# Patient Record
Sex: FEMALE | Race: WHITE | NOT HISPANIC OR LATINO | Employment: OTHER | URBAN - METROPOLITAN AREA
[De-identification: names, ages, dates, MRNs, and addresses within clinical notes are randomized per-mention and may not be internally consistent; named-entity substitution may affect disease eponyms.]

---

## 2017-04-06 ENCOUNTER — HOSPITAL ENCOUNTER (OUTPATIENT)
Dept: OTHER | Facility: HOSPITAL | Age: 67
Discharge: HOME | End: 2017-04-06
Attending: FAMILY MEDICINE | Admitting: FAMILY MEDICINE

## 2017-08-01 ENCOUNTER — HOSPITAL ENCOUNTER (OUTPATIENT)
Dept: OTHER | Facility: HOSPITAL | Age: 67
Discharge: HOME | End: 2017-08-01
Attending: FAMILY MEDICINE | Admitting: FAMILY MEDICINE

## 2017-11-22 ENCOUNTER — HOSPITAL ENCOUNTER (OUTPATIENT)
Dept: MAMMOGRAPHY | Facility: HOSPITAL | Age: 67
Discharge: HOME | End: 2017-11-22
Attending: OBSTETRICS & GYNECOLOGY | Admitting: OBSTETRICS & GYNECOLOGY

## 2018-01-03 ENCOUNTER — CONVERSION ENCOUNTER (OUTPATIENT)
Dept: FAMILY MEDICINE CLINIC | Facility: CLINIC | Age: 68
End: 2018-01-03

## 2018-01-03 LAB
BUN SERPL-MCNC: 15 MG/DL (ref 7–25)
BUN/CREAT SERPL: 16.7 (CALC) (ref 6–22)
CHLORIDE SERPL-SCNC: 102 MMOL/L (ref 98–110)
CONV CO2: 27 MMOL/L (ref 21–33)
CREAT UR-MCNC: 0.9 MG/DL (ref 0.6–1.18)
GLUCOSE UR QL: 94 MG/DL (ref 65–99)
POTASSIUM SERPL-SCNC: 4.4 MMOL/L (ref 3.5–5.3)
SODIUM SERPL-SCNC: 137 MMOL/L (ref 135–146)

## 2018-05-10 ENCOUNTER — HOSPITAL ENCOUNTER (OUTPATIENT)
Dept: GENERAL RADIOLOGY | Facility: HOSPITAL | Age: 68
Discharge: HOME | End: 2018-05-10
Attending: FAMILY MEDICINE | Admitting: FAMILY MEDICINE

## 2018-07-24 ENCOUNTER — CONVERSION ENCOUNTER (OUTPATIENT)
Dept: FAMILY MEDICINE CLINIC | Facility: CLINIC | Age: 68
End: 2018-07-24

## 2018-07-25 LAB
ALBUMIN SERPL-MCNC: 4.1 G/DL (ref 3.6–5.1)
ALP SERPL-CCNC: 34 U/L (ref 33–130)
AST SERPL-CCNC: 24 U/L (ref 10–35)
BILIRUB SERPL-MCNC: 0.5 MG/DL (ref 0.2–1.2)
BUN SERPL-MCNC: 15 MG/DL (ref 7–25)
BUN/CREAT SERPL: 13.6 (CALC) (ref 6–22)
CALCIUM SERPL-MCNC: 10.2 MG/DL (ref 8.6–10.2)
CHLORIDE SERPL-SCNC: 105 MMOL/L (ref 98–110)
CHOLEST SERPL-MCNC: 181 MG/DL (ref 125–200)
CONV CO2: 27 MMOL/L (ref 21–33)
CONV TOTAL PROTEIN: 6.6 G/DL (ref 6.2–8.3)
CREAT UR-MCNC: 1.1 MG/DL (ref 0.6–1.18)
EOSINOPHIL # BLD AUTO: 100 CELLS/UL (ref 15–500)
EOSINOPHIL # BLD AUTO: 2 %
ERYTHROCYTE [DISTWIDTH] IN BLOOD BY AUTOMATED COUNT: 13.9 % (ref 11–15)
GLOBULIN UR ELPH-MCNC: 2.5 MG/DL (ref 2.2–3.9)
GLUCOSE UR QL: 97 MG/DL (ref 65–99)
HCT VFR BLD AUTO: 38.2 % (ref 35–45)
HDLC SERPL-MCNC: 60 MG/DL
HGB BLD-MCNC: 12.8 G/DL (ref 11.7–15.5)
INSULIN SERPL-ACNC: 1.6 (CALC) (ref 1–2.1)
LDLC SERPL CALC-MCNC: 106 MG/DL
LYMPHOCYTES # BLD AUTO: 2200 CELLS/UL (ref 850–3900)
LYMPHOCYTES NFR BLD AUTO: 37 %
MCH RBC QN AUTO: 31.4 PG (ref 27–33)
MCHC RBC AUTO-ENTMCNC: 33.4 G/DL (ref 32–36)
MCV RBC AUTO: 93.8 FL (ref 80–100)
MONOCYTES # BLD AUTO: 700 CELLS/UL (ref 200–950)
MONOCYTES NFR BLD AUTO: 12 %
NEUTROPHILS # BLD AUTO: 2900 CELLS/UL (ref 1500–7800)
NEUTROPHILS NFR BLD AUTO: 49 %
PLATELET # BLD AUTO: 229 10*3/UL (ref 140–400)
PMV BLD AUTO: 10 FL (ref 7.5–11.5)
POTASSIUM SERPL-SCNC: 4.3 MMOL/L (ref 3.5–5.3)
RBC # BLD AUTO: 4.08 MILLION/UL (ref 3.8–5.1)
SODIUM SERPL-SCNC: 139 MMOL/L (ref 135–146)
TRIGL SERPL-MCNC: 77 MG/DL
TSH SERPL-ACNC: 3.37 MIU/L (ref 0.4–4.5)
WBC # BLD AUTO: 5.9 10*3/UL (ref 3.8–10.8)

## 2018-10-30 ENCOUNTER — HOSPITAL ENCOUNTER (OUTPATIENT)
Dept: OTHER | Facility: HOSPITAL | Age: 68
Discharge: HOME | End: 2018-10-30
Attending: FAMILY MEDICINE | Admitting: FAMILY MEDICINE

## 2018-12-04 ENCOUNTER — HOSPITAL ENCOUNTER (OUTPATIENT)
Dept: MAMMOGRAPHY | Facility: HOSPITAL | Age: 68
Discharge: HOME | End: 2018-12-04
Attending: OBSTETRICS & GYNECOLOGY | Admitting: OBSTETRICS & GYNECOLOGY

## 2019-02-12 ENCOUNTER — CONVERSION ENCOUNTER (OUTPATIENT)
Dept: FAMILY MEDICINE CLINIC | Facility: CLINIC | Age: 69
End: 2019-02-12

## 2019-02-13 LAB
ALBUMIN SERPL-MCNC: 4.4 G/DL (ref 3.6–5.1)
ALP SERPL-CCNC: 43 U/L (ref 33–130)
ALT SERPL-CCNC: 11 U/L (ref 6–29)
AST SERPL-CCNC: 18 U/L (ref 10–35)
BASOPHILS # BLD AUTO: 53 CELLS/UL (ref 0–200)
BASOPHILS NFR BLD AUTO: 0.6 %
BILIRUB SERPL-MCNC: 0.3 MG/DL (ref 0.2–1.2)
BUN SERPL-MCNC: 27 MG/DL (ref 7–25)
BUN/CREAT SERPL: 28 (CALC) (ref 6–22)
CALCIUM SERPL-MCNC: 9.8 MG/DL (ref 8.6–10.4)
CHLORIDE SERPL-SCNC: 104 MMOL/L (ref 98–110)
CONV CO2: 28 MMOL/L (ref 20–32)
CONV TOTAL PROTEIN: 7.2 G/DL (ref 6.1–8.1)
CREAT UR-MCNC: 0.96 MG/DL (ref 0.6–0.93)
EOSINOPHIL # BLD AUTO: 1.4 %
EOSINOPHIL # BLD AUTO: 123 CELLS/UL (ref 15–500)
ERYTHROCYTE [DISTWIDTH] IN BLOOD BY AUTOMATED COUNT: 13.1 % (ref 11–15)
GLOBULIN UR ELPH-MCNC: 2.8 MG/DL (ref 1.9–3.7)
GLUCOSE UR QL: 104 MG/DL (ref 65–99)
HCT VFR BLD AUTO: 37.9 % (ref 35–45)
HGB BLD-MCNC: 12.7 G/DL (ref 11.7–15.5)
INSULIN SERPL-ACNC: 1.6 (CALC) (ref 1–2.5)
LYMPHOCYTES # BLD AUTO: 2552 CELLS/UL (ref 850–3900)
LYMPHOCYTES NFR BLD AUTO: 29 %
MCH RBC QN AUTO: 30.6 PG (ref 27–33)
MCHC RBC AUTO-ENTMCNC: 33.5 G/DL (ref 32–36)
MCV RBC AUTO: 91.3 FL (ref 80–100)
MONOCYTES # BLD AUTO: 783 CELLS/UL (ref 200–950)
MONOCYTES NFR BLD AUTO: 8.9 %
NEUTROPHILS # BLD AUTO: 5289 CELLS/UL (ref 1500–7800)
NEUTROPHILS NFR BLD AUTO: 60.1 %
PLATELET # BLD AUTO: 269 10*3/UL (ref 140–400)
PMV BLD AUTO: 11.1 FL (ref 7.5–12.5)
POTASSIUM SERPL-SCNC: 4.6 MMOL/L (ref 3.5–5.3)
RBC # BLD AUTO: 4.15 MILLION/UL (ref 3.8–5.1)
SODIUM SERPL-SCNC: 138 MMOL/L (ref 135–146)
WBC # BLD AUTO: 8.8 10*3/UL (ref 3.8–10.8)

## 2019-07-12 RX ORDER — AMLODIPINE BESYLATE 2.5 MG/1
TABLET ORAL
Qty: 30 TABLET | Refills: 0 | Status: SHIPPED | OUTPATIENT
Start: 2019-07-12 | End: 2019-08-06 | Stop reason: SDUPTHER

## 2019-07-26 DIAGNOSIS — I10 HYPERTENSION, UNSPECIFIED TYPE: ICD-10-CM

## 2019-07-26 DIAGNOSIS — E78.5 HYPERLIPIDEMIA, UNSPECIFIED HYPERLIPIDEMIA TYPE: Primary | ICD-10-CM

## 2019-07-27 ENCOUNTER — RESULTS ENCOUNTER (OUTPATIENT)
Dept: FAMILY MEDICINE CLINIC | Facility: CLINIC | Age: 69
End: 2019-07-27

## 2019-07-27 DIAGNOSIS — E78.5 HYPERLIPIDEMIA, UNSPECIFIED HYPERLIPIDEMIA TYPE: ICD-10-CM

## 2019-07-27 DIAGNOSIS — I10 HYPERTENSION, UNSPECIFIED TYPE: ICD-10-CM

## 2019-08-06 ENCOUNTER — OFFICE VISIT (OUTPATIENT)
Dept: FAMILY MEDICINE CLINIC | Facility: CLINIC | Age: 69
End: 2019-08-06

## 2019-08-06 VITALS
HEIGHT: 62 IN | WEIGHT: 144.4 LBS | SYSTOLIC BLOOD PRESSURE: 128 MMHG | DIASTOLIC BLOOD PRESSURE: 80 MMHG | OXYGEN SATURATION: 96 % | RESPIRATION RATE: 17 BRPM | HEART RATE: 62 BPM | TEMPERATURE: 98.9 F | BODY MASS INDEX: 26.57 KG/M2

## 2019-08-06 DIAGNOSIS — R31.9 HEMATURIA, UNSPECIFIED TYPE: ICD-10-CM

## 2019-08-06 DIAGNOSIS — I10 HYPERTENSION, BENIGN: ICD-10-CM

## 2019-08-06 DIAGNOSIS — M81.0 AGE-RELATED OSTEOPOROSIS WITHOUT CURRENT PATHOLOGICAL FRACTURE: ICD-10-CM

## 2019-08-06 DIAGNOSIS — Z00.00 MEDICARE ANNUAL WELLNESS VISIT, SUBSEQUENT: Primary | ICD-10-CM

## 2019-08-06 DIAGNOSIS — E78.2 MIXED HYPERLIPIDEMIA: ICD-10-CM

## 2019-08-06 DIAGNOSIS — IMO0002 SOLITARY KIDNEY: ICD-10-CM

## 2019-08-06 DIAGNOSIS — N26.1 ATROPHIC KIDNEY: ICD-10-CM

## 2019-08-06 DIAGNOSIS — N30.10 IC (INTERSTITIAL CYSTITIS): ICD-10-CM

## 2019-08-06 DIAGNOSIS — N28.9 RENAL INSUFFICIENCY: ICD-10-CM

## 2019-08-06 PROBLEM — R15.0 INCOMPLETE DEFECATION: Status: ACTIVE | Noted: 2019-08-06

## 2019-08-06 PROBLEM — Z71.89 ENCOUNTER FOR COUNSELING FOR CARE MANAGEMENT OF PATIENT WITH CHRONIC CONDITIONS AND COMPLEX HEALTH NEEDS USING NURSE-BASED MODEL: Status: ACTIVE | Noted: 2019-08-06

## 2019-08-06 PROBLEM — B35.1 ONYCHOMYCOSIS: Status: ACTIVE | Noted: 2019-08-06

## 2019-08-06 PROBLEM — M54.6 THORACIC BACK PAIN: Status: ACTIVE | Noted: 2019-08-06

## 2019-08-06 PROBLEM — R00.2 PALPITATIONS: Status: ACTIVE | Noted: 2019-08-06

## 2019-08-06 PROBLEM — M94.9 DISORDER OF BONE AND CARTILAGE: Status: ACTIVE | Noted: 2019-08-06

## 2019-08-06 PROBLEM — L82.1 SEBORRHEIC KERATOSIS: Status: ACTIVE | Noted: 2019-08-06

## 2019-08-06 PROBLEM — M10.9 GOUT: Status: ACTIVE | Noted: 2019-08-06

## 2019-08-06 PROBLEM — I51.7 LVH (LEFT VENTRICULAR HYPERTROPHY): Status: ACTIVE | Noted: 2019-08-06

## 2019-08-06 PROBLEM — I34.0 MITRAL INSUFFICIENCY, ACUTE: Status: ACTIVE | Noted: 2019-08-06

## 2019-08-06 PROBLEM — E78.5 HYPERLIPIDEMIA: Status: ACTIVE | Noted: 2019-08-06

## 2019-08-06 PROBLEM — L91.8 SKIN TAG: Status: ACTIVE | Noted: 2019-08-06

## 2019-08-06 PROBLEM — I27.0 PRIMARY PULMONARY HYPERTENSION: Status: ACTIVE | Noted: 2019-08-06

## 2019-08-06 PROBLEM — F32.A DEPRESSIVE DISORDER: Status: ACTIVE | Noted: 2019-08-06

## 2019-08-06 PROBLEM — Z12.31 VISIT FOR SCREENING MAMMOGRAM: Status: ACTIVE | Noted: 2019-08-06

## 2019-08-06 PROBLEM — K21.9 ACID REFLUX: Status: ACTIVE | Noted: 2019-08-06

## 2019-08-06 PROBLEM — N95.1 MENOPAUSAL SYNDROME: Status: ACTIVE | Noted: 2019-08-06

## 2019-08-06 PROBLEM — M54.2 NECK PAIN: Status: ACTIVE | Noted: 2019-08-06

## 2019-08-06 PROBLEM — G56.00 CARPAL TUNNEL SYNDROME: Status: ACTIVE | Noted: 2019-08-06

## 2019-08-06 PROBLEM — M19.90 OSTEOARTHRITIS: Status: ACTIVE | Noted: 2019-08-06

## 2019-08-06 PROBLEM — K58.1 IRRITABLE BOWEL SYNDROME WITH CONSTIPATION: Status: ACTIVE | Noted: 2019-08-06

## 2019-08-06 PROBLEM — K64.9 HEMORRHOIDS: Status: ACTIVE | Noted: 2019-08-06

## 2019-08-06 PROBLEM — M72.2 PLANTAR FASCIITIS: Status: ACTIVE | Noted: 2019-08-06

## 2019-08-06 PROBLEM — J30.9 ALLERGIC RHINITIS: Status: ACTIVE | Noted: 2019-08-06

## 2019-08-06 PROBLEM — M89.9 DISORDER OF BONE AND CARTILAGE: Status: ACTIVE | Noted: 2019-08-06

## 2019-08-06 PROBLEM — Z12.11 SPECIAL SCREENING FOR MALIGNANT NEOPLASMS, COLON: Status: ACTIVE | Noted: 2019-08-06

## 2019-08-06 PROBLEM — R09.89 CAROTID BRUIT PRESENT: Status: ACTIVE | Noted: 2019-08-06

## 2019-08-06 PROBLEM — F41.9 ANXIETY: Status: ACTIVE | Noted: 2019-08-06

## 2019-08-06 LAB
BILIRUB BLD-MCNC: NEGATIVE MG/DL
CLARITY, POC: CLEAR
COLOR UR: YELLOW
GLUCOSE UR STRIP-MCNC: NEGATIVE MG/DL
KETONES UR QL: ABNORMAL
LEUKOCYTE EST, POC: ABNORMAL
NITRITE UR-MCNC: NEGATIVE MG/ML
PH UR: 6 [PH] (ref 5–8)
PROT UR STRIP-MCNC: ABNORMAL MG/DL
RBC # UR STRIP: ABNORMAL /UL
SP GR UR: 1.03 (ref 1–1.03)
UROBILINOGEN UR QL: NORMAL

## 2019-08-06 PROCEDURE — G0444 DEPRESSION SCREEN ANNUAL: HCPCS | Performed by: FAMILY MEDICINE

## 2019-08-06 PROCEDURE — G0439 PPPS, SUBSEQ VISIT: HCPCS | Performed by: FAMILY MEDICINE

## 2019-08-06 PROCEDURE — 99497 ADVNCD CARE PLAN 30 MIN: CPT | Performed by: FAMILY MEDICINE

## 2019-08-06 PROCEDURE — 96160 PT-FOCUSED HLTH RISK ASSMT: CPT | Performed by: FAMILY MEDICINE

## 2019-08-06 PROCEDURE — 99213 OFFICE O/P EST LOW 20 MIN: CPT | Performed by: FAMILY MEDICINE

## 2019-08-06 PROCEDURE — 81002 URINALYSIS NONAUTO W/O SCOPE: CPT | Performed by: FAMILY MEDICINE

## 2019-08-06 RX ORDER — ASPIRIN 81 MG/1
TABLET ORAL
COMMUNITY
Start: 2014-08-14 | End: 2021-04-14

## 2019-08-06 RX ORDER — ANTIARTHRITIC COMBINATION NO.2 900 MG
TABLET ORAL DAILY
COMMUNITY
End: 2022-11-23

## 2019-08-06 RX ORDER — TAMSULOSIN HYDROCHLORIDE 0.4 MG/1
1 CAPSULE ORAL DAILY
COMMUNITY

## 2019-08-06 RX ORDER — ALLOPURINOL 300 MG/1
TABLET ORAL
COMMUNITY
Start: 2019-06-24 | End: 2020-02-18

## 2019-08-06 RX ORDER — AMLODIPINE BESYLATE 2.5 MG/1
2.5 TABLET ORAL DAILY
Qty: 90 TABLET | Refills: 1 | Status: CANCELLED | OUTPATIENT
Start: 2019-08-06

## 2019-08-06 RX ORDER — METOPROLOL SUCCINATE 100 MG/1
1 TABLET, EXTENDED RELEASE ORAL DAILY
COMMUNITY
Start: 2019-05-20 | End: 2020-05-13

## 2019-08-06 RX ORDER — AMLODIPINE BESYLATE 2.5 MG/1
2.5 TABLET ORAL DAILY
Qty: 90 TABLET | Refills: 1 | Status: SHIPPED | OUTPATIENT
Start: 2019-08-06 | End: 2019-11-16 | Stop reason: SDUPTHER

## 2019-08-06 RX ORDER — CYCLOSPORINE 0.5 MG/ML
2 EMULSION OPHTHALMIC
COMMUNITY
Start: 2014-08-14 | End: 2020-12-28

## 2019-08-06 NOTE — PROGRESS NOTES
Subjective   Dunia Fabian is a 68 y.o. female.     Chief Complaint   Patient presents with   • Medicare Wellness-subsequent     last echo 5/10/2018 last stress test 11/27/2007   • Hyperlipidemia     last lipid 7/29/2019 and was elevated   • Follow-up     seen dr.carol berg on 4/18/2019 and had pap and mammogram       The patient is here: for coordination of medical care.  Last comprehensive physical was on 7/31/2018.  Patient's previous physician was Dr.John Driver.  Overall has: moderate activity with work/home activities, exercises 2 - 3 times per week, good appetite, feels well with minor complaints, good energy level and is sleeping well. Nutrition: appropriate balanced diet. Last tetanus shot was 10/24/2012. occasionally. Patient's last PAP Smear was: 4/18/2019. 8/1/2017. The Patient's last Mammogram was: 4/18/2019. Patient's last colonoscopy was: 6/21/2016.    Hyperlipidemia   This is a chronic problem. The current episode started more than 1 year ago. The problem is uncontrolled. Recent lipid tests were reviewed and are high. Pertinent negatives include no chest pain or shortness of breath.        Recent Hospitalizations:  No hospitalization(s) within the last year..  ccc    I personally reviewed and updated the patient's allergies, medications, problem list, and past medical, surgical, social, and family history.     Family History   Problem Relation Age of Onset   • Parkinsonism Mother    • Heart disease Mother         cardiovascular disease   • Diabetes Mother         diabetes mellitus    • Heart disease Father         cardiovascular disease   • Heart disease Sister         cardiovascular disease   • Diabetes Sister         diabetes mellitus    • Heart disease Brother         cardiovascular disease   • Diabetes Son         diabetes mellitus    • Heart disease Paternal Uncle         ischemic   • Stomach cancer Maternal Grandmother        Social History     Tobacco Use   • Smoking status: Never  Smoker   Substance Use Topics   • Alcohol use: No     Frequency: Never   • Drug use: No       Past Surgical History:   Procedure Laterality Date   • CATARACT EXTRACTION Left 08/2005    with Insert of Lens Prostheti   • CYSTOCELE REPAIR  06/1998    SIMENTAL Cystourethropexy & Cystocele Repair    • KIDNEY SURGERY Right 02/24/2014    Removal   • TONSILLECTOMY  1953   • URETHROLYSIS  10/2000    Transvaginal Urethrolysis & Bone [place] Sling   • VAGINAL HYSTERECTOMY  1970   • VITRECTOMY Left 04/2005    & Membrane Peeling       Patient Active Problem List   Diagnosis   • Acid reflux   • Allergic rhinitis   • Anxiety   • Atrophic kidney   • Carotid bruit present   • Carpal tunnel syndrome   • Low back pain   • Neck pain   • Thoracic back pain   • Depressive disorder   • Medicare annual wellness visit, subsequent   • Gout   • Hemorrhoids   • Hyperlipidemia   • Hypertension, benign   • IC (interstitial cystitis)   • Irritable bowel syndrome with constipation   • LVH (left ventricular hypertrophy)   • Menopausal syndrome   • Mitral insufficiency, acute   • Onychomycosis   • Disorder of bone and cartilage   • Osteoporosis   • Palpitations   • Osteoarthritis   • Primary pulmonary hypertension (CMS/HCC)   • Renal insufficiency   • Plantar fasciitis   • Senile osteoporosis   • Solitary kidney   • Visit for screening mammogram   • Special screening for malignant neoplasms, colon   • Skin tag   • Incomplete defecation   • Seborrheic keratosis   • Hematuria         Current Outpatient Medications:   •  allopurinol (ZYLOPRIM) 300 MG tablet, , Disp: , Rfl:   •  amLODIPine (NORVASC) 2.5 MG tablet, Take 1 tablet by mouth Daily., Disp: 90 tablet, Rfl: 1  •  aspirin (ADULT ASPIRIN EC LOW STRENGTH) 81 MG EC tablet, ADULT ASPIRIN EC LOW STRENGTH 81 MG ORAL TABLET DELAYED RELEASE, Disp: , Rfl:   •  cycloSPORINE (RESTASIS) 0.05 % ophthalmic emulsion, 2 drops., Disp: , Rfl:   •  denosumab (PROLIA) 60 MG/ML solution prefilled syringe syringe, Inject  " under the skin into the appropriate area as directed., Disp: , Rfl:   •  metoprolol succinate XL (TOPROL-XL) 100 MG 24 hr tablet, Take 1 tablet by mouth Daily., Disp: , Rfl:   •  Multiple Vitamins-Minerals (WOMENS MULTI) capsule, Take  by mouth Daily., Disp: , Rfl:   •  SUPER B COMPLEX/C capsule, SUPER B COMPLEX/C ORAL CAPSULE, Disp: , Rfl:   •  tamsulosin (FLOMAX) 0.4 MG capsule 24 hr capsule, Take 1 capsule by mouth Daily., Disp: , Rfl:          Review of Systems   Constitutional: Negative for chills and diaphoresis.   HENT: Negative for trouble swallowing and voice change.    Eyes: Negative for visual disturbance.   Respiratory: Negative for shortness of breath.    Cardiovascular: Negative for chest pain and palpitations.   Gastrointestinal: Negative for abdominal pain and nausea.   Endocrine: Negative for polydipsia and polyphagia.   Genitourinary: Negative for hematuria.   Musculoskeletal: Negative for neck stiffness.   Skin: Negative for color change and pallor.   Allergic/Immunologic: Negative for immunocompromised state.   Neurological: Negative for seizures and syncope.   Hematological: Negative for adenopathy.   Psychiatric/Behavioral: Negative for sleep disturbance and suicidal ideas.       Objective   /80   Pulse 62   Temp 98.9 °F (37.2 °C)   Resp 17   Ht 156.2 cm (61.5\")   Wt 65.5 kg (144 lb 6.4 oz)   SpO2 96%   BMI 26.84 kg/m²   BP Readings from Last 3 Encounters:   08/06/19 128/80   02/19/19 142/62   10/30/18 122/64     Wt Readings from Last 3 Encounters:   08/06/19 65.5 kg (144 lb 6.4 oz)   02/19/19 65.8 kg (144 lb 16 oz)   10/30/18 65 kg (143 lb 6.4 oz)     Physical Exam   Constitutional: She is oriented to person, place, and time. She appears well-developed and well-nourished.   HENT:   Head: Normocephalic.   Right Ear: Tympanic membrane, external ear and ear canal normal.   Left Ear: Tympanic membrane, external ear and ear canal normal.   Nose: Nose normal.   Eyes: Conjunctivae, EOM " and lids are normal. Pupils are equal, round, and reactive to light.   Neck: No JVD present. Carotid bruit is not present. No tracheal deviation present. No thyromegaly present.   Cardiovascular: Normal rate, regular rhythm, normal heart sounds and intact distal pulses. Exam reveals no gallop and no friction rub.   No murmur heard.  Pulmonary/Chest: Effort normal and breath sounds normal. No stridor. She has no decreased breath sounds. She has no wheezes. She has no rales.   Abdominal: Soft. Bowel sounds are normal. She exhibits no distension and no mass. There is no tenderness. There is no rebound and no guarding. No hernia.   Lymphadenopathy:        Head (right side): No submental, no submandibular, no tonsillar, no preauricular, no posterior auricular and no occipital adenopathy present.        Head (left side): No submental, no submandibular, no tonsillar, no preauricular, no posterior auricular and no occipital adenopathy present.     She has no cervical adenopathy.   Neurological: She is alert and oriented to person, place, and time. She has normal strength and normal reflexes. No cranial nerve deficit or sensory deficit. Coordination and gait normal.   Skin: Skin is warm and dry. Turgor is normal. She is not diaphoretic. No pallor.   Seborrheic keratosis neck, benign appearance       Recent Lab Results:          Lab Results   Component Value Date    CHOL 181 07/24/2018    CHOL 215 (H) 07/21/2017    CHOL 198 07/20/2016     Lab Results   Component Value Date    TRIG 77 07/24/2018    TRIG 96 07/21/2017    TRIG 105 07/20/2016     Lab Results   Component Value Date    HDL 60 07/24/2018    HDL 62 07/21/2017    HDL 60 07/20/2016     Lab Results   Component Value Date     07/24/2018     (H) 07/21/2017     07/20/2016     No results found for: PSA  Lab Results   Component Value Date    WBC 8.8 02/12/2019    HGB 12.7 02/12/2019    HCT 37.9 02/12/2019    MCV 91.3 02/12/2019     02/12/2019      Lab Results   Component Value Date    TSH 3.37 07/24/2018     Lab Results   Component Value Date    BUN 27 (H) 02/12/2019    CREATININE 0.96 (H) 02/12/2019    EGFRIFNONA 60 02/12/2019    EGFRIFAFRI 69 02/12/2019    BCR 28 (H) 02/12/2019    K 4.6 02/12/2019    CO2 28 02/12/2019    CALCIUM 9.8 02/12/2019    ALBUMIN 4.4 02/12/2019    AST 18 02/12/2019    ALT 11 02/12/2019         Age-appropriate Screening Schedule:  Refer to the list below for future screening recommendations based on patient's age, sex and/or medical conditions. Orders for these recommended tests are listed in the plan section. The patient has been provided with a written plan.    Health Maintenance   Topic Date Due   • ZOSTER VACCINE (1 of 2) 02/07/2019   • LIPID PANEL  07/26/2019   • INFLUENZA VACCINE  08/01/2019   • DXA SCAN  08/27/2019   • PNEUMOCOCCAL VACCINES (65+ LOW/MEDIUM RISK) (2 of 2 - PPSV23) 08/31/2019   • TDAP/TD VACCINES (3 - Td) 10/24/2022   • COLONOSCOPY  06/21/2026       Depression Screen:   PHQ-2/PHQ-9 Depression Screening 8/6/2019   Little interest or pleasure in doing things 0   Feeling down, depressed, or hopeless 0   Trouble falling or staying asleep, or sleeping too much 0   Feeling tired or having little energy 0   Poor appetite or overeating 0   Feeling bad about yourself - or that you are a failure or have let yourself or your family down 0   Trouble concentrating on things, such as reading the newspaper or watching television 0   Moving or speaking so slowly that other people could have noticed. Or the opposite - being so fidgety or restless that you have been moving around a lot more than usual 0   Thoughts that you would be better off dead, or of hurting yourself in some way 0   Total Score 0   If you checked off any problems, how difficult have these problems made it for you to do your work, take care of things at home, or get along with other people? Not difficult at all     I spent more than 16 minutes asking  patient questions, counseling and documenting in the chart.    Health Habits and Functional and Cognitive Screening:  Functional & Cognitive Status 8/6/2019   Do you have difficulty preparing food and eating? No   Do you have difficulty bathing yourself, getting dressed or grooming yourself? No   Do you have difficulty using the toilet? No   Do you have difficulty moving around from place to place? No   Do you have trouble with steps or getting out of a bed or a chair? No   Current Diet Well Balanced Diet   Dental Exam Up to date   Eye Exam Up to date   Exercise (times per week) 6 times per week   Current Exercise Activities Include Running/Jogging   Do you need help using the phone?  No   Are you deaf or do you have serious difficulty hearing?  No   Do you need help with transportation? No   Do you need help shopping? No   Do you need help preparing meals?  No   Do you need help with housework?  No   Do you need help with laundry? No   Do you need help taking your medications? No   Do you need help managing money? No   Do you ever drive or ride in a car without wearing a seat belt? No   Have you felt unusual stress, anger or loneliness in the last month? No   Who do you live with? Alone   If you need help, do you have trouble finding someone available to you? No   Have you been bothered in the last four weeks by sexual problems? No   Do you have difficulty concentrating, remembering or making decisions? No       Does the patient have evidence of cognitive impairment? No    Advance Care Planning:  Patient does have a living will and will provide us with that.     A face-to-face visit was completed today with patient.  Counseling explanation, and discussion of advanced directives was performed.   The last advanced care visit was performed in 2018.  In a near life ending situation, from which she is not expected to recover functionally, and she is not able to speak for her, she does not want life sustaining measures.  We discussed feeding tubes, ventilators and cardiac support as well as dialysis.    I spent more than 16 minutes discussing Advanced Care Planning information and documenting in the chart.    Identification of Risk Factors:  Risk factors include: Advance Directive Discussion  Cardiovascular risk  Dementia/Memory   Depression/Dysphoria  Fall Risk  Osteoprorosis Risk.    Compared to one year ago, the patient feels her physical health is better.  Compared to one year ago, the patient feels her mental health is better.    Patient Self-Management and Personalized Health Advice  The patient has been provided with information about: diet, exercise, weight management, prevention of cardiac or vascular disease, the relationship between weight and GERD, fall prevention, designing advance directives, supplements and mental health concerns and preventive services including:   · Alcohol Misuse Screening and Counseling  (15 minutes counseling time, Code )  · Annual Wellness Visit (AWV)  · Bone Density Measurements  · Counseling to Prevent Tobacco Use (use of smartset and @cessation@ smartphrase for documentation)  · Depression Screening (15 minutes face to face, Code )  · Influenza Vaccine and Administration  · Pneumococcal Vaccine and Administration.      Assessment/Plan   Problem List Items Addressed This Visit        Cardiovascular and Mediastinum    Hyperlipidemia    Overview     Worse, has not been working on diet, restart  Overall good control  Discussed diet, exercise, lifestyle modification.          Hypertension, benign    Overview     good control  Discussed low sodium diet, lifestyle modification.  good control  holding lisinopril secondary to renal insufficiency  Echo 5/18 with normal ef, increased r sided pressure  Carotid dopplers 5.18 with midl blockage only         Relevant Medications    metoprolol succinate XL (TOPROL-XL) 100 MG 24 hr tablet    amLODIPine (NORVASC) 2.5 MG tablet       Musculoskeletal  and Integument    Osteoporosis    Overview     Tolerating prolia  Discussed diet, exercise, lifestyle modification.  holding calcium, vit d per nephrology  recheck dexa            Genitourinary    Atrophic kidney    Overview     improve status post nephrectomy  followed by urology         IC (interstitial cystitis)    Overview     improved on flomax, followed by [facility], change rx to qhs  h/o interstim placement / subsequent removal  h/o improved on rx per morena, pt  Discussed diet, lifestyle modification.   followed by Zulema / Ernie, s/p recentt removal interstim 2nd inneffective         Relevant Medications    tamsulosin (FLOMAX) 0.4 MG capsule 24 hr capsule    Renal insufficiency    Overview     mild / stable / improved  s/p left nephrectomy bell  avoid nsaids, improved off lisinopril / hctz / pyridium  no blood draws in right arm         Solitary kidney    Overview     doing well, renal f negative normal  avoid nsaids / continue to monitor            Other    Medicare annual wellness visit, subsequent - Primary    Overview     Doing well, vaccines current  Followed by obgyn  Age specific anticipatory guidance and warning signs discussed.  Diet, exercise, and lifestyle modification discussed.  Safety, seatbelts, and routine screenbng examinations discussed.  Discussed self-examinations.          Relevant Orders    POCT urinalysis dipstick, manual (Completed)    Hematuria    Relevant Orders    Urine Culture - Urine, Urine, Clean Catch              Expected course, medications, and adverse effects discussed.  Call or return if worsening or persistent symptoms.

## 2019-08-09 LAB
BACTERIA UR CULT: ABNORMAL
BACTERIA UR CULT: ABNORMAL
OTHER ANTIBIOTIC SUSC ISLT: ABNORMAL

## 2019-08-13 ENCOUNTER — TELEPHONE (OUTPATIENT)
Dept: FAMILY MEDICINE CLINIC | Facility: CLINIC | Age: 69
End: 2019-08-13

## 2019-08-13 DIAGNOSIS — R15.9 INCONTINENCE OF FECES, UNSPECIFIED FECAL INCONTINENCE TYPE: Primary | ICD-10-CM

## 2019-08-13 RX ORDER — NITROFURANTOIN 25; 75 MG/1; MG/1
100 CAPSULE ORAL 2 TIMES DAILY
Qty: 20 CAPSULE | Refills: 0 | Status: SHIPPED | OUTPATIENT
Start: 2019-08-13 | End: 2020-07-16

## 2019-08-13 NOTE — TELEPHONE ENCOUNTER
Let her know her urine culture is growing out bacteria indicating she does have a urinary tract infection, I sent an antibiotic for her, thanks

## 2019-08-14 ENCOUNTER — OFFICE (AMBULATORY)
Dept: URBAN - METROPOLITAN AREA CLINIC 64 | Facility: CLINIC | Age: 69
End: 2019-08-14

## 2019-08-14 VITALS
HEART RATE: 55 BPM | DIASTOLIC BLOOD PRESSURE: 67 MMHG | HEIGHT: 61 IN | SYSTOLIC BLOOD PRESSURE: 146 MMHG | WEIGHT: 146 LBS

## 2019-08-14 DIAGNOSIS — R15.9 FULL INCONTINENCE OF FECES: ICD-10-CM

## 2019-08-14 DIAGNOSIS — R19.7 DIARRHEA, UNSPECIFIED: ICD-10-CM

## 2019-08-14 PROCEDURE — 99203 OFFICE O/P NEW LOW 30 MIN: CPT | Performed by: INTERNAL MEDICINE

## 2019-08-14 RX ORDER — LOPERAMIDE HYDROCHLORIDE 2 MG/1
2 TABLET, FILM COATED ORAL
Qty: 30 | Refills: 10 | Status: COMPLETED
Start: 2019-08-14 | End: 2024-02-27

## 2019-11-06 ENCOUNTER — OFFICE VISIT (OUTPATIENT)
Dept: FAMILY MEDICINE CLINIC | Facility: CLINIC | Age: 69
End: 2019-11-06

## 2019-11-06 VITALS
HEART RATE: 65 BPM | TEMPERATURE: 98 F | BODY MASS INDEX: 27.86 KG/M2 | HEIGHT: 62 IN | SYSTOLIC BLOOD PRESSURE: 152 MMHG | RESPIRATION RATE: 18 BRPM | DIASTOLIC BLOOD PRESSURE: 80 MMHG | OXYGEN SATURATION: 95 % | WEIGHT: 151.4 LBS

## 2019-11-06 DIAGNOSIS — I10 HYPERTENSION, UNSPECIFIED TYPE: Primary | ICD-10-CM

## 2019-11-06 DIAGNOSIS — M81.0 AGE-RELATED OSTEOPOROSIS WITHOUT CURRENT PATHOLOGICAL FRACTURE: ICD-10-CM

## 2019-11-06 DIAGNOSIS — N26.1 ATROPHIC KIDNEY: ICD-10-CM

## 2019-11-06 DIAGNOSIS — N30.10 IC (INTERSTITIAL CYSTITIS): ICD-10-CM

## 2019-11-06 PROCEDURE — 99214 OFFICE O/P EST MOD 30 MIN: CPT | Performed by: FAMILY MEDICINE

## 2019-11-06 NOTE — PROGRESS NOTES
Subjective   Dunia Fabian is a 69 y.o. female.     Chief Complaint   Patient presents with   • Hypertension       Hypertension   This is a chronic problem. The current episode started more than 1 year ago. The problem is controlled. Associated symptoms include neck pain. Pertinent negatives include no chest pain, palpitations or shortness of breath. Risk factors for coronary artery disease include dyslipidemia. Current antihypertension treatment includes beta blockers and angiotensin blockers. The current treatment provides moderate improvement. There are no compliance problems.             I personally reviewed and updated the patient's allergies, medications, problem list, and past medical, surgical, social, and family history.     Family History   Problem Relation Age of Onset   • Parkinsonism Mother    • Heart disease Mother         cardiovascular disease   • Diabetes Mother         diabetes mellitus    • Heart disease Father         cardiovascular disease   • Heart disease Sister         cardiovascular disease   • Diabetes Sister         diabetes mellitus    • Heart disease Brother         cardiovascular disease   • Diabetes Son         diabetes mellitus    • Heart disease Paternal Uncle         ischemic   • Stomach cancer Maternal Grandmother        Social History     Tobacco Use   • Smoking status: Never Smoker   • Smokeless tobacco: Never Used   Substance Use Topics   • Alcohol use: No     Frequency: Never   • Drug use: No       Past Surgical History:   Procedure Laterality Date   • CATARACT EXTRACTION Left 08/2005    with Insert of Lens Prostheti   • CYSTOCELE REPAIR  06/1998    SIMENTAL Cystourethropexy & Cystocele Repair    • KIDNEY SURGERY Right 02/24/2014    Removal   • TONSILLECTOMY  1953   • URETHROLYSIS  10/2000    Transvaginal Urethrolysis & Bone [place] Sling   • VAGINAL HYSTERECTOMY  1970   • VITRECTOMY Left 04/2005    & Membrane Peeling       Patient Active Problem List   Diagnosis   • Acid  reflux   • Allergic rhinitis   • Anxiety   • Atrophic kidney   • Carotid bruit present   • Carpal tunnel syndrome   • Low back pain   • Neck pain   • Thoracic back pain   • Depressive disorder   • Medicare annual wellness visit, subsequent   • Gout   • Hemorrhoids   • Hyperlipidemia   • Hypertension, benign   • IC (interstitial cystitis)   • Irritable bowel syndrome with constipation   • LVH (left ventricular hypertrophy)   • Menopausal syndrome   • Mitral insufficiency, acute   • Onychomycosis   • Disorder of bone and cartilage   • Osteoporosis   • Palpitations   • Osteoarthritis   • Primary pulmonary hypertension (CMS/HCC)   • Renal insufficiency   • Plantar fasciitis   • Senile osteoporosis   • Solitary kidney   • Visit for screening mammogram   • Special screening for malignant neoplasms, colon   • Skin tag   • Incomplete defecation   • Seborrheic keratosis   • Hematuria         Current Outpatient Medications:   •  allopurinol (ZYLOPRIM) 300 MG tablet, , Disp: , Rfl:   •  amLODIPine (NORVASC) 2.5 MG tablet, Take 1 tablet by mouth Daily., Disp: 90 tablet, Rfl: 1  •  aspirin (ADULT ASPIRIN EC LOW STRENGTH) 81 MG EC tablet, ADULT ASPIRIN EC LOW STRENGTH 81 MG ORAL TABLET DELAYED RELEASE, Disp: , Rfl:   •  cycloSPORINE (RESTASIS) 0.05 % ophthalmic emulsion, 2 drops., Disp: , Rfl:   •  denosumab (PROLIA) 60 MG/ML solution prefilled syringe syringe, Inject  under the skin into the appropriate area as directed., Disp: , Rfl:   •  metoprolol succinate XL (TOPROL-XL) 100 MG 24 hr tablet, Take 1 tablet by mouth Daily., Disp: , Rfl:   •  Multiple Vitamins-Minerals (WOMENS MULTI) capsule, Take  by mouth Daily., Disp: , Rfl:   •  nitrofurantoin, macrocrystal-monohydrate, (MACROBID) 100 MG capsule, Take 1 capsule by mouth 2 (Two) Times a Day., Disp: 20 capsule, Rfl: 0  •  SUPER B COMPLEX/C capsule, SUPER B COMPLEX/C ORAL CAPSULE, Disp: , Rfl:   •  tamsulosin (FLOMAX) 0.4 MG capsule 24 hr capsule, Take 1 capsule by mouth  "Daily., Disp: , Rfl:          Review of Systems   Constitutional: Negative for chills and diaphoresis.   Eyes: Negative for visual disturbance.   Respiratory: Negative for shortness of breath.    Cardiovascular: Negative for chest pain and palpitations.   Gastrointestinal: Negative for abdominal pain and nausea.   Endocrine: Negative for polydipsia and polyphagia.   Musculoskeletal: Positive for neck pain. Negative for neck stiffness.   Skin: Negative for color change and pallor.   Neurological: Negative for seizures and syncope.   Hematological: Negative for adenopathy.       Objective   /80   Pulse 65   Temp 98 °F (36.7 °C)   Resp 18   Ht 156.2 cm (61.5\")   Wt 68.7 kg (151 lb 6.4 oz)   SpO2 95%   Breastfeeding? No   BMI 28.14 kg/m²   Wt Readings from Last 3 Encounters:   11/06/19 68.7 kg (151 lb 6.4 oz)   08/06/19 65.5 kg (144 lb 6.4 oz)   02/19/19 65.8 kg (144 lb 16 oz)     Physical Exam   Constitutional: She is oriented to person, place, and time. She appears well-developed and well-nourished.   Cardiovascular: Normal rate, regular rhythm, S1 normal, S2 normal, normal heart sounds, intact distal pulses and normal pulses. Exam reveals no gallop and no friction rub.   No murmur heard.  Pulmonary/Chest: Effort normal and breath sounds normal. No accessory muscle usage or stridor. She has no decreased breath sounds. She has no wheezes. She has no rhonchi. She has no rales.   Abdominal: Soft. Normal appearance, normal aorta and bowel sounds are normal. She exhibits no distension, no pulsatile midline mass and no mass. There is no hepatosplenomegaly. There is no tenderness. There is no rigidity, no rebound, no guarding, no CVA tenderness and negative Diego's sign. No hernia.   Neurological: She is alert and oriented to person, place, and time. Coordination and gait normal.   Skin: Skin is warm and dry. Turgor is normal. She is not diaphoretic. No pallor.         Assessment/Plan     Hypertension.  Good " control.  Discussed low-sodium diet.  Stress echo benign/elevated right-sided pressure only 2018.  Carotid Dopplers with mild blockage only 2018.  Osteoporosis.  Tolerating Prolia.  Holding calcium/vitamin D per nephrology.  Follow-up recheck DEXA  Atrophic kidney.  Improved status post nephrectomy, followed by urology.  Renal function normal.  Interstitial cystitis.  Improved on Flomax, will be followed by [facility], history of InterStim placement/subsequent removal.  Problem List Items Addressed This Visit        Musculoskeletal and Integument    Osteoporosis       Genitourinary    Atrophic kidney    IC (interstitial cystitis)      Other Visit Diagnoses     Hypertension, unspecified type    -  Primary              Expected course, medications, and adverse effects discussed.  Call or return if worsening or persistent symptoms.

## 2019-11-16 DIAGNOSIS — I10 HYPERTENSION, BENIGN: ICD-10-CM

## 2019-11-18 DIAGNOSIS — I10 HYPERTENSION, BENIGN: ICD-10-CM

## 2019-11-18 DIAGNOSIS — I10 HYPERTENSION, BENIGN: Primary | ICD-10-CM

## 2019-11-18 RX ORDER — AMLODIPINE BESYLATE 2.5 MG/1
2.5 TABLET ORAL DAILY
Qty: 90 TABLET | Refills: 0 | Status: SHIPPED | OUTPATIENT
Start: 2019-11-18 | End: 2020-01-15 | Stop reason: SDUPTHER

## 2019-11-18 RX ORDER — CLONIDINE HYDROCHLORIDE 0.1 MG/1
TABLET ORAL
Qty: 60 TABLET | Refills: 5 | Status: SHIPPED | OUTPATIENT
Start: 2019-11-18 | End: 2019-11-18 | Stop reason: SDUPTHER

## 2019-11-19 RX ORDER — CLONIDINE HYDROCHLORIDE 0.1 MG/1
TABLET ORAL
Qty: 771 TABLET | Refills: 5 | Status: SHIPPED | OUTPATIENT
Start: 2019-11-19 | End: 2020-01-15

## 2019-11-25 ENCOUNTER — TRANSCRIBE ORDERS (OUTPATIENT)
Dept: ADMINISTRATIVE | Facility: HOSPITAL | Age: 69
End: 2019-11-25

## 2019-11-25 DIAGNOSIS — Z12.31 ENCOUNTER FOR SCREENING MAMMOGRAM FOR BREAST CANCER: Primary | ICD-10-CM

## 2019-12-03 ENCOUNTER — TELEPHONE (OUTPATIENT)
Dept: FAMILY MEDICINE CLINIC | Facility: CLINIC | Age: 69
End: 2019-12-03

## 2019-12-03 NOTE — TELEPHONE ENCOUNTER
Called pt and let her know we do have the Prolia and that she can come in and get it when she is ready.

## 2019-12-04 ENCOUNTER — CLINICAL SUPPORT (OUTPATIENT)
Dept: FAMILY MEDICINE CLINIC | Facility: CLINIC | Age: 69
End: 2019-12-04

## 2019-12-04 DIAGNOSIS — M81.0 AGE-RELATED OSTEOPOROSIS WITHOUT CURRENT PATHOLOGICAL FRACTURE: Primary | ICD-10-CM

## 2019-12-04 PROCEDURE — 96372 THER/PROPH/DIAG INJ SC/IM: CPT | Performed by: FAMILY MEDICINE

## 2019-12-10 ENCOUNTER — HOSPITAL ENCOUNTER (OUTPATIENT)
Dept: MAMMOGRAPHY | Facility: HOSPITAL | Age: 69
Discharge: HOME | End: 2019-12-10
Admitting: OBSTETRICS & GYNECOLOGY

## 2019-12-10 DIAGNOSIS — Z12.31 ENCOUNTER FOR SCREENING MAMMOGRAM FOR BREAST CANCER: ICD-10-CM

## 2019-12-10 PROCEDURE — 77067 SCR MAMMO BI INCL CAD: CPT

## 2019-12-10 PROCEDURE — 77063 BREAST TOMOSYNTHESIS BI: CPT

## 2020-01-15 ENCOUNTER — OFFICE VISIT (OUTPATIENT)
Dept: FAMILY MEDICINE CLINIC | Facility: CLINIC | Age: 70
End: 2020-01-15

## 2020-01-15 VITALS
WEIGHT: 152.4 LBS | OXYGEN SATURATION: 97 % | HEART RATE: 65 BPM | TEMPERATURE: 98.2 F | HEIGHT: 62 IN | DIASTOLIC BLOOD PRESSURE: 76 MMHG | SYSTOLIC BLOOD PRESSURE: 158 MMHG | RESPIRATION RATE: 16 BRPM | BODY MASS INDEX: 28.05 KG/M2

## 2020-01-15 DIAGNOSIS — N30.01 ACUTE CYSTITIS WITH HEMATURIA: ICD-10-CM

## 2020-01-15 DIAGNOSIS — J06.9 VIRAL UPPER RESPIRATORY TRACT INFECTION: Primary | ICD-10-CM

## 2020-01-15 DIAGNOSIS — I10 HYPERTENSION, BENIGN: ICD-10-CM

## 2020-01-15 DIAGNOSIS — R30.0 DYSURIA: ICD-10-CM

## 2020-01-15 DIAGNOSIS — N30.10 IC (INTERSTITIAL CYSTITIS): ICD-10-CM

## 2020-01-15 LAB
BILIRUB BLD-MCNC: NEGATIVE MG/DL
CLARITY, POC: ABNORMAL
COLOR UR: YELLOW
EXPIRATION DATE: NORMAL
FLUAV AG NPH QL: NEGATIVE
FLUBV AG NPH QL: NEGATIVE
GLUCOSE UR STRIP-MCNC: NEGATIVE MG/DL
INTERNAL CONTROL: NORMAL
KETONES UR QL: ABNORMAL
LEUKOCYTE EST, POC: ABNORMAL
Lab: NORMAL
NITRITE UR-MCNC: NEGATIVE MG/ML
PH UR: 6.5 [PH] (ref 5–8)
PROT UR STRIP-MCNC: NEGATIVE MG/DL
RBC # UR STRIP: ABNORMAL /UL
SP GR UR: 1 (ref 1–1.03)
UROBILINOGEN UR QL: NORMAL

## 2020-01-15 PROCEDURE — 87804 INFLUENZA ASSAY W/OPTIC: CPT | Performed by: FAMILY MEDICINE

## 2020-01-15 PROCEDURE — 81002 URINALYSIS NONAUTO W/O SCOPE: CPT | Performed by: FAMILY MEDICINE

## 2020-01-15 PROCEDURE — 99214 OFFICE O/P EST MOD 30 MIN: CPT | Performed by: FAMILY MEDICINE

## 2020-01-15 RX ORDER — FLUCONAZOLE 150 MG/1
150 TABLET ORAL ONCE
Qty: 1 TABLET | Refills: 0 | Status: SHIPPED | OUTPATIENT
Start: 2020-01-15 | End: 2020-01-15

## 2020-01-15 RX ORDER — CEFPROZIL 500 MG/1
500 TABLET, FILM COATED ORAL 2 TIMES DAILY
Qty: 20 TABLET | Refills: 0 | Status: SHIPPED | OUTPATIENT
Start: 2020-01-15 | End: 2020-07-16

## 2020-01-15 RX ORDER — CLONIDINE HYDROCHLORIDE 0.1 MG/1
TABLET ORAL
Qty: 120 TABLET | Refills: 0
Start: 2020-01-15 | End: 2020-07-16

## 2020-01-15 RX ORDER — AMLODIPINE BESYLATE 5 MG/1
5 TABLET ORAL DAILY
Qty: 30 TABLET | Refills: 11 | Status: SHIPPED | OUTPATIENT
Start: 2020-01-15 | End: 2020-01-15

## 2020-01-15 RX ORDER — AMLODIPINE BESYLATE 5 MG/1
5 TABLET ORAL DAILY
Qty: 90 TABLET | Refills: 2 | Status: SHIPPED | OUTPATIENT
Start: 2020-01-15 | End: 2020-06-03 | Stop reason: SDUPTHER

## 2020-01-15 NOTE — PROGRESS NOTES
Subjective   Dunia Fabian is a 69 y.o. female.     Chief Complaint   Patient presents with   • URI       URI    This is a new problem. The current episode started in the past 7 days. The problem has been unchanged. There has been no fever. Associated symptoms include congestion, coughing, dysuria, ear pain, headaches, a plugged ear sensation, sneezing and a sore throat.   Flank Pain   This is a new problem. The current episode started in the past 7 days. The problem occurs constantly. The problem has been gradually worsening since onset. The quality of the pain is described as aching and burning. The pain does not radiate. Associated symptoms include dysuria and headaches.            I personally reviewed and updated the patient's allergies, medications, problem list, and past medical, surgical, social, and family history.     Family History   Problem Relation Age of Onset   • Parkinsonism Mother    • Heart disease Mother         cardiovascular disease   • Diabetes Mother         diabetes mellitus    • Heart disease Father         cardiovascular disease   • Heart disease Sister         cardiovascular disease   • Diabetes Sister         diabetes mellitus    • Heart disease Brother         cardiovascular disease   • Diabetes Son         diabetes mellitus    • Heart disease Paternal Uncle         ischemic   • Stomach cancer Maternal Grandmother    • Breast cancer Niece 39       Social History     Tobacco Use   • Smoking status: Never Smoker   • Smokeless tobacco: Never Used   Substance Use Topics   • Alcohol use: No     Frequency: Never   • Drug use: No       Past Surgical History:   Procedure Laterality Date   • CATARACT EXTRACTION Left 08/2005    with Insert of Lens Prostheti   • CYSTOCELE REPAIR  06/1998    SIMENTAL Cystourethropexy & Cystocele Repair    • KIDNEY SURGERY Right 02/24/2014    Removal   • TONSILLECTOMY  1953   • URETHROLYSIS  10/2000    Transvaginal Urethrolysis & Bone [place] Sling   • VAGINAL  HYSTERECTOMY  1970   • VITRECTOMY Left 04/2005    & Membrane Peeling       Patient Active Problem List   Diagnosis   • Acid reflux   • Allergic rhinitis   • Anxiety   • Atrophic kidney   • Carotid bruit present   • Carpal tunnel syndrome   • Low back pain   • Neck pain   • Thoracic back pain   • Depressive disorder   • Medicare annual wellness visit, subsequent   • Gout   • Hemorrhoids   • Hyperlipidemia   • Hypertension, benign   • IC (interstitial cystitis)   • Irritable bowel syndrome with constipation   • LVH (left ventricular hypertrophy)   • Menopausal syndrome   • Mitral insufficiency, acute   • Onychomycosis   • Disorder of bone and cartilage   • Osteoporosis   • Palpitations   • Osteoarthritis   • Primary pulmonary hypertension (CMS/HCC)   • Renal insufficiency   • Plantar fasciitis   • Senile osteoporosis   • Solitary kidney   • Visit for screening mammogram   • Special screening for malignant neoplasms, colon   • Skin tag   • Incomplete defecation   • Seborrheic keratosis   • Hematuria   • Chronic kidney disease, stage 3 (moderate) (CMS/HCC)   • Renal hypertrophy   • Hypercalcemia   • Viral upper respiratory tract infection   • Dysuria   • Acute cystitis with hematuria         Current Outpatient Medications:   •  allopurinol (ZYLOPRIM) 300 MG tablet, , Disp: , Rfl:   •  aspirin (ADULT ASPIRIN EC LOW STRENGTH) 81 MG EC tablet, ADULT ASPIRIN EC LOW STRENGTH 81 MG ORAL TABLET DELAYED RELEASE, Disp: , Rfl:   •  cloNIDine (CATAPRES) 0.1 MG tablet, TAKE 1 TABLET BY MOUTH EVERY 3 TO 4 HOURS AS NEEDED FOR SYSTOLIC BLOOD PRESSURE GREATHER THAN 160, Disp: 120 tablet, Rfl: 0  •  cycloSPORINE (RESTASIS) 0.05 % ophthalmic emulsion, 2 drops., Disp: , Rfl:   •  denosumab (PROLIA) 60 MG/ML solution prefilled syringe syringe, Inject  under the skin into the appropriate area as directed., Disp: , Rfl:   •  metoprolol succinate XL (TOPROL-XL) 100 MG 24 hr tablet, Take 1 tablet by mouth Daily., Disp: , Rfl:   •  Multiple  "Vitamins-Minerals (WOMENS MULTI) capsule, Take  by mouth Daily., Disp: , Rfl:   •  nitrofurantoin, macrocrystal-monohydrate, (MACROBID) 100 MG capsule, Take 1 capsule by mouth 2 (Two) Times a Day., Disp: 20 capsule, Rfl: 0  •  SUPER B COMPLEX/C capsule, SUPER B COMPLEX/C ORAL CAPSULE, Disp: , Rfl:   •  tamsulosin (FLOMAX) 0.4 MG capsule 24 hr capsule, Take 1 capsule by mouth Daily., Disp: , Rfl:   •  amLODIPine (NORVASC) 5 MG tablet, TAKE 1 TABLET BY MOUTH DAILY, Disp: 90 tablet, Rfl: 2  •  cefprozil (CEFZIL) 500 MG tablet, Take 1 tablet by mouth 2 (Two) Times a Day., Disp: 20 tablet, Rfl: 0  •  fluconazole (DIFLUCAN) 150 MG tablet, Take 1 tablet by mouth 1 (One) Time for 1 dose., Disp: 1 tablet, Rfl: 0         Review of Systems   HENT: Positive for congestion, ear pain, sneezing and sore throat.    Respiratory: Positive for cough.    Genitourinary: Positive for dysuria and flank pain.       Objective   /76   Pulse 65   Temp 98.2 °F (36.8 °C)   Resp 16   Ht 156.2 cm (61.5\")   Wt 69.1 kg (152 lb 6.4 oz)   SpO2 97%   Breastfeeding No   BMI 28.33 kg/m²   Wt Readings from Last 3 Encounters:   01/15/20 69.1 kg (152 lb 6.4 oz)   11/06/19 68.7 kg (151 lb 6.4 oz)   08/06/19 65.5 kg (144 lb 6.4 oz)     Physical Exam   Constitutional: She is oriented to person, place, and time. She appears well-developed and well-nourished.   HENT:   Head: Normocephalic.   Right Ear: Hearing, external ear and ear canal normal. Tympanic membrane is erythematous. A middle ear effusion is present.   Left Ear: Hearing, external ear and ear canal normal. Tympanic membrane is erythematous. A middle ear effusion is present.   Nose: Congestion present. Right sinus exhibits maxillary sinus tenderness and frontal sinus tenderness. Left sinus exhibits maxillary sinus tenderness and frontal sinus tenderness.   Mouth/Throat: Posterior oropharyngeal erythema present. No tonsillar abscesses. Tonsils are 1+ on the right. Tonsils are 1+ on the " left.   Eyes: Pupils are equal, round, and reactive to light. Conjunctivae, EOM and lids are normal.   Neck: No Brudzinski's sign and no Kernig's sign noted.   Cardiovascular: Normal rate, regular rhythm, S1 normal, S2 normal, normal heart sounds and normal pulses. Exam reveals no gallop and no friction rub.   No murmur heard.  Pulmonary/Chest: Effort normal. No accessory muscle usage or stridor. No respiratory distress. She has no decreased breath sounds. She has wheezes in the right upper field, the right middle field, the right lower field, the left upper field, the left middle field and the left lower field. She has rhonchi in the right upper field, the right middle field, the right lower field, the left upper field, the left middle field and the left lower field. She has no rales.   Abdominal: Soft. Normal appearance and bowel sounds are normal. She exhibits no distension and no mass. There is no tenderness. No hernia.   Neurological: She is alert and oriented to person, place, and time. No cranial nerve deficit. Coordination and gait normal.   Skin: Skin is warm and dry. Turgor is normal. She is not diaphoretic. No pallor.         Assessment/Plan     Acute bacterial sinusitis.  Start cefprozil.  Increase fluid intake.  Allergic rhinitis.  OTC Claritin or Zyrtec.  UTI.  Culture sent.  Cover with cefprozil.  Hypertension.  Worse today, increase amlodipine.  Follow-up recheck. Discussed low-sodium diet.  Stress echo benign/elevated right-sided pressure only 2018.  Carotid Dopplers with mild blockage only 2018.  Osteoporosis.  Tolerating Prolia.  Holding calcium/vitamin D per nephrology.  Follow-up recheck DEXA  Atrophic kidney.  Improved status post nephrectomy, followed by urology.  Renal function normal.  Interstitial cystitis.  Improved on Flomax, will be followed by [facility], history of InterStim placement/subsequent removal.  I and O catheter dependent.  Problem List Items Addressed This Visit         Cardiovascular and Mediastinum    Hypertension, benign    Relevant Medications    cloNIDine (CATAPRES) 0.1 MG tablet       Respiratory    Viral upper respiratory tract infection - Primary    Relevant Medications    cefprozil (CEFZIL) 500 MG tablet    Other Relevant Orders    POC Influenza A / B (Completed)       Nervous and Auditory    Dysuria    Relevant Medications    fluconazole (DIFLUCAN) 150 MG tablet    Other Relevant Orders    POCT urinalysis dipstick, manual (Completed)    Urine Culture - Urine, Urine, Clean Catch       Genitourinary    IC (interstitial cystitis)    Acute cystitis with hematuria              Expected course, medications, and adverse effects discussed.  Call or return if worsening or persistent symptoms.

## 2020-01-18 LAB
BACTERIA UR CULT: ABNORMAL
BACTERIA UR CULT: ABNORMAL
OTHER ANTIBIOTIC SUSC ISLT: ABNORMAL

## 2020-01-28 DIAGNOSIS — I10 HYPERTENSION, BENIGN: ICD-10-CM

## 2020-01-28 RX ORDER — AMLODIPINE BESYLATE 2.5 MG/1
2.5 TABLET ORAL DAILY
Qty: 90 TABLET | Refills: 0 | Status: SHIPPED | OUTPATIENT
Start: 2020-01-28 | End: 2020-05-06

## 2020-02-18 RX ORDER — ALLOPURINOL 300 MG/1
TABLET ORAL
Qty: 90 TABLET | Refills: 1 | Status: SHIPPED | OUTPATIENT
Start: 2020-02-18 | End: 2020-08-11

## 2020-05-05 NOTE — PROGRESS NOTES
Subjective   Dunia Fabian is a 69 y.o. female.     Chief Complaint   Patient presents with   • Hypertension       Hypertension   This is a chronic problem. The current episode started more than 1 year ago. The problem is controlled. Associated symptoms include neck pain. Pertinent negatives include no chest pain, palpitations or shortness of breath. Risk factors for coronary artery disease include dyslipidemia. Current antihypertension treatment includes beta blockers and angiotensin blockers. The current treatment provides moderate improvement. There are no compliance problems.             I personally reviewed and updated the patient's allergies, medications, problem list, and past medical, surgical, social, and family history.     Family History   Problem Relation Age of Onset   • Parkinsonism Mother    • Heart disease Mother         cardiovascular disease   • Diabetes Mother         diabetes mellitus    • Heart disease Father         cardiovascular disease   • Heart disease Sister         cardiovascular disease   • Diabetes Sister         diabetes mellitus    • Heart disease Brother         cardiovascular disease   • Diabetes Son         diabetes mellitus    • Heart disease Paternal Uncle         ischemic   • Stomach cancer Maternal Grandmother    • Breast cancer Niece 39       Social History     Tobacco Use   • Smoking status: Never Smoker   • Smokeless tobacco: Never Used   Substance Use Topics   • Alcohol use: No     Frequency: Never   • Drug use: No       Past Surgical History:   Procedure Laterality Date   • CATARACT EXTRACTION Left 08/2005    with Insert of Lens Prostheti   • CYSTOCELE REPAIR  06/1998    SIMENTAL Cystourethropexy & Cystocele Repair    • KIDNEY SURGERY Right 02/24/2014    Removal   • TONSILLECTOMY  1953   • URETHROLYSIS  10/2000    Transvaginal Urethrolysis & Bone [place] Sling   • VAGINAL HYSTERECTOMY  1970   • VITRECTOMY Left 04/2005    & Membrane Peeling       Patient Active Problem  List   Diagnosis   • Acid reflux   • Allergic rhinitis   • Anxiety   • Atrophic kidney   • Carotid bruit present   • Carpal tunnel syndrome   • Low back pain   • Neck pain   • Thoracic back pain   • Depressive disorder   • Medicare annual wellness visit, subsequent   • Gout   • Hemorrhoids   • Hyperlipidemia   • Hypertension, benign   • IC (interstitial cystitis)   • Irritable bowel syndrome with constipation   • LVH (left ventricular hypertrophy)   • Menopausal syndrome   • Mitral insufficiency, acute   • Onychomycosis   • Disorder of bone and cartilage   • Osteoporosis   • Palpitations   • Osteoarthritis   • Primary pulmonary hypertension (CMS/HCC)   • Renal insufficiency   • Plantar fasciitis   • Senile osteoporosis   • Solitary kidney   • Visit for screening mammogram   • Special screening for malignant neoplasms, colon   • Skin tag   • Incomplete defecation   • Seborrheic keratosis   • Hematuria   • Chronic kidney disease, stage 3 (moderate) (CMS/HCC)   • Renal hypertrophy   • Hypercalcemia   • Viral upper respiratory tract infection   • Dysuria   • Acute cystitis with hematuria         Current Outpatient Medications:   •  allopurinol (ZYLOPRIM) 300 MG tablet, TAKE 1 TABLET BY MOUTH DAILY, Disp: 90 tablet, Rfl: 1  •  aspirin (ADULT ASPIRIN EC LOW STRENGTH) 81 MG EC tablet, ADULT ASPIRIN EC LOW STRENGTH 81 MG ORAL TABLET DELAYED RELEASE, Disp: , Rfl:   •  cloNIDine (CATAPRES) 0.1 MG tablet, TAKE 1 TABLET BY MOUTH EVERY 3 TO 4 HOURS AS NEEDED FOR SYSTOLIC BLOOD PRESSURE GREATHER THAN 160, Disp: 120 tablet, Rfl: 0  •  cycloSPORINE (RESTASIS) 0.05 % ophthalmic emulsion, 2 drops., Disp: , Rfl:   •  denosumab (PROLIA) 60 MG/ML solution prefilled syringe syringe, Inject  under the skin into the appropriate area as directed., Disp: , Rfl:   •  metoprolol succinate XL (TOPROL-XL) 100 MG 24 hr tablet, Take 1 tablet by mouth Daily., Disp: , Rfl:   •  SUPER B COMPLEX/C capsule, SUPER B COMPLEX/C ORAL CAPSULE, Disp: , Rfl:  "  •  tamsulosin (FLOMAX) 0.4 MG capsule 24 hr capsule, Take 1 capsule by mouth Daily., Disp: , Rfl:   •  amLODIPine (NORVASC) 5 MG tablet, TAKE 1 TABLET BY MOUTH DAILY, Disp: 90 tablet, Rfl: 2  •  cefprozil (CEFZIL) 500 MG tablet, Take 1 tablet by mouth 2 (Two) Times a Day., Disp: 20 tablet, Rfl: 0  •  Multiple Vitamins-Minerals (WOMENS MULTI) capsule, Take  by mouth Daily., Disp: , Rfl:   •  nitrofurantoin, macrocrystal-monohydrate, (MACROBID) 100 MG capsule, Take 1 capsule by mouth 2 (Two) Times a Day., Disp: 20 capsule, Rfl: 0         Review of Systems   Constitutional: Negative for chills and diaphoresis.   Eyes: Negative for visual disturbance.   Respiratory: Negative for shortness of breath.    Cardiovascular: Negative for chest pain and palpitations.   Gastrointestinal: Negative for abdominal pain and nausea.   Endocrine: Negative for polydipsia and polyphagia.   Musculoskeletal: Positive for neck pain. Negative for neck stiffness.   Skin: Negative for color change and pallor.   Neurological: Negative for seizures and syncope.   Hematological: Negative for adenopathy.   Psychiatric/Behavioral: Negative for hallucinations and suicidal ideas.       I have reviewed and confirmed the accuracy of the ROS as documented by the MA/LPN/RN Tyron Driver MD      Objective   /74   Pulse 64   Temp 98 °F (36.7 °C)   Resp 16   Ht 156.2 cm (61.5\")   Wt 71.3 kg (157 lb 3.2 oz)   SpO2 97%   BMI 29.22 kg/m²   Wt Readings from Last 3 Encounters:   05/06/20 71.3 kg (157 lb 3.2 oz)   01/15/20 69.1 kg (152 lb 6.4 oz)   11/06/19 68.7 kg (151 lb 6.4 oz)     Physical Exam   Constitutional: She is oriented to person, place, and time. She appears well-developed and well-nourished.   Cardiovascular: Normal rate, regular rhythm, S1 normal, S2 normal, normal heart sounds, intact distal pulses and normal pulses. Exam reveals no gallop and no friction rub.   No murmur heard.  Pulmonary/Chest: Effort normal and breath sounds " normal. No accessory muscle usage or stridor. She has no decreased breath sounds. She has no wheezes. She has no rhonchi. She has no rales.   Abdominal: Soft. Normal appearance, normal aorta and bowel sounds are normal. She exhibits no distension, no pulsatile midline mass and no mass. There is no hepatosplenomegaly. There is no tenderness. There is no rigidity, no rebound, no guarding, no CVA tenderness and negative Diego's sign. No hernia.   Neurological: She is alert and oriented to person, place, and time. Coordination and gait normal.   Skin: Skin is warm and dry. Turgor is normal. She is not diaphoretic. No pallor.         Assessment/Plan     Allergic rhinitis.  OTC Claritin or Zyrtec.  Hypertension.  Worse today, increase amlodipine to 5 mg daily.  Follow-up recheck. Discussed low-sodium diet.  Stress echo benign/elevated right-sided pressure only 2018.  Carotid Dopplers with mild blockage only 2018.  Osteoporosis.  Tolerating Prolia.  Holding calcium/vitamin D per nephrology.  Follow-up recheck DEXA  Atrophic kidney.  Improved status post nephrectomy, followed by urology,nephrology.  Renal function normal.  Interstitial cystitis.  Improved on Flomax, will be followed by [facility], history of InterStim placement/subsequent removal.  I and O catheter dependent.  Hypertensive retinopathy.  Followed by ophthalmology ember galeano, status post treatment    Problem List Items Addressed This Visit        Unprioritized    Atrophic kidney    Hyperlipidemia    Hypertension, benign - Primary    IC (interstitial cystitis)              Expected course, medications, and adverse effects discussed.  Call or return if worsening or persistent symptoms.

## 2020-05-06 ENCOUNTER — OFFICE VISIT (OUTPATIENT)
Dept: FAMILY MEDICINE CLINIC | Facility: CLINIC | Age: 70
End: 2020-05-06

## 2020-05-06 VITALS
WEIGHT: 157.2 LBS | TEMPERATURE: 98 F | HEIGHT: 62 IN | RESPIRATION RATE: 16 BRPM | SYSTOLIC BLOOD PRESSURE: 142 MMHG | HEART RATE: 64 BPM | DIASTOLIC BLOOD PRESSURE: 74 MMHG | BODY MASS INDEX: 28.93 KG/M2 | OXYGEN SATURATION: 97 %

## 2020-05-06 DIAGNOSIS — E78.2 MIXED HYPERLIPIDEMIA: ICD-10-CM

## 2020-05-06 DIAGNOSIS — N26.1 ATROPHIC KIDNEY: ICD-10-CM

## 2020-05-06 DIAGNOSIS — I10 HYPERTENSION, BENIGN: Primary | ICD-10-CM

## 2020-05-06 DIAGNOSIS — N30.10 IC (INTERSTITIAL CYSTITIS): ICD-10-CM

## 2020-05-06 PROCEDURE — 99214 OFFICE O/P EST MOD 30 MIN: CPT | Performed by: FAMILY MEDICINE

## 2020-05-13 DIAGNOSIS — I10 HYPERTENSION, BENIGN: Primary | ICD-10-CM

## 2020-05-13 RX ORDER — METOPROLOL SUCCINATE 100 MG/1
TABLET, EXTENDED RELEASE ORAL DAILY
Qty: 90 TABLET | Refills: 2 | Status: SHIPPED | OUTPATIENT
Start: 2020-05-13 | End: 2021-02-08

## 2020-06-02 PROBLEM — I10 HYPERTENSION, BENIGN: Chronic | Status: ACTIVE | Noted: 2019-08-06

## 2020-06-02 NOTE — PROGRESS NOTES
Subjective   Dunia Fabian is a 69 y.o. female.     Chief Complaint   Patient presents with   • Hypertension       Hypertension   This is a chronic problem. The current episode started more than 1 year ago. The problem is controlled. Associated symptoms include neck pain. Pertinent negatives include no chest pain, palpitations or shortness of breath. Risk factors for coronary artery disease include dyslipidemia. Current antihypertension treatment includes beta blockers and angiotensin blockers. The current treatment provides moderate improvement. There are no compliance problems.             I personally reviewed and updated the patient's allergies, medications, problem list, and past medical, surgical, social, and family history.     Family History   Problem Relation Age of Onset   • Parkinsonism Mother    • Heart disease Mother         cardiovascular disease   • Diabetes Mother         diabetes mellitus    • Heart disease Father         cardiovascular disease   • Heart disease Sister         cardiovascular disease   • Diabetes Sister         diabetes mellitus    • Heart disease Brother         cardiovascular disease   • Diabetes Son         diabetes mellitus    • Heart disease Paternal Uncle         ischemic   • Stomach cancer Maternal Grandmother    • Breast cancer Niece 39       Social History     Tobacco Use   • Smoking status: Never Smoker   • Smokeless tobacco: Never Used   Substance Use Topics   • Alcohol use: No     Frequency: Never   • Drug use: No       Past Surgical History:   Procedure Laterality Date   • CATARACT EXTRACTION Left 08/2005    with Insert of Lens Prostheti   • CYSTOCELE REPAIR  06/1998    SIMENTAL Cystourethropexy & Cystocele Repair    • KIDNEY SURGERY Right 02/24/2014    Removal   • TONSILLECTOMY  1953   • URETHROLYSIS  10/2000    Transvaginal Urethrolysis & Bone [place] Sling   • VAGINAL HYSTERECTOMY  1970   • VITRECTOMY Left 04/2005    & Membrane Peeling       Patient Active Problem  List   Diagnosis   • Acid reflux   • Allergic rhinitis   • Anxiety   • Atrophic kidney   • Carotid bruit present   • Carpal tunnel syndrome   • Low back pain   • Neck pain   • Thoracic back pain   • Depressive disorder   • Medicare annual wellness visit, subsequent   • Gout   • Hemorrhoids   • Hyperlipidemia   • Hypertension, benign   • IC (interstitial cystitis)   • Irritable bowel syndrome with constipation   • LVH (left ventricular hypertrophy)   • Menopausal syndrome   • Mitral insufficiency, acute   • Onychomycosis   • Disorder of bone and cartilage   • Osteoporosis   • Palpitations   • Osteoarthritis   • Primary pulmonary hypertension (CMS/HCC)   • Renal insufficiency   • Plantar fasciitis   • Senile osteoporosis   • Solitary kidney   • Visit for screening mammogram   • Special screening for malignant neoplasms, colon   • Skin tag   • Incomplete defecation   • Seborrheic keratosis   • Hematuria   • Chronic kidney disease, stage 3 (moderate) (CMS/HCC)   • Renal hypertrophy   • Hypercalcemia   • Viral upper respiratory tract infection   • Dysuria   • Acute cystitis with hematuria         Current Outpatient Medications:   •  allopurinol (ZYLOPRIM) 300 MG tablet, TAKE 1 TABLET BY MOUTH DAILY, Disp: 90 tablet, Rfl: 1  •  amLODIPine (NORVASC) 10 MG tablet, Take 1 tablet by mouth Daily., Disp: 90 tablet, Rfl: 3  •  aspirin (ADULT ASPIRIN EC LOW STRENGTH) 81 MG EC tablet, ADULT ASPIRIN EC LOW STRENGTH 81 MG ORAL TABLET DELAYED RELEASE, Disp: , Rfl:   •  cefprozil (CEFZIL) 500 MG tablet, Take 1 tablet by mouth 2 (Two) Times a Day., Disp: 20 tablet, Rfl: 0  •  cloNIDine (CATAPRES) 0.1 MG tablet, TAKE 1 TABLET BY MOUTH EVERY 3 TO 4 HOURS AS NEEDED FOR SYSTOLIC BLOOD PRESSURE GREATHER THAN 160, Disp: 120 tablet, Rfl: 0  •  cycloSPORINE (RESTASIS) 0.05 % ophthalmic emulsion, 2 drops., Disp: , Rfl:   •  denosumab (PROLIA) 60 MG/ML solution prefilled syringe syringe, Inject  under the skin into the appropriate area as  "directed., Disp: , Rfl:   •  metoprolol succinate XL (TOPROL-XL) 100 MG 24 hr tablet, TAKE 1 TABLET BY MOUTH DAILY, Disp: 90 tablet, Rfl: 2  •  Multiple Vitamins-Minerals (WOMENS MULTI) capsule, Take  by mouth Daily., Disp: , Rfl:   •  nitrofurantoin, macrocrystal-monohydrate, (MACROBID) 100 MG capsule, Take 1 capsule by mouth 2 (Two) Times a Day., Disp: 20 capsule, Rfl: 0  •  SUPER B COMPLEX/C capsule, SUPER B COMPLEX/C ORAL CAPSULE, Disp: , Rfl:   •  tamsulosin (FLOMAX) 0.4 MG capsule 24 hr capsule, Take 1 capsule by mouth Daily., Disp: , Rfl:          Review of Systems   Constitutional: Negative for chills and diaphoresis.   Eyes: Negative for visual disturbance.   Respiratory: Negative for shortness of breath.    Cardiovascular: Negative for chest pain and palpitations.   Gastrointestinal: Negative for abdominal pain and nausea.   Endocrine: Negative for polydipsia and polyphagia.   Musculoskeletal: Positive for neck pain. Negative for neck stiffness.   Skin: Negative for color change and pallor.   Neurological: Negative for seizures and syncope.   Hematological: Negative for adenopathy.       I have reviewed and confirmed the accuracy of the ROS as documented by the MA/LPN/RN Tyron Driver MD      Objective   /73 (BP Location: Right arm, Patient Position: Sitting, Cuff Size: Adult)   Pulse 59   Temp 98 °F (36.7 °C)   Resp 18   Ht 156.2 cm (61.5\")   Wt 70.4 kg (155 lb 3.2 oz)   SpO2 99%   BMI 28.85 kg/m²   Wt Readings from Last 3 Encounters:   06/03/20 70.4 kg (155 lb 3.2 oz)   05/06/20 71.3 kg (157 lb 3.2 oz)   01/15/20 69.1 kg (152 lb 6.4 oz)     Physical Exam   Constitutional: She is oriented to person, place, and time. She appears well-developed and well-nourished.   Cardiovascular: Normal rate, regular rhythm, S1 normal, S2 normal, normal heart sounds, intact distal pulses and normal pulses. Exam reveals no gallop and no friction rub.   No murmur heard.  Pulmonary/Chest: Effort normal and " breath sounds normal. No accessory muscle usage or stridor. She has no decreased breath sounds. She has no wheezes. She has no rhonchi. She has no rales.   Abdominal: Soft. Normal appearance, normal aorta and bowel sounds are normal. She exhibits no distension, no pulsatile midline mass and no mass. There is no hepatosplenomegaly. There is no tenderness. There is no rigidity, no rebound, no guarding, no CVA tenderness and negative Diego's sign. No hernia.   Neurological: She is alert and oriented to person, place, and time. Coordination and gait normal.   Skin: Skin is warm and dry. Turgor is normal. She is not diaphoretic. No pallor.         Assessment/Plan      Medication     Problem List         LOS      Allergic rhinitis.  OTC Claritin or Zyrtec.  Hypertension.  Improved today on amlodipine to 10mg daily.  Follow-up recheck. Discussed low-sodium diet.  Stress echo benign/elevated right-sided pressure only 2018.  Carotid Dopplers with mild blockage only 2018.  Osteoporosis.  Tolerating Prolia.  Holding calcium/vitamin D per nephrology.  Follow-up recheck DEXA  Atrophic kidney.  Improved status post nephrectomy, followed by urology,nephrology.  Renal function normal.  Interstitial cystitis.  Improved on Flomax, followed by [facility], history of InterStim placement/subsequent removal.  I and O catheter dependent.  Hypertensive retinopathy.  Followed by ophthalmology ember galeano, status post treatment       Problem List Items Addressed This Visit        Unprioritized    Hypertension, benign - Primary (Chronic)    Relevant Medications    amLODIPine (NORVASC) 10 MG tablet    Osteoporosis    Relevant Medications    denosumab (PROLIA) syringe 60 mg (Completed)              Expected course, medications, and adverse effects discussed.  Call or return if worsening or persistent symptoms.

## 2020-06-03 ENCOUNTER — OFFICE VISIT (OUTPATIENT)
Dept: FAMILY MEDICINE CLINIC | Facility: CLINIC | Age: 70
End: 2020-06-03

## 2020-06-03 VITALS
BODY MASS INDEX: 28.56 KG/M2 | RESPIRATION RATE: 18 BRPM | DIASTOLIC BLOOD PRESSURE: 73 MMHG | OXYGEN SATURATION: 99 % | HEART RATE: 59 BPM | HEIGHT: 62 IN | TEMPERATURE: 98 F | WEIGHT: 155.2 LBS | SYSTOLIC BLOOD PRESSURE: 138 MMHG

## 2020-06-03 DIAGNOSIS — M81.0 AGE-RELATED OSTEOPOROSIS WITHOUT CURRENT PATHOLOGICAL FRACTURE: ICD-10-CM

## 2020-06-03 DIAGNOSIS — I10 HYPERTENSION, BENIGN: Primary | Chronic | ICD-10-CM

## 2020-06-03 DIAGNOSIS — N30.10 IC (INTERSTITIAL CYSTITIS): ICD-10-CM

## 2020-06-03 DIAGNOSIS — E78.2 MIXED HYPERLIPIDEMIA: ICD-10-CM

## 2020-06-03 PROCEDURE — 96372 THER/PROPH/DIAG INJ SC/IM: CPT | Performed by: FAMILY MEDICINE

## 2020-06-03 PROCEDURE — 99214 OFFICE O/P EST MOD 30 MIN: CPT | Performed by: FAMILY MEDICINE

## 2020-06-03 RX ORDER — AMLODIPINE BESYLATE 10 MG/1
10 TABLET ORAL DAILY
Qty: 90 TABLET | Refills: 3 | Status: SHIPPED | OUTPATIENT
Start: 2020-06-03 | End: 2020-07-16

## 2020-07-16 ENCOUNTER — OFFICE VISIT (OUTPATIENT)
Dept: FAMILY MEDICINE CLINIC | Facility: CLINIC | Age: 70
End: 2020-07-16

## 2020-07-16 VITALS
SYSTOLIC BLOOD PRESSURE: 140 MMHG | DIASTOLIC BLOOD PRESSURE: 65 MMHG | WEIGHT: 155.4 LBS | HEART RATE: 64 BPM | TEMPERATURE: 96.7 F | HEIGHT: 62 IN | OXYGEN SATURATION: 97 % | BODY MASS INDEX: 28.6 KG/M2

## 2020-07-16 DIAGNOSIS — R60.0 BILATERAL LOWER EXTREMITY EDEMA: ICD-10-CM

## 2020-07-16 DIAGNOSIS — I10 HYPERTENSION, BENIGN: Primary | Chronic | ICD-10-CM

## 2020-07-16 DIAGNOSIS — R82.90 ABNORMAL URINALYSIS: ICD-10-CM

## 2020-07-16 LAB
BILIRUB BLD-MCNC: NEGATIVE MG/DL
CLARITY, POC: CLEAR
COLOR UR: YELLOW
GLUCOSE UR STRIP-MCNC: NEGATIVE MG/DL
KETONES UR QL: ABNORMAL
LEUKOCYTE EST, POC: NEGATIVE
NITRITE UR-MCNC: NEGATIVE MG/ML
PH UR: 5 [PH] (ref 5–8)
PROT UR STRIP-MCNC: ABNORMAL MG/DL
RBC # UR STRIP: ABNORMAL /UL
SP GR UR: 1.02 (ref 1–1.03)
UROBILINOGEN UR QL: NORMAL

## 2020-07-16 PROCEDURE — 81003 URINALYSIS AUTO W/O SCOPE: CPT | Performed by: FAMILY MEDICINE

## 2020-07-16 PROCEDURE — 99213 OFFICE O/P EST LOW 20 MIN: CPT | Performed by: FAMILY MEDICINE

## 2020-07-16 RX ORDER — AMLODIPINE BESYLATE 2.5 MG/1
5 TABLET ORAL DAILY
Qty: 30 TABLET | Refills: 0
Start: 2020-07-16 | End: 2020-12-28 | Stop reason: DRUGHIGH

## 2020-07-16 NOTE — PROGRESS NOTES
Chief Complaint   Patient presents with   • Edema       Subjective   Dunia Fabian is a 69 y.o. female.     Leg Swelling   This is a new problem. The current episode started in the past 7 days. The problem occurs daily. The problem has been unchanged. Pertinent negatives include no abdominal pain, arthralgias, chest pain, chills, coughing, fever, numbness, rash or vomiting. The symptoms are aggravated by walking and standing. She has tried relaxation, rest, ice and heat for the symptoms. The treatment provided no relief.      Onset sudden.  She reports that symptoms may have been triggered by standing on her feet.  She is not having any chest pain, shortness of breath, orthopnea or urinary changes.    Amlodipine increased from 5 to 10 mg last month.    I have reviewed and updated her medications, medical history and problem list during today's office visit.       Past Medical History :  Active Ambulatory Problems     Diagnosis Date Noted   • Acid reflux 08/06/2019   • Allergic rhinitis 08/06/2019   • Anxiety 08/06/2019   • Atrophic kidney 08/06/2019   • Carotid bruit present 08/06/2019   • Carpal tunnel syndrome 08/06/2019   • Low back pain 08/14/2014   • Neck pain 08/06/2019   • Thoracic back pain 08/06/2019   • Depressive disorder 08/06/2019   • Medicare annual wellness visit, subsequent 08/06/2019   • Gout 08/06/2019   • Hemorrhoids 08/06/2019   • Hyperlipidemia 08/06/2019   • Hypertension, benign 08/06/2019   • IC (interstitial cystitis) 12/01/2014   • Irritable bowel syndrome with constipation 08/06/2019   • LVH (left ventricular hypertrophy) 08/06/2019   • Menopausal syndrome 08/06/2019   • Mitral insufficiency, acute 08/06/2019   • Onychomycosis 08/06/2019   • Disorder of bone and cartilage 08/06/2019   • Osteoporosis 08/06/2019   • Palpitations 08/06/2019   • Osteoarthritis 08/06/2019   • Primary pulmonary hypertension (CMS/HCC) 08/06/2019   • Renal insufficiency 08/06/2019   • Plantar fasciitis  08/06/2019   • Senile osteoporosis 08/06/2019   • Solitary kidney 08/06/2019   • Visit for screening mammogram 08/06/2019   • Special screening for malignant neoplasms, colon 08/06/2019   • Skin tag 08/06/2019   • Incomplete defecation 08/06/2019   • Seborrheic keratosis 08/06/2019   • Hematuria 08/06/2019   • Chronic kidney disease, stage 3 (moderate) (CMS/HCC) 03/17/2014   • Renal hypertrophy 03/17/2014   • Hypercalcemia 03/17/2014   • Dysuria 01/15/2020   • Acute cystitis with hematuria 01/15/2020   • Bilateral lower extremity edema 07/16/2020     Resolved Ambulatory Problems     Diagnosis Date Noted   • Viral upper respiratory tract infection 01/15/2020     Past Medical History:   Diagnosis Date   • Acute bronchitis    • Acute sinusitis    • Arm weakness    • Arthralgia    • Arthritis    • Back pain    • Blurry vision    • Carotid bruit    • Cellulitis    • Chest pain    • Constipation    • Contusion of knee, right    • Disorder of skin and subcutaneous tissue    • Dog bite    • Encounter for long-term (current) use of medications    • Enrolled in chronic care management    • External otitis    • Flank pain    • Fracture, foot    • Fracture, toe    • Generalized abdominal pain    • H/O drug therapy    • Hematuria, microscopic    • Hip pain    • Hypertension    • 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[facility])    • Influenza    • Knee pain    • Malaise and fatigue    • Myalgia    • Overweight (BMI 25.0-29.9)    • Pain in soft tissues of limb    • Pruritus    • Psoriasis    • Rectal bleeding    • Rotator cuff tendonitis, right    • RUQ pain    • Screening for depression    • Screening mammogram, encounter for    • Solar lentiginosis    • Telogen effluvium    • TMJ arthritis    • Tricuspid valve disorder    • URI, acute    • Urinary incontinence    • Urinary retention        Medication List:    Current Outpatient Medications:   •  allopurinol (ZYLOPRIM) 300 MG tablet, TAKE 1 TABLET BY MOUTH DAILY, Disp: 90 tablet, Rfl:  "1  •  amLODIPine (NORVASC) 10 MG tablet, Take 1 tablet by mouth Daily., Disp: 90 tablet, Rfl: 3  •  aspirin (ADULT ASPIRIN EC LOW STRENGTH) 81 MG EC tablet, ADULT ASPIRIN EC LOW STRENGTH 81 MG ORAL TABLET DELAYED RELEASE, Disp: , Rfl:   •  cloNIDine (CATAPRES) 0.1 MG tablet, TAKE 1 TABLET BY MOUTH EVERY 3 TO 4 HOURS AS NEEDED FOR SYSTOLIC BLOOD PRESSURE GREATHER THAN 160, Disp: 120 tablet, Rfl: 0  •  cycloSPORINE (RESTASIS) 0.05 % ophthalmic emulsion, 2 drops., Disp: , Rfl:   •  denosumab (PROLIA) 60 MG/ML solution prefilled syringe syringe, Inject  under the skin into the appropriate area as directed., Disp: , Rfl:   •  metoprolol succinate XL (TOPROL-XL) 100 MG 24 hr tablet, TAKE 1 TABLET BY MOUTH DAILY, Disp: 90 tablet, Rfl: 2  •  Multiple Vitamins-Minerals (WOMENS MULTI) capsule, Take  by mouth Daily., Disp: , Rfl:   •  SUPER B COMPLEX/C capsule, SUPER B COMPLEX/C ORAL CAPSULE, Disp: , Rfl:   •  tamsulosin (FLOMAX) 0.4 MG capsule 24 hr capsule, Take 1 capsule by mouth Daily., Disp: , Rfl:     Allergies   Allergen Reactions   • Contrast Dye Anaphylaxis   • Gadolinium Hives     \"crawling\" feeling    • Iodine Anaphylaxis       Social History     Tobacco Use   • Smoking status: Never Smoker   • Smokeless tobacco: Never Used   Substance Use Topics   • Alcohol use: No     Frequency: Never       Review of Systems   Constitutional: Negative for chills, fever and unexpected weight gain.   HENT: Negative for trouble swallowing.    Respiratory: Negative for cough, chest tightness, shortness of breath and wheezing.    Cardiovascular: Negative for chest pain, palpitations and leg swelling (no signficant pain).   Gastrointestinal: Negative for abdominal pain, constipation, diarrhea and vomiting.   Genitourinary: Negative for difficulty urinating, dysuria and flank pain.   Musculoskeletal: Negative for arthralgias and back pain.   Skin: Negative for color change and rash.   Neurological: Negative for dizziness, syncope and " "numbness.       I have reviewed and confirmed the accuracy of the ROS as documented by the MA/LPN/RN Tanesha Bentley MD      Objective   Vitals:    07/16/20 1039   BP: 140/65   Pulse: 64   Temp: 96.7 °F (35.9 °C)   TempSrc: Oral   SpO2: 97%   Weight: 70.5 kg (155 lb 6.4 oz)   Height: 156.2 cm (61.5\")     Body mass index is 28.89 kg/m².    Physical Exam   Constitutional: She is oriented to person, place, and time. She appears well-developed and well-nourished. No distress.   HENT:   Head: Normocephalic and atraumatic.   Right Ear: External ear normal.   Left Ear: External ear normal.   Mouth/Throat: Oropharynx is clear and moist.   Eyes: Pupils are equal, round, and reactive to light. Conjunctivae and EOM are normal. No scleral icterus.   Neck: Normal range of motion. Neck supple. No JVD present. No edema present.   Cardiovascular: Normal rate, regular rhythm and normal heart sounds.   Pulmonary/Chest: Effort normal and breath sounds normal.   Musculoskeletal: She exhibits edema (1+) and tenderness (mildly anterior, joshua's sign neg).   Neurological: She is alert and oriented to person, place, and time. No cranial nerve deficit.   Skin: Skin is warm. Capillary refill takes less than 2 seconds. No rash noted. No erythema.   Psychiatric: She has a normal mood and affect. Her behavior is normal. Thought content normal.       Brief Urine Lab Results  (Last result in the past 365 days)      Color   Clarity   Blood   Leuk Est   Nitrite   Protein   CREAT   Urine HCG        07/16/20 1159 Yellow Clear Moderate Negative Negative 30 mg/dL                   Lab Results   Component Value Date    GLU 99 07/16/2020    BUN 11 07/16/2020    CREATININE 0.91 07/16/2020    EGFRIFNONA 64 07/16/2020    EGFRIFAFRI 73 07/16/2020     07/16/2020    K 4.0 07/16/2020     07/16/2020    CALCIUM 9.2 07/16/2020    ALBUMIN 4.4 07/16/2020    BILITOT 0.3 07/16/2020    ALKPHOS 55 07/16/2020    AST 22 07/16/2020    ALT 21 " 07/16/2020    WBC 9.3 07/16/2020    RBC 4.55 07/16/2020    HCT 42.5 07/16/2020    MCV 93 07/16/2020    MCH 29.2 07/16/2020          Assessment/Plan     Diagnoses and all orders for this visit:    1. Hypertension, benign (Primary)  Assessment & Plan:  LE edema could be related to recent increase in amlodipine.  I will have her return to the previous dosing and closely monitor BP.  Watch salt intake.  She declines to add additional agent for HTN today.  Recheck with Dr. Driver next week.    Orders:  -     amLODIPine (NORVASC) 2.5 MG tablet; Take 2 tablets by mouth Daily.  Dispense: 30 tablet; Refill: 0  -     CBC & Differential  -     Comprehensive Metabolic Panel  -     POC Urinalysis Dipstick, Multipro    2. Bilateral lower extremity edema  Comments:  Low suspicion for acute bilateral DVT.  Med changes as above.      3. Abnormal urinalysis  Comments:  Culture sent.  Orders:  -     Urine Culture - Urine, Urine, Random Void      Return in 6 days (on 7/22/2020) for Recheck.     I wore protective equipment throughout this patient encounter to include mask. Hand hygiene was performed before donning protective equipment and after removal when leaving the room.

## 2020-07-17 LAB
ALBUMIN SERPL-MCNC: 4.4 G/DL (ref 3.7–4.7)
ALBUMIN/GLOB SERPL: 1.5 {RATIO} (ref 1.2–2.2)
ALP SERPL-CCNC: 55 IU/L (ref 39–117)
ALT SERPL-CCNC: 21 IU/L (ref 0–32)
AST SERPL-CCNC: 22 IU/L (ref 0–40)
BASOPHILS # BLD AUTO: 0.1 X10E3/UL (ref 0–0.2)
BASOPHILS NFR BLD AUTO: 1 %
BILIRUB SERPL-MCNC: 0.3 MG/DL (ref 0–1.2)
BUN SERPL-MCNC: 11 MG/DL (ref 8–27)
BUN/CREAT SERPL: 12 (ref 12–28)
CALCIUM SERPL-MCNC: 9.2 MG/DL (ref 8.7–10.3)
CHLORIDE SERPL-SCNC: 103 MMOL/L (ref 96–106)
CO2 SERPL-SCNC: 24 MMOL/L (ref 20–29)
CREAT SERPL-MCNC: 0.91 MG/DL (ref 0.57–1)
EOSINOPHIL # BLD AUTO: 0.1 X10E3/UL (ref 0–0.4)
EOSINOPHIL NFR BLD AUTO: 1 %
ERYTHROCYTE [DISTWIDTH] IN BLOOD BY AUTOMATED COUNT: 13.5 % (ref 11.7–15.4)
GLOBULIN SER CALC-MCNC: 2.9 G/DL (ref 1.5–4.5)
GLUCOSE SERPL-MCNC: 99 MG/DL (ref 65–99)
HCT VFR BLD AUTO: 42.5 % (ref 34–46.6)
HGB BLD-MCNC: 13.3 G/DL (ref 11.1–15.9)
IMM GRANULOCYTES # BLD AUTO: 0 X10E3/UL (ref 0–0.1)
IMM GRANULOCYTES NFR BLD AUTO: 0 %
LYMPHOCYTES # BLD AUTO: 3.1 X10E3/UL (ref 0.7–3.1)
LYMPHOCYTES NFR BLD AUTO: 34 %
MCH RBC QN AUTO: 29.2 PG (ref 26.6–33)
MCHC RBC AUTO-ENTMCNC: 31.3 G/DL (ref 31.5–35.7)
MCV RBC AUTO: 93 FL (ref 79–97)
MONOCYTES # BLD AUTO: 0.9 X10E3/UL (ref 0.1–0.9)
MONOCYTES NFR BLD AUTO: 10 %
NEUTROPHILS # BLD AUTO: 5.1 X10E3/UL (ref 1.4–7)
NEUTROPHILS NFR BLD AUTO: 54 %
PLATELET # BLD AUTO: 283 X10E3/UL (ref 150–450)
POTASSIUM SERPL-SCNC: 4 MMOL/L (ref 3.5–5.2)
PROT SERPL-MCNC: 7.3 G/DL (ref 6–8.5)
RBC # BLD AUTO: 4.55 X10E6/UL (ref 3.77–5.28)
SODIUM SERPL-SCNC: 139 MMOL/L (ref 134–144)
WBC # BLD AUTO: 9.3 X10E3/UL (ref 3.4–10.8)

## 2020-07-18 LAB
BACTERIA UR CULT: ABNORMAL
BACTERIA UR CULT: ABNORMAL
OTHER ANTIBIOTIC SUSC ISLT: ABNORMAL

## 2020-07-22 ENCOUNTER — OFFICE VISIT (OUTPATIENT)
Dept: FAMILY MEDICINE CLINIC | Facility: CLINIC | Age: 70
End: 2020-07-22

## 2020-07-22 VITALS
OXYGEN SATURATION: 98 % | BODY MASS INDEX: 28.16 KG/M2 | SYSTOLIC BLOOD PRESSURE: 143 MMHG | RESPIRATION RATE: 18 BRPM | DIASTOLIC BLOOD PRESSURE: 84 MMHG | HEIGHT: 62 IN | HEART RATE: 59 BPM | WEIGHT: 153 LBS | TEMPERATURE: 97.6 F

## 2020-07-22 DIAGNOSIS — N26.1 ATROPHIC KIDNEY: ICD-10-CM

## 2020-07-22 DIAGNOSIS — I10 HYPERTENSION, BENIGN: Primary | Chronic | ICD-10-CM

## 2020-07-22 DIAGNOSIS — N30.00 ACUTE CYSTITIS WITHOUT HEMATURIA: Primary | ICD-10-CM

## 2020-07-22 DIAGNOSIS — F41.9 ANXIETY: ICD-10-CM

## 2020-07-22 DIAGNOSIS — R60.0 LOWER EXTREMITY EDEMA: ICD-10-CM

## 2020-07-22 DIAGNOSIS — K21.9 GASTROESOPHAGEAL REFLUX DISEASE, ESOPHAGITIS PRESENCE NOT SPECIFIED: ICD-10-CM

## 2020-07-22 PROBLEM — J06.9 VIRAL UPPER RESPIRATORY TRACT INFECTION: Status: RESOLVED | Noted: 2020-01-15 | Resolved: 2020-07-22

## 2020-07-22 PROBLEM — L91.8 SKIN TAG: Status: RESOLVED | Noted: 2019-08-06 | Resolved: 2020-07-22

## 2020-07-22 PROBLEM — R00.2 PALPITATIONS: Status: RESOLVED | Noted: 2019-08-06 | Resolved: 2020-07-22

## 2020-07-22 PROCEDURE — 99214 OFFICE O/P EST MOD 30 MIN: CPT | Performed by: FAMILY MEDICINE

## 2020-07-22 RX ORDER — CIPROFLOXACIN 250 MG/1
250 TABLET, FILM COATED ORAL 2 TIMES DAILY
Qty: 14 TABLET | Refills: 0 | Status: SHIPPED | OUTPATIENT
Start: 2020-07-22 | End: 2020-07-29

## 2020-07-22 NOTE — ASSESSMENT & PLAN NOTE
LE edema could be related to recent increase in amlodipine.  I will have her return to the previous dosing and closely monitor BP.  Watch salt intake.  She declines to add additional agent for HTN today.  Recheck with Dr. Driver next week.

## 2020-07-22 NOTE — PROGRESS NOTES
Subjective   Dunia Fabian is a 69 y.o. female.     Chief Complaint   Patient presents with   • Hypertension       Hypertension   This is a chronic problem. The current episode started more than 1 year ago. The problem is controlled. Associated symptoms include neck pain. Pertinent negatives include no chest pain, palpitations or shortness of breath. Risk factors for coronary artery disease include dyslipidemia. Current antihypertension treatment includes beta blockers and angiotensin blockers. The current treatment provides moderate improvement. There are no compliance problems.             I personally reviewed and updated the patient's allergies, medications, problem list, and past medical, surgical, social, and family history.     Family History   Problem Relation Age of Onset   • Parkinsonism Mother    • Heart disease Mother         cardiovascular disease   • Diabetes Mother         diabetes mellitus    • Heart disease Father         cardiovascular disease   • Heart disease Sister         cardiovascular disease   • Diabetes Sister         diabetes mellitus    • Heart disease Brother         cardiovascular disease   • Diabetes Son         diabetes mellitus    • Heart disease Paternal Uncle         ischemic   • Stomach cancer Maternal Grandmother    • Breast cancer Niece 39       Social History     Tobacco Use   • Smoking status: Never Smoker   • Smokeless tobacco: Never Used   Substance Use Topics   • Alcohol use: No     Frequency: Never   • Drug use: No       Past Surgical History:   Procedure Laterality Date   • CATARACT EXTRACTION Left 08/2005    with Insert of Lens Prostheti   • CYSTOCELE REPAIR  06/1998    SIMENTAL Cystourethropexy & Cystocele Repair    • KIDNEY SURGERY Right 02/24/2014    Removal   • TONSILLECTOMY  1953   • URETHROLYSIS  10/2000    Transvaginal Urethrolysis & Bone [place] [facility]   • VAGINAL HYSTERECTOMY  1970   • VITRECTOMY Left 04/2005    & Membrane Peeling       Patient Active Problem  List   Diagnosis   • Acid reflux   • Allergic rhinitis   • Anxiety   • Atrophic kidney   • Carotid bruit present   • Carpal tunnel syndrome   • Low back pain   • Neck pain   • Thoracic back pain   • Depressive disorder   • Medicare annual wellness visit, subsequent   • Gout   • Hemorrhoids   • Hyperlipidemia   • Hypertension, benign   • IC (interstitial cystitis)   • Irritable bowel syndrome with constipation   • LVH (left ventricular hypertrophy)   • Menopausal syndrome   • Mitral insufficiency, acute   • Onychomycosis   • Disorder of bone and cartilage   • Osteoporosis   • Osteoarthritis   • Primary pulmonary hypertension (CMS/HCC)   • Renal insufficiency   • Plantar fasciitis   • Senile osteoporosis   • Solitary kidney   • Visit for screening mammogram   • Special screening for malignant neoplasms, colon   • Incomplete defecation   • Seborrheic keratosis   • Hematuria   • Chronic kidney disease, stage 3 (moderate) (CMS/HCC)   • Renal hypertrophy   • Hypercalcemia   • Dysuria   • Acute cystitis with hematuria   • Bilateral lower extremity edema   • Lower extremity edema         Current Outpatient Medications:   •  allopurinol (ZYLOPRIM) 300 MG tablet, TAKE 1 TABLET BY MOUTH DAILY, Disp: 90 tablet, Rfl: 1  •  amLODIPine (NORVASC) 2.5 MG tablet, Take 2 tablets by mouth Daily., Disp: 30 tablet, Rfl: 0  •  aspirin (ADULT ASPIRIN EC LOW STRENGTH) 81 MG EC tablet, ADULT ASPIRIN EC LOW STRENGTH 81 MG ORAL TABLET DELAYED RELEASE, Disp: , Rfl:   •  ciprofloxacin (CIPRO) 250 MG tablet, Take 1 tablet by mouth 2 (Two) Times a Day for 7 days., Disp: 14 tablet, Rfl: 0  •  cycloSPORINE (RESTASIS) 0.05 % ophthalmic emulsion, 2 drops., Disp: , Rfl:   •  denosumab (PROLIA) 60 MG/ML solution prefilled syringe syringe, Inject  under the skin into the appropriate area as directed., Disp: , Rfl:   •  metoprolol succinate XL (TOPROL-XL) 100 MG 24 hr tablet, TAKE 1 TABLET BY MOUTH DAILY, Disp: 90 tablet, Rfl: 2  •  Multiple  "Vitamins-Minerals (WOMENS MULTI) capsule, Take  by mouth Daily., Disp: , Rfl:   •  SUPER B COMPLEX/C capsule, SUPER B COMPLEX/C ORAL CAPSULE, Disp: , Rfl:   •  tamsulosin (FLOMAX) 0.4 MG capsule 24 hr capsule, Take 1 capsule by mouth Daily., Disp: , Rfl:          Review of Systems   Constitutional: Negative for chills and diaphoresis.   HENT: Negative for trouble swallowing and voice change.    Eyes: Negative for visual disturbance.   Respiratory: Negative for shortness of breath.    Cardiovascular: Negative for chest pain and palpitations.   Gastrointestinal: Negative for abdominal pain and nausea.   Endocrine: Negative for polydipsia and polyphagia.   Genitourinary: Negative for hematuria.   Musculoskeletal: Positive for neck pain. Negative for neck stiffness.   Skin: Negative for color change and pallor.   Allergic/Immunologic: Negative for immunocompromised state.   Neurological: Negative for seizures and syncope.   Hematological: Negative for adenopathy.   Psychiatric/Behavioral: Negative for hallucinations, sleep disturbance and suicidal ideas.       I have reviewed and confirmed the accuracy of the ROS as documented by the MA/LPN/RN Tyron Driver MD      Objective   /84 (BP Location: Right arm, Patient Position: Sitting, Cuff Size: Adult)   Pulse 59   Temp 97.6 °F (36.4 °C)   Resp 18   Ht 156.2 cm (61.5\")   Wt 69.4 kg (153 lb)   SpO2 98%   BMI 28.44 kg/m²   BP Readings from Last 3 Encounters:   07/22/20 143/84   07/16/20 140/65   06/03/20 138/73     Wt Readings from Last 3 Encounters:   07/22/20 69.4 kg (153 lb)   07/16/20 70.5 kg (155 lb 6.4 oz)   06/03/20 70.4 kg (155 lb 3.2 oz)     Physical Exam   Constitutional: She is oriented to person, place, and time. She appears well-developed and well-nourished.   Cardiovascular: Normal rate, regular rhythm, S1 normal, S2 normal, normal heart sounds, intact distal pulses and normal pulses. Exam reveals no gallop and no friction rub.   No murmur " heard.  Pulmonary/Chest: Effort normal and breath sounds normal. No accessory muscle usage or stridor. She has no decreased breath sounds. She has no wheezes. She has no rhonchi. She has no rales.   Abdominal: Soft. Normal appearance, normal aorta and bowel sounds are normal. She exhibits no distension, no pulsatile midline mass and no mass. There is no hepatosplenomegaly. There is no tenderness. There is no rigidity, no rebound, no guarding, no CVA tenderness and negative Diego's sign. No hernia.   Neurological: She is alert and oriented to person, place, and time. Coordination and gait normal.   Skin: Skin is warm and dry. Turgor is normal. She is not diaphoretic. No pallor.         Assessment/Plan      Medications        Problem List         LOS    Allergic rhinitis.  OTC Claritin or Zyrtec.  Hypertension.  Stable/normotensive today on amlodipine back to  5 mg daily.  Follow-up recheck. Discussed low-sodium diet.  Stress echo benign/elevated right-sided pressure only 2018.  Carotid Dopplers with mild blockage only 2018.  Lower extremity edema.  Improved currently with decreased dose amlodipine.  Discussed low-sodium diet.  Follow-up recheck.  Consider sleep study if persistent symptoms.  Osteoporosis.  Tolerating Prolia.  Holding calcium/vitamin D per nephrology.  Follow-up recheck DEXA  Atrophic kidney.  Improved status post nephrectomy, followed by urology,nephrology.  Renal function normal.  Interstitial cystitis.  Improved on Flomax, followed by [facility], history of InterStim placement/subsequent removal.  I and O catheter dependent.  Hypertensive retinopathy.  Followed by ophthalmology ember galeano, status post treatment      Problem List Items Addressed This Visit        Unprioritized    Hypertension, benign - Primary (Chronic)    Acid reflux    Overview     Discussed diet, exercise, lifestyle modification.  start ppi  Call / return if persistent symptoms.         Anxiety    Atrophic kidney    Lower extremity  edema              Expected course, medications, and adverse effects discussed.  Call or return if worsening or persistent symptoms.

## 2020-07-29 PROBLEM — R60.0 LOWER EXTREMITY EDEMA: Status: ACTIVE | Noted: 2020-07-29

## 2020-08-11 RX ORDER — ALLOPURINOL 300 MG/1
TABLET ORAL
Qty: 90 TABLET | Refills: 1 | Status: SHIPPED | OUTPATIENT
Start: 2020-08-11 | End: 2021-02-08

## 2020-09-02 ENCOUNTER — OFFICE VISIT (OUTPATIENT)
Dept: FAMILY MEDICINE CLINIC | Facility: CLINIC | Age: 70
End: 2020-09-02

## 2020-09-02 VITALS
HEART RATE: 61 BPM | RESPIRATION RATE: 18 BRPM | OXYGEN SATURATION: 96 % | WEIGHT: 155 LBS | HEIGHT: 62 IN | DIASTOLIC BLOOD PRESSURE: 73 MMHG | SYSTOLIC BLOOD PRESSURE: 157 MMHG | BODY MASS INDEX: 28.52 KG/M2 | TEMPERATURE: 98.2 F

## 2020-09-02 DIAGNOSIS — M81.0 SENILE OSTEOPOROSIS: ICD-10-CM

## 2020-09-02 DIAGNOSIS — N26.1 ATROPHIC KIDNEY: ICD-10-CM

## 2020-09-02 DIAGNOSIS — Z12.11 SPECIAL SCREENING FOR MALIGNANT NEOPLASMS, COLON: ICD-10-CM

## 2020-09-02 DIAGNOSIS — F41.9 ANXIETY: ICD-10-CM

## 2020-09-02 DIAGNOSIS — I10 HYPERTENSION, BENIGN: Chronic | ICD-10-CM

## 2020-09-02 DIAGNOSIS — E78.2 MIXED HYPERLIPIDEMIA: ICD-10-CM

## 2020-09-02 DIAGNOSIS — Z12.31 ENCOUNTER FOR SCREENING MAMMOGRAM FOR MALIGNANT NEOPLASM OF BREAST: ICD-10-CM

## 2020-09-02 DIAGNOSIS — Z00.00 MEDICARE ANNUAL WELLNESS VISIT, SUBSEQUENT: Primary | ICD-10-CM

## 2020-09-02 PROBLEM — R31.9 HEMATURIA: Status: RESOLVED | Noted: 2019-08-06 | Resolved: 2020-09-02

## 2020-09-02 PROBLEM — R15.0 INCOMPLETE DEFECATION: Status: RESOLVED | Noted: 2019-08-06 | Resolved: 2020-09-02

## 2020-09-02 PROBLEM — R30.0 DYSURIA: Status: RESOLVED | Noted: 2020-01-15 | Resolved: 2020-09-02

## 2020-09-02 PROBLEM — R60.0 BILATERAL LOWER EXTREMITY EDEMA: Status: RESOLVED | Noted: 2020-07-16 | Resolved: 2020-09-02

## 2020-09-02 LAB
BILIRUB BLD-MCNC: NEGATIVE MG/DL
CLARITY, POC: CLEAR
COLOR UR: YELLOW
GLUCOSE UR STRIP-MCNC: NEGATIVE MG/DL
KETONES UR QL: NEGATIVE
LEUKOCYTE EST, POC: NEGATIVE
NITRITE UR-MCNC: NEGATIVE MG/ML
PH UR: 5 [PH] (ref 5–8)
PROT UR STRIP-MCNC: NEGATIVE MG/DL
RBC # UR STRIP: NEGATIVE /UL
SP GR UR: 1.01 (ref 1–1.03)
UROBILINOGEN UR QL: NORMAL

## 2020-09-02 PROCEDURE — 81002 URINALYSIS NONAUTO W/O SCOPE: CPT | Performed by: FAMILY MEDICINE

## 2020-09-02 PROCEDURE — 99497 ADVNCD CARE PLAN 30 MIN: CPT | Performed by: FAMILY MEDICINE

## 2020-09-02 PROCEDURE — 99214 OFFICE O/P EST MOD 30 MIN: CPT | Performed by: FAMILY MEDICINE

## 2020-09-02 PROCEDURE — 96160 PT-FOCUSED HLTH RISK ASSMT: CPT | Performed by: FAMILY MEDICINE

## 2020-09-02 PROCEDURE — G0439 PPPS, SUBSEQ VISIT: HCPCS | Performed by: FAMILY MEDICINE

## 2020-09-02 RX ORDER — HYDRALAZINE HYDROCHLORIDE 25 MG/1
TABLET, FILM COATED ORAL
Qty: 180 TABLET | Refills: 2 | Status: SHIPPED | OUTPATIENT
Start: 2020-09-02 | End: 2020-12-02

## 2020-09-02 RX ORDER — HYDRALAZINE HYDROCHLORIDE 25 MG/1
TABLET, FILM COATED ORAL
Qty: 60 TABLET | Refills: 5 | Status: SHIPPED | OUTPATIENT
Start: 2020-09-02 | End: 2020-09-02

## 2020-09-02 RX ORDER — BUPROPION HYDROCHLORIDE 150 MG/1
TABLET ORAL
Qty: 30 TABLET | Refills: 5 | Status: SHIPPED | OUTPATIENT
Start: 2020-09-02 | End: 2020-12-02

## 2020-09-02 RX ORDER — AMLODIPINE BESYLATE 10 MG/1
10 TABLET ORAL DAILY
COMMUNITY
End: 2021-04-14

## 2020-09-02 NOTE — PROGRESS NOTES
Subjective   Dunia Fabian is a 69 y.o. female.     Chief Complaint   Patient presents with   • Medicare Wellness-subsequent   • Hypertension   • Hyperlipidemia   • Depression   • Chronic Kidney Disease       The patient is here: to discuss health maintenance and disease prevention to follow up on multiple medical problems.  Last comprehensive physical was on 8/6/2019.  Previous physical was performed by  Tyron Driver MD  Overall has: moderate activity with work/home activities, good appetite, feels well with minor complaints, decreased energy level and is sleeping poorly. Nutrition: appropriate balanced diet, balanced diet and eating a variety of foods. Last tetanus shot was 10/24/2012. occasionally. Patient's last PAP Smear was: 2020 at  office. Patient's last dexa was 8/10/2017. The Patient's last Mammogram was: 11/25/2019. Patient's last colonoscopy was: 6/21/2016. Patients last echo was 5/11/2018. Patients last us carotid was 5/10/2018.    Hypertension   This is a chronic problem. The current episode started more than 1 year ago. The problem is controlled. Associated symptoms include neck pain. Pertinent negatives include no chest pain, palpitations or shortness of breath. Risk factors for coronary artery disease include dyslipidemia. Current antihypertension treatment includes beta blockers and angiotensin blockers. The current treatment provides moderate improvement. There are no compliance problems.    Depression   Visit Type: initial  Onset of symptoms: more than 6 months ago  Progression since onset: gradually worsening  Patient presents with the following symptoms: decreased concentration, depressed mood, excessive worry, fatigue, feelings of hopelessness, feelings of worthlessness and insomnia.  Patient is not experiencing: chest pain, choking sensation, nausea, nervousness/anxiety, palpitations, shortness of breath, suicidal ideas and suicidal planning.  Frequency of symptoms:  constantly   Risk factors: no known risk factors         Recent Hospitalizations:  No hospitalization(s) within the last year..  ccc    I personally reviewed and updated the patient's allergies, medications, problem list, and past medical, surgical, social, and family history.     Family History   Problem Relation Age of Onset   • Parkinsonism Mother    • Heart disease Mother         cardiovascular disease   • Diabetes Mother         diabetes mellitus    • Heart disease Father         cardiovascular disease   • Heart disease Sister         cardiovascular disease   • Diabetes Sister         diabetes mellitus    • Heart disease Brother         cardiovascular disease   • Diabetes Son         diabetes mellitus    • Heart disease Paternal Uncle         ischemic   • Stomach cancer Maternal Grandmother    • Breast cancer Niece 39       Social History     Tobacco Use   • Smoking status: Never Smoker   • Smokeless tobacco: Never Used   Substance Use Topics   • Alcohol use: No     Frequency: Never   • Drug use: No       Past Surgical History:   Procedure Laterality Date   • CATARACT EXTRACTION Left 08/2005    with Insert of Lens Prostheti   • CYSTOCELE REPAIR  06/1998    SIMENTAL Cystourethropexy & Cystocele Repair    • KIDNEY SURGERY Right 02/24/2014    Removal   • TONSILLECTOMY  1953   • URETHROLYSIS  10/2000    Transvaginal Urethrolysis & Bone Orlando Sling   • VAGINAL HYSTERECTOMY  1970   • VITRECTOMY Left 04/2005    & Membrane Peeling       Patient Active Problem List   Diagnosis   • Acid reflux   • Allergic rhinitis   • Anxiety   • Atrophic kidney   • Carotid bruit present   • Carpal tunnel syndrome   • Low back pain   • Neck pain   • Thoracic back pain   • Depressive disorder   • Medicare annual wellness visit, subsequent   • Gout   • Hemorrhoids   • Mixed hyperlipidemia   • Hypertension, benign   • IC (interstitial cystitis)   • Irritable bowel syndrome with constipation   • LVH (left ventricular hypertrophy)   •  Menopausal syndrome   • Mitral insufficiency, acute   • Onychomycosis   • Disorder of bone and cartilage   • Osteoarthritis   • Primary pulmonary hypertension (CMS/HCC)   • Renal insufficiency   • Plantar fasciitis   • Senile osteoporosis   • Solitary kidney   • Encounter for screening mammogram for malignant neoplasm of breast   • Special screening for malignant neoplasms, colon   • Seborrheic keratosis   • Chronic kidney disease, stage 3 (moderate) (CMS/HCC)   • Renal hypertrophy   • Hypercalcemia   • Acute cystitis with hematuria   • Lower extremity edema         Current Outpatient Medications:   •  allopurinol (ZYLOPRIM) 300 MG tablet, TAKE 1 TABLET BY MOUTH DAILY, Disp: 90 tablet, Rfl: 1  •  amLODIPine (NORVASC) 5 MG tablet, Take 5 mg by mouth Daily., Disp: , Rfl:   •  aspirin (ADULT ASPIRIN EC LOW STRENGTH) 81 MG EC tablet, ADULT ASPIRIN EC LOW STRENGTH 81 MG ORAL TABLET DELAYED RELEASE, Disp: , Rfl:   •  cycloSPORINE (RESTASIS) 0.05 % ophthalmic emulsion, 2 drops., Disp: , Rfl:   •  denosumab (PROLIA) 60 MG/ML solution prefilled syringe syringe, Inject  under the skin into the appropriate area as directed., Disp: , Rfl:   •  metoprolol succinate XL (TOPROL-XL) 100 MG 24 hr tablet, TAKE 1 TABLET BY MOUTH DAILY, Disp: 90 tablet, Rfl: 2  •  Multiple Vitamins-Minerals (WOMENS MULTI) capsule, Take  by mouth Daily., Disp: , Rfl:   •  SUPER B COMPLEX/C capsule, SUPER B COMPLEX/C ORAL CAPSULE, Disp: , Rfl:   •  tamsulosin (FLOMAX) 0.4 MG capsule 24 hr capsule, Take 1 capsule by mouth Daily., Disp: , Rfl:   •  amLODIPine (NORVASC) 2.5 MG tablet, Take 2 tablets by mouth Daily. (Patient taking differently: Take  by mouth Daily.), Disp: 30 tablet, Rfl: 0  •  buPROPion XL (Wellbutrin XL) 150 MG 24 hr tablet, Take 1 tablet daily, Disp: 30 tablet, Rfl: 5  •  hydrALAZINE (APRESOLINE) 25 MG tablet, TAKE 1 TABLET BY MOUTH TWICE DAILY, Disp: 180 tablet, Rfl: 2         Review of Systems   Constitutional: Negative for chills and  "diaphoresis.   HENT: Negative for trouble swallowing and voice change.    Eyes: Negative for visual disturbance.   Respiratory: Negative for choking and shortness of breath.    Cardiovascular: Negative for chest pain and palpitations.   Gastrointestinal: Negative for abdominal pain and nausea.   Endocrine: Negative for polydipsia and polyphagia.   Genitourinary: Negative for breast lump, hematuria and pelvic pain.   Musculoskeletal: Positive for neck pain. Negative for neck stiffness.   Skin: Negative for color change and pallor.   Allergic/Immunologic: Negative for immunocompromised state.   Neurological: Negative for seizures and syncope.   Hematological: Negative for adenopathy.   Psychiatric/Behavioral: Positive for decreased concentration and depressed mood. Negative for hallucinations, sleep disturbance and suicidal ideas. The patient has insomnia. The patient is not nervous/anxious.        I have reviewed and confirmed the accuracy of the ROS as documented by the MA/LPN/RN Tyron Driver MD      Objective   /73 (BP Location: Right arm, Patient Position: Sitting, Cuff Size: Adult)   Pulse 61   Temp 98.2 °F (36.8 °C)   Resp 18   Ht 156.2 cm (61.5\")   Wt 70.3 kg (155 lb)   SpO2 96%   BMI 28.81 kg/m²   BP Readings from Last 3 Encounters:   09/02/20 157/73   07/22/20 143/84   07/16/20 140/65     Wt Readings from Last 3 Encounters:   09/02/20 70.3 kg (155 lb)   07/22/20 69.4 kg (153 lb)   07/16/20 70.5 kg (155 lb 6.4 oz)     Physical Exam   Constitutional: She is oriented to person, place, and time. She appears well-developed and well-nourished.   HENT:   Head: Normocephalic.   Right Ear: Tympanic membrane, external ear and ear canal normal.   Left Ear: Tympanic membrane, external ear and ear canal normal.   Nose: Nose normal.   Eyes: Pupils are equal, round, and reactive to light. Conjunctivae, EOM and lids are normal.   Neck: No JVD present. Carotid bruit is not present. No tracheal deviation " present. No thyromegaly present.   Cardiovascular: Normal rate, regular rhythm, normal heart sounds and intact distal pulses. Exam reveals no gallop and no friction rub.   No murmur heard.  Pulmonary/Chest: Effort normal and breath sounds normal. No stridor. She has no decreased breath sounds. She has no wheezes. She has no rales.   Abdominal: Soft. Bowel sounds are normal. She exhibits no distension and no mass. There is no tenderness. There is no rebound and no guarding. No hernia.   Lymphadenopathy:        Head (right side): No submental, no submandibular, no tonsillar, no preauricular, no posterior auricular and no occipital adenopathy present.        Head (left side): No submental, no submandibular, no tonsillar, no preauricular, no posterior auricular and no occipital adenopathy present.     She has no cervical adenopathy.   Neurological: She is alert and oriented to person, place, and time. She has normal strength and normal reflexes. No cranial nerve deficit or sensory deficit. Coordination and gait normal.   Skin: Skin is warm and dry. Turgor is normal. She is not diaphoretic. No pallor.       Recent Lab Results:    Lab Results   Component Value Date     (H) 08/26/2020        Lab Results   Component Value Date    CHOL 181 07/24/2018    CHOL 215 (H) 07/21/2017    CHOL 198 07/20/2016    CHLPL 228 (H) 08/26/2020     Lab Results   Component Value Date    TRIG 170 (H) 08/26/2020    TRIG 77 07/24/2018    TRIG 96 07/21/2017     Lab Results   Component Value Date    HDL 53 08/26/2020    HDL 60 07/24/2018    HDL 62 07/21/2017     Lab Results   Component Value Date     (H) 08/26/2020     07/24/2018     (H) 07/21/2017     No results found for: PSA  Lab Results   Component Value Date    WBC 8.7 08/26/2020    HGB 13.0 08/26/2020    HCT 39.8 08/26/2020    MCV 92 08/26/2020     08/26/2020     Lab Results   Component Value Date    TSH 4.220 08/26/2020     Lab Results   Component Value  Date    BUN 15 08/26/2020    CREATININE 0.93 08/26/2020    EGFRIFNONA 62 08/26/2020    EGFRIFAFRI 72 08/26/2020    BCR 16 08/26/2020    K 4.5 08/26/2020    CO2 26 08/26/2020    CALCIUM 10.0 08/26/2020    PROTENTOTREF 7.2 08/26/2020    ALBUMIN 4.6 08/26/2020    LABIL2 1.8 08/26/2020    AST 20 08/26/2020    ALT 20 08/26/2020         Age-appropriate Screening Schedule:  Refer to the list below for future screening recommendations based on patient's age, sex and/or medical conditions. Orders for these recommended tests are listed in the plan section. The patient has been provided with a written plan.    Health Maintenance   Topic Date Due   • ZOSTER VACCINE (2 of 3) 02/07/2019   • DXA SCAN  08/27/2019   • INFLUENZA VACCINE  08/01/2020   • LIPID PANEL  08/26/2021   • MAMMOGRAM  12/10/2021   • TDAP/TD VACCINES (3 - Td) 10/24/2022   • COLONOSCOPY  06/21/2026       Depression Screen:   PHQ-2/PHQ-9 Depression Screening 9/2/2020   Little interest or pleasure in doing things 3   Feeling down, depressed, or hopeless 1   Trouble falling or staying asleep, or sleeping too much 3   Feeling tired or having little energy 1   Poor appetite or overeating 0   Feeling bad about yourself - or that you are a failure or have let yourself or your family down 1   Trouble concentrating on things, such as reading the newspaper or watching television 0   Moving or speaking so slowly that other people could have noticed. Or the opposite - being so fidgety or restless that you have been moving around a lot more than usual 0   Thoughts that you would be better off dead, or of hurting yourself in some way 0   Total Score 9   If you checked off any problems, how difficult have these problems made it for you to do your work, take care of things at home, or get along with other people? Somewhat difficult     I spent more than 16 minutes asking patient questions, counseling and documenting in the chart.    Health Habits and Functional and Cognitive  Screening:  Functional & Cognitive Status 9/2/2020   Do you have difficulty preparing food and eating? No   Do you have difficulty bathing yourself, getting dressed or grooming yourself? No   Do you have difficulty using the toilet? No   Do you have difficulty moving around from place to place? No   Do you have trouble with steps or getting out of a bed or a chair? No   Current Diet Well Balanced Diet   Dental Exam Up to date   Eye Exam Up to date   Exercise (times per week) 2 times per week   Current Exercise Activities Include Yard Work   Do you need help using the phone?  No   Are you deaf or do you have serious difficulty hearing?  No   Do you need help with transportation? No   Do you need help shopping? No   Do you need help preparing meals?  No   Do you need help with housework?  No   Do you need help with laundry? No   Do you need help taking your medications? No   Do you need help managing money? No   Do you ever drive or ride in a car without wearing a seat belt? No   Have you felt unusual stress, anger or loneliness in the last month? No   Who do you live with? Alone   If you need help, do you have trouble finding someone available to you? No   Have you been bothered in the last four weeks by sexual problems? No   Do you have difficulty concentrating, remembering or making decisions? No       Does the patient have evidence of cognitive impairment? No    Advance Care Planning:  ACP discussion was held with the patient during this visit. Patient does not have an advance directive, information provided.     A face-to-face visit was completed today with patient.  Counseling explanation, and discussion of advanced directives was performed.   The last advanced care visit was performed in 2019.  In a near life ending situation, from which she is not expected to recover functionally, and she is not able to speak for her, she does not want life sustaining measures. We discussed feeding tubes, ventilators and  cardiac support as well as dialysis.    I spent more than 16 minutes discussing Advanced Care Planning information and documenting in the chart.    Identification of Risk Factors:  Risk factors include: Advance Directive Discussion  Breast Cancer/Mammogram Screening  Cardiovascular risk  Fall Risk  Osteoprorosis Risk.    Compared to one year ago, the patient feels her physical health is the same.  Compared to one year ago, the patient feels her mental health is the same.    Patient Self-Management and Personalized Health Advice  The patient has been provided with information about: diet, exercise, prevention of cardiac or vascular disease, fall prevention, designing advance directives and supplements and preventive services including:   · Alcohol Misuse Screening and Counseling  (15 minutes counseling time, Code )  · Annual Wellness Visit (AWV)  · Bone Density Measurements  · Colorectal Cancer Screening, Colonoscopy  · Influenza Vaccine and Administration  · Pneumococcal Vaccine and Administration.      Assessment/Plan      Medications        Problem List         LOS    Medicare wellness.  Doing well, vaccines current.  Discussed health maintenance, screening test, lifestyle modification.  Pap current, followed by Dr. Patiño, mammogram scheduled.  Allergic rhinitis.  OTC Claritin or Zyrtec.  Hypertension.    Worse today, add hydralazine.  amlodipine back to  5 mg daily.  Follow-up recheck. Discussed low-sodium diet.  Stress echo benign/elevated right-sided pressure only 2018.  Carotid Dopplers with mild blockage only 2018.  Life stress/decreased energy.  Multiple stressors.  Good social support.  On Wellbutrin.  Follow-up recheck.  Lower extremity edema.  Improved currently with decreased dose amlodipine.  Discussed low-sodium diet.  Follow-up recheck.  Consider sleep study if persistent symptoms.  Osteoporosis.  Tolerating Prolia.  Holding calcium/vitamin D per nephrology.  Follow-up recheck DEXA  Atrophic  kidney.  Improved status post nephrectomy, followed by urology,nephrology.  Renal function normal.  Interstitial cystitis.  Improved on Flomax, followed by aHrlan, history of InterStim placement/subsequent removal.  I and O catheter dependent.  Hypertensive retinopathy.  Followed by ophthalmology ember galeano, status post treatment  Colon screening.  Colonoscopy benign 2016./Diverticulosis only       Problem List Items Addressed This Visit        Unprioritized    Mixed hyperlipidemia (Chronic)    Hypertension, benign (Chronic)    Relevant Medications    amLODIPine (NORVASC) 5 MG tablet    Anxiety    Atrophic kidney    Medicare annual wellness visit, subsequent - Primary    Relevant Orders    POCT urinalysis dipstick, manual (Completed)    Senile osteoporosis    Overview     improved, tolerating prolia  discussed calcium, vit d  Follow up recheck dxa         Relevant Orders    DEXA Bone Density Axial    Encounter for screening mammogram for malignant neoplasm of breast    Overview     Ordered by gynecologist.         Relevant Orders    Mammo Screening Digital Tomosynthesis Bilateral With CAD    Special screening for malignant neoplasms, colon    Overview     Negative colonoscopy  by Dr. Goff in 2016 (diverticulosis only)                   Expected course, medications, and adverse effects discussed.  Call or return if worsening or persistent symptoms.

## 2020-12-02 ENCOUNTER — OFFICE VISIT (OUTPATIENT)
Dept: FAMILY MEDICINE CLINIC | Facility: CLINIC | Age: 70
End: 2020-12-02

## 2020-12-02 ENCOUNTER — APPOINTMENT (OUTPATIENT)
Dept: BONE DENSITY | Facility: HOSPITAL | Age: 70
End: 2020-12-02

## 2020-12-02 VITALS
HEIGHT: 62 IN | SYSTOLIC BLOOD PRESSURE: 160 MMHG | DIASTOLIC BLOOD PRESSURE: 90 MMHG | OXYGEN SATURATION: 97 % | TEMPERATURE: 97.8 F | BODY MASS INDEX: 29.48 KG/M2 | RESPIRATION RATE: 18 BRPM | WEIGHT: 160.2 LBS | HEART RATE: 72 BPM

## 2020-12-02 DIAGNOSIS — E66.3 OVER WEIGHT: ICD-10-CM

## 2020-12-02 DIAGNOSIS — N26.1 ATROPHIC KIDNEY: ICD-10-CM

## 2020-12-02 DIAGNOSIS — I10 HYPERTENSION, BENIGN: Primary | Chronic | ICD-10-CM

## 2020-12-02 DIAGNOSIS — F32.A DEPRESSIVE DISORDER: ICD-10-CM

## 2020-12-02 PROBLEM — N30.01 ACUTE CYSTITIS WITH HEMATURIA: Status: RESOLVED | Noted: 2020-01-15 | Resolved: 2020-12-02

## 2020-12-02 PROCEDURE — 99214 OFFICE O/P EST MOD 30 MIN: CPT | Performed by: FAMILY MEDICINE

## 2020-12-02 RX ORDER — HYDRALAZINE HYDROCHLORIDE 50 MG/1
50 TABLET, FILM COATED ORAL 3 TIMES DAILY
Qty: 90 TABLET | Refills: 5 | Status: SHIPPED | OUTPATIENT
Start: 2020-12-02 | End: 2020-12-02

## 2020-12-02 RX ORDER — HYDRALAZINE HYDROCHLORIDE 50 MG/1
50 TABLET, FILM COATED ORAL 3 TIMES DAILY
Qty: 90 TABLET | Refills: 5 | Status: SHIPPED | OUTPATIENT
Start: 2020-12-02 | End: 2020-12-04

## 2020-12-02 RX ORDER — BUPROPION HYDROCHLORIDE 300 MG/1
300 TABLET ORAL DAILY
Qty: 30 TABLET | Refills: 5 | Status: SHIPPED | OUTPATIENT
Start: 2020-12-02 | End: 2021-04-14

## 2020-12-02 NOTE — PROGRESS NOTES
Subjective   Dunia Fabian is a 70 y.o. female.     Chief Complaint   Patient presents with   • Hypertension   • Depression       Hypertension  This is a chronic problem. The current episode started more than 1 year ago. The problem is controlled. Associated symptoms include blurred vision, headaches and neck pain. Pertinent negatives include no chest pain, palpitations or shortness of breath. Risk factors for coronary artery disease include dyslipidemia, post-menopausal state and obesity. Current antihypertension treatment includes beta blockers and angiotensin blockers. The current treatment provides moderate improvement. There are no compliance problems.    Depression  Visit Type: initial  Onset of symptoms: more than 6 months ago  Progression since onset: gradually worsening  Patient presents with the following symptoms: decreased concentration, depressed mood, excessive worry, fatigue, feelings of hopelessness, feelings of worthlessness and insomnia.  Patient is not experiencing: chest pain, choking sensation, nausea, nervousness/anxiety, palpitations, shortness of breath, suicidal ideas and suicidal planning.  Frequency of symptoms: constantly   Risk factors: no known risk factors             I personally reviewed and updated the patient's allergies, medications, problem list, and past medical, surgical, social, and family history. I have reviewed and confirmed the accuracy of the History of Present Illness and Review of Symptoms as documented by the MA/LPN/RN. Tyron Driver MD    Family History   Problem Relation Age of Onset   • Parkinsonism Mother    • Heart disease Mother         cardiovascular disease   • Diabetes Mother         diabetes mellitus    • Heart disease Father         cardiovascular disease   • Heart disease Sister         cardiovascular disease   • Diabetes Sister         diabetes mellitus    • Heart disease Brother         cardiovascular disease   • Diabetes Son         diabetes  mellitus    • Heart disease Paternal Uncle         ischemic   • Stomach cancer Maternal Grandmother    • Breast cancer Niece 39       Social History     Tobacco Use   • Smoking status: Never Smoker   • Smokeless tobacco: Never Used   Substance Use Topics   • Alcohol use: No     Frequency: Never   • Drug use: No       Past Surgical History:   Procedure Laterality Date   • CATARACT EXTRACTION Left 08/2005    with Insert of Lens Prostheti   • CYSTOCELE REPAIR  06/1998    SIMENTAL Cystourethropexy & Cystocele Repair    • KIDNEY SURGERY Right 02/24/2014    Removal   • TONSILLECTOMY  1953   • URETHROLYSIS  10/2000    Transvaginal Urethrolysis & Bone [place] Sling   • VAGINAL HYSTERECTOMY  1970   • VITRECTOMY Left 04/2005    & Membrane Peeling       Patient Active Problem List   Diagnosis   • Acid reflux   • Allergic rhinitis   • Anxiety   • Atrophic kidney   • Carotid bruit present   • Carpal tunnel syndrome   • Low back pain   • Neck pain   • Thoracic back pain   • Depressive disorder   • Medicare annual wellness visit, subsequent   • Gout   • Hemorrhoids   • Mixed hyperlipidemia   • Hypertension, benign   • IC (interstitial cystitis)   • Irritable bowel syndrome with constipation   • LVH (left ventricular hypertrophy)   • Menopausal syndrome   • Mitral insufficiency, acute   • Onychomycosis   • Disorder of bone and cartilage   • Osteoarthritis   • Primary pulmonary hypertension (CMS/HCC)   • Renal insufficiency   • Plantar fasciitis   • Senile osteoporosis   • Solitary kidney   • Encounter for screening mammogram for malignant neoplasm of breast   • Special screening for malignant neoplasms, colon   • Seborrheic keratosis   • Chronic kidney disease, stage 3 (moderate)   • Renal hypertrophy   • Hypercalcemia   • Lower extremity edema   • Over weight         Current Outpatient Medications:   •  allopurinol (ZYLOPRIM) 300 MG tablet, TAKE 1 TABLET BY MOUTH DAILY, Disp: 90 tablet, Rfl: 1  •  amLODIPine (NORVASC) 2.5 MG tablet,  Take 2 tablets by mouth Daily. (Patient taking differently: Take  by mouth Daily.), Disp: 30 tablet, Rfl: 0  •  amLODIPine (NORVASC) 5 MG tablet, Take 5 mg by mouth Daily., Disp: , Rfl:   •  aspirin (ADULT ASPIRIN EC LOW STRENGTH) 81 MG EC tablet, ADULT ASPIRIN EC LOW STRENGTH 81 MG ORAL TABLET DELAYED RELEASE, Disp: , Rfl:   •  cycloSPORINE (RESTASIS) 0.05 % ophthalmic emulsion, 2 drops., Disp: , Rfl:   •  denosumab (PROLIA) 60 MG/ML solution prefilled syringe syringe, Inject  under the skin into the appropriate area as directed., Disp: , Rfl:   •  metoprolol succinate XL (TOPROL-XL) 100 MG 24 hr tablet, TAKE 1 TABLET BY MOUTH DAILY, Disp: 90 tablet, Rfl: 2  •  Multiple Vitamins-Minerals (WOMENS MULTI) capsule, Take  by mouth Daily., Disp: , Rfl:   •  SUPER B COMPLEX/C capsule, SUPER B COMPLEX/C ORAL CAPSULE, Disp: , Rfl:   •  tamsulosin (FLOMAX) 0.4 MG capsule 24 hr capsule, Take 1 capsule by mouth Daily., Disp: , Rfl:   •  buPROPion XL (WELLBUTRIN XL) 300 MG 24 hr tablet, Take 1 tablet by mouth Daily. Take 1 tablet daily, Disp: 30 tablet, Rfl: 5  •  hydrALAZINE (APRESOLINE) 50 MG tablet, TAKE 1 TABLET BY MOUTH THREE TIMES DAILY, Disp: 270 tablet, Rfl: 0         Review of Systems   Constitutional: Negative for chills and diaphoresis.   HENT: Negative for trouble swallowing and voice change.    Eyes: Positive for blurred vision. Negative for visual disturbance.   Respiratory: Negative for choking and shortness of breath.    Cardiovascular: Negative for chest pain and palpitations.   Gastrointestinal: Negative for abdominal pain and nausea.   Endocrine: Negative for polydipsia and polyphagia.   Genitourinary: Negative for hematuria.   Musculoskeletal: Positive for neck pain. Negative for neck stiffness.   Skin: Negative for color change and pallor.   Allergic/Immunologic: Negative for immunocompromised state.   Neurological: Negative for seizures and syncope.   Hematological: Negative for adenopathy.  "  Psychiatric/Behavioral: Positive for decreased concentration and depressed mood. Negative for hallucinations, sleep disturbance and suicidal ideas. The patient has insomnia. The patient is not nervous/anxious.        I have reviewed and confirmed the accuracy of the ROS as documented by the MA/LPN/RN Tyron Driver MD      Objective   /90 (BP Location: Right arm, Patient Position: Sitting, Cuff Size: Adult)   Pulse 72   Temp 97.8 °F (36.6 °C)   Resp 18   Ht 156.2 cm (61.5\")   Wt 72.7 kg (160 lb 3.2 oz)   SpO2 97%   BMI 29.78 kg/m²   BP Readings from Last 3 Encounters:   12/02/20 160/90   09/02/20 157/73   07/22/20 143/84     Wt Readings from Last 3 Encounters:   12/02/20 72.7 kg (160 lb 3.2 oz)   09/02/20 70.3 kg (155 lb)   07/22/20 69.4 kg (153 lb)     Physical Exam  Constitutional:       Appearance: Normal appearance. She is well-developed. She is not diaphoretic.   HENT:      Right Ear: Hearing, tympanic membrane, ear canal and external ear normal.      Left Ear: Hearing, tympanic membrane, ear canal and external ear normal.      Nose: Nose normal. No mucosal edema or congestion.      Right Sinus: No maxillary sinus tenderness or frontal sinus tenderness.      Left Sinus: No maxillary sinus tenderness or frontal sinus tenderness.      Mouth/Throat:      Mouth: No oral lesions.      Pharynx: Uvula midline. No oropharyngeal exudate or posterior oropharyngeal erythema.      Tonsils: No tonsillar exudate.   Cardiovascular:      Rate and Rhythm: Normal rate and regular rhythm.      Pulses: Normal pulses.      Heart sounds: Normal heart sounds, S1 normal and S2 normal. No murmur. No friction rub. No gallop.    Pulmonary:      Effort: Pulmonary effort is normal. No accessory muscle usage.      Breath sounds: Normal breath sounds. No stridor. No decreased breath sounds, wheezing, rhonchi or rales.   Abdominal:      General: Bowel sounds are normal. There is no distension.      Palpations: Abdomen is soft. " Abdomen is not rigid. There is no mass or pulsatile mass.      Tenderness: There is no abdominal tenderness. There is no guarding or rebound. Negative signs include Diego's sign.      Hernia: No hernia is present.   Skin:     General: Skin is warm and dry.      Coloration: Skin is not pale.   Neurological:      Mental Status: She is alert and oriented to person, place, and time.      Coordination: Coordination normal.      Gait: Gait normal.         Recent Lab Results:    No results found for: HGBA1C  Lab Results   Component Value Date     (H) 08/26/2020     Lab Results   Component Value Date     (H) 08/26/2020     07/24/2018     (H) 07/21/2017     Lab Results   Component Value Date    CHOL 181 07/24/2018    CHOL 215 (H) 07/21/2017    CHOL 198 07/20/2016     Lab Results   Component Value Date    TRIG 170 (H) 08/26/2020    TRIG 77 07/24/2018    TRIG 96 07/21/2017     Lab Results   Component Value Date    HDL 53 08/26/2020    HDL 60 07/24/2018    HDL 62 07/21/2017     No results found for: PSA  Lab Results   Component Value Date    WBC 8.7 08/26/2020    HGB 13.0 08/26/2020    HCT 39.8 08/26/2020    MCV 92 08/26/2020     08/26/2020     Lab Results   Component Value Date    TSH 4.220 08/26/2020      Lab Results   Component Value Date    BUN 15 08/26/2020    CREATININE 0.93 08/26/2020    EGFRIFNONA 62 08/26/2020    EGFRIFAFRI 72 08/26/2020    BCR 16 08/26/2020    K 4.5 08/26/2020    CO2 26 08/26/2020    CALCIUM 10.0 08/26/2020    PROTENTOTREF 7.2 08/26/2020    ALBUMIN 4.6 08/26/2020    LABIL2 1.8 08/26/2020    AST 20 08/26/2020    ALT 20 08/26/2020     No results found for: ESTHER, RF, SEDRATE   No results found for: CRP   No results found for: IRON, TIBC, FERRITIN   No results found for: [id number]       Assessment/Plan      Medications        Problem List         LOS    Health maintenance.  Doing well, vaccines current.  Discussed health maintenance, screening test, lifestyle  modification.  Pap current, followed by Dr. Patiño, mammogram scheduled.  Allergic rhinitis.  OTC Claritin or Zyrtec.  Hypertension.      Persistent elevation today, increase hydralazine.  amlodipine back to  5 mg daily.  Follow-up recheck. Discussed low-sodium diet.  Stress echo benign/elevated right-sided pressure only 2018.  Carotid Dopplers with mild blockage only 2018.  Life stress/decreased energy.  Multiple stressors.  Good social support.    Tolerating Wellbutrin, dose increased.  Follow-up recheck.  Lower extremity edema.  Improved currently with decreased dose amlodipine.  Discussed low-sodium diet.  Follow-up recheck.  Consider sleep study if persistent symptoms.  Osteoporosis.  Tolerating Prolia.  Holding calcium/vitamin D per nephrology.  Follow-up recheck DEXA  Atrophic kidney.  Improved status post nephrectomy, followed by urology,nephrology.  Renal function normal.  Interstitial cystitis.  Improved on Flomax, followed by [facility], history of InterStim placement/subsequent removal.  I and O catheter dependent.  Hypertensive retinopathy.  Followed by ophthalmology ember galeano, status post treatment  Colon screening.  Colonoscopy benign 2016./Diverticulosis only         Problem List Items Addressed This Visit        Unprioritized    Hypertension, benign - Primary (Chronic)    Atrophic kidney    Depressive disorder    Relevant Medications    buPROPion XL (WELLBUTRIN XL) 300 MG 24 hr tablet    Over weight              Expected course, medications, and adverse effects discussed.  Call or return if worsening or persistent symptoms.  I wore protective equipment throughout this patient encounter including a mask, gloves, and eye protection.  Hand hygiene was performed before donning protective equipment and after removal when leaving the room.

## 2020-12-04 DIAGNOSIS — F32.A DEPRESSIVE DISORDER: ICD-10-CM

## 2020-12-04 RX ORDER — HYDRALAZINE HYDROCHLORIDE 50 MG/1
TABLET, FILM COATED ORAL
Qty: 270 TABLET | Refills: 0 | Status: SHIPPED | OUTPATIENT
Start: 2020-12-04 | End: 2020-12-28

## 2020-12-08 ENCOUNTER — APPOINTMENT (OUTPATIENT)
Dept: MAMMOGRAPHY | Facility: HOSPITAL | Age: 70
End: 2020-12-08

## 2020-12-11 ENCOUNTER — HOSPITAL ENCOUNTER (OUTPATIENT)
Dept: BONE DENSITY | Facility: HOSPITAL | Age: 70
Discharge: HOME | End: 2020-12-11

## 2020-12-11 ENCOUNTER — HOSPITAL ENCOUNTER (OUTPATIENT)
Dept: MAMMOGRAPHY | Facility: HOSPITAL | Age: 70
Discharge: HOME | End: 2020-12-11

## 2020-12-11 DIAGNOSIS — M81.0 SENILE OSTEOPOROSIS: ICD-10-CM

## 2020-12-11 DIAGNOSIS — Z12.31 ENCOUNTER FOR SCREENING MAMMOGRAM FOR MALIGNANT NEOPLASM OF BREAST: ICD-10-CM

## 2020-12-11 PROCEDURE — 77063 BREAST TOMOSYNTHESIS BI: CPT

## 2020-12-11 PROCEDURE — 77080 DXA BONE DENSITY AXIAL: CPT

## 2020-12-11 PROCEDURE — 77067 SCR MAMMO BI INCL CAD: CPT

## 2020-12-14 ENCOUNTER — TELEPHONE (OUTPATIENT)
Dept: FAMILY MEDICINE CLINIC | Facility: CLINIC | Age: 70
End: 2020-12-14

## 2020-12-14 NOTE — TELEPHONE ENCOUNTER
----- Message from Tyron Driver MD sent at 12/13/2020  3:47 PM EST -----  Let her know her bone scan shows that her bones are still thin in the osteopenia range, not all the way to osteoporosis, her bones are a little thinner than the last check but overall stable, want her to continue the Prolia, thanks

## 2020-12-18 ENCOUNTER — TELEPHONE (OUTPATIENT)
Dept: FAMILY MEDICINE CLINIC | Facility: CLINIC | Age: 70
End: 2020-12-18

## 2020-12-28 ENCOUNTER — OFFICE VISIT (OUTPATIENT)
Dept: FAMILY MEDICINE CLINIC | Facility: CLINIC | Age: 70
End: 2020-12-28

## 2020-12-28 VITALS
OXYGEN SATURATION: 98 % | BODY MASS INDEX: 28.01 KG/M2 | TEMPERATURE: 96.7 F | RESPIRATION RATE: 18 BRPM | WEIGHT: 152.2 LBS | DIASTOLIC BLOOD PRESSURE: 80 MMHG | HEART RATE: 66 BPM | SYSTOLIC BLOOD PRESSURE: 199 MMHG | HEIGHT: 62 IN

## 2020-12-28 DIAGNOSIS — G89.29 CHRONIC NONINTRACTABLE HEADACHE, UNSPECIFIED HEADACHE TYPE: ICD-10-CM

## 2020-12-28 DIAGNOSIS — I10 HYPERTENSION, BENIGN: Primary | Chronic | ICD-10-CM

## 2020-12-28 DIAGNOSIS — F32.A DEPRESSIVE DISORDER: ICD-10-CM

## 2020-12-28 DIAGNOSIS — R00.2 PALPITATIONS: ICD-10-CM

## 2020-12-28 DIAGNOSIS — R42 DIZZINESS: ICD-10-CM

## 2020-12-28 DIAGNOSIS — R09.89 CAROTID BRUIT, UNSPECIFIED LATERALITY: ICD-10-CM

## 2020-12-28 DIAGNOSIS — R06.02 SOB (SHORTNESS OF BREATH): ICD-10-CM

## 2020-12-28 DIAGNOSIS — R51.9 CHRONIC NONINTRACTABLE HEADACHE, UNSPECIFIED HEADACHE TYPE: ICD-10-CM

## 2020-12-28 DIAGNOSIS — R53.1 WEAKNESS: ICD-10-CM

## 2020-12-28 PROBLEM — E66.3 OVER WEIGHT: Status: RESOLVED | Noted: 2020-12-02 | Resolved: 2020-12-28

## 2020-12-28 PROCEDURE — 99215 OFFICE O/P EST HI 40 MIN: CPT | Performed by: FAMILY MEDICINE

## 2020-12-28 PROCEDURE — 93010 ELECTROCARDIOGRAM REPORT: CPT | Performed by: FAMILY MEDICINE

## 2020-12-28 RX ORDER — HYDRALAZINE HYDROCHLORIDE 100 MG/1
100 TABLET, FILM COATED ORAL 3 TIMES DAILY
Qty: 90 TABLET | Refills: 3 | Status: SHIPPED | OUTPATIENT
Start: 2020-12-28 | End: 2020-12-30

## 2020-12-28 NOTE — TELEPHONE ENCOUNTER
----- Message from Tyron Driver MD sent at 12/28/2020  6:16 PM EST -----  Let her know her blood work looks good, her heart troponin is normal, blood count looks good everything else normal, will call again when the rest of her study results are available, thanks

## 2020-12-30 ENCOUNTER — CLINICAL SUPPORT (OUTPATIENT)
Dept: FAMILY MEDICINE CLINIC | Facility: CLINIC | Age: 70
End: 2020-12-30

## 2020-12-30 ENCOUNTER — TRANSCRIBE ORDERS (OUTPATIENT)
Dept: ADMINISTRATIVE | Facility: HOSPITAL | Age: 70
End: 2020-12-30

## 2020-12-30 DIAGNOSIS — R39.14 FEELING OF INCOMPLETE BLADDER EMPTYING: Primary | ICD-10-CM

## 2020-12-30 DIAGNOSIS — M81.0 SENILE OSTEOPOROSIS: Primary | ICD-10-CM

## 2020-12-30 PROCEDURE — 96372 THER/PROPH/DIAG INJ SC/IM: CPT | Performed by: FAMILY MEDICINE

## 2020-12-30 RX ORDER — HYDRALAZINE HYDROCHLORIDE 100 MG/1
TABLET, FILM COATED ORAL
Qty: 270 TABLET | Refills: 0 | Status: SHIPPED | OUTPATIENT
Start: 2020-12-30 | End: 2021-03-16

## 2021-01-06 ENCOUNTER — OFFICE VISIT (OUTPATIENT)
Dept: FAMILY MEDICINE CLINIC | Facility: CLINIC | Age: 71
End: 2021-01-06

## 2021-01-06 VITALS
SYSTOLIC BLOOD PRESSURE: 140 MMHG | OXYGEN SATURATION: 96 % | TEMPERATURE: 96.8 F | DIASTOLIC BLOOD PRESSURE: 72 MMHG | HEIGHT: 62 IN | WEIGHT: 157.8 LBS | RESPIRATION RATE: 16 BRPM | HEART RATE: 75 BPM | BODY MASS INDEX: 29.04 KG/M2

## 2021-01-06 DIAGNOSIS — F32.A DEPRESSIVE DISORDER: ICD-10-CM

## 2021-01-06 DIAGNOSIS — I10 HYPERTENSION, BENIGN: Primary | Chronic | ICD-10-CM

## 2021-01-06 DIAGNOSIS — R42 DIZZINESS: ICD-10-CM

## 2021-01-06 DIAGNOSIS — N26.1 ATROPHIC KIDNEY: ICD-10-CM

## 2021-01-06 PROCEDURE — 99214 OFFICE O/P EST MOD 30 MIN: CPT | Performed by: FAMILY MEDICINE

## 2021-01-06 RX ORDER — SULFAMETHOXAZOLE AND TRIMETHOPRIM 800; 160 MG/1; MG/1
TABLET ORAL
COMMUNITY
Start: 2020-12-30 | End: 2021-04-14

## 2021-01-06 RX ORDER — DULOXETIN HYDROCHLORIDE 30 MG/1
30 CAPSULE, DELAYED RELEASE ORAL DAILY
Qty: 30 CAPSULE | Refills: 3 | Status: SHIPPED | OUTPATIENT
Start: 2021-01-06 | End: 2021-04-14

## 2021-01-06 NOTE — PROGRESS NOTES
Subjective   Dunia Fabian is a 70 y.o. female.     No chief complaint on file.      Hypertension  This is a chronic problem. The current episode started more than 1 year ago. The problem is uncontrolled. Associated symptoms include blurred vision, headaches and neck pain. Pertinent negatives include no chest pain, palpitations or shortness of breath. Risk factors for coronary artery disease include dyslipidemia, post-menopausal state and obesity. Current antihypertension treatment includes beta blockers and angiotensin blockers. The current treatment provides moderate improvement. There are no compliance problems.    Depression  Visit Type: initial  Onset of symptoms: more than 6 months ago  Progression since onset: gradually worsening  Patient presents with the following symptoms: decreased concentration, depressed mood, excessive worry, fatigue, feelings of hopelessness, feelings of worthlessness and insomnia.  Patient is not experiencing: chest pain, choking sensation, nausea, nervousness/anxiety, palpitations, shortness of breath, suicidal ideas and suicidal planning.  Frequency of symptoms: constantly   Risk factors: no known risk factors    Dizziness  This is a new problem. The current episode started 1 to 4 weeks ago. The problem has been gradually improving. Associated symptoms include fatigue, headaches, neck pain and weakness. Pertinent negatives include no abdominal pain, chest pain, chills, diaphoresis, nausea or vertigo.            I personally reviewed and updated the patient's allergies, medications, problem list, and past medical, surgical, social, and family history. I have reviewed and confirmed the accuracy of the History of Present Illness and Review of Symptoms as documented by the MA/TARA/RN. Tyron Driver MD    Family History   Problem Relation Age of Onset   • Parkinsonism Mother    • Heart disease Mother         cardiovascular disease   • Diabetes Mother         diabetes mellitus    •  Heart disease Father         cardiovascular disease   • Heart disease Sister         cardiovascular disease   • Diabetes Sister         diabetes mellitus    • Heart disease Brother         cardiovascular disease   • Diabetes Son         diabetes mellitus    • Heart disease Paternal Uncle         ischemic   • Stomach cancer Maternal Grandmother    • Breast cancer Niece 39       Social History     Tobacco Use   • Smoking status: Never Smoker   • Smokeless tobacco: Never Used   Substance Use Topics   • Alcohol use: No     Frequency: Never   • Drug use: No       Past Surgical History:   Procedure Laterality Date   • CATARACT EXTRACTION Left 08/2005    with Insert of Lens Prostheti   • CYSTOCELE REPAIR  06/1998    SIMENTAL Cystourethropexy & Cystocele Repair    • KIDNEY SURGERY Right 02/24/2014    Removal   • TONSILLECTOMY  1953   • URETHROLYSIS  10/2000    Transvaginal Urethrolysis & Bone [place] Sling   • VAGINAL HYSTERECTOMY  1970   • VITRECTOMY Left 04/2005    & Membrane Peeling       Patient Active Problem List   Diagnosis   • Acid reflux   • Allergic rhinitis   • Anxiety   • Atrophic kidney   • Carotid bruit present   • Carpal tunnel syndrome   • Low back pain   • Neck pain   • Thoracic back pain   • Depressive disorder   • Medicare annual wellness visit, subsequent   • Gout   • Hemorrhoids   • Mixed hyperlipidemia   • Hypertension, benign   • IC (interstitial cystitis)   • Irritable bowel syndrome with constipation   • LVH (left ventricular hypertrophy)   • Menopausal syndrome   • Mitral insufficiency, acute   • Onychomycosis   • Disorder of bone and cartilage   • Osteoarthritis   • Primary pulmonary hypertension (CMS/HCC)   • Renal insufficiency   • Plantar fasciitis   • Senile osteoporosis   • Solitary kidney   • Encounter for screening mammogram for malignant neoplasm of breast   • Special screening for malignant neoplasms, colon   • Seborrheic keratosis   • Chronic kidney disease, stage 3 (moderate)   • Renal  hypertrophy   • Hypercalcemia   • Lower extremity edema   • Dizziness         Current Outpatient Medications:   •  allopurinol (ZYLOPRIM) 300 MG tablet, TAKE 1 TABLET BY MOUTH DAILY, Disp: 90 tablet, Rfl: 1  •  amLODIPine (NORVASC) 10 MG tablet, Take 10 mg by mouth Daily., Disp: , Rfl:   •  aspirin (ADULT ASPIRIN EC LOW STRENGTH) 81 MG EC tablet, ADULT ASPIRIN EC LOW STRENGTH 81 MG ORAL TABLET DELAYED RELEASE, Disp: , Rfl:   •  buPROPion XL (WELLBUTRIN XL) 300 MG 24 hr tablet, Take 1 tablet by mouth Daily. Take 1 tablet daily, Disp: 30 tablet, Rfl: 5  •  hydrALAZINE (APRESOLINE) 100 MG tablet, TAKE 1 TABLET BY MOUTH THREE TIMES DAILY, Disp: 270 tablet, Rfl: 0  •  metoprolol succinate XL (TOPROL-XL) 100 MG 24 hr tablet, TAKE 1 TABLET BY MOUTH DAILY, Disp: 90 tablet, Rfl: 2  •  Multiple Vitamins-Minerals (WOMENS MULTI) capsule, Take  by mouth Daily., Disp: , Rfl:   •  sulfamethoxazole-trimethoprim (BACTRIM DS,SEPTRA DS) 800-160 MG per tablet, , Disp: , Rfl:   •  SUPER B COMPLEX/C capsule, SUPER B COMPLEX/C ORAL CAPSULE, Disp: , Rfl:   •  tamsulosin (FLOMAX) 0.4 MG capsule 24 hr capsule, Take 1 capsule by mouth Daily., Disp: , Rfl:   •  DULoxetine (CYMBALTA) 30 MG capsule, Take 1 capsule by mouth Daily., Disp: 30 capsule, Rfl: 3         Review of Systems   Constitutional: Positive for fatigue. Negative for chills and diaphoresis.   Eyes: Positive for blurred vision. Negative for visual disturbance.   Respiratory: Negative for choking and shortness of breath.    Cardiovascular: Negative for chest pain and palpitations.   Gastrointestinal: Negative for abdominal pain and nausea.   Endocrine: Negative for polydipsia and polyphagia.   Musculoskeletal: Positive for neck pain. Negative for neck stiffness.   Skin: Negative for color change and pallor.   Neurological: Positive for dizziness and weakness. Negative for vertigo, seizures and syncope.   Hematological: Negative for adenopathy.   Psychiatric/Behavioral: Positive for  "decreased concentration and depressed mood. Negative for hallucinations and suicidal ideas. The patient has insomnia. The patient is not nervous/anxious.        I have reviewed and confirmed the accuracy of the ROS as documented by the MA/LPN/RN Tyron Driver MD      Objective   /72 (BP Location: Right arm, Patient Position: Sitting)   Pulse 75   Temp 96.8 °F (36 °C)   Resp 16   Ht 156.2 cm (61.5\")   Wt 71.6 kg (157 lb 12.8 oz)   SpO2 96%   BMI 29.33 kg/m²   BP Readings from Last 3 Encounters:   01/06/21 140/72   12/28/20 (!) 199/80   12/02/20 160/90     Wt Readings from Last 3 Encounters:   01/06/21 71.6 kg (157 lb 12.8 oz)   12/28/20 69 kg (152 lb 3.2 oz)   12/02/20 72.7 kg (160 lb 3.2 oz)     Physical Exam  Constitutional:       Appearance: Normal appearance. She is well-developed. She is not diaphoretic.   Cardiovascular:      Rate and Rhythm: Normal rate and regular rhythm.      Pulses: Normal pulses.      Heart sounds: Normal heart sounds, S1 normal and S2 normal. No murmur. No friction rub. No gallop.    Pulmonary:      Effort: Pulmonary effort is normal. No accessory muscle usage.      Breath sounds: Normal breath sounds. No stridor. No decreased breath sounds, wheezing, rhonchi or rales.   Abdominal:      General: Bowel sounds are normal. There is no distension.      Palpations: Abdomen is soft. Abdomen is not rigid. There is no mass or pulsatile mass.      Tenderness: There is no abdominal tenderness. There is no guarding or rebound. Negative signs include Diego's sign.      Hernia: No hernia is present.   Skin:     General: Skin is warm and dry.      Coloration: Skin is not pale.   Neurological:      Mental Status: She is alert and oriented to person, place, and time.      Cranial Nerves: No cranial nerve deficit.      Sensory: No sensory deficit.      Motor: No tremor, abnormal muscle tone or seizure activity.      Coordination: Coordination normal.      Gait: Gait normal.      Deep Tendon " Reflexes: Reflexes are normal and symmetric.         Data / Lab Results:    No results found for: HGBA1C  Lab Results   Component Value Date     (H) 08/26/2020     Lab Results   Component Value Date     (H) 08/26/2020     07/24/2018     (H) 07/21/2017     Lab Results   Component Value Date    CHOL 181 07/24/2018    CHOL 215 (H) 07/21/2017    CHOL 198 07/20/2016     Lab Results   Component Value Date    TRIG 170 (H) 08/26/2020    TRIG 77 07/24/2018    TRIG 96 07/21/2017     Lab Results   Component Value Date    HDL 53 08/26/2020    HDL 60 07/24/2018    HDL 62 07/21/2017     No results found for: PSA  Lab Results   Component Value Date    WBC 8.7 08/26/2020    HGB 13.0 08/26/2020    HCT 39.8 08/26/2020    MCV 92 08/26/2020     08/26/2020     Lab Results   Component Value Date    TSH 4.220 08/26/2020      Lab Results   Component Value Date    BUN 15 08/26/2020    CREATININE 0.93 08/26/2020    EGFRIFNONA 62 08/26/2020    EGFRIFAFRI 72 08/26/2020    BCR 16 08/26/2020    K 4.5 08/26/2020    CO2 26 08/26/2020    CALCIUM 10.0 08/26/2020    PROTENTOTREF 7.2 08/26/2020    ALBUMIN 4.6 08/26/2020    LABIL2 1.8 08/26/2020    AST 20 08/26/2020    ALT 20 08/26/2020     No results found for: ESTHER, RF, SEDRATE   No results found for: CRP   No results found for: IRON, TIBC, FERRITIN   No results found for: [id number]       Assessment/Plan      Medications        Problem List         LOS    Dizziness.    Much improved today, hypertension much better controlled.  EKG, stat troponin benign. echocardiogram, carotid Dopplers, MRI brain upcoming.  Follow-up 30 days.  Good social support, here with her daughter, do not drive.  Call or return if worsening symptoms.  Health maintenance.  Doing well, vaccines current.  Discussed health maintenance, screening test, lifestyle modification.  Pap current, followed by Dr. Patiño, mammogram scheduled.  Allergic rhinitis.  OTC Claritin or  Zyrtec.  Hypertension.      Improved today on increased dose hydralazine.  amlodipine back to  5 mg daily.  Follow-up recheck. Discussed low-sodium diet.  Stress echo benign/elevated right-sided pressure only 2018.  Carotid Dopplers with mild blockage only 2018.  Dizziness.  Life stress/decreased energy.  Multiple stressors.  Good social support.    Tolerating increased dose Wellbutrin, add Cymbalta.  Follow-up recheck.  Lower extremity edema.  Improved currently with decreased dose amlodipine.  Discussed low-sodium diet.  Follow-up recheck.  Consider sleep study if persistent symptoms.  Osteoporosis.  Tolerating Prolia.  Holding calcium/vitamin D per nephrology.  Follow-up recheck DEXA  Atrophic kidney.  Improved status post nephrectomy, followed by urology,nephrology.  Renal function normal.  Interstitial cystitis.  Improved on Flomax, followed by [facility], history of InterStim placement/subsequent removal.  I and O catheter dependent.  Hypertensive retinopathy.  Followed by ophthalmology ember galeano, status post treatment  Colon screening.  Colonoscopy benign 2016./Diverticulosis only      Diagnoses and all orders for this visit:    1. Hypertension, benign (Primary)    2. Depressive disorder  -     DULoxetine (CYMBALTA) 30 MG capsule; Take 1 capsule by mouth Daily.  Dispense: 30 capsule; Refill: 3    3. Dizziness    4. Atrophic kidney              Expected course, medications, and adverse effects discussed.  Call or return if worsening or persistent symptoms.  I wore protective equipment throughout this patient encounter including a mask, gloves, and eye protection.  Hand hygiene was performed before donning protective equipment and after removal when leaving the room. The complete contents of the Assessment and Plan and Data/Lab Results as documented above have been reviewed and addressed by myself with the patient today as part of an ongoing evaluation / treatment plan.  If some of the documentation has been  copied from a previous note and is unchanged it indicates that this problem / plan has been assessed today but is stable from a previous visit and no changes have been recommended.

## 2021-01-15 DIAGNOSIS — R09.89 CAROTID BRUIT, UNSPECIFIED LATERALITY: Primary | ICD-10-CM

## 2021-01-20 ENCOUNTER — HOSPITAL ENCOUNTER (OUTPATIENT)
Dept: CARDIOLOGY | Facility: HOSPITAL | Age: 71
Discharge: HOME | End: 2021-01-20

## 2021-01-20 ENCOUNTER — HOSPITAL ENCOUNTER (OUTPATIENT)
Dept: MRI IMAGING | Facility: HOSPITAL | Age: 71
Discharge: HOME | End: 2021-01-20

## 2021-01-20 VITALS
SYSTOLIC BLOOD PRESSURE: 149 MMHG | WEIGHT: 157 LBS | HEART RATE: 67 BPM | DIASTOLIC BLOOD PRESSURE: 64 MMHG | BODY MASS INDEX: 28.89 KG/M2 | HEIGHT: 62 IN

## 2021-01-20 DIAGNOSIS — G89.29 CHRONIC NONINTRACTABLE HEADACHE, UNSPECIFIED HEADACHE TYPE: ICD-10-CM

## 2021-01-20 DIAGNOSIS — R06.02 SOB (SHORTNESS OF BREATH): ICD-10-CM

## 2021-01-20 DIAGNOSIS — R53.1 WEAKNESS: ICD-10-CM

## 2021-01-20 DIAGNOSIS — R42 DIZZINESS: ICD-10-CM

## 2021-01-20 DIAGNOSIS — R51.9 CHRONIC NONINTRACTABLE HEADACHE, UNSPECIFIED HEADACHE TYPE: ICD-10-CM

## 2021-01-20 DIAGNOSIS — R00.2 PALPITATIONS: ICD-10-CM

## 2021-01-20 LAB
BH CV ECHO MEAS - ACS: 1.6 CM
BH CV ECHO MEAS - AO MAX PG (FULL): 2.7 MMHG
BH CV ECHO MEAS - AO MAX PG: 9.6 MMHG
BH CV ECHO MEAS - AO MEAN PG (FULL): 0.56 MMHG
BH CV ECHO MEAS - AO MEAN PG: 4.2 MMHG
BH CV ECHO MEAS - AO ROOT AREA (BSA CORRECTED): 1.4
BH CV ECHO MEAS - AO ROOT AREA: 4.2 CM^2
BH CV ECHO MEAS - AO ROOT DIAM: 2.3 CM
BH CV ECHO MEAS - AO V2 MAX: 154.8 CM/SEC
BH CV ECHO MEAS - AO V2 MEAN: 94.7 CM/SEC
BH CV ECHO MEAS - AO V2 VTI: 29.2 CM
BH CV ECHO MEAS - ASC AORTA: 3.2 CM
BH CV ECHO MEAS - AVA(I,A): 2.5 CM^2
BH CV ECHO MEAS - AVA(I,D): 2.5 CM^2
BH CV ECHO MEAS - AVA(V,A): 2.1 CM^2
BH CV ECHO MEAS - AVA(V,D): 2.1 CM^2
BH CV ECHO MEAS - BSA(HAYCOCK): 1.8 M^2
BH CV ECHO MEAS - BSA: 1.7 M^2
BH CV ECHO MEAS - BZI_BMI: 29.7 KILOGRAMS/M^2
BH CV ECHO MEAS - BZI_METRIC_HEIGHT: 154.9 CM
BH CV ECHO MEAS - BZI_METRIC_WEIGHT: 71.2 KG
BH CV ECHO MEAS - EDV(CUBED): 98.6 ML
BH CV ECHO MEAS - EDV(MOD-SP4): 53.1 ML
BH CV ECHO MEAS - EDV(TEICH): 98.3 ML
BH CV ECHO MEAS - EF(CUBED): 78.3 %
BH CV ECHO MEAS - EF(MOD-BP): 69 %
BH CV ECHO MEAS - EF(MOD-SP4): 68.9 %
BH CV ECHO MEAS - EF(TEICH): 70.5 %
BH CV ECHO MEAS - ESV(CUBED): 21.4 ML
BH CV ECHO MEAS - ESV(MOD-SP4): 16.5 ML
BH CV ECHO MEAS - ESV(TEICH): 29 ML
BH CV ECHO MEAS - FS: 39.9 %
BH CV ECHO MEAS - IVS/LVPW: 0.81
BH CV ECHO MEAS - IVSD: 0.86 CM
BH CV ECHO MEAS - LA DIMENSION(2D): 3.8 CM
BH CV ECHO MEAS - LA DIMENSION: 3.7 CM
BH CV ECHO MEAS - LA/AO: 1.6
BH CV ECHO MEAS - LV DIASTOLIC VOL/BSA (35-75): 31.2 ML/M^2
BH CV ECHO MEAS - LV MASS(C)D: 152.7 GRAMS
BH CV ECHO MEAS - LV MASS(C)DI: 89.6 GRAMS/M^2
BH CV ECHO MEAS - LV MAX PG: 6.9 MMHG
BH CV ECHO MEAS - LV MEAN PG: 3.7 MMHG
BH CV ECHO MEAS - LV SYSTOLIC VOL/BSA (12-30): 9.7 ML/M^2
BH CV ECHO MEAS - LV V1 MAX: 131.2 CM/SEC
BH CV ECHO MEAS - LV V1 MEAN: 89.4 CM/SEC
BH CV ECHO MEAS - LV V1 VTI: 28.8 CM
BH CV ECHO MEAS - LVIDD: 4.6 CM
BH CV ECHO MEAS - LVIDS: 2.8 CM
BH CV ECHO MEAS - LVOT AREA: 2.5 CM^2
BH CV ECHO MEAS - LVOT DIAM: 1.8 CM
BH CV ECHO MEAS - LVPWD: 1.1 CM
BH CV ECHO MEAS - MV A MAX VEL: 106 CM/SEC
BH CV ECHO MEAS - MV DEC SLOPE: 397.3 CM/SEC^2
BH CV ECHO MEAS - MV DEC TIME: 0.19 SEC
BH CV ECHO MEAS - MV E MAX VEL: 75.3 CM/SEC
BH CV ECHO MEAS - MV E/A: 0.71
BH CV ECHO MEAS - MV MAX PG: 4.8 MMHG
BH CV ECHO MEAS - MV MEAN PG: 1.6 MMHG
BH CV ECHO MEAS - MV V2 MAX: 109.7 CM/SEC
BH CV ECHO MEAS - MV V2 MEAN: 56.8 CM/SEC
BH CV ECHO MEAS - MV V2 VTI: 29.6 CM
BH CV ECHO MEAS - MVA(VTI): 2.4 CM^2
BH CV ECHO MEAS - PA ACC TIME: 0.09 SEC
BH CV ECHO MEAS - PA MAX PG (FULL): 1.4 MMHG
BH CV ECHO MEAS - PA MAX PG: 5.3 MMHG
BH CV ECHO MEAS - PA PR(ACCEL): 37.5 MMHG
BH CV ECHO MEAS - PA V2 MAX: 114.6 CM/SEC
BH CV ECHO MEAS - PULM A REVS DUR: 0.08 SEC
BH CV ECHO MEAS - PULM A REVS VEL: 34.7 CM/SEC
BH CV ECHO MEAS - PULM DIAS VEL: 36.3 CM/SEC
BH CV ECHO MEAS - PULM S/D: 1.5
BH CV ECHO MEAS - PULM SYS VEL: 54.6 CM/SEC
BH CV ECHO MEAS - RAP SYSTOLE: 3 MMHG
BH CV ECHO MEAS - RV MAX PG: 3.9 MMHG
BH CV ECHO MEAS - RV MEAN PG: 2 MMHG
BH CV ECHO MEAS - RV V1 MAX: 98.7 CM/SEC
BH CV ECHO MEAS - RV V1 MEAN: 67.1 CM/SEC
BH CV ECHO MEAS - RV V1 VTI: 23.5 CM
BH CV ECHO MEAS - RVDD: 3.2 CM
BH CV ECHO MEAS - RVSP: 29.8 MMHG
BH CV ECHO MEAS - SI(AO): 72.4 ML/M^2
BH CV ECHO MEAS - SI(CUBED): 45.3 ML/M^2
BH CV ECHO MEAS - SI(LVOT): 42.3 ML/M^2
BH CV ECHO MEAS - SI(MOD-SP4): 21.5 ML/M^2
BH CV ECHO MEAS - SI(TEICH): 40.7 ML/M^2
BH CV ECHO MEAS - SV(AO): 123.4 ML
BH CV ECHO MEAS - SV(CUBED): 77.1 ML
BH CV ECHO MEAS - SV(LVOT): 72.1 ML
BH CV ECHO MEAS - SV(MOD-SP4): 36.6 ML
BH CV ECHO MEAS - SV(TEICH): 69.3 ML
BH CV ECHO MEAS - TR MAX VEL: 258.7 CM/SEC

## 2021-01-20 PROCEDURE — 70551 MRI BRAIN STEM W/O DYE: CPT

## 2021-01-20 PROCEDURE — 93306 TTE W/DOPPLER COMPLETE: CPT

## 2021-01-20 PROCEDURE — 93306 TTE W/DOPPLER COMPLETE: CPT | Performed by: INTERNAL MEDICINE

## 2021-01-25 ENCOUNTER — TELEPHONE (OUTPATIENT)
Dept: FAMILY MEDICINE CLINIC | Facility: CLINIC | Age: 71
End: 2021-01-25

## 2021-01-25 NOTE — TELEPHONE ENCOUNTER
----- Message from Tyron Driver MD sent at 1/25/2021  2:36 PM EST -----  Let her know her MRI of her brain is normal, her echocardiogram also looks good, her heart function is normal around 60%, the right side of her heart is mildly enlarged but this is nothing that is causing her any problems currently, thanks

## 2021-01-27 ENCOUNTER — HOSPITAL ENCOUNTER (OUTPATIENT)
Dept: ULTRASOUND IMAGING | Facility: HOSPITAL | Age: 71
Discharge: HOME | End: 2021-01-27

## 2021-01-27 ENCOUNTER — HOSPITAL ENCOUNTER (OUTPATIENT)
Dept: CARDIOLOGY | Facility: HOSPITAL | Age: 71
Discharge: HOME | End: 2021-01-27

## 2021-01-27 DIAGNOSIS — R09.89 CAROTID BRUIT, UNSPECIFIED LATERALITY: ICD-10-CM

## 2021-01-27 DIAGNOSIS — R39.14 FEELING OF INCOMPLETE BLADDER EMPTYING: ICD-10-CM

## 2021-01-27 LAB
BH CV XLRA MEAS LEFT DIST CCA EDV: 28 CM/SEC
BH CV XLRA MEAS LEFT DIST CCA PSV: 110 CM/SEC
BH CV XLRA MEAS LEFT DIST ICA EDV: 35.7 CM/SEC
BH CV XLRA MEAS LEFT DIST ICA PSV: 106 CM/SEC
BH CV XLRA MEAS LEFT ICA/CCA RATIO: 0.96
BH CV XLRA MEAS LEFT PROX CCA EDV: 23 CM/SEC
BH CV XLRA MEAS LEFT PROX CCA PSV: 91.3 CM/SEC
BH CV XLRA MEAS LEFT PROX ECA PSV: 274 CM/SEC
BH CV XLRA MEAS LEFT PROX ICA EDV: 34.2 CM/SEC
BH CV XLRA MEAS LEFT PROX ICA PSV: 92.6 CM/SEC
BH CV XLRA MEAS LEFT PROX SCLA PSV: 169 CM/SEC
BH CV XLRA MEAS LEFT VERTEBRAL A PSV: 50.8 CM/SEC
BH CV XLRA MEAS RIGHT DIST CCA EDV: 25.8 CM/SEC
BH CV XLRA MEAS RIGHT DIST CCA PSV: 92.2 CM/SEC
BH CV XLRA MEAS RIGHT DIST ICA EDV: 31.7 CM/SEC
BH CV XLRA MEAS RIGHT DIST ICA PSV: 96.9 CM/SEC
BH CV XLRA MEAS RIGHT ICA/CCA RATIO: 1.54
BH CV XLRA MEAS RIGHT PROX CCA EDV: 20.5 CM/SEC
BH CV XLRA MEAS RIGHT PROX CCA PSV: 70.8 CM/SEC
BH CV XLRA MEAS RIGHT PROX ECA PSV: 204 CM/SEC
BH CV XLRA MEAS RIGHT PROX ICA EDV: 48.6 CM/SEC
BH CV XLRA MEAS RIGHT PROX ICA PSV: 142 CM/SEC
BH CV XLRA MEAS RIGHT PROX SCLA PSV: 162 CM/SEC
BH CV XLRA MEAS RIGHT VERTEBRAL A PSV: 52.6 CM/SEC

## 2021-01-27 PROCEDURE — 93880 EXTRACRANIAL BILAT STUDY: CPT

## 2021-01-27 PROCEDURE — 76775 US EXAM ABDO BACK WALL LIM: CPT

## 2021-02-01 ENCOUNTER — TELEPHONE (OUTPATIENT)
Dept: FAMILY MEDICINE CLINIC | Facility: CLINIC | Age: 71
End: 2021-02-01

## 2021-02-01 NOTE — TELEPHONE ENCOUNTER
----- Message from Tyron Driver MD sent at 1/31/2021 12:11 PM EST -----  Let her know that her carotid Dopplers show the area in her right carotid looks a little worse than the last check, it looks like it is more than 50% blocked, left side looks good, this is a rough test, would recommend a MRA of her carotid arteries with contrast at the hospital to further evaluate this and see if it is advanced enough to need to be treated, if she is okay with that get her set up, thanks

## 2021-02-01 NOTE — TELEPHONE ENCOUNTER
Patient is allergic to iodine, contrast, and specifically gadolinium. How else can this be ordered for what you need?

## 2021-02-01 NOTE — TELEPHONE ENCOUNTER
Spoke Dunia and she is perfectly fine with getting MRA. She would like this done at [facility], [place]. Thanks

## 2021-02-05 NOTE — TELEPHONE ENCOUNTER
Let her know we are limited in our options for further tests because of her allergies to contrast, the area in her carotid is between 50 and 59% obstructed, the ultrasound is a rough test, usually a vascular surgeon would not intervene unless the lesion was progressing or got closer to 75%, would be reasonable to repeat carotid Doppler in 6 months, if things are staying stable we can continue to monitor this spot, other option will be to go ahead and go on and see a vascular surgeon at this point, if she is interested in doing that could see Dr. Andrey Hicks at [facility] , thanks

## 2021-02-07 DIAGNOSIS — I10 HYPERTENSION, BENIGN: ICD-10-CM

## 2021-02-08 RX ORDER — ALLOPURINOL 300 MG/1
TABLET ORAL
Qty: 90 TABLET | Refills: 1 | Status: SHIPPED | OUTPATIENT
Start: 2021-02-08 | End: 2021-04-14

## 2021-02-08 RX ORDER — METOPROLOL SUCCINATE 100 MG/1
TABLET, EXTENDED RELEASE ORAL DAILY
Qty: 90 TABLET | Refills: 2 | Status: SHIPPED | OUTPATIENT
Start: 2021-02-08 | End: 2021-04-14

## 2021-02-10 ENCOUNTER — OFFICE VISIT (OUTPATIENT)
Dept: FAMILY MEDICINE CLINIC | Facility: CLINIC | Age: 71
End: 2021-02-10

## 2021-02-10 VITALS
DIASTOLIC BLOOD PRESSURE: 82 MMHG | HEIGHT: 62 IN | OXYGEN SATURATION: 96 % | TEMPERATURE: 97.5 F | WEIGHT: 160.4 LBS | BODY MASS INDEX: 29.52 KG/M2 | HEART RATE: 99 BPM | RESPIRATION RATE: 18 BRPM | SYSTOLIC BLOOD PRESSURE: 148 MMHG

## 2021-02-10 DIAGNOSIS — N26.1 ATROPHIC KIDNEY: ICD-10-CM

## 2021-02-10 DIAGNOSIS — I10 HYPERTENSION, BENIGN: Primary | Chronic | ICD-10-CM

## 2021-02-10 DIAGNOSIS — F41.9 ANXIETY: ICD-10-CM

## 2021-02-10 DIAGNOSIS — E78.2 MIXED HYPERLIPIDEMIA: Chronic | ICD-10-CM

## 2021-02-10 DIAGNOSIS — R42 DIZZINESS: ICD-10-CM

## 2021-02-10 DIAGNOSIS — F32.A DEPRESSIVE DISORDER: ICD-10-CM

## 2021-02-10 PROCEDURE — 99214 OFFICE O/P EST MOD 30 MIN: CPT | Performed by: FAMILY MEDICINE

## 2021-02-10 NOTE — PROGRESS NOTES
Subjective   Dunia Fabian is a 70 y.o. female.     Chief Complaint   Patient presents with   • Hypertension   • Depression   • Dizziness       Hypertension  This is a chronic problem. The current episode started more than 1 year ago. The problem is uncontrolled. Associated symptoms include blurred vision and headaches. Pertinent negatives include no chest pain, neck pain, palpitations or shortness of breath. Risk factors for coronary artery disease include dyslipidemia, post-menopausal state and obesity. Current antihypertension treatment includes beta blockers and angiotensin blockers. The current treatment provides moderate improvement. There are no compliance problems.    Depression  Visit Type: initial  Onset of symptoms: more than 6 months ago  Progression since onset: gradually worsening  Patient presents with the following symptoms: decreased concentration, depressed mood, excessive worry, fatigue, feelings of hopelessness, feelings of worthlessness and insomnia.  Patient is not experiencing: chest pain, choking sensation, nausea, nervousness/anxiety, palpitations, shortness of breath, suicidal ideas and suicidal planning.  Frequency of symptoms: constantly   Sleep per night: 4 hours  Sleep quality: poor  Nighttime awakenings: several  Risk factors: no known risk factors    Dizziness  This is a new problem. The current episode started 1 to 4 weeks ago. The problem has been gradually improving. Associated symptoms include fatigue, headaches and weakness. Pertinent negatives include no abdominal pain, chest pain, chills, diaphoresis, nausea, neck pain or vertigo.            I personally reviewed and updated the patient's allergies, medications, problem list, and past medical, surgical, social, and family history. I have reviewed and confirmed the accuracy of the History of Present Illness and Review of Symptoms as documented by the MA/LPN/RN. Tyron Driver MD    Family History   Problem Relation Age of  Onset   • Parkinsonism Mother    • Heart disease Mother         cardiovascular disease   • Diabetes Mother         diabetes mellitus    • Heart disease Father         cardiovascular disease   • Heart disease Sister         cardiovascular disease   • Diabetes Sister         diabetes mellitus    • Heart disease Brother         cardiovascular disease   • Diabetes Son         diabetes mellitus    • Heart disease Paternal Uncle         ischemic   • Stomach cancer Maternal Grandmother    • Breast cancer Niece 39       Social History     Tobacco Use   • Smoking status: Never Smoker   • Smokeless tobacco: Never Used   Substance Use Topics   • Alcohol use: No     Frequency: Never   • Drug use: No       Past Surgical History:   Procedure Laterality Date   • CATARACT EXTRACTION Left 08/2005    with Insert of Lens Prostheti   • CYSTOCELE REPAIR  06/1998    SIMENTAL Cystourethropexy & Cystocele Repair    • KIDNEY SURGERY Right 02/24/2014    Removal   • TONSILLECTOMY  1953   • URETHROLYSIS  10/2000    Transvaginal Urethrolysis & Bone [place] Sling   • VAGINAL HYSTERECTOMY  1970   • VITRECTOMY Left 04/2005    & Membrane Peeling       Patient Active Problem List   Diagnosis   • Acid reflux   • Allergic rhinitis   • Anxiety   • Atrophic kidney   • Carotid bruit present   • Carpal tunnel syndrome   • Low back pain   • Neck pain   • Thoracic back pain   • Depressive disorder   • Medicare annual wellness visit, subsequent   • Gout   • Hemorrhoids   • Mixed hyperlipidemia   • Hypertension, benign   • IC (interstitial cystitis)   • Irritable bowel syndrome with constipation   • LVH (left ventricular hypertrophy)   • Menopausal syndrome   • Mitral insufficiency, acute   • Onychomycosis   • Disorder of bone and cartilage   • Osteoarthritis   • Primary pulmonary hypertension (CMS/HCC)   • Renal insufficiency   • Plantar fasciitis   • Senile osteoporosis   • Solitary kidney   • Encounter for screening mammogram for malignant neoplasm of breast    • Special screening for malignant neoplasms, colon   • Seborrheic keratosis   • Chronic kidney disease, stage 3 (moderate)   • Renal hypertrophy   • Hypercalcemia   • Lower extremity edema   • Dizziness         Current Outpatient Medications:   •  allopurinol (ZYLOPRIM) 300 MG tablet, TAKE 1 TABLET BY MOUTH DAILY, Disp: 90 tablet, Rfl: 1  •  amLODIPine (NORVASC) 10 MG tablet, Take 10 mg by mouth Daily., Disp: , Rfl:   •  aspirin (ADULT ASPIRIN EC LOW STRENGTH) 81 MG EC tablet, ADULT ASPIRIN EC LOW STRENGTH 81 MG ORAL TABLET DELAYED RELEASE, Disp: , Rfl:   •  buPROPion XL (WELLBUTRIN XL) 300 MG 24 hr tablet, Take 1 tablet by mouth Daily. Take 1 tablet daily, Disp: 30 tablet, Rfl: 5  •  DULoxetine (CYMBALTA) 30 MG capsule, Take 1 capsule by mouth Daily., Disp: 30 capsule, Rfl: 3  •  hydrALAZINE (APRESOLINE) 100 MG tablet, TAKE 1 TABLET BY MOUTH THREE TIMES DAILY, Disp: 270 tablet, Rfl: 0  •  metoprolol succinate XL (TOPROL-XL) 100 MG 24 hr tablet, TAKE 1 TABLET BY MOUTH DAILY, Disp: 90 tablet, Rfl: 2  •  Multiple Vitamins-Minerals (WOMENS MULTI) capsule, Take  by mouth Daily., Disp: , Rfl:   •  sulfamethoxazole-trimethoprim (BACTRIM DS,SEPTRA DS) 800-160 MG per tablet, , Disp: , Rfl:   •  SUPER B COMPLEX/C capsule, SUPER B COMPLEX/C ORAL CAPSULE, Disp: , Rfl:   •  tamsulosin (FLOMAX) 0.4 MG capsule 24 hr capsule, Take 1 capsule by mouth Daily., Disp: , Rfl:          Review of Systems   Constitutional: Positive for fatigue. Negative for chills and diaphoresis.   Eyes: Positive for blurred vision. Negative for visual disturbance.   Respiratory: Negative for choking and shortness of breath.    Cardiovascular: Negative for chest pain and palpitations.   Gastrointestinal: Negative for abdominal pain and nausea.   Endocrine: Negative for polydipsia and polyphagia.   Musculoskeletal: Negative for neck pain and neck stiffness.   Skin: Negative for color change and pallor.   Neurological: Positive for dizziness and weakness.  "Negative for vertigo, seizures and syncope.   Hematological: Negative for adenopathy.   Psychiatric/Behavioral: Positive for decreased concentration and depressed mood. Negative for hallucinations and suicidal ideas. The patient has insomnia. The patient is not nervous/anxious.        I have reviewed and confirmed the accuracy of the ROS as documented by the MA/LPN/RN Tyron Driver MD      Objective   /82 (BP Location: Right arm, Patient Position: Sitting)   Pulse 99   Temp 97.5 °F (36.4 °C)   Resp 18   Ht 156.2 cm (61.5\")   Wt 72.8 kg (160 lb 6.4 oz)   SpO2 96%   BMI 29.82 kg/m²   BP Readings from Last 3 Encounters:   02/10/21 148/82   01/20/21 149/64   01/06/21 140/72     Wt Readings from Last 3 Encounters:   02/10/21 72.8 kg (160 lb 6.4 oz)   01/20/21 71.2 kg (157 lb)   01/06/21 71.6 kg (157 lb 12.8 oz)     Physical Exam  Constitutional:       Appearance: Normal appearance. She is well-developed. She is not diaphoretic.   Cardiovascular:      Rate and Rhythm: Normal rate and regular rhythm.      Pulses: Normal pulses.      Heart sounds: Normal heart sounds, S1 normal and S2 normal. No murmur. No friction rub. No gallop.    Pulmonary:      Effort: Pulmonary effort is normal. No accessory muscle usage.      Breath sounds: Normal breath sounds. No stridor. No decreased breath sounds, wheezing, rhonchi or rales.   Abdominal:      General: Bowel sounds are normal. There is no distension.      Palpations: Abdomen is soft. Abdomen is not rigid. There is no mass or pulsatile mass.      Tenderness: There is no abdominal tenderness. There is no guarding or rebound. Negative signs include Diego's sign.      Hernia: No hernia is present.   Skin:     General: Skin is warm and dry.      Coloration: Skin is not pale.   Neurological:      Mental Status: She is alert and oriented to person, place, and time.      Cranial Nerves: No cranial nerve deficit.      Sensory: No sensory deficit.      Motor: No tremor, " abnormal muscle tone or seizure activity.      Coordination: Coordination normal.      Gait: Gait normal.      Deep Tendon Reflexes: Reflexes are normal and symmetric.         Data / Lab Results:    No results found for: HGBA1C  Lab Results   Component Value Date     (H) 08/26/2020     Lab Results   Component Value Date     (H) 08/26/2020     07/24/2018     (H) 07/21/2017     Lab Results   Component Value Date    CHOL 181 07/24/2018    CHOL 215 (H) 07/21/2017    CHOL 198 07/20/2016     Lab Results   Component Value Date    TRIG 170 (H) 08/26/2020    TRIG 77 07/24/2018    TRIG 96 07/21/2017     Lab Results   Component Value Date    HDL 53 08/26/2020    HDL 60 07/24/2018    HDL 62 07/21/2017     No results found for: PSA  Lab Results   Component Value Date    WBC 8.7 08/26/2020    HGB 13.0 08/26/2020    HCT 39.8 08/26/2020    MCV 92 08/26/2020     08/26/2020     Lab Results   Component Value Date    TSH 4.220 08/26/2020      Lab Results   Component Value Date    BUN 15 08/26/2020    CREATININE 0.93 08/26/2020    EGFRIFNONA 62 08/26/2020    EGFRIFAFRI 72 08/26/2020    BCR 16 08/26/2020    K 4.5 08/26/2020    CO2 26 08/26/2020    CALCIUM 10.0 08/26/2020    PROTENTOTREF 7.2 08/26/2020    ALBUMIN 4.6 08/26/2020    LABIL2 1.8 08/26/2020    AST 20 08/26/2020    ALT 20 08/26/2020     No results found for: ESTHER, RF, SEDRATE   No results found for: CRP   No results found for: IRON, TIBC, FERRITIN   No results found for: [id number]       Assessment/Plan      Medications        Problem List         LOS      Dizziness.    Much improved today, hypertension much better controlled.  EKG, stat troponin benign. echocardiogram benign, MRI brain normal.  Follow-up 30 days.  Good social support, here with her daughter, do not drive.  Call or return if worsening symptoms.  Health maintenance.  Doing well, vaccines current.  Discussed health maintenance, screening test, lifestyle modification.  Pap  current, followed by Dr. Patiño, mammogram scheduled.  Allergic rhinitis.  OTC Claritin or Zyrtec.  Hypertension.      Improved today on increased dose hydralazine, add clonidine 0.1 mg 3 times daily and as needed elevated BP..  amlodipine back to  5 mg daily.  Follow-up recheck. Discussed low-sodium diet.  Stress echo benign/elevated right-sided pressure only 2018.  Carotid Dopplers with mild blockage only 2018.  Dizziness.  Life stress/decreased energy.  Multiple stressors.  Good social support.    Tolerating increased dose Wellbutrin,  improved today on Cymbalta.  Follow-up recheck.  Lower extremity edema.  Improved currently with decreased dose amlodipine.  Discussed low-sodium diet.  Follow-up recheck.  Consider sleep study if persistent symptoms.  Osteoporosis.  Tolerating Prolia.  Holding calcium/vitamin D per nephrology.  Follow-up recheck DEXA  Atrophic kidney.  Improved status post nephrectomy, followed by urology,nephrology.  Renal function normal.  Interstitial cystitis.  Improved on Flomax, followed by [facility], history of InterStim placement/subsequent removal.  I and O catheter dependent.  Hypertensive retinopathy.  Followed by ophthalmology ember galeano, status post treatment  Colon screening.  Colonoscopy benign 2016./Diverticulosis only  Carotid stenosis.  50 to 59% on the right, mild on left.  She is allergic to contrast and gadolinium. Plan recheck Doppler 6 months.  Consider vascular surgery referral if worsening symptoms.  Continue risk factor reduction.        Diagnoses and all orders for this visit:    1. Hypertension, benign (Primary)    2. Depressive disorder    3. Dizziness    4. Mixed hyperlipidemia    5. Anxiety    6. Atrophic kidney              Expected course, medications, and adverse effects discussed.  Call or return if worsening or persistent symptoms.  I wore protective equipment throughout this patient encounter including a mask, gloves, and eye protection.  Hand hygiene was  performed before donning protective equipment and after removal when leaving the room. The complete contents of the Assessment and Plan and Data/Lab Results as documented above have been reviewed and addressed by myself with the patient today as part of an ongoing evaluation / treatment plan.  If some of the documentation has been copied from a previous note and is unchanged it indicates that this problem / plan has been assessed today but is stable from a previous visit and no changes have been recommended.

## 2021-03-09 ENCOUNTER — TELEPHONE (OUTPATIENT)
Dept: FAMILY MEDICINE CLINIC | Facility: CLINIC | Age: 71
End: 2021-03-09

## 2021-03-09 NOTE — TELEPHONE ENCOUNTER
----- Message from Rachna Mathur sent at 3/9/2021 10:11 AM EST -----  Regarding: Authorization  [employer] is requesting an authorization for DOS 12/30/20 for provider Tyron Driver The code in question is . I can not find one in the chart. Do you have a pre-authorization on file that has not been scanned in?

## 2021-03-16 DIAGNOSIS — F32.A DEPRESSIVE DISORDER: ICD-10-CM

## 2021-03-16 RX ORDER — HYDRALAZINE HYDROCHLORIDE 100 MG/1
TABLET, FILM COATED ORAL
Qty: 270 TABLET | Refills: 0 | Status: SHIPPED | OUTPATIENT
Start: 2021-03-16 | End: 2021-04-14

## 2021-04-13 NOTE — PROGRESS NOTES
Subjective   Dunia Fabian is a 70 y.o. female.     Chief Complaint   Patient presents with   • Hypertension   • Dizziness   • Difficulty Urinating       Hypertension  This is a chronic problem. The current episode started more than 1 year ago. The problem is uncontrolled. Associated symptoms include blurred vision. Pertinent negatives include no chest pain, neck pain, palpitations or shortness of breath. Risk factors for coronary artery disease include dyslipidemia, post-menopausal state and obesity. Current antihypertension treatment includes beta blockers and angiotensin blockers. The current treatment provides moderate improvement. There are no compliance problems.    Dizziness  This is a new problem. The current episode started 1 to 4 weeks ago. The problem has been gradually improving. Associated symptoms include fatigue and urinary symptoms. Pertinent negatives include no abdominal pain, chest pain, chills, diaphoresis, nausea, neck pain or vertigo.   Difficulty Urinating  This is a new problem. The current episode started in the past 7 days. The problem occurs constantly. The problem has been gradually improving. Associated symptoms include fatigue and urinary symptoms. Pertinent negatives include no abdominal pain, chest pain, chills, diaphoresis, nausea, neck pain or vertigo.            I personally reviewed and updated the patient's allergies, medications, problem list, and past medical, surgical, social, and family history. I have reviewed and confirmed the accuracy of the History of Present Illness and Review of Symptoms as documented by the MA/TARA/RN. Tyron Driver MD    Family History   Problem Relation Age of Onset   • Parkinsonism Mother    • Heart disease Mother         cardiovascular disease   • Diabetes Mother         diabetes mellitus    • Heart disease Father         cardiovascular disease   • Heart disease Sister         cardiovascular disease   • Diabetes Sister         diabetes mellitus     • Heart disease Brother         cardiovascular disease   • Diabetes Son         diabetes mellitus    • Heart disease Paternal Uncle         ischemic   • Stomach cancer Maternal Grandmother    • Breast cancer Niece 39       Social History     Tobacco Use   • Smoking status: Never Smoker   • Smokeless tobacco: Never Used   Vaping Use   • Vaping Use: Never used   Substance Use Topics   • Alcohol use: No   • Drug use: No       Past Surgical History:   Procedure Laterality Date   • CATARACT EXTRACTION Left 08/2005    with Insert of Lens Prostheti   • CYSTOCELE REPAIR  06/1998    SIMENTAL Cystourethropexy & Cystocele Repair    • KIDNEY SURGERY Right 02/24/2014    Removal   • TONSILLECTOMY  1953   • URETHROLYSIS  10/2000    Transvaginal Urethrolysis & Bone Edelstein Sling   • VAGINAL HYSTERECTOMY  1970   • VITRECTOMY Left 04/2005    & Membrane Peeling       Patient Active Problem List   Diagnosis   • Acid reflux   • Allergic rhinitis   • Anxiety   • Atrophic kidney   • Carotid bruit present   • Carpal tunnel syndrome   • Low back pain   • Neck pain   • Thoracic back pain   • Depressive disorder   • Medicare annual wellness visit, subsequent   • Gout   • Hemorrhoids   • Mixed hyperlipidemia   • Hypertension, benign   • IC (interstitial cystitis)   • Irritable bowel syndrome with constipation   • LVH (left ventricular hypertrophy)   • Menopausal syndrome   • Mitral insufficiency, acute   • Onychomycosis   • Disorder of bone and cartilage   • Osteoarthritis   • Primary pulmonary hypertension (CMS/HCC)   • Renal insufficiency   • Plantar fasciitis   • Senile osteoporosis   • Solitary kidney   • Encounter for screening mammogram for malignant neoplasm of breast   • Special screening for malignant neoplasms, colon   • Seborrheic keratosis   • Chronic kidney disease, stage 3 (moderate)   • Renal hypertrophy   • Hypercalcemia   • Acute cystitis with hematuria   • Lower extremity edema   • Dizziness         Current Outpatient  "Medications:   •  aspirin 81 MG EC tablet, Take 81 mg by mouth Daily., Disp: , Rfl:   •  Biotin 10 MG capsule, Take  by mouth., Disp: , Rfl:   •  cloNIDine (CATAPRES) 0.1 MG tablet, Take 0.1 mg by mouth 2 (Two) Times a Day., Disp: , Rfl:   •  cycloSPORINE (RESTASIS) 0.05 % ophthalmic emulsion, 1 drop 2 (Two) Times a Day., Disp: , Rfl:   •  metoprolol succinate XL (TOPROL-XL) 100 MG 24 hr tablet, Take 0.5 tablets by mouth Daily., Disp: 30 tablet, Rfl: 2  •  Multiple Vitamins-Minerals (WOMENS MULTI) capsule, Take  by mouth Daily., Disp: , Rfl:   •  SUPER B COMPLEX/C capsule, SUPER B COMPLEX/C ORAL CAPSULE, Disp: , Rfl:   •  tamsulosin (FLOMAX) 0.4 MG capsule 24 hr capsule, Take 1 capsule by mouth Daily., Disp: , Rfl:   •  cephalexin (KEFLEX) 500 MG capsule, Take 1 capsule by mouth 3 (Three) Times a Day for 10 days., Disp: 30 capsule, Rfl: 0         Review of Systems   Constitutional: Positive for fatigue. Negative for chills and diaphoresis.   Eyes: Positive for blurred vision. Negative for visual disturbance.   Respiratory: Negative for shortness of breath.    Cardiovascular: Negative for chest pain and palpitations.   Gastrointestinal: Negative for abdominal pain and nausea.   Endocrine: Negative for polydipsia and polyphagia.   Genitourinary: Positive for difficulty urinating.   Musculoskeletal: Negative for neck pain and neck stiffness.   Skin: Negative for color change and pallor.   Neurological: Positive for dizziness. Negative for vertigo, seizures and syncope.   Hematological: Negative for adenopathy.       I have reviewed and confirmed the accuracy of the ROS as documented by the MA/LPN/RN Tyron Driver MD      Objective   /74   Pulse 78   Temp 97.8 °F (36.6 °C)   Resp 18   Ht 156.2 cm (61.5\")   Wt 70.3 kg (155 lb)   SpO2 98%   BMI 28.81 kg/m²   BP Readings from Last 3 Encounters:   04/14/21 130/74   02/10/21 148/82   01/20/21 149/64     Wt Readings from Last 3 Encounters:   04/14/21 70.3 kg " (155 lb)   02/10/21 72.8 kg (160 lb 6.4 oz)   01/20/21 71.2 kg (157 lb)     Physical Exam  Constitutional:       Appearance: Normal appearance. She is well-developed. She is not diaphoretic.   Cardiovascular:      Rate and Rhythm: Normal rate and regular rhythm.      Pulses: Normal pulses.      Heart sounds: Normal heart sounds, S1 normal and S2 normal. No murmur heard.   No friction rub. No gallop.    Pulmonary:      Effort: Pulmonary effort is normal. No accessory muscle usage.      Breath sounds: Normal breath sounds. No stridor. No decreased breath sounds, wheezing, rhonchi or rales.   Abdominal:      General: Bowel sounds are normal. There is no distension.      Palpations: Abdomen is soft. Abdomen is not rigid. There is no mass or pulsatile mass.      Tenderness: There is no abdominal tenderness. There is no guarding or rebound. Negative signs include Diego's sign.      Hernia: No hernia is present.   Skin:     General: Skin is warm and dry.      Coloration: Skin is not pale.   Neurological:      Mental Status: She is alert and oriented to person, place, and time.      Cranial Nerves: No cranial nerve deficit.      Sensory: No sensory deficit.      Motor: No tremor, abnormal muscle tone or seizure activity.      Coordination: Coordination normal.      Gait: Gait normal.      Deep Tendon Reflexes: Reflexes are normal and symmetric.         Data / Lab Results:    No results found for: HGBA1C  Lab Results   Component Value Date     (H) 08/26/2020     Lab Results   Component Value Date     (H) 08/26/2020     07/24/2018     (H) 07/21/2017     Lab Results   Component Value Date    CHOL 181 07/24/2018    CHOL 215 (H) 07/21/2017    CHOL 198 07/20/2016     Lab Results   Component Value Date    TRIG 170 (H) 08/26/2020    TRIG 77 07/24/2018    TRIG 96 07/21/2017     Lab Results   Component Value Date    HDL 53 08/26/2020    HDL 60 07/24/2018    HDL 62 07/21/2017     No results found for:  PSA  Lab Results   Component Value Date    WBC 8.7 08/26/2020    HGB 13.0 08/26/2020    HCT 39.8 08/26/2020    MCV 92 08/26/2020     08/26/2020     Lab Results   Component Value Date    TSH 4.220 08/26/2020      Lab Results   Component Value Date    BUN 15 08/26/2020    CREATININE 0.93 08/26/2020    EGFRIFNONA 62 08/26/2020    EGFRIFAFRI 72 08/26/2020    BCR 16 08/26/2020    K 4.5 08/26/2020    CO2 26 08/26/2020    CALCIUM 10.0 08/26/2020    PROTENTOTREF 7.2 08/26/2020    ALBUMIN 4.6 08/26/2020    LABIL2 1.8 08/26/2020    AST 20 08/26/2020    ALT 20 08/26/2020     No results found for: ESTHER, RF, SEDRATE   No results found for: CRP   No results found for: IRON, TIBC, FERRITIN   No results found for: [id number]       Assessment/Plan      Medications        Problem List         LOS      Dizziness.    Much improved today, hypertension much better controlled.  EKG, stat troponin benign. echocardiogram benign, MRI brain normal.  Follow-up 30 days.  Good social support, here with her daughter, do not drive.  Call or return if worsening symptoms.  Health maintenance.  Doing well, vaccines current.  Discussed health maintenance, screening test, lifestyle modification.  Pap current, followed by Dr. Patiño, mammogram scheduled.  Allergic rhinitis.  OTC Claritin or Zyrtec.  Hypertension.   Clinically improved today on as needed clonidine.  She stopped other Rx.  Persistent every morning elevation.  Restart Toprol-XL at 50 mg daily.  Monitor blood pressure closely.  Follow-up recheck.  Did not tolerate hydralazine.  Consider restart amlodipine if persistent elevation.   . Discussed low-sodium diet.  Stress echo benign/elevated right-sided pressure only 2018.  Carotid Dopplers with mild blockage only 2018.  Dizziness.  Life stress/decreased energy.  Multiple stressors.  Good social support.   Initially improved on Cymbalta/Wellbutrin, she discontinued Rx.    Lower extremity edema.  Improved currently with decreased dose  amlodipine.  Discussed low-sodium diet.  Follow-up recheck.  Consider sleep study if persistent symptoms.  Osteoporosis.  Tolerating Prolia.  Holding calcium/vitamin D per nephrology.  Follow-up recheck DEXA  Atrophic kidney.  Improved status post nephrectomy, followed by urology,nephrology.  Renal function normal.  Interstitial cystitis.  Improved on Flomax, followed by [facility], history of InterStim placement/subsequent removal.  I and O catheter dependent.  Hypertensive retinopathy.  Followed by ophthalmology ember galeano, status post treatment  Colon screening.  Colonoscopy benign 2016./Diverticulosis only  Carotid stenosis.  50 to 59% on the right, mild on left per Doppler 1/21..  She is allergic to contrast and gadolinium. Plan recheck Doppler 6 months.  Consider vascular surgery referral if worsening symptoms.  Continue risk factor reduction.  Osteoarthritis.  Worse left hip/knee.  Orthopedics referral scheduled.        Diagnoses and all orders for this visit:    1. Dysuria (Primary)  -     POCT urinalysis dipstick, manual  -     cephalexin (KEFLEX) 500 MG capsule; Take 1 capsule by mouth 3 (Three) Times a Day for 10 days.  Dispense: 30 capsule; Refill: 0    2. Hematuria, unspecified type  -     Urine Culture - Urine, Urine, Clean Catch    3. Hypertension, benign  -     metoprolol succinate XL (TOPROL-XL) 100 MG 24 hr tablet; Take 0.5 tablets by mouth Daily.  Dispense: 30 tablet; Refill: 2    4. Acute cystitis with hematuria    5. Renal insufficiency    6. Solitary kidney    7. IC (interstitial cystitis)              Expected course, medications, and adverse effects discussed.  Call or return if worsening or persistent symptoms.  I wore protective equipment throughout this patient encounter including a mask, gloves, and eye protection.  Hand hygiene was performed before donning protective equipment and after removal when leaving the room. The complete contents of the Assessment and Plan and Data/Lab Results as  documented above have been reviewed and addressed by myself with the patient today as part of an ongoing evaluation / treatment plan.  If some of the documentation has been copied from a previous note and is unchanged it indicates that this problem / plan has been assessed today but is stable from a previous visit and no changes have been recommended.

## 2021-04-14 ENCOUNTER — OFFICE VISIT (OUTPATIENT)
Dept: FAMILY MEDICINE CLINIC | Facility: CLINIC | Age: 71
End: 2021-04-14

## 2021-04-14 VITALS
RESPIRATION RATE: 18 BRPM | HEART RATE: 78 BPM | BODY MASS INDEX: 28.52 KG/M2 | TEMPERATURE: 97.8 F | OXYGEN SATURATION: 98 % | SYSTOLIC BLOOD PRESSURE: 130 MMHG | DIASTOLIC BLOOD PRESSURE: 74 MMHG | HEIGHT: 62 IN | WEIGHT: 155 LBS

## 2021-04-14 DIAGNOSIS — R30.0 DYSURIA: Primary | ICD-10-CM

## 2021-04-14 DIAGNOSIS — IMO0002 SOLITARY KIDNEY: ICD-10-CM

## 2021-04-14 DIAGNOSIS — I10 HYPERTENSION, BENIGN: ICD-10-CM

## 2021-04-14 DIAGNOSIS — N30.01 ACUTE CYSTITIS WITH HEMATURIA: ICD-10-CM

## 2021-04-14 DIAGNOSIS — N30.10 IC (INTERSTITIAL CYSTITIS): ICD-10-CM

## 2021-04-14 DIAGNOSIS — R31.9 HEMATURIA, UNSPECIFIED TYPE: ICD-10-CM

## 2021-04-14 DIAGNOSIS — N28.9 RENAL INSUFFICIENCY: ICD-10-CM

## 2021-04-14 LAB
BILIRUB BLD-MCNC: NEGATIVE MG/DL
CLARITY, POC: ABNORMAL
COLOR UR: YELLOW
GLUCOSE UR STRIP-MCNC: NEGATIVE MG/DL
KETONES UR QL: NEGATIVE
LEUKOCYTE EST, POC: NEGATIVE
NITRITE UR-MCNC: POSITIVE MG/ML
PH UR: 5 [PH] (ref 5–8)
PROT UR STRIP-MCNC: NEGATIVE MG/DL
RBC # UR STRIP: ABNORMAL /UL
SP GR UR: 1.02 (ref 1–1.03)
UROBILINOGEN UR QL: NORMAL

## 2021-04-14 PROCEDURE — 99214 OFFICE O/P EST MOD 30 MIN: CPT | Performed by: FAMILY MEDICINE

## 2021-04-14 PROCEDURE — 81002 URINALYSIS NONAUTO W/O SCOPE: CPT | Performed by: FAMILY MEDICINE

## 2021-04-14 RX ORDER — CYCLOSPORINE 0.5 MG/ML
1 EMULSION OPHTHALMIC 2 TIMES DAILY
COMMUNITY
End: 2021-09-10

## 2021-04-14 RX ORDER — METOPROLOL SUCCINATE 100 MG/1
100 TABLET, EXTENDED RELEASE ORAL DAILY
COMMUNITY
End: 2021-04-14 | Stop reason: SDUPTHER

## 2021-04-14 RX ORDER — METOPROLOL SUCCINATE 100 MG/1
50 TABLET, EXTENDED RELEASE ORAL DAILY
Qty: 30 TABLET | Refills: 2 | Status: SHIPPED | OUTPATIENT
Start: 2021-04-14 | End: 2021-09-10

## 2021-04-14 RX ORDER — CEPHALEXIN 500 MG/1
500 CAPSULE ORAL 3 TIMES DAILY
Qty: 30 CAPSULE | Refills: 0 | Status: SHIPPED | OUTPATIENT
Start: 2021-04-14 | End: 2021-04-24

## 2021-04-14 RX ORDER — BIOTIN 10000 MCG
CAPSULE ORAL
COMMUNITY
End: 2022-03-18

## 2021-04-14 RX ORDER — CLONIDINE HYDROCHLORIDE 0.1 MG/1
0.1 TABLET ORAL EVERY 4 HOURS PRN
COMMUNITY

## 2021-04-14 RX ORDER — ASPIRIN 81 MG/1
81 TABLET ORAL DAILY
COMMUNITY

## 2021-04-17 LAB
BACTERIA UR CULT: ABNORMAL
BACTERIA UR CULT: ABNORMAL
OTHER ANTIBIOTIC SUSC ISLT: ABNORMAL

## 2021-04-21 ENCOUNTER — TELEPHONE (OUTPATIENT)
Dept: FAMILY MEDICINE CLINIC | Facility: CLINIC | Age: 71
End: 2021-04-21

## 2021-04-21 NOTE — TELEPHONE ENCOUNTER
I called and spoke to sheri. I asked if she referred herself to this dr? She said no she thought  did. She said the girl who let her go from her anselmo last week said she would refer her to a knee and hip dr and that office would call her to schedule an anselmo so she figured  did it. I asked her if the person who scheduled the appointment and ordered the referral in her chart with  called her? She said no she called our office and the girl who answered the phone set it up for her. She assumed the girl she was talking to about this was an employee in  office. I let her know that  had referred her to a different provider she then said that her best friend had a hip replacement from this  and she thought she would see if he could see her too. so she called and the lady she talked to on the phone, again whom she assumed was an employee at  office, told her she could schedule her in with  Tuesday as a new patient and that sheri now had a referral in her chart to be able to see him. I let sheri know that it was a  at [facility]. And this is considered a self referral. I told her I would ask  if he was ok with sending her images to dr.mehtas rust for her self referral. She understood and asked for a call if he is not ok with that.  I asked dr church and explained the situation. He said it was ok to send those. I have faxed these over to  office. Although they seem to be in epic and should be able to see these already. There are not any newer images.

## 2021-04-21 NOTE — TELEPHONE ENCOUNTER
Caller: Dunia Fabian    Relationship: Self    Best call back number: [phone]    Who are you requesting to speak with (clinical staff, provider,  specific staff member): NURSE    What was the call regarding:PATIENT HAS APPT WITH ORTHOPEDIC SURGEON ON 04/27 AND SHE IS NEEDING SOME INFORMATION TO GIVE TO THEM.     PATIENT ISN'T SURE IF WE HAVE ANY XRAYS OF HIPS OR KNEES. SHE WOULD LIKE TO SPEAK TO A NURSE. PLEASE CALL AND ADVISE.       Do you require a callback: YES

## 2021-04-27 ENCOUNTER — OFFICE VISIT (OUTPATIENT)
Dept: ORTHOPEDIC SURGERY | Facility: CLINIC | Age: 71
End: 2021-04-27

## 2021-04-27 VITALS — WEIGHT: 152.4 LBS | HEIGHT: 62 IN | BODY MASS INDEX: 28.05 KG/M2

## 2021-04-27 DIAGNOSIS — M25.552 LEFT HIP PAIN: ICD-10-CM

## 2021-04-27 DIAGNOSIS — M17.0 BILATERAL PRIMARY OSTEOARTHRITIS OF KNEE: Primary | ICD-10-CM

## 2021-04-27 PROCEDURE — 99213 OFFICE O/P EST LOW 20 MIN: CPT | Performed by: ORTHOPAEDIC SURGERY

## 2021-04-27 NOTE — PROGRESS NOTES
"Chief Complaint  Pain of the Left Knee, Pain of the Right Knee, and Pain of the Left Hip    Subjective    History of Present Illness      Dunia Fabian is a 70 y.o. female who presents to [facility] for left hip and left knee pain.  History of Present Illness the patient is a pleasant, active 70-year-old white female who has pain and discomfort.  She fell awkwardly over the anterior aspect of her left lower extremity 2 months ago.  She was told that she had developed bruising and had some fluid on her knees.  Most of the symptoms are on the left hip and the left knee.  She has had pain in these 2 joints for at least 2 years.  She does have some difficulty going up and on the steps as well as squatting on the ground.  Cross body activities bother the patient to some degree.  She is here for second opinion and had been seen previously by Dr. Guevara for left hip pain and discomfort.  Pain Location:  BILATERAL knee and LEFT hip  Radiation: none  Quality: aching, sharp, stabbing  Intensity/Severity: mild-moderate  Duration: 2 months   Progression of symptoms: yes, progressive worsening  Onset quality: sudden fell on left hip  Timing: intermittent  Aggravating Factors: kneeling, rising after sitting, squatting  Alleviating Factors: NSAIDs, rest, ice  Previous Episodes: yes  Associated Symptoms: pain, swelling, clicking/popping  ADLs Affected: ambulating, recreational activities/sports  Previous Treatment: NSAIDs       Objective   Vital Signs:   Ht 157.5 cm (62\")   Wt 69.1 kg (152 lb 6.4 oz)   BMI 27.87 kg/m²     Physical Exam  Physical Exam  Vitals signs and nursing note reviewed.   Constitutional:       Appearance: Normal appearance.   Pulmonary:      Effort: Pulmonary effort is normal.   Skin:     General: Skin is warm and dry.      Capillary Refill: Capillary refill takes less than 2 seconds.   Neurological:      General: No focal deficit present.      Mental Status: He is alert and " oriented to person, place, and time. Mental status is at baseline.   Psychiatric:         Mood and Affect: Mood normal.         Behavior: Behavior normal.         Thought Content: Thought content normal.         Judgment: Judgment normal.     Ortho Exam   Bilateral knee (cmp). The patients' patellar grind test is exquisitely postive.  A lot of pain and discomfort in the retropatellar area. The patient has high Q-angle. Range of motion is from 0-120 degrees of flexion. Anterior and posterior drawer signs are negative. No medial or lateral instability is noted. The patella tends to track laterally in high grades of flexion. A Slightly positive apprehension sign is noted. There is some tenderness over the medial patellofemoral ligaments. Skin and soft tissues are swollen; consistent with inflammatory changes of the patellofemoral joint. Dorsalis pedis and posterior tibial artery pulses are palpable. Common peroneal nerve function is well preserved. Quad mechanism is well preserved.  The patient has some pseudolaxity of the knee with stress testing on the anteroposterior plane.  Left hip (gtb): Skin and soft tissues over the greater trochanteric bursa are tender upon palpation, along with IT band tenderness. Cross body adduction is negative. Internal and external rotations are bothersome and cause tenderness over the greater trochanter. There is no clinical deformity and no shortening. She does not have a limp upon walking. There is not piriformis tightness and there  is not capsular tightness with any rotation. Dorsalis pedis and posterior tibial artery pulses are palpable. Neurovascular status is intact and capillary refill is less than 2 seconds. Common peroneal nerve function is well preserved.          Result Review :   The following data was reviewed by: Abdulaziz Angel MD on 04/27/2021:  Radiologic studies - see below for interpretation  bilateral knee and left hip xrays ap/lateralviews were performed at [facility] on 04/27/21. These images were independently viewed and interpreted by myself, my impression as follows:  bilateral Knee X-Ray  Indication: Pain and discomfort on the medial aspect of both knees.  AP, Lateral views  Findings: Early to moderate osteoarthritis of the knee with some narrowing of the medial joint space.  no bony lesion  Soft tissues within normal limits  decreased joint spaces  Hardware appropriately positioned not applicable      no prior studies available for comparison.    This patient's x-ray report was graded according to the Kellgren and Geovanni classification.  This took into account the joint space narrowing, osteophyte formation, sclerosis of the distal femur/proximal tibia along with deformity of those bones.  The findings were indicative of K L grade 2.    X-RAY was ordered and reviewed by Abdulaziz Angel MD     left Hip X-Ray  Indication: Pain and discomfort after a fall.  AP and lateral  Findings: Fairly normal contour of the hip joint without any fractures noted.  Some early degenerative change in the form of osteoarthritis with lateral bone spurs is noted.  no bony lesion  Soft tissues within normal limits  within normal limits joint spaces  Hardware appropriately positioned not applicable      no prior studies available for comparison.    X-RAY was ordered and reviewed by Abdulaziz Angel MD    DEXA scan reports:    DEXA scan reports are available in the office today.  These images are discussed with the patient.  In the lumbar spine the T score is 0.3 and in the femoral necks the T score is -1.5.  This is classified as osteopenia for this patient.          Procedures           Assessment   Assessment and Plan    Diagnoses and all orders for this visit:    1. Bilateral primary osteoarthritis of knee (Primary)  -     XR Knee 3 View Bilateral    2. Left hip pain  -     XR Hip With or Without Pelvis 2 - 3 View Left          Follow Up   · Compression/brace of the knee to prevent it from  buckling and giving out.  This knee brace will help her to manage the pseudolaxity of the knee.  · Rest, ice, compression, and elevation (RICE) therapy  · Stretching and strengthening exercises of the quads and the hamstrings.  · Viscosupplementation injections for the knee discussed and offered to the patient.  · Nonoperative management of the hip and the knee at this point.  · [employer] exercise program for the lumbar spine.  · Issues with regards to management of the osteoporosis has been discussed with the patient.  · Calcium and vitamin D discussed with the patient.  · Recommended that she should see my PA Huyen Bynum in the osteoporosis clinic for management of that bony condition.  · OTC Tylenol 500-1000mg by mouth every 6 hours as needed for pain   · Follow up in 6 month(s)  • Patient was given instructions and counseling regarding her condition or for health maintenance advice. Please see specific information pulled into the AVS if appropriate.     Abdulaziz Angel MD   Date of Encounter: 4/27/2021        EMR Dragon/Transcription disclaimer:  Much of this encounter note is an electronic transcription/translation of spoken language to printed text. The electronic translation of spoken language may permit erroneous, or at times, nonsensical words or phrases to be inadvertently transcribed; Although I have reviewed the note for such errors, some may still exist.

## 2021-05-14 ENCOUNTER — OFFICE VISIT (OUTPATIENT)
Dept: FAMILY MEDICINE CLINIC | Facility: CLINIC | Age: 71
End: 2021-05-14

## 2021-05-14 VITALS
HEART RATE: 83 BPM | WEIGHT: 155.2 LBS | SYSTOLIC BLOOD PRESSURE: 150 MMHG | HEIGHT: 62 IN | BODY MASS INDEX: 28.56 KG/M2 | OXYGEN SATURATION: 99 % | RESPIRATION RATE: 18 BRPM | TEMPERATURE: 97.3 F | DIASTOLIC BLOOD PRESSURE: 80 MMHG

## 2021-05-14 DIAGNOSIS — G89.29 CHRONIC BILATERAL LOW BACK PAIN WITH BILATERAL SCIATICA: ICD-10-CM

## 2021-05-14 DIAGNOSIS — E78.2 MIXED HYPERLIPIDEMIA: Chronic | ICD-10-CM

## 2021-05-14 DIAGNOSIS — M54.42 CHRONIC BILATERAL LOW BACK PAIN WITH BILATERAL SCIATICA: ICD-10-CM

## 2021-05-14 DIAGNOSIS — I10 HYPERTENSION, BENIGN: Primary | Chronic | ICD-10-CM

## 2021-05-14 DIAGNOSIS — M15.9 PRIMARY OSTEOARTHRITIS INVOLVING MULTIPLE JOINTS: ICD-10-CM

## 2021-05-14 DIAGNOSIS — M54.41 CHRONIC BILATERAL LOW BACK PAIN WITH BILATERAL SCIATICA: ICD-10-CM

## 2021-05-14 DIAGNOSIS — N30.10 IC (INTERSTITIAL CYSTITIS): ICD-10-CM

## 2021-05-14 DIAGNOSIS — R42 DIZZINESS: ICD-10-CM

## 2021-05-14 DIAGNOSIS — IMO0002 SOLITARY KIDNEY: ICD-10-CM

## 2021-05-14 PROCEDURE — 99214 OFFICE O/P EST MOD 30 MIN: CPT | Performed by: FAMILY MEDICINE

## 2021-05-14 RX ORDER — ALLOPURINOL 300 MG/1
TABLET ORAL
COMMUNITY
Start: 2021-05-08 | End: 2021-09-10

## 2021-06-22 DIAGNOSIS — F32.A DEPRESSIVE DISORDER: ICD-10-CM

## 2021-06-22 RX ORDER — HYDRALAZINE HYDROCHLORIDE 100 MG/1
TABLET, FILM COATED ORAL
Qty: 270 TABLET | Refills: 0 | Status: SHIPPED | OUTPATIENT
Start: 2021-06-22 | End: 2022-02-01

## 2021-09-10 ENCOUNTER — OFFICE VISIT (OUTPATIENT)
Dept: FAMILY MEDICINE CLINIC | Facility: CLINIC | Age: 71
End: 2021-09-10

## 2021-09-10 VITALS
RESPIRATION RATE: 18 BRPM | WEIGHT: 153.4 LBS | HEART RATE: 88 BPM | TEMPERATURE: 96.8 F | DIASTOLIC BLOOD PRESSURE: 90 MMHG | HEIGHT: 62 IN | BODY MASS INDEX: 28.23 KG/M2 | SYSTOLIC BLOOD PRESSURE: 130 MMHG | OXYGEN SATURATION: 97 %

## 2021-09-10 DIAGNOSIS — J06.9 UPPER RESPIRATORY TRACT INFECTION, UNSPECIFIED TYPE: ICD-10-CM

## 2021-09-10 DIAGNOSIS — E78.2 MIXED HYPERLIPIDEMIA: Chronic | ICD-10-CM

## 2021-09-10 DIAGNOSIS — I10 HYPERTENSION, BENIGN: Chronic | ICD-10-CM

## 2021-09-10 DIAGNOSIS — Z00.00 MEDICARE ANNUAL WELLNESS VISIT, SUBSEQUENT: Primary | ICD-10-CM

## 2021-09-10 DIAGNOSIS — R73.01 ELEVATED FASTING BLOOD SUGAR: ICD-10-CM

## 2021-09-10 DIAGNOSIS — N95.1 MENOPAUSAL SYNDROME: ICD-10-CM

## 2021-09-10 DIAGNOSIS — Z12.11 SCREENING FOR MALIGNANT NEOPLASM OF COLON: ICD-10-CM

## 2021-09-10 DIAGNOSIS — M81.0 SENILE OSTEOPOROSIS: ICD-10-CM

## 2021-09-10 DIAGNOSIS — R09.89 CAROTID BRUIT, UNSPECIFIED LATERALITY: ICD-10-CM

## 2021-09-10 LAB
GLUCOSE BLDC GLUCOMTR-MCNC: 120 MG/DL (ref 70–130)
HBA1C MFR BLD: 6.1 %

## 2021-09-10 PROCEDURE — 99497 ADVNCD CARE PLAN 30 MIN: CPT | Performed by: FAMILY MEDICINE

## 2021-09-10 PROCEDURE — 1159F MED LIST DOCD IN RCRD: CPT | Performed by: FAMILY MEDICINE

## 2021-09-10 PROCEDURE — 99214 OFFICE O/P EST MOD 30 MIN: CPT | Performed by: FAMILY MEDICINE

## 2021-09-10 PROCEDURE — 96160 PT-FOCUSED HLTH RISK ASSMT: CPT | Performed by: FAMILY MEDICINE

## 2021-09-10 PROCEDURE — 1170F FXNL STATUS ASSESSED: CPT | Performed by: FAMILY MEDICINE

## 2021-09-10 PROCEDURE — 3725F SCREEN DEPRESSION PERFORMED: CPT | Performed by: FAMILY MEDICINE

## 2021-09-10 PROCEDURE — G0439 PPPS, SUBSEQ VISIT: HCPCS | Performed by: FAMILY MEDICINE

## 2021-09-10 RX ORDER — CEPHALEXIN 500 MG/1
500 CAPSULE ORAL 3 TIMES DAILY
Qty: 30 CAPSULE | Refills: 0 | Status: SHIPPED | OUTPATIENT
Start: 2021-09-10 | End: 2021-10-01

## 2021-09-10 NOTE — PROGRESS NOTES
Subjective   Dunia Fabian is a 70 y.o. female.     Chief Complaint   Patient presents with   • Medicare Wellness-subsequent   • Hypertension   • Hyperlipidemia   • Blood Sugar Problem       The patient is here: for coordination of medical care to discuss health maintenance and disease prevention to follow up on multiple medical problems.  Last comprehensive physical was on 9/2/2020.  Previous physical was performed by  Tyron Driver MD  Overall has: minimal activity with work/home activities, exercises 1 time per week, good appetite, feels well with minor complaints, good energy level and is sleeping poorly. Nutrition: eating a variety of foods. Last tetanus shot was 10/24/2012.   Patient is doing routine self breast exams: monthly Patient's last carotid doppler was: 1/15/2021 Patient's last stress test was: 12/28/2020. Last colonoscopy was 6/21/2016 at [facility] by Dr Goff and was recommended 10 year. Dunia reports that she sees Dr Patiño for Gynecology   Last Completed Mammogram          MAMMOGRAM (Every 2 Years) Next due on 12/11/2022 12/11/2020  Mammo Screening Digital Tomosynthesis Bilateral With CAD    12/10/2019  Mammo Screening Digital Tomosynthesis Bilateral With CAD    12/04/2018  Mammo Screening Digital Tomosynthesis Bilateral With CAD    11/22/2017  Mammo Screening Digital Tomosynthesis Bilateral With CAD    11/22/2017   Mammogram    Only the first 5 history entries have been loaded, but more history exists.                Dexa Scan   Date Value Ref Range Status   08/01/2017 see report  Final        Hypertension  This is a chronic problem. The current episode started more than 1 year ago. The problem is uncontrolled. Associated symptoms include headaches. Pertinent negatives include no anxiety, blurred vision, chest pain, malaise/fatigue, palpitations or shortness of breath. Agents associated with hypertension include decongestants. Risk factors for coronary artery disease include  dyslipidemia, post-menopausal state and obesity. Current antihypertension treatment includes beta blockers and angiotensin blockers. The current treatment provides moderate improvement. There are no compliance problems.    Hyperlipidemia  This is a chronic problem. Exacerbating diseases include diabetes. Pertinent negatives include no chest pain, leg pain or shortness of breath. Current antihyperlipidemic treatment includes diet change and exercise. There are no compliance problems.  Risk factors for coronary artery disease include hypertension.   Blood Sugar Problem  This is a new problem. The current episode started more than 1 month ago. Associated symptoms include congestion, fatigue, headaches and a sore throat. Pertinent negatives include no abdominal pain, chest pain, chills, coughing, diaphoresis, nausea or vomiting. Nothing aggravates the symptoms. She has tried nothing for the symptoms. The treatment provided no relief.        Recent Hospitalizations:  No hospitalization(s) within the last year..  ccc    I personally reviewed and updated the patient's allergies, medications, problem list, and past medical, surgical, social, and family history. I have reviewed and confirmed the accuracy of the HPI and ROS as documented by the MA/LPN/RN Tyron Driver MD      Family History   Problem Relation Age of Onset   • Parkinsonism Mother    • Heart disease Mother         cardiovascular disease   • Diabetes Mother         diabetes mellitus    • Heart disease Father         cardiovascular disease   • Heart disease Sister         cardiovascular disease   • Diabetes Sister         diabetes mellitus    • Heart disease Brother         cardiovascular disease   • Diabetes Son         diabetes mellitus    • Heart disease Paternal Uncle         ischemic   • Stomach cancer Maternal Grandmother    • Breast cancer Niece 39       Social History     Tobacco Use   • Smoking status: Never Smoker   • Smokeless tobacco: Never Used    Vaping Use   • Vaping Use: Never used   Substance Use Topics   • Alcohol use: No   • Drug use: No       Past Surgical History:   Procedure Laterality Date   • CATARACT EXTRACTION Left 08/2005    with Insert of Lens Prostheti   • CYSTOCELE REPAIR  06/1998    SIMENTAL Cystourethropexy & Cystocele Repair    • KIDNEY SURGERY Right 02/24/2014    Removal   • TONSILLECTOMY  1953   • URETHROLYSIS  10/2000    Transvaginal Urethrolysis & Bone Revere Sling   • VAGINAL HYSTERECTOMY  1970   • VITRECTOMY Left 04/2005    & Membrane Peeling       Patient Active Problem List   Diagnosis   • Acid reflux   • Allergic rhinitis   • Anxiety   • Atrophic kidney   • Carotid bruit present   • Carpal tunnel syndrome   • Low back pain   • Neck pain   • Thoracic back pain   • Depressive disorder   • Medicare annual wellness visit, subsequent   • Gout   • Hemorrhoids   • Mixed hyperlipidemia   • Hypertension, benign   • IC (interstitial cystitis)   • Irritable bowel syndrome with constipation   • LVH (left ventricular hypertrophy)   • Menopausal syndrome   • Mitral insufficiency, acute   • Onychomycosis   • Disorder of bone and cartilage   • Osteoarthritis   • Primary pulmonary hypertension (CMS/HCC)   • Renal insufficiency   • Plantar fasciitis   • Senile osteoporosis   • Solitary kidney   • Encounter for screening mammogram for malignant neoplasm of breast   • Special screening for malignant neoplasms, colon   • Seborrheic keratosis   • Chronic kidney disease, stage 3 (moderate)   • Renal hypertrophy   • Hypercalcemia   • Acute cystitis with hematuria   • Lower extremity edema   • Dizziness   • Left hip pain   • Bilateral primary osteoarthritis of knee   • Elevated fasting blood sugar         Current Outpatient Medications:   •  aspirin 81 MG EC tablet, Take 81 mg by mouth Daily., Disp: , Rfl:   •  Bioflavonoid Products (Vitamin C Plus) 1000 MG tablet, Take  by mouth., Disp: , Rfl:   •  Biotin 10 MG capsule, Take  by mouth., Disp: , Rfl:   •   "cloNIDine (CATAPRES) 0.1 MG tablet, Take 0.1 mg by mouth 2 (Two) Times a Day., Disp: , Rfl:   •  hydrALAZINE (APRESOLINE) 100 MG tablet, TAKE 1 TABLET BY MOUTH THREE TIMES DAILY, Disp: 270 tablet, Rfl: 0  •  Multiple Vitamins-Minerals (WOMENS MULTI) capsule, Take  by mouth Daily., Disp: , Rfl:   •  SUPER B COMPLEX/C capsule, SUPER B COMPLEX/C ORAL CAPSULE, Disp: , Rfl:   •  tamsulosin (FLOMAX) 0.4 MG capsule 24 hr capsule, Take 1 capsule by mouth Daily., Disp: , Rfl:   •  VITAMIN B COMPLEX-C PO, Take  by mouth., Disp: , Rfl:   •  vitamin D3 125 MCG (5000 UT) capsule capsule, Take 5,000 Units by mouth Daily., Disp: , Rfl:   •  Zinc 50 MG capsule, Take  by mouth., Disp: , Rfl:   •  cephalexin (KEFLEX) 500 MG capsule, Take 1 capsule by mouth 3 (Three) Times a Day., Disp: 30 capsule, Rfl: 0         Review of Systems   Constitutional: Positive for fatigue. Negative for chills, diaphoresis and malaise/fatigue.   HENT: Positive for congestion and sore throat. Negative for trouble swallowing and voice change.    Eyes: Negative for blurred vision and visual disturbance.   Respiratory: Negative for cough and shortness of breath.    Cardiovascular: Negative for chest pain and palpitations.   Gastrointestinal: Negative for abdominal pain, nausea and vomiting.   Endocrine: Negative for polydipsia and polyphagia.   Genitourinary: Negative for hematuria.   Musculoskeletal: Negative for neck stiffness.   Skin: Negative for color change and pallor.   Allergic/Immunologic: Negative for immunocompromised state.   Neurological: Negative for seizures and syncope.   Hematological: Negative for adenopathy.   Psychiatric/Behavioral: Negative for hallucinations, sleep disturbance and suicidal ideas.       I have reviewed and confirmed the accuracy of the ROS as documented by the MA/LPN/RN Tyron Drivre MD      Objective   /90   Pulse 88   Temp 96.8 °F (36 °C)   Resp 18   Ht 157.5 cm (62\")   Wt 69.6 kg (153 lb 6.4 oz)   SpO2 97% "   Breastfeeding No   BMI 28.06 kg/m²   BP Readings from Last 3 Encounters:   09/10/21 130/90   05/14/21 150/80   04/14/21 130/74     Wt Readings from Last 3 Encounters:   09/10/21 69.6 kg (153 lb 6.4 oz)   05/14/21 70.4 kg (155 lb 3.2 oz)   04/27/21 69.1 kg (152 lb 6.4 oz)     Physical Exam  Constitutional:       Appearance: She is well-developed. She is not diaphoretic.   HENT:      Head: Normocephalic.      Right Ear: Tympanic membrane, ear canal and external ear normal.      Left Ear: Tympanic membrane, ear canal and external ear normal.      Nose: Nose normal.   Eyes:      General: Lids are normal.      Conjunctiva/sclera: Conjunctivae normal.      Pupils: Pupils are equal, round, and reactive to light.   Neck:      Thyroid: No thyromegaly.      Vascular: No carotid bruit or JVD.      Trachea: No tracheal deviation.   Cardiovascular:      Rate and Rhythm: Normal rate and regular rhythm.      Heart sounds: Normal heart sounds. No murmur heard.   No friction rub. No gallop.    Pulmonary:      Effort: Pulmonary effort is normal.      Breath sounds: Normal breath sounds. No stridor. No decreased breath sounds, wheezing or rales.   Abdominal:      General: Bowel sounds are normal. There is no distension.      Palpations: Abdomen is soft. There is no mass.      Tenderness: There is no abdominal tenderness. There is no guarding or rebound.      Hernia: No hernia is present.   Lymphadenopathy:      Head:      Right side of head: No submental, submandibular, tonsillar, preauricular, posterior auricular or occipital adenopathy.      Left side of head: No submental, submandibular, tonsillar, preauricular, posterior auricular or occipital adenopathy.      Cervical: No cervical adenopathy.   Skin:     General: Skin is warm and dry.      Coloration: Skin is not pale.   Neurological:      Mental Status: She is alert and oriented to person, place, and time.      Cranial Nerves: No cranial nerve deficit.      Sensory: No  sensory deficit.      Coordination: Coordination normal.      Gait: Gait normal.      Deep Tendon Reflexes: Reflexes are normal and symmetric.         Data / Lab Results:    Hemoglobin A1C   Date Value Ref Range Status   09/10/2021 6.1 % Final     Lab Results   Component Value Date     (H) 08/28/2021     Lab Results   Component Value Date     (H) 08/28/2021     (H) 08/26/2020     07/24/2018     Lab Results   Component Value Date    CHOL 181 07/24/2018    CHOL 215 (H) 07/21/2017    CHOL 198 07/20/2016     Lab Results   Component Value Date    TRIG 113 08/28/2021    TRIG 170 (H) 08/26/2020    TRIG 77 07/24/2018     Lab Results   Component Value Date    HDL 54 08/28/2021    HDL 53 08/26/2020    HDL 60 07/24/2018     No results found for: PSA  Lab Results   Component Value Date    WBC 10.7 08/28/2021    HGB 12.3 08/28/2021    HCT 38.6 08/28/2021    MCV 94 08/28/2021     08/28/2021     Lab Results   Component Value Date    TSH 4.220 08/28/2021      Lab Results   Component Value Date    BUN 22 08/28/2021    CREATININE 1.15 (H) 08/28/2021    EGFRIFNONA 48 (L) 08/28/2021    EGFRIFAFRI 55 (L) 08/28/2021    BCR 19 08/28/2021    K 4.3 08/28/2021    CO2 26 08/28/2021    CALCIUM 10.7 (H) 08/28/2021    PROTENTOTREF 7.1 08/28/2021    ALBUMIN 4.3 08/28/2021    LABIL2 1.5 08/28/2021    AST 19 08/28/2021    ALT 14 08/28/2021     No results found for: ESTHER, RF, SEDRATE   No results found for: CRP   No results found for: IRON, TIBC, FERRITIN   No results found for: [id number]       Age-appropriate Screening Schedule:  Refer to the list below for future screening recommendations based on patient's age, sex and/or medical conditions. Orders for these recommended tests are listed in the plan section. The patient has been provided with a written plan.    Health Maintenance   Topic Date Due   • ZOSTER VACCINE (2 of 3) 02/07/2019   • INFLUENZA VACCINE  10/01/2021   • LIPID PANEL  08/28/2022   • TDAP/TD  VACCINES (3 - Td or Tdap) 10/24/2022   • MAMMOGRAM  12/11/2022   • DXA SCAN  12/11/2022       Depression Screen:   PHQ-2/PHQ-9 Depression Screening 9/10/2021   Little interest or pleasure in doing things 0   Feeling down, depressed, or hopeless 0   Trouble falling or staying asleep, or sleeping too much 0   Feeling tired or having little energy 0   Poor appetite or overeating 0   Feeling bad about yourself - or that you are a failure or have let yourself or your family down 0   Trouble concentrating on things, such as reading the newspaper or watching television 0   Moving or speaking so slowly that other people could have noticed. Or the opposite - being so fidgety or restless that you have been moving around a lot more than usual 0   Thoughts that you would be better off dead, or of hurting yourself in some way 0   Total Score 0   If you checked off any problems, how difficult have these problems made it for you to do your work, take care of things at home, or get along with other people? Not difficult at all     I spent more than 16 minutes asking patient questions, counseling and documenting in the chart.    Health Habits and Functional and Cognitive Screening:  Functional & Cognitive Status 9/10/2021   Do you have difficulty preparing food and eating? No   Do you have difficulty bathing yourself, getting dressed or grooming yourself? No   Do you have difficulty using the toilet? No   Do you have difficulty moving around from place to place? No   Do you have trouble with steps or getting out of a bed or a chair? No   Current Diet Well Balanced Diet   Dental Exam Not up to date   Eye Exam Not up to date   Exercise (times per week) 3 times per week   Current Exercises Include No Regular Exercise   Current Exercise Activities Include -   Do you need help using the phone?  No   Are you deaf or do you have serious difficulty hearing?  No   Do you need help with transportation? No   Do you need help shopping? No   Do  you need help preparing meals?  No   Do you need help with housework?  No   Do you need help with laundry? No   Do you need help taking your medications? No   Do you need help managing money? No   Do you ever drive or ride in a car without wearing a seat belt? No   Have you felt unusual stress, anger or loneliness in the last month? No   Who do you live with? Alone   If you need help, do you have trouble finding someone available to you? No   Have you been bothered in the last four weeks by sexual problems? -   Do you have difficulty concentrating, remembering or making decisions? No       Does the patient have evidence of cognitive impairment? No    Advance Care Planning:  ACP discussion was held with the patient during this visit. Patient has an advance directive in EMR which is still valid.      A face-to-face visit was completed today with patient.  Counseling explanation, and discussion of advanced directives was performed.   The last advanced care visit was performed in 2020.  In a near life ending situation, from which she is not expected to recover functionally, and she is not able to speak for her, she does not want life sustaining measures. We discussed feeding tubes, ventilators and cardiac support as well as dialysis.    I spent more than 16 minutes discussing Advanced Care Planning information and documenting in the chart.    Identification of Risk Factors:  Risk factors include: Breast Cancer/Mammogram Screening  Cardiovascular risk  Chronic Pain   Diabetic Lab Screening   Fall Risk  Immunizations Discussed/Encouraged (specific immunizations; Shingrix and COVID19 )  Osteoporosis Risk.    Compared to one year ago, the patient feels her physical health is worse.  Compared to one year ago, the patient feels her mental health is the same.    Patient Self-Management and Personalized Health Advice  The patient has been provided with information about: diet, exercise, weight management, prevention of cardiac  or vascular disease, fall prevention and supplements and preventive services including:   · Annual Wellness Visit (AWV)  · Bone Density Measurements  · Cardiovascular Disease Screening Tests (may do this order every 5 years in beneficiaries without signs or symptoms of cardiovascular disease)  · Colorectal Cancer Screening, Colonoscopy  · Depression Screening (15 minutes face to face, Code )  · Influenza Vaccine and Administration  · Pneumococcal Vaccine and Administration.      Assessment/Plan      Medications        Problem List         LOS      Dizziness.    Much improved today, hypertension much better controlled.  EKG, stat troponin benign. echocardiogram benign, MRI brain normal.  Follow-up 30 days.  Good social support, here with her daughter, do not drive.  Call or return if worsening symptoms.  Health maintenance.  Doing well, vaccines current.  Discussed health maintenance, screening test, lifestyle modification.  Pap current, followed by Dr. Patiño, mammogram scheduled.  Allergic rhinitis.  OTC Claritin or Zyrtec.  Hypertension.   Clinically improved today on  hydralazine 100 mg twice daily, as needed clonidine.  Monitor blood pressure closely.  Follow-up recheck.  Has done well on metoprolol XL in the past, she stopped Rx, consider restart if worsening symptoms.  Consider restart amlodipine if persistent elevation.   . Discussed low-sodium diet.  Stress echo benign/elevated right-sided pressure only 2018.  Carotid Dopplers with mild blockage only 2018.  Dizziness.  Life stress/decreased energy.  Multiple stressors.  Good social support.   Initially improved on Cymbalta/Wellbutrin, she discontinued Rx.    Lower extremity edema.  Improved currently with decreased dose amlodipine.  Discussed low-sodium diet.  Follow-up recheck.  Consider sleep study if persistent symptoms.  Osteoporosis.  Tolerating Prolia.  Holding calcium/vitamin D per nephrology.  Follow-up recheck DEXA  Atrophic kidney.  Improved  status post nephrectomy, followed by urology,nephrology.  Renal function normal.  Interstitial cystitis.  Improved on Flomax, followed by [facility], history of InterStim placement/subsequent removal.  I and O catheter dependent.  Hypertensive retinopathy.  Followed by ophthalmology ember galeano, status post treatment  Colon screening.  Colonoscopy benign 2016./Diverticulosis only  Carotid stenosis.  50 to 59% on the right, mild on left per Doppler 1/21..  She is allergic to contrast and gadolinium.  Repeat Doppler scheduled.  Consider vascular surgery referral if worsening symptoms.  Continue risk factor reduction.  Osteoarthritis.  Worse left hip/knee.    Followed by orthopedics Dr. Angel, x-rays with mild left hip OA, moderate left OA knee, attempting knee bracing.  Recommend knee injection she declines.  Hyperlipidemia.  Mild elevation, improved today.  Aggressive LDL target considering carotid stenosis.  Add red yeast rice.  Discussed diet exercise lifestyle modification.  Follow-up recheck.  Consider add statin if persistent elevation.  Acute bacterial sinusitis.  Start antibiotics.      Diagnoses and all orders for this visit:    1. Medicare annual wellness visit, subsequent (Primary)    2. Hypertension, benign    3. Mixed hyperlipidemia    4. Elevated fasting blood sugar  -     POC Glycosylated Hemoglobin (Hb A1C)  -     POCT Glucose    5. Screening for malignant neoplasm of colon    6. Menopausal syndrome    7. Upper respiratory tract infection, unspecified type  -     cephalexin (KEFLEX) 500 MG capsule; Take 1 capsule by mouth 3 (Three) Times a Day.  Dispense: 30 capsule; Refill: 0    8. Carotid bruit, unspecified laterality  -     US Carotid Bilateral; Future    9. Senile osteoporosis              Expected course, medications, and adverse effects discussed.  Call or return if worsening or persistent symptoms.  I wore protective equipment throughout this patient encounter including a mask, gloves, and eye  protection.  Hand hygiene was performed before donning protective equipment and after removal when leaving the room. The complete contents of the Assessment and Plan and Data / Lab Results as documented above have been reviewed and addressed by myself with the patient today as part of an ongoing evaluation / treatment plan.  If some of the documentation has been copied from a previous note and is unchanged it indicates that this problem / plan has been assessed today but is stable from a previous visit and no changes have been recommended.

## 2021-09-21 ENCOUNTER — TELEPHONE (OUTPATIENT)
Dept: FAMILY MEDICINE CLINIC | Facility: CLINIC | Age: 71
End: 2021-09-21

## 2021-09-21 NOTE — TELEPHONE ENCOUNTER
Can you order a prolia for her to get this Friday?   Detail Level: Zone Detail Level: Simple Detail Level: Detailed Detail Level: Generalized

## 2021-09-24 ENCOUNTER — CLINICAL SUPPORT (OUTPATIENT)
Dept: FAMILY MEDICINE CLINIC | Facility: CLINIC | Age: 71
End: 2021-09-24

## 2021-09-24 DIAGNOSIS — M81.0 SENILE OSTEOPOROSIS: ICD-10-CM

## 2021-09-24 PROCEDURE — 96372 THER/PROPH/DIAG INJ SC/IM: CPT | Performed by: FAMILY MEDICINE

## 2021-09-28 ENCOUNTER — TELEPHONE (OUTPATIENT)
Dept: FAMILY MEDICINE CLINIC | Facility: CLINIC | Age: 71
End: 2021-09-28

## 2021-09-28 NOTE — TELEPHONE ENCOUNTER
Caller: Dunia Fabian    Relationship: Self    Best call back number: [phone]     What medication are you requesting: ANTIBIOTICS     What are your current symptoms: STILL COUGHING AND FEVERISH AND WHEEZING COUGHING UP MUCUS.    How long have you been experiencing symptoms: OVER A WEEK    Have you had these symptoms before:    [x] Yes  [] No    Have you been treated for these symptoms before:   [x] Yes  [] No    If a prescription is needed, what is your preferred pharmacy and phone number: [id number] - [place]  [address] AT [facility] OF  &  [address] - [phone]  - [phone]     Additional notes:    SHE IS IS STILL COUGHING PRETTY BAD AND NOT FEELING WELL SHE DID FINISH FIRST ROUND OF ANTIBIOTICS AND WOULD LIKE ,MORE MEDICATION.

## 2021-09-30 DIAGNOSIS — J06.9 UPPER RESPIRATORY TRACT INFECTION, UNSPECIFIED TYPE: ICD-10-CM

## 2021-10-01 RX ORDER — CEPHALEXIN 500 MG/1
CAPSULE ORAL
Qty: 30 CAPSULE | Refills: 0 | Status: SHIPPED | OUTPATIENT
Start: 2021-10-01 | End: 2022-03-18

## 2021-10-01 NOTE — TELEPHONE ENCOUNTER
That is fine send another round of Keflex 500 3 times daily for 10 days, she should let me know if she does not improve, thanks

## 2021-10-20 ENCOUNTER — HOSPITAL ENCOUNTER (OUTPATIENT)
Dept: ULTRASOUND IMAGING | Facility: HOSPITAL | Age: 71
Discharge: HOME | End: 2021-10-20
Admitting: FAMILY MEDICINE

## 2021-10-20 DIAGNOSIS — R09.89 CAROTID BRUIT, UNSPECIFIED LATERALITY: ICD-10-CM

## 2021-10-20 PROCEDURE — 93880 EXTRACRANIAL BILAT STUDY: CPT

## 2021-11-03 ENCOUNTER — TELEPHONE (OUTPATIENT)
Dept: FAMILY MEDICINE CLINIC | Facility: CLINIC | Age: 71
End: 2021-11-03

## 2021-11-03 NOTE — TELEPHONE ENCOUNTER
Caller: Dnuia aFbian    Relationship: Self    Best call back number: [phone] (H)    Caller requesting test results: DUNIA FABIAN    What test was performed: ULTRASOUND ON NECK    When was the test performed: TWO WEEKS AGO ON 10/20/2021    Where was the test performed: DOWNSTAIRS FROM DR SOSA'S OFFICE    Additional notes: PATIENT WOULD LIKE A CALL BACK WITH TEST RESULTS ASAP

## 2021-11-15 ENCOUNTER — TELEPHONE (OUTPATIENT)
Dept: FAMILY MEDICINE CLINIC | Facility: CLINIC | Age: 71
End: 2021-11-15

## 2021-11-15 NOTE — TELEPHONE ENCOUNTER
----- Message from Tyron Driver MD sent at 11/15/2021 12:36 PM EST -----  Let her know sorry it took a while to get back with her, her carotid Doppler looks good, she has the blockage on the right side but it appears stable between 50 and 69%, the left side has a mild blockage less than 50%, want to continue to monitor this closely will plan to repeat Doppler again in 6 months to make sure things are not worsening, thanks

## 2021-12-15 ENCOUNTER — HOSPITAL ENCOUNTER (OUTPATIENT)
Dept: MAMMOGRAPHY | Facility: HOSPITAL | Age: 71
Discharge: HOME | End: 2021-12-15
Admitting: OBSTETRICS & GYNECOLOGY

## 2021-12-15 DIAGNOSIS — Z12.31 SCREENING MAMMOGRAM FOR BREAST CANCER: ICD-10-CM

## 2021-12-15 PROCEDURE — 77067 SCR MAMMO BI INCL CAD: CPT

## 2021-12-15 PROCEDURE — 77063 BREAST TOMOSYNTHESIS BI: CPT

## 2021-12-29 ENCOUNTER — APPOINTMENT (OUTPATIENT)
Dept: ULTRASOUND IMAGING | Facility: HOSPITAL | Age: 71
End: 2021-12-29

## 2022-01-31 DIAGNOSIS — F32.A DEPRESSIVE DISORDER: ICD-10-CM

## 2022-02-01 RX ORDER — HYDRALAZINE HYDROCHLORIDE 100 MG/1
TABLET, FILM COATED ORAL
Qty: 270 TABLET | Refills: 3 | Status: SHIPPED | OUTPATIENT
Start: 2022-02-01 | End: 2022-03-18 | Stop reason: SDUPTHER

## 2022-03-18 ENCOUNTER — OFFICE VISIT (OUTPATIENT)
Dept: FAMILY MEDICINE CLINIC | Facility: CLINIC | Age: 72
End: 2022-03-18

## 2022-03-18 VITALS
WEIGHT: 157.2 LBS | HEIGHT: 62 IN | SYSTOLIC BLOOD PRESSURE: 164 MMHG | TEMPERATURE: 98 F | RESPIRATION RATE: 16 BRPM | DIASTOLIC BLOOD PRESSURE: 80 MMHG | OXYGEN SATURATION: 97 % | HEART RATE: 80 BPM | BODY MASS INDEX: 28.93 KG/M2

## 2022-03-18 DIAGNOSIS — N30.10 IC (INTERSTITIAL CYSTITIS): ICD-10-CM

## 2022-03-18 DIAGNOSIS — I10 HYPERTENSION, BENIGN: Chronic | ICD-10-CM

## 2022-03-18 DIAGNOSIS — M81.0 SENILE OSTEOPOROSIS: ICD-10-CM

## 2022-03-18 DIAGNOSIS — M15.9 PRIMARY OSTEOARTHRITIS INVOLVING MULTIPLE JOINTS: ICD-10-CM

## 2022-03-18 DIAGNOSIS — E78.2 MIXED HYPERLIPIDEMIA: Chronic | ICD-10-CM

## 2022-03-18 DIAGNOSIS — Z90.5 SOLITARY KIDNEY, ACQUIRED: ICD-10-CM

## 2022-03-18 DIAGNOSIS — N28.9 RENAL INSUFFICIENCY: ICD-10-CM

## 2022-03-18 DIAGNOSIS — I10 HYPERTENSION, BENIGN: Primary | Chronic | ICD-10-CM

## 2022-03-18 DIAGNOSIS — F32.A DEPRESSIVE DISORDER: ICD-10-CM

## 2022-03-18 PROCEDURE — 99214 OFFICE O/P EST MOD 30 MIN: CPT | Performed by: FAMILY MEDICINE

## 2022-03-18 RX ORDER — METOPROLOL SUCCINATE 25 MG/1
25 TABLET, EXTENDED RELEASE ORAL DAILY
Qty: 90 TABLET | Refills: 1 | Status: SHIPPED | OUTPATIENT
Start: 2022-03-18 | End: 2022-04-22 | Stop reason: SDUPTHER

## 2022-03-18 RX ORDER — ROSUVASTATIN CALCIUM 5 MG/1
5 TABLET, COATED ORAL DAILY
Qty: 30 TABLET | Refills: 3 | Status: SHIPPED | OUTPATIENT
Start: 2022-03-18 | End: 2022-03-18

## 2022-03-18 RX ORDER — ROSUVASTATIN CALCIUM 5 MG/1
5 TABLET, COATED ORAL DAILY
Qty: 90 TABLET | Refills: 1 | Status: SHIPPED | OUTPATIENT
Start: 2022-03-18 | End: 2022-06-13

## 2022-03-18 RX ORDER — METOPROLOL SUCCINATE 25 MG/1
25 TABLET, EXTENDED RELEASE ORAL DAILY
Qty: 30 TABLET | Refills: 5 | Status: SHIPPED | OUTPATIENT
Start: 2022-03-18 | End: 2022-03-18

## 2022-03-18 RX ORDER — AMOXICILLIN AND CLAVULANATE POTASSIUM 875; 125 MG/1; MG/1
TABLET, FILM COATED ORAL
COMMUNITY
Start: 2022-03-02 | End: 2022-04-22

## 2022-03-18 RX ORDER — HYDRALAZINE HYDROCHLORIDE 100 MG/1
100 TABLET, FILM COATED ORAL 2 TIMES DAILY
Qty: 270 TABLET | Refills: 3 | Status: SHIPPED | OUTPATIENT
Start: 2022-03-18 | End: 2022-05-13

## 2022-03-18 NOTE — PROGRESS NOTES
Subjective   Dunia Fabian is a 71 y.o. female.     Chief Complaint   Patient presents with   • Hypertension       Hypertension  This is a chronic problem. The current episode started more than 1 year ago. The problem has been gradually improving since onset. The problem is controlled. Pertinent negatives include no chest pain, palpitations or shortness of breath. There are no associated agents to hypertension. Risk factors for coronary artery disease include post-menopausal state. The current treatment provides moderate improvement. There are no compliance problems.             I personally reviewed and updated the patient's allergies, medications, problem list, and past medical, surgical, social, and family history. I have reviewed and confirmed the accuracy of the History of Present Illness and Review of Symptoms as documented by the MA/LPN/RN. Tyron Driver MD    Family History   Problem Relation Age of Onset   • Parkinsonism Mother    • Heart disease Mother         cardiovascular disease   • Diabetes Mother         diabetes mellitus    • Heart disease Father         cardiovascular disease   • Heart disease Sister         cardiovascular disease   • Diabetes Sister         diabetes mellitus    • Heart disease Brother         cardiovascular disease   • Diabetes Son         diabetes mellitus    • Heart disease Paternal Uncle         ischemic   • Stomach cancer Maternal Grandmother    • Breast cancer Niece 39   • Ovarian cancer Paternal Grandmother        Social History     Tobacco Use   • Smoking status: Never Smoker   • Smokeless tobacco: Never Used   Vaping Use   • Vaping Use: Never used   Substance Use Topics   • Alcohol use: No   • Drug use: No       Past Surgical History:   Procedure Laterality Date   • CATARACT EXTRACTION Left 08/2005    with Insert of Lens Prostheti   • CYSTOCELE REPAIR  06/1998    SIMENTAL Cystourethropexy & Cystocele Repair    • KIDNEY SURGERY Right 02/24/2014    Removal   •  TONSILLECTOMY  1953   • URETHROLYSIS  10/2000    Transvaginal Urethrolysis & Bone [place] Sling   • VAGINAL HYSTERECTOMY  1970   • VITRECTOMY Left 04/2005    & Membrane Peeling       Patient Active Problem List   Diagnosis   • Acid reflux   • Allergic rhinitis   • Anxiety   • Atrophic kidney   • Carotid bruit present   • Carpal tunnel syndrome   • Low back pain   • Neck pain   • Thoracic back pain   • Depressive disorder   • Medicare annual wellness visit, subsequent   • Gout   • Hemorrhoids   • Mixed hyperlipidemia   • Hypertension, benign   • IC (interstitial cystitis)   • Irritable bowel syndrome with constipation   • LVH (left ventricular hypertrophy)   • Menopausal syndrome   • Mitral insufficiency, acute   • Onychomycosis   • Disorder of bone and cartilage   • Osteoarthritis   • Primary pulmonary hypertension (HCC)   • Renal insufficiency   • Plantar fasciitis   • Senile osteoporosis   • Solitary kidney   • Encounter for screening mammogram for malignant neoplasm of breast   • Special screening for malignant neoplasms, colon   • Seborrheic keratosis   • Chronic kidney disease, stage 3 (moderate)   • Renal hypertrophy   • Hypercalcemia   • Acute cystitis with hematuria   • Lower extremity edema   • Dizziness   • Left hip pain   • Bilateral primary osteoarthritis of knee   • Elevated fasting blood sugar   • Solitary kidney, acquired         Current Outpatient Medications:   •  amoxicillin-clavulanate (AUGMENTIN) 875-125 MG per tablet, , Disp: , Rfl:   •  aspirin 81 MG EC tablet, Take 81 mg by mouth Daily., Disp: , Rfl:   •  Bioflavonoid Products (Vitamin C Plus) 1000 MG tablet, Take  by mouth., Disp: , Rfl:   •  cloNIDine (CATAPRES) 0.1 MG tablet, Take 0.1 mg by mouth 2 (Two) Times a Day., Disp: , Rfl:   •  hydrALAZINE (APRESOLINE) 100 MG tablet, Take 1 tablet by mouth 2 (Two) Times a Day., Disp: 270 tablet, Rfl: 3  •  Multiple Vitamins-Minerals (WOMENS MULTI) capsule, Take  by mouth Daily., Disp: , Rfl:   •   "SUPER B COMPLEX/C capsule, SUPER B COMPLEX/C ORAL CAPSULE, Disp: , Rfl:   •  tamsulosin (FLOMAX) 0.4 MG capsule 24 hr capsule, Take 1 capsule by mouth Daily., Disp: , Rfl:   •  VITAMIN B COMPLEX-C PO, Take  by mouth., Disp: , Rfl:   •  vitamin D3 125 MCG (5000 UT) capsule capsule, Take 5,000 Units by mouth Daily., Disp: , Rfl:   •  Zinc 50 MG capsule, Take  by mouth., Disp: , Rfl:   •  metoprolol succinate XL (Toprol XL) 25 MG 24 hr tablet, Take 1 tablet by mouth Daily., Disp: 30 tablet, Rfl: 5  •  rosuvastatin (CRESTOR) 5 MG tablet, Take 1 tablet by mouth Daily., Disp: 30 tablet, Rfl: 3         Review of Systems   Constitutional: Negative for chills and diaphoresis.   Eyes: Negative for visual disturbance.   Respiratory: Negative for shortness of breath.    Cardiovascular: Negative for chest pain and palpitations.   Gastrointestinal: Negative for abdominal pain and nausea.   Endocrine: Negative for polydipsia and polyphagia.   Musculoskeletal: Negative for neck stiffness.   Skin: Negative for color change and pallor.   Neurological: Negative for seizures and syncope.   Hematological: Negative for adenopathy.       I have reviewed and confirmed the accuracy of the ROS as documented by the MA/LPN/RN Tyron Driver MD      Objective   /80 (BP Location: Right arm, Patient Position: Sitting, Cuff Size: Adult)   Pulse 80   Temp 98 °F (36.7 °C) (Skin)   Resp 16   Ht 157.5 cm (62\")   Wt 71.3 kg (157 lb 3.2 oz)   SpO2 97%   BMI 28.75 kg/m²   BP Readings from Last 3 Encounters:   03/18/22 164/80   09/10/21 130/90   05/14/21 150/80     Wt Readings from Last 3 Encounters:   03/18/22 71.3 kg (157 lb 3.2 oz)   09/10/21 69.6 kg (153 lb 6.4 oz)   05/14/21 70.4 kg (155 lb 3.2 oz)     Physical Exam  Constitutional:       Appearance: Normal appearance. She is well-developed. She is not diaphoretic.   Cardiovascular:      Rate and Rhythm: Normal rate and regular rhythm.      Pulses: Normal pulses.      Heart sounds: " Normal heart sounds, S1 normal and S2 normal. No murmur heard.    No friction rub. No gallop.   Pulmonary:      Effort: Pulmonary effort is normal. No accessory muscle usage.      Breath sounds: Normal breath sounds. No stridor. No decreased breath sounds, wheezing, rhonchi or rales.   Abdominal:      General: Bowel sounds are normal. There is no distension.      Palpations: Abdomen is soft. Abdomen is not rigid. There is no mass or pulsatile mass.      Tenderness: There is no abdominal tenderness. There is no guarding or rebound. Negative signs include Diego's sign.      Hernia: No hernia is present.   Skin:     General: Skin is warm and dry.      Coloration: Skin is not pale.   Neurological:      Mental Status: She is alert and oriented to person, place, and time.      Cranial Nerves: No cranial nerve deficit.      Sensory: No sensory deficit.      Motor: No tremor, abnormal muscle tone or seizure activity.      Coordination: Coordination normal.      Gait: Gait normal.      Deep Tendon Reflexes: Reflexes are normal and symmetric.         Data / Lab Results:    Hemoglobin A1C   Date Value Ref Range Status   09/10/2021 6.1 % Final        Lab Results   Component Value Date     (H) 03/11/2022     (H) 08/28/2021     (H) 08/26/2020     Lab Results   Component Value Date    CHOL 181 07/24/2018    CHOL 215 (H) 07/21/2017    CHOL 198 07/20/2016     Lab Results   Component Value Date    TRIG 128 03/11/2022    TRIG 113 08/28/2021    TRIG 170 (H) 08/26/2020     Lab Results   Component Value Date    HDL 52 03/11/2022    HDL 54 08/28/2021    HDL 53 08/26/2020     No results found for: PSA  Lab Results   Component Value Date    WBC 10.7 08/28/2021    HGB 12.3 08/28/2021    HCT 38.6 08/28/2021    MCV 94 08/28/2021     08/28/2021     Lab Results   Component Value Date    TSH 4.220 08/28/2021      Lab Results   Component Value Date    GLUCOSE 119 (H) 03/11/2022    BUN 21 03/11/2022    CREATININE 1.17  (H) 03/11/2022    EGFRIFNONA 48 (L) 08/28/2021    EGFRIFAFRI 55 (L) 08/28/2021    BCR 18 03/11/2022    K 4.4 03/11/2022    CO2 23 03/11/2022    CALCIUM 10.6 (H) 03/11/2022    PROTENTOTREF 7.3 03/11/2022    ALBUMIN 4.7 03/11/2022    LABIL2 1.8 03/11/2022    AST 20 03/11/2022    ALT 13 03/11/2022     No results found for: ESTHER, RF, SEDRATE   No results found for: CRP   No results found for: IRON, TIBC, FERRITIN   No results found for: [id number]       Assessment/Plan      Medications        Problem List         LOS      Dizziness.    Much improved today, hypertension much better controlled.  EKG, stat troponin benign. echocardiogram benign, MRI brain normal.  Follow-up 30 days.  Good social support, here with her daughter, do not drive.  Call or return if worsening symptoms.  Health maintenance.  Doing well, vaccines current.  Discussed health maintenance, screening test, lifestyle modification.  Pap current, followed by Dr. Patiño, mammogram scheduled.  Allergic rhinitis.  OTC Claritin or Zyrtec.  Hypertension.   Elevated today, restart Toprol-XL 25 mg..  Clinically improved today on  hydralazine 100 mg twice daily, as needed clonidine.  Monitor blood pressure closely.  Follow-up recheck.  Consider restart amlodipine if persistent elevation.   . Discussed low-sodium diet.  Stress echo benign/elevated right-sided pressure only 2018.  Carotid Dopplers with mild blockage only 2018.  Dizziness.  Life stress/decreased energy.  Multiple stressors.  Good social support.   Initially improved on Cymbalta/Wellbutrin, she discontinued Rx.    Lower extremity edema.  Improved currently with decreased dose amlodipine.  Discussed low-sodium diet.  Follow-up recheck.  Consider sleep study if persistent symptoms.  Osteoporosis.  Tolerating Prolia.  Holding calcium/vitamin D per nephrology.  Follow-up recheck DEXA  Atrophic kidney.  Improved status post nephrectomy, followed by urology,nephrology.  Renal function normal.  Interstitial  cystitis.  Improved on Flomax, followed by [facility], history of InterStim placement/subsequent removal.  I and O catheter dependent.  Hypertensive retinopathy.  Followed by ophthalmology ember galeano, status post treatment  Colon screening.  Colonoscopy benign 2016./Diverticulosis only  Carotid stenosis.  50 to 59% on the right, mild on left per Doppler 1/21..  She is allergic to contrast and gadolinium.  Repeat Doppler scheduled.  Consider vascular surgery referral if worsening symptoms.  Continue risk factor reduction.  Osteoarthritis.  Worse left hip/knee.    Followed by orthopedics Dr. Angel, x-rays with mild left hip OA, moderate left OA knee, attempting knee bracing.  Consider knee injection.  Hyperlipidemia.    Persistent mild elevation, refractory to red yeast rice, start Crestor. Aggressive LDL target considering carotid stenosis. .  Discussed diet exercise lifestyle modification.  Follow-up recheck.               Diagnoses and all orders for this visit:    1. Hypertension, benign (Primary)  -     metoprolol succinate XL (Toprol XL) 25 MG 24 hr tablet; Take 1 tablet by mouth Daily.  Dispense: 30 tablet; Refill: 5    2. Mixed hyperlipidemia  -     rosuvastatin (CRESTOR) 5 MG tablet; Take 1 tablet by mouth Daily.  Dispense: 30 tablet; Refill: 3    3. Depressive disorder  -     hydrALAZINE (APRESOLINE) 100 MG tablet; Take 1 tablet by mouth 2 (Two) Times a Day.  Dispense: 270 tablet; Refill: 3    4. Solitary kidney, acquired    5. Senile osteoporosis    6. Renal insufficiency    7. Primary osteoarthritis involving multiple joints    8. IC (interstitial cystitis)              Expected course, medications, and adverse effects discussed.  Call or return if worsening or persistent symptoms.  I wore protective equipment throughout this patient encounter including a mask, gloves, and eye protection.  Hand hygiene was performed before donning protective equipment and after removal when leaving the room. The complete  contents of the Assessment and Plan and Data/Lab Results as documented above have been reviewed and addressed by myself with the patient today as part of an ongoing evaluation / treatment plan.  If some of the documentation has been copied from a previous note and is unchanged it indicates that this problem / plan has been assessed today but is stable from a previous visit and no changes have been recommended.

## 2022-04-20 NOTE — PROGRESS NOTES
Subjective   Dunia Fabian is a 71 y.o. female.     Chief Complaint   Patient presents with   • Hypertension       Hypertension  This is a chronic problem. The current episode started more than 1 year ago. The problem has been gradually improving since onset. The problem is controlled. Associated symptoms include headaches and neck pain. Pertinent negatives include no chest pain, palpitations or shortness of breath. There are no associated agents to hypertension. Risk factors for coronary artery disease include post-menopausal state. The current treatment provides moderate improvement. There are no compliance problems.             I personally reviewed and updated the patient's allergies, medications, problem list, and past medical, surgical, social, and family history. I have reviewed and confirmed the accuracy of the History of Present Illness and Review of Symptoms as documented by the MA/LPN/RN. Tyron Driver MD    Family History   Problem Relation Age of Onset   • Parkinsonism Mother    • Heart disease Mother         cardiovascular disease   • Diabetes Mother         diabetes mellitus    • Heart disease Father         cardiovascular disease   • Heart disease Sister         cardiovascular disease   • Diabetes Sister         diabetes mellitus    • Heart disease Brother         cardiovascular disease   • Diabetes Son         diabetes mellitus    • Heart disease Paternal Uncle         ischemic   • Stomach cancer Maternal Grandmother    • Breast cancer Niece 39   • Ovarian cancer Paternal Grandmother        Social History     Tobacco Use   • Smoking status: Never Smoker   • Smokeless tobacco: Never Used   Vaping Use   • Vaping Use: Never used   Substance Use Topics   • Alcohol use: No   • Drug use: No       Past Surgical History:   Procedure Laterality Date   • CATARACT EXTRACTION Left 08/2005    with Insert of Lens Prostheti   • CYSTOCELE REPAIR  06/1998    SIMENTAL Cystourethropexy & Cystocele Repair    • KIDNEY  SURGERY Right 02/24/2014    Removal   • TONSILLECTOMY  1953   • URETHROLYSIS  10/2000    Transvaginal Urethrolysis & Bone [place] Sling   • VAGINAL HYSTERECTOMY  1970   • VITRECTOMY Left 04/2005    & Membrane Peeling       Patient Active Problem List   Diagnosis   • Acid reflux   • Allergic rhinitis   • Anxiety   • Atrophic kidney   • Carotid bruit present   • Carpal tunnel syndrome   • Low back pain   • Neck pain   • Thoracic back pain   • Depressive disorder   • Medicare annual wellness visit, subsequent   • Gout   • Hemorrhoids   • Mixed hyperlipidemia   • Hypertension, benign   • IC (interstitial cystitis)   • Irritable bowel syndrome with constipation   • LVH (left ventricular hypertrophy)   • Menopausal syndrome   • Mitral insufficiency, acute   • Onychomycosis   • Disorder of bone and cartilage   • Age-related osteoporosis without current pathological fracture   • Osteoarthritis   • Primary pulmonary hypertension (HCC)   • Renal insufficiency   • Plantar fasciitis   • Senile osteoporosis   • Solitary kidney   • Encounter for screening mammogram for malignant neoplasm of breast   • Special screening for malignant neoplasms, colon   • Seborrheic keratosis   • Chronic kidney disease, stage 3 (moderate)   • Renal hypertrophy   • Hypercalcemia   • Acute cystitis with hematuria   • Lower extremity edema   • Dizziness   • Left hip pain   • Bilateral primary osteoarthritis of knee   • Elevated fasting blood sugar   • Solitary kidney, acquired         Current Outpatient Medications:   •  aspirin 81 MG EC tablet, Take 81 mg by mouth Daily., Disp: , Rfl:   •  Bioflavonoid Products (Vitamin C Plus) 1000 MG tablet, Take  by mouth., Disp: , Rfl:   •  cloNIDine (CATAPRES) 0.1 MG tablet, Take 0.1 mg by mouth 2 (Two) Times a Day., Disp: , Rfl:   •  hydrALAZINE (APRESOLINE) 100 MG tablet, Take 1 tablet by mouth 2 (Two) Times a Day., Disp: 270 tablet, Rfl: 3  •  metoprolol succinate XL (TOPROL-XL) 50 MG 24 hr tablet, Take 1  "tablet by mouth Daily., Disp: 90 tablet, Rfl: 3  •  Multiple Vitamins-Minerals (WOMENS MULTI) capsule, Take  by mouth Daily., Disp: , Rfl:   •  nitrofurantoin, macrocrystal-monohydrate, (MACROBID) 100 MG capsule, , Disp: , Rfl:   •  rosuvastatin (CRESTOR) 5 MG tablet, TAKE 1 TABLET BY MOUTH DAILY, Disp: 90 tablet, Rfl: 1  •  SUPER B COMPLEX/C capsule, SUPER B COMPLEX/C ORAL CAPSULE, Disp: , Rfl:   •  tamsulosin (FLOMAX) 0.4 MG capsule 24 hr capsule, Take 1 capsule by mouth Daily., Disp: , Rfl:   •  VITAMIN B COMPLEX-C PO, Take  by mouth., Disp: , Rfl:   •  vitamin D3 125 MCG (5000 UT) capsule capsule, Take 5,000 Units by mouth Daily., Disp: , Rfl:   •  Zinc 50 MG capsule, Take  by mouth., Disp: , Rfl:          Review of Systems   Constitutional: Negative for chills and diaphoresis.   Eyes: Negative for visual disturbance.   Respiratory: Negative for shortness of breath.    Cardiovascular: Negative for chest pain and palpitations.   Gastrointestinal: Negative for abdominal pain and nausea.   Endocrine: Negative for polydipsia and polyphagia.   Musculoskeletal: Positive for neck pain. Negative for neck stiffness.   Skin: Negative for color change and pallor.   Neurological: Negative for seizures and syncope.   Hematological: Negative for adenopathy.   Psychiatric/Behavioral: Negative for hallucinations and suicidal ideas.       I have reviewed and confirmed the accuracy of the ROS as documented by the MA/LPN/RN Tyron Driver MD      Objective   /69 (BP Location: Right arm, Patient Position: Sitting, Cuff Size: Adult)   Pulse 73   Temp 97.4 °F (36.3 °C) (Temporal)   Resp 18   Ht 157.5 cm (62\")   Wt 71.3 kg (157 lb 2 oz)   SpO2 98% Comment: room air  BMI 28.74 kg/m²   BP Readings from Last 3 Encounters:   04/22/22 166/69   03/18/22 164/80   09/10/21 130/90     Wt Readings from Last 3 Encounters:   04/22/22 71.3 kg (157 lb 2 oz)   03/18/22 71.3 kg (157 lb 3.2 oz)   09/10/21 69.6 kg (153 lb 6.4 oz) "     Physical Exam  Constitutional:       Appearance: Normal appearance. She is well-developed. She is not diaphoretic.   Cardiovascular:      Rate and Rhythm: Normal rate and regular rhythm.      Pulses: Normal pulses.      Heart sounds: Normal heart sounds, S1 normal and S2 normal. No murmur heard.    No friction rub. No gallop.   Pulmonary:      Effort: Pulmonary effort is normal. No accessory muscle usage.      Breath sounds: Normal breath sounds. No stridor. No decreased breath sounds, wheezing, rhonchi or rales.   Abdominal:      General: Bowel sounds are normal. There is no distension.      Palpations: Abdomen is soft. Abdomen is not rigid. There is no mass or pulsatile mass.      Tenderness: There is no abdominal tenderness. There is no guarding or rebound. Negative signs include Diego's sign.      Hernia: No hernia is present.   Skin:     General: Skin is warm and dry.      Coloration: Skin is not pale.   Neurological:      Mental Status: She is alert and oriented to person, place, and time.      Coordination: Coordination normal.      Gait: Gait normal.         Data / Lab Results:    Hemoglobin A1C   Date Value Ref Range Status   09/10/2021 6.1 % Final        Lab Results   Component Value Date     (H) 03/11/2022     (H) 08/28/2021     (H) 08/26/2020     Lab Results   Component Value Date    CHOL 181 07/24/2018    CHOL 215 (H) 07/21/2017    CHOL 198 07/20/2016     Lab Results   Component Value Date    TRIG 128 03/11/2022    TRIG 113 08/28/2021    TRIG 170 (H) 08/26/2020     Lab Results   Component Value Date    HDL 52 03/11/2022    HDL 54 08/28/2021    HDL 53 08/26/2020     No results found for: PSA  Lab Results   Component Value Date    WBC 10.7 08/28/2021    HGB 12.3 08/28/2021    HCT 38.6 08/28/2021    MCV 94 08/28/2021     08/28/2021     Lab Results   Component Value Date    TSH 4.220 08/28/2021      Lab Results   Component Value Date    GLUCOSE 119 (H) 03/11/2022    BUN 21  03/11/2022    CREATININE 1.17 (H) 03/11/2022    EGFRIFNONA 48 (L) 08/28/2021    EGFRIFAFRI 55 (L) 08/28/2021    BCR 18 03/11/2022    K 4.4 03/11/2022    CO2 23 03/11/2022    CALCIUM 10.6 (H) 03/11/2022    PROTENTOTREF 7.3 03/11/2022    ALBUMIN 4.7 03/11/2022    LABIL2 1.8 03/11/2022    AST 20 03/11/2022    ALT 13 03/11/2022     No results found for: ESTHER, RF, SEDRATE   No results found for: CRP   No results found for: IRON, TIBC, FERRITIN   No results found for: [id number]       Assessment/Plan      Medications        Problem List         LOS  .  Health maintenance.  Doing well, vaccines current.  Discussed health maintenance, screening test, lifestyle modification.  Pap current, followed by Dr. Patiño, mammogram scheduled.  Allergic rhinitis.  OTC Claritin or Zyrtec.  Hypertension.   Persistent elevation today, metoprolol increased.  Cardiology referral scheduled.  Clinically improved today on  hydralazine 100 mg twice daily, as needed clonidine.  Monitor blood pressure closely.  Follow-up recheck.  Consider restart amlodipine if persistent elevation.   . Discussed low-sodium diet.  Stress echo benign/elevated right-sided pressure only 2018.  Carotid Dopplers with mild blockage only 2018.  MRI brain benign 2020.  Life stress/decreased energy.  Multiple stressors.  Good social support.   Initially improved on Cymbalta/Wellbutrin, she discontinued Rx.    Lower extremity edema.  Improved currently with decreased dose amlodipine.  Discussed low-sodium diet.  Follow-up recheck.  Consider sleep study if persistent symptoms.  Osteoporosis.  Tolerating Prolia.  Holding calcium/vitamin D per nephrology.  Follow-up recheck DEXA  Atrophic kidney.  Improved status post nephrectomy, followed by urology,nephrology.  Renal function normal.  Interstitial cystitis.  Improved on Flomax, followed by [facility], history of InterStim placement/subsequent removal.  I and O catheter dependent.  Hypertensive retinopathy.  Followed by  ophthalmology ember galeano, status post treatment  Colon screening.  Colonoscopy benign 2016./Diverticulosis only  Carotid stenosis.  50 to 59% on the right, mild on left per Doppler 1/21..  She is allergic to contrast and gadolinium.  Repeat Doppler scheduled.  Consider vascular surgery referral if worsening symptoms.  Continue risk factor reduction.  Osteoarthritis.  Worse left hip/knee.    Followed by orthopedics Dr. Angel, x-rays with mild left hip OA, moderate left OA knee, attempting knee bracing.  Consider knee injection.  Hyperlipidemia.    Persistent mild elevation, refractory to red yeast rice, start Crestor. Aggressive LDL target considering carotid stenosis. .  Discussed diet exercise lifestyle modification.  Follow-up recheck.    Memory loss.  Hypertension contributing today.  MRI brain benign 2020.  Follow-up recheck/plan check MMSE.      Diagnoses and all orders for this visit:    1. Hypertension, benign (Primary)  -     metoprolol succinate XL (TOPROL-XL) 50 MG 24 hr tablet; Take 1 tablet by mouth Daily.  Dispense: 90 tablet; Refill: 3    2. Primary osteoarthritis involving multiple joints  -     denosumab (PROLIA) syringe 60 mg    3. Solitary kidney, acquired    4. Age-related osteoporosis without current pathological fracture              Expected course, medications, and adverse effects discussed.  Call or return if worsening or persistent symptoms.  I wore protective equipment throughout this patient encounter including a mask, gloves, and eye protection.  Hand hygiene was performed before donning protective equipment and after removal when leaving the room. The complete contents of the Assessment and Plan and Data/Lab Results as documented above have been reviewed and addressed by myself with the patient today as part of an ongoing evaluation / treatment plan.  If some of the documentation has been copied from a previous note and is unchanged it indicates that this problem / plan has been assessed  today but is stable from a previous visit and no changes have been recommended.

## 2022-04-22 ENCOUNTER — OFFICE VISIT (OUTPATIENT)
Dept: FAMILY MEDICINE CLINIC | Facility: CLINIC | Age: 72
End: 2022-04-22

## 2022-04-22 VITALS
HEIGHT: 62 IN | SYSTOLIC BLOOD PRESSURE: 166 MMHG | RESPIRATION RATE: 18 BRPM | DIASTOLIC BLOOD PRESSURE: 69 MMHG | HEART RATE: 73 BPM | BODY MASS INDEX: 28.91 KG/M2 | WEIGHT: 157.13 LBS | TEMPERATURE: 97.4 F | OXYGEN SATURATION: 98 %

## 2022-04-22 DIAGNOSIS — M81.0 AGE-RELATED OSTEOPOROSIS WITHOUT CURRENT PATHOLOGICAL FRACTURE: ICD-10-CM

## 2022-04-22 DIAGNOSIS — I10 HYPERTENSION, BENIGN: Primary | Chronic | ICD-10-CM

## 2022-04-22 DIAGNOSIS — Z90.5 SOLITARY KIDNEY, ACQUIRED: ICD-10-CM

## 2022-04-22 DIAGNOSIS — M15.9 PRIMARY OSTEOARTHRITIS INVOLVING MULTIPLE JOINTS: ICD-10-CM

## 2022-04-22 PROCEDURE — 96372 THER/PROPH/DIAG INJ SC/IM: CPT | Performed by: FAMILY MEDICINE

## 2022-04-22 PROCEDURE — 99214 OFFICE O/P EST MOD 30 MIN: CPT | Performed by: FAMILY MEDICINE

## 2022-04-22 RX ORDER — NITROFURANTOIN 25; 75 MG/1; MG/1
CAPSULE ORAL
COMMUNITY
Start: 2022-04-13 | End: 2022-05-13

## 2022-04-22 RX ORDER — METOPROLOL SUCCINATE 50 MG/1
50 TABLET, EXTENDED RELEASE ORAL DAILY
Qty: 90 TABLET | Refills: 3 | Status: SHIPPED | OUTPATIENT
Start: 2022-04-22 | End: 2022-05-13

## 2022-04-26 DIAGNOSIS — R42 DIZZINESS: ICD-10-CM

## 2022-04-26 DIAGNOSIS — I10 HYPERTENSION, BENIGN: Primary | ICD-10-CM

## 2022-05-11 PROBLEM — I10 BENIGN ESSENTIAL HTN: Status: ACTIVE | Noted: 2022-05-11

## 2022-05-12 NOTE — PROGRESS NOTES
date of Office Visit: 2022  Encounter Provider: Dr. Jarrod Palmer  Place of Service: [facility] [place]  Patient Name: Dunia Fabian  :1950  Tyron Driver MD    Chief Complaint   Patient presents with   • Hypertension   • Hyperlipidemia   • Palpitations   • Consult     History of Present Illness:    I am pleased to see Mrs. Fabian in my office today as a new consultation.    As you know, patient is 71 years old white female whose past medical history significant for hypertension who is referred to me for uncontrolled hypertension.    In , patient underwent right nephrectomy.  Patient does not remember the reason for nephrectomy.  Patient follows with Dr. Noguera.  In  she was diagnosed with hypertension and from last 1 year, her blood pressure has been very high.  Patient has been treated with hydralazine and Toprol-XL but blood pressures are still running in the range of 160 to 180 mm systolic.  Patient complain of headaches.  Visual disturbances are reported.  Patient denies any orthopnea or PND.  She complains of fatigue and tiredness no chest pain reported shortness of breath present.    Patient does not have previous history of CAD, PCI or MI.  She does not smoke or abuse alcohol.    EKG showed normal sinus rhythm.  No specific ischemic changes noted.    Patient appears to have memory deficits.  I am concerned whether she is taking her medication but she says that she has a pill box and she takes the medication regularly.  I have given all the instruction in writing to her.  I would like to stop Toprol-XL.  I would start Bystolic 10 mg daily.  I would add losartan 50 mg daily.  Her recent creatinine was 1.1.  I would also like that patient should follow-up with nephrology.  I would send a referral to Dr. Mcgee.  Patient would need stress test in future once the blood pressure is controlled        Past Medical History:   Diagnosis Date   • Acid reflux     Impression:  Discussed diet, exercise, lifestyle modification. start ppi Call / return if persistent symptoms.   • Acute bronchitis     Impression: Discussed the plan of care and anticipated course of illness with antibiotic treatment. Educated on side effects of antibiotics and hydromet. Pt was educated on the effects of decongestants on bp. She was encouraged to stop nyquil and decongestants. Frequent fluids and rest were encouraged. Pt was told if he symptoms do no improve within one week to return for further evaluation.   • Acute cystitis with hematuria     Impression: Findings discussed. All questions answered. Medication and medication adverse effects discussed. Drug education given and explained to patient. Patient verbalized understanding. Follow-up in approximately 3 days for reevaluation if not improved. Follow-up sooner for worsening symptoms or any concerns. Increase fluids   • Acute sinusitis    • Allergic rhinitis     Impression: Stable.   • Anxiety    • Arm weakness     Impression: episode weakness / stiffness l hand and foot, resolved currently ddx includes tia / flare arhtritis / carpal tunnel consider recent working hard moving, rx in progress check carotid dopplers, echo, restart asa Follow up 2 months. Call / return if if recurrant symptoms.   • Arthralgia     Impression: Will call with lab results. May need referral to Rheumatology.   • Arthritis     Impression: history of treatment for lupus with medication in remote past - recheck bloodwork   • Atrophic kidney     Impression: improve status post nephrectomy followed by urology   • Back pain     Impression: traumatic ice 20 minutes bid Rehabilitation exercises discussed. xray without fracture Consider further imaging if symptoms persist   • Blurry vision     Impression: transient episode, resolved has had benign optho eval per her report, will get records ddx ncludes tia check carotid dopplers, holter continue asa Follow up [id number] Call / return if if  recurrant symptoms.   • Carotid bruit    • Carpal tunnel syndrome     Impression: followed by hand surgery continue qhs bracing consider Follow up for repeat injxn, surgery if persistent symptoms.   • Cellulitis     Impression: Topical care discussed. Call / return if persistent symptoms.   • Chest pain    • Constipation     Impression: improved today - increase fluids, fiber - did not tolerate metamucil, change to fiber tablet / titratete - continue otc lactulose prm - consider amitiza - Follow up recheck   • Contusion of knee, right     Impression: Ice. Elevate. Rest. Discussed anticipated course. Given copy of x-rays on disc to take to Dr. Warren tomorrow.   • Depressive disorder     Impression: conflict with family currently, expects resolution when moves to [place] later this mos to live with new  Good social support start ativan prn, use sparingly consider ssri if persistent symptoms.   • Disorder of bone and cartilage     Impression: stable / slightly worse per dexa (osteopenia, fn -1.5) discussed calcium, vit d start prolia Discussed diet, exercise, lifestyle modification. recheck 1 year   • Disorder of skin and subcutaneous tissue    • Dog bite     Impression: her neighbors dog, is healthy Topical care discussed. Signs and symptoms of infecton discussed. Call / return if fever, worsening symptoms.   • Dysuria     Impression: likely 2nd uti / IC flare ct with atrophic r kidney with stones in r kidney only, xray lspine without fracture, wbc normal continue antibiothiics + urinary retentinon / catheter in place urology Follow up scheduled monday go to ED if fever, unable to keep fluids down, worsening symptoms.   • Encounter for long-term (current) use of medications    • Enrolled in chronic care management    • External otitis     Impression: Topical care discussed. Call / return if persistent symptoms.   • Flank pain    • Fracture, foot     Impression: Left. x-rayed at [facility] today - will call for records.  Keep appointment with Dr. Warren for tomorrow. She was given prescription for Oxycodone in the ER.   • Fracture, toe     Impression: base prox phalax great toe, nondisplaced, improving nita taping / hard soled shoe Call / return if persistent symptoms.   • Generalized abdominal pain     Impression: llq, suprapubic, rlq cbc normal likely 2nd flare interstitial cystitis, uti, self cathing currently ddx includes diverticultis start antibiotics, cx sent, Follow up recheck has urology Follow up next week Call / return if fever, unable to keep fluids down, worsening symptoms. Consider imaging if persistent symptoms.   • Gout     Impression: uric acid 7 on 4/12 doing well on allopurinol Follow up recheck levels Discussed diet, lifestyle modification.   • H/O drug therapy    • Hematuria, microscopic    • Hemorrhoids     Impression: improved, increase fluids / fiber Topical care discussed. consider colorectal surgery referrall if persistent symptoms.   • Hip pain     Impression: oa, worse / flare, refractory to pt ortho ref sched ice 20 minutes bid Rehabilitation exercises discussed. consider add tramadol   • Hyperlipidemia     Impression: good control ------------- Stable Compliant with meds Is getting adequate diet and exercise Goals developed at last visit were met Care management needs are self addressed. Discussed diet, exercise, lifestyle modification.   • Hypertension     Impression: good control Discussed low sodium diet, lifestyle modification.   • Hypertension, benign     Impression: good control holding lisinopril secondary to renal insufficiency   • IC (interstitial cystitis)     Impression: improved on flomax, followed by [facility] h/o interstim placement / subsequent removal h/o improved on rx per morena, pt Discussed diet, lifestyle modification. followed by Zulema / Ernie, s/p recentt removal interstim 2nd inneffective   • Inflammatory and toxic neuropathy (HCC)     Impression: Trial of medications to see if  she can get some relief.   • Influenza     Impression: Push fluids, bland diet. Signs and symptoms of dehydration discussed. Prophylactic rx written for close contacts. Call / return if unable to keep fluids down, worsening symptoms.   • Irritable bowel syndrome with constipation     Impression: Occasional flares. Change with stress and diet.   • Knee pain     Impression: Right. X-ray without obvious acute process - awaiting Radiologist's official report.   • Low back pain     Impression: improved lumbar MRI with moderate ddd 2017, x-ray left hip with mild arthritis. Doing well, improved with epidural injections currently. s/p course Physical therapy. followed by Dr. Beavers / improved with epidural injxn, consider repeat course.   • LVH (left ventricular hypertrophy)     Impression: htn well controlled recheck echo   • Malaise and fatigue     Impression: much improved today possibly 2nd recent nephrectomy bloodwork normal consider further eval if persistent symptoms. follow up 2 to 3 months   • Medicare annual wellness visit, subsequent     Impression: doing well, vaccines current Age specific anticipatory guidance and warning signs discussed. Diet, exercise, and lifestyle modification discussed. Safety, seatbelts, and routine screening examinations discussed. Discussed self-examinations.   • Menopausal syndrome     Impression: current on mammogram / followed by ob/gyn (Dr. Claros) Recommend follow up visit for comprehensive physical exam, coordination of care, and screening tests.   • Mitral insufficiency, acute    • Myalgia    • Neck pain     Impression: strain ice 20 minutes bid Rehabilitation exercises discussed. Consider imaging if persistent symptoms.   • Onychomycosis    • Osteoarthritis     Impression: She has an appointment for another opinion.   • Osteoporosis     Impression: improved, tolerating prolia discussed calcium, vit d Follow up recheck dxa   • Overweight (BMI 25.0-29.9)    • Pain in soft  tissues of limb    • Palpitations    • Plantar fasciitis     Impression: qhs bracing / heel cups ice 20 minutes bid nsaids Rehabilitation exercises discussed. Call / return if persistent symptoms.   • Primary pulmonary hypertension (HCC)    • Pruritus    • Psoriasis    • Rectal bleeding     Impression: likely 2nd hemorrhoids, rx in orogress cbc normal, no tachycardia Follow up recheck Call / return if worsening symptoms.   • Renal insufficiency     Impression: mild / stable / improved s/p left nephrectomy bell avoid nsaids, improved off lisinopril / hctz / pyridium no blood draws in right arm   • Rotator cuff tendonitis, right     Impression: strain ice 20 minutes bid Rehabilitation exercises discussed. Consider imaging, joint injxn if persistent symptoms.   • RUQ pain     Impression: much improved on ppi gerd vs gb pathology ruq us neg Follow up recheck Call / return if unable to keep fluids down, fever, worsening symptoms.   • Screening for depression     Negative Depression Screening (4 or less) ()   • Screening mammogram, encounter for    • Seborrheic keratosis     Impression: left forehead, benign appearance Topical care discussed. Follow up recheck apt with dermatology upcoming continue to monitor ccliniclly / consider resectino if worsening symptoms.   • Solar lentiginosis     Impression: r face, benign appearance Topical care discussed. Call / return if lesion increases in size, changes in shape or color, or becomes tender or inflamed. Discussed skin care, use of sun block and protective clothing.   • Solitary kidney     Impression: doing well, renal f negative normal avoid nsaids / continue to monitor   • Special screening for malignant neoplasms, colon     Impression: Negative colonoscopy by Dr. Goff in 2016 (diverticulosis only)   • Telogen effluvium    • Thoracic back pain    • TMJ arthritis     Impression: Rehabilitation exercises discussed. restart robaxin qhs prn   • Tricuspid valve disorder     • URI, acute     Impression: Increase fluid intake. Mucinex Call / return if fever, respiratory difficulty, worsening symptoms.   • Urinary incontinence     Impression: She has been self catheterizing.   • Urinary retention     Impression: catheter in place / urology Follow up scheduled         Past Surgical History:   Procedure Laterality Date   • CATARACT EXTRACTION Left 08/2005    with Insert of Lens Prostheti   • CYSTOCELE REPAIR  06/1998    SIMENTAL Cystourethropexy & Cystocele Repair    • KIDNEY SURGERY Right 02/24/2014    Removal   • TONSILLECTOMY  1953   • URETHROLYSIS  10/2000    Transvaginal Urethrolysis & Bone Leola Sling   • VAGINAL HYSTERECTOMY  1970   • VITRECTOMY Left 04/2005    & Membrane Peeling           Current Outpatient Medications:   •  aspirin 81 MG EC tablet, Take 81 mg by mouth Daily., Disp: , Rfl:   •  Bioflavonoid Products (Vitamin C Plus) 1000 MG tablet, Take  by mouth., Disp: , Rfl:   •  cloNIDine (CATAPRES) 0.1 MG tablet, Take 0.1 mg by mouth As Needed., Disp: , Rfl:   •  hydrALAZINE (APRESOLINE) 100 MG tablet, Take 100 mg by mouth 2 (Two) Times a Day., Disp: , Rfl:   •  Multiple Vitamins-Minerals (WOMENS MULTI) capsule, Take  by mouth Daily., Disp: , Rfl:   •  rosuvastatin (CRESTOR) 5 MG tablet, TAKE 1 TABLET BY MOUTH DAILY, Disp: 90 tablet, Rfl: 1  •  SUPER B COMPLEX/C capsule, SUPER B COMPLEX/C ORAL CAPSULE, Disp: , Rfl:   •  tamsulosin (FLOMAX) 0.4 MG capsule 24 hr capsule, Take 1 capsule by mouth Daily., Disp: , Rfl:   •  VITAMIN B COMPLEX-C PO, Take  by mouth., Disp: , Rfl:   •  vitamin D3 125 MCG (5000 UT) capsule capsule, Take 5,000 Units by mouth Daily., Disp: , Rfl:   •  Zinc 50 MG capsule, Take  by mouth., Disp: , Rfl:   •  losartan (Cozaar) 50 MG tablet, Take 1 tablet by mouth Daily., Disp: 90 tablet, Rfl: 1  •  nebivolol (Bystolic) 10 MG tablet, Take 1 tablet by mouth Daily., Disp: 90 tablet, Rfl: 1      Social History     Socioeconomic History   • Marital status: Single  "  Tobacco Use   • Smoking status: Never Smoker   • Smokeless tobacco: Never Used   Vaping Use   • Vaping Use: Never used   Substance and Sexual Activity   • Alcohol use: Not Currently   • Drug use: No   • Sexual activity: Defer         Review of Systems   Constitutional: Positive for malaise/fatigue. Negative for chills and fever.   HENT: Negative for ear discharge and nosebleeds.    Eyes: Negative for discharge and redness.   Cardiovascular: Negative for chest pain, orthopnea, palpitations, paroxysmal nocturnal dyspnea and syncope.   Respiratory: Positive for shortness of breath. Negative for cough and wheezing.    Endocrine: Negative for heat intolerance.   Skin: Negative for rash.   Musculoskeletal: Positive for arthritis. Negative for myalgias.   Gastrointestinal: Negative for abdominal pain, melena, nausea and vomiting.   Genitourinary: Negative for dysuria and hematuria.   Neurological: Negative for dizziness, light-headedness, numbness and tremors.   Psychiatric/Behavioral: Positive for memory loss. Negative for depression. The patient is not nervous/anxious.        Procedures      ECG 12 Lead    Date/Time: 5/13/2022 9:25 AM  Performed by: Jarrod Palmer MD  Authorized by: Jarrod Palmer MD   Previous ECG: no previous ECG available  Rhythm: sinus rhythm  Other findings: non-specific ST-T wave changes    Clinical impression: normal ECG            ECG 12 Lead    (Results Pending)           Objective:    BP (!) 184/72 (BP Location: Right arm, Cuff Size: Adult)   Pulse 63   Ht 157.5 cm (62.01\")   Wt 71.2 kg (157 lb)   BMI 28.71 kg/m²         Constitutional:       Appearance: Well-developed.   Eyes:      General: No scleral icterus.        Right eye: No discharge.   HENT:      Head: Normocephalic and atraumatic.   Neck:      Thyroid: No thyromegaly.      Lymphadenopathy: No cervical adenopathy.   Pulmonary:      Effort: Pulmonary effort is normal. No respiratory distress.      Breath sounds: Normal breath " sounds. No wheezing. No rales.   Cardiovascular:      Normal rate. Regular rhythm.      No gallop.   Abdominal:      Tenderness: There is no abdominal tenderness.   Skin:     Findings: No erythema or rash.   Neurological:      Mental Status: Alert and oriented to person, place, and time.             Assessment:       Diagnosis Plan   1. Benign essential HTN  ECG 12 Lead    nebivolol (Bystolic) 10 MG tablet    losartan (Cozaar) 50 MG tablet   2. Mixed hyperlipidemia  ECG 12 Lead    nebivolol (Bystolic) 10 MG tablet    losartan (Cozaar) 50 MG tablet   3. Palpitations  ECG 12 Lead    nebivolol (Bystolic) 10 MG tablet    losartan (Cozaar) 50 MG tablet   4. Shortness of breath  nebivolol (Bystolic) 10 MG tablet    losartan (Cozaar) 50 MG tablet            Plan:       MDM:    1.  Hypertension:    I would recommend to start losartan.  Discontinue Toprol-XL.  Add Bystolic.  Repeat BMP in 2 to 3 weeks after initiation of losartan    2.  Palpitation:    Continue observation    3.  Shortness of breath:    Patient probably would need stress test in future

## 2022-05-13 ENCOUNTER — OFFICE VISIT (OUTPATIENT)
Dept: CARDIOLOGY | Facility: CLINIC | Age: 72
End: 2022-05-13

## 2022-05-13 VITALS
WEIGHT: 157 LBS | HEART RATE: 63 BPM | SYSTOLIC BLOOD PRESSURE: 184 MMHG | BODY MASS INDEX: 28.89 KG/M2 | HEIGHT: 62 IN | DIASTOLIC BLOOD PRESSURE: 72 MMHG

## 2022-05-13 DIAGNOSIS — R00.2 PALPITATIONS: ICD-10-CM

## 2022-05-13 DIAGNOSIS — I10 BENIGN ESSENTIAL HTN: Primary | ICD-10-CM

## 2022-05-13 DIAGNOSIS — R06.02 SHORTNESS OF BREATH: ICD-10-CM

## 2022-05-13 DIAGNOSIS — E78.2 MIXED HYPERLIPIDEMIA: Chronic | ICD-10-CM

## 2022-05-13 PROCEDURE — 93000 ELECTROCARDIOGRAM COMPLETE: CPT | Performed by: INTERNAL MEDICINE

## 2022-05-13 PROCEDURE — 99204 OFFICE O/P NEW MOD 45 MIN: CPT | Performed by: INTERNAL MEDICINE

## 2022-05-13 RX ORDER — HYDRALAZINE HYDROCHLORIDE 100 MG/1
100 TABLET, FILM COATED ORAL 2 TIMES DAILY
COMMUNITY
End: 2022-05-23 | Stop reason: SDUPTHER

## 2022-05-13 RX ORDER — NEBIVOLOL 10 MG/1
10 TABLET ORAL DAILY
Qty: 90 TABLET | Refills: 1 | Status: SHIPPED | OUTPATIENT
Start: 2022-05-13 | End: 2022-05-23 | Stop reason: SDUPTHER

## 2022-05-13 RX ORDER — LOSARTAN POTASSIUM 50 MG/1
50 TABLET ORAL DAILY
Qty: 90 TABLET | Refills: 1 | Status: SHIPPED | OUTPATIENT
Start: 2022-05-13 | End: 2022-05-23

## 2022-05-20 NOTE — PROGRESS NOTES
Subjective   Dunia Fabian is a 71 y.o. female.     Chief Complaint   Patient presents with   • Hypertension     knee   • Joint Swelling       Hypertension  This is a chronic problem. The current episode started more than 1 year ago. The problem has been gradually improving since onset. The problem is controlled. Associated symptoms include headaches and neck pain. Pertinent negatives include no chest pain, palpitations or shortness of breath. There are no associated agents to hypertension. Risk factors for coronary artery disease include post-menopausal state. The current treatment provides moderate improvement. There are no compliance problems.    Joint Swelling  This is a chronic problem. The current episode started more than 1 month ago. The problem occurs constantly. The problem has been gradually worsening. Associated symptoms include headaches, joint swelling and neck pain. Pertinent negatives include no abdominal pain, chest pain, chills, diaphoresis or nausea. Nothing aggravates the symptoms. She has tried nothing for the symptoms. The treatment provided no relief.            I personally reviewed and updated the patient's allergies, medications, problem list, and past medical, surgical, social, and family history. I have reviewed and confirmed the accuracy of the History of Present Illness and Review of Symptoms as documented by the MA/TARA/RN. Tyron Driver MD    Family History   Problem Relation Age of Onset   • Parkinsonism Mother    • Heart disease Mother         cardiovascular disease   • Diabetes Mother         diabetes mellitus    • Heart disease Father         cardiovascular disease   • Heart disease Sister         cardiovascular disease   • Diabetes Sister         diabetes mellitus    • Heart disease Brother         cardiovascular disease   • Diabetes Son         diabetes mellitus    • Heart disease Paternal Uncle         ischemic   • Stomach cancer Maternal Grandmother    • Breast cancer  Niece 39   • Ovarian cancer Paternal Grandmother        Social History     Tobacco Use   • Smoking status: Never Smoker   • Smokeless tobacco: Never Used   Vaping Use   • Vaping Use: Never used   Substance Use Topics   • Alcohol use: Not Currently   • Drug use: No       Past Surgical History:   Procedure Laterality Date   • CATARACT EXTRACTION Left 08/2005    with Insert of Lens Prostheti   • CYSTOCELE REPAIR  06/1998    SIMENTAL Cystourethropexy & Cystocele Repair    • KIDNEY SURGERY Right 02/24/2014    Removal   • TONSILLECTOMY  1953   • URETHROLYSIS  10/2000    Transvaginal Urethrolysis & Bone York Sling   • VAGINAL HYSTERECTOMY  1970   • VITRECTOMY Left 04/2005    & Membrane Peeling       Patient Active Problem List   Diagnosis   • Acid reflux   • Allergic rhinitis   • Anxiety   • Atrophic kidney   • Carotid bruit present   • Carpal tunnel syndrome   • Low back pain   • Neck pain   • Thoracic back pain   • Depressive disorder   • Medicare annual wellness visit, subsequent   • Gout   • Hemorrhoids   • Mixed hyperlipidemia   • IC (interstitial cystitis)   • Irritable bowel syndrome with constipation   • LVH (left ventricular hypertrophy)   • Menopausal syndrome   • Mitral insufficiency, acute   • Onychomycosis   • Disorder of bone and cartilage   • Age-related osteoporosis without current pathological fracture   • Palpitations   • Osteoarthritis   • Primary pulmonary hypertension (HCC)   • Renal insufficiency   • Plantar fasciitis   • Senile osteoporosis   • Solitary kidney   • Encounter for screening mammogram for malignant neoplasm of breast   • Special screening for malignant neoplasms, colon   • Seborrheic keratosis   • Chronic kidney disease, stage 3 (moderate)   • Renal hypertrophy   • Hypercalcemia   • Acute cystitis with hematuria   • Lower extremity edema   • Dizziness   • Left hip pain   • Bilateral primary osteoarthritis of knee   • Elevated fasting blood sugar   • Solitary kidney, acquired   • Benign  essential HTN         Current Outpatient Medications:   •  aspirin 81 MG EC tablet, Take 81 mg by mouth Daily., Disp: , Rfl:   •  Bioflavonoid Products (Vitamin C Plus) 1000 MG tablet, Take  by mouth., Disp: , Rfl:   •  cloNIDine (CATAPRES) 0.1 MG tablet, Take 0.1 mg by mouth As Needed., Disp: , Rfl:   •  hydrALAZINE (APRESOLINE) 100 MG tablet, Take 1 tablet by mouth 2 (Two) Times a Day., Disp: 180 tablet, Rfl: 0  •  nebivolol (Bystolic) 20 MG tablet, Take 0.5 tablets by mouth Daily., Disp: 90 tablet, Rfl: 0  •  rosuvastatin (CRESTOR) 5 MG tablet, TAKE 1 TABLET BY MOUTH DAILY, Disp: 90 tablet, Rfl: 1  •  SUPER B COMPLEX/C capsule, SUPER B COMPLEX/C ORAL CAPSULE, Disp: , Rfl:   •  tamsulosin (FLOMAX) 0.4 MG capsule 24 hr capsule, Take 1 capsule by mouth Daily., Disp: , Rfl:   •  VITAMIN B COMPLEX-C PO, Take  by mouth., Disp: , Rfl:   •  vitamin D3 125 MCG (5000 UT) capsule capsule, Take 5,000 Units by mouth Daily., Disp: , Rfl:   •  Zinc 50 MG capsule, Take  by mouth., Disp: , Rfl:   •  Multiple Vitamins-Minerals (WOMENS MULTI) capsule, Take  by mouth Daily., Disp: , Rfl:          Review of Systems   Constitutional: Negative for chills and diaphoresis.   HENT: Negative for trouble swallowing and voice change.    Eyes: Negative for visual disturbance.   Respiratory: Negative for shortness of breath.    Cardiovascular: Negative for chest pain and palpitations.   Gastrointestinal: Negative for abdominal pain and nausea.   Endocrine: Negative for polydipsia and polyphagia.   Genitourinary: Negative for hematuria.   Musculoskeletal: Positive for joint swelling and neck pain. Negative for neck stiffness.   Skin: Negative for color change and pallor.   Allergic/Immunologic: Negative for immunocompromised state.   Neurological: Negative for seizures and syncope.   Hematological: Negative for adenopathy.   Psychiatric/Behavioral: Negative for hallucinations, sleep disturbance and suicidal ideas.       I have reviewed and  "confirmed the accuracy of the ROS as documented by the MA/LPN/RN Tyron Driver MD      Objective   /88 (BP Location: Left arm, Patient Position: Sitting, Cuff Size: Adult)   Pulse 59   Temp 98.7 °F (37.1 °C) (Temporal)   Resp 18   Ht 157.5 cm (62\")   Wt 70.9 kg (156 lb 3.2 oz)   SpO2 97%   BMI 28.57 kg/m²   BP Readings from Last 3 Encounters:   05/23/22 162/88   05/13/22 (!) 184/72   04/22/22 166/69     Wt Readings from Last 3 Encounters:   05/23/22 70.9 kg (156 lb 3.2 oz)   05/13/22 71.2 kg (157 lb)   04/22/22 71.3 kg (157 lb 2 oz)     Physical Exam  Constitutional:       Appearance: Normal appearance. She is well-developed. She is not diaphoretic.   HENT:      Head: Normocephalic.      Right Ear: Tympanic membrane, ear canal and external ear normal.      Left Ear: Tympanic membrane, ear canal and external ear normal.      Nose: Nose normal.   Eyes:      General: Lids are normal.      Conjunctiva/sclera: Conjunctivae normal.      Pupils: Pupils are equal, round, and reactive to light.   Neck:      Thyroid: No thyromegaly.      Vascular: No carotid bruit or JVD.      Trachea: No tracheal deviation.   Cardiovascular:      Rate and Rhythm: Normal rate and regular rhythm.      Pulses: Normal pulses.      Heart sounds: Normal heart sounds, S1 normal and S2 normal. No murmur heard.    No friction rub. No gallop.   Pulmonary:      Effort: Pulmonary effort is normal. No accessory muscle usage.      Breath sounds: Normal breath sounds. No stridor. No decreased breath sounds, wheezing, rhonchi or rales.   Abdominal:      General: Bowel sounds are normal. There is no distension.      Palpations: Abdomen is soft. Abdomen is not rigid. There is no mass or pulsatile mass.      Tenderness: There is no abdominal tenderness. There is no guarding or rebound. Negative signs include Diego's sign.      Hernia: No hernia is present.   Lymphadenopathy:      Head:      Right side of head: No submental, submandibular, " tonsillar, preauricular, posterior auricular or occipital adenopathy.      Left side of head: No submental, submandibular, tonsillar, preauricular, posterior auricular or occipital adenopathy.      Cervical: No cervical adenopathy.   Skin:     General: Skin is warm and dry.      Coloration: Skin is not pale.   Neurological:      Mental Status: She is alert and oriented to person, place, and time.      Cranial Nerves: No cranial nerve deficit.      Sensory: No sensory deficit.      Motor: No tremor, abnormal muscle tone or seizure activity.      Coordination: Coordination normal.      Gait: Gait normal.      Deep Tendon Reflexes: Reflexes are normal and symmetric.         Data / Lab Results:    Hemoglobin A1C   Date Value Ref Range Status   09/10/2021 6.1 % Final        Lab Results   Component Value Date     (H) 03/11/2022     (H) 08/28/2021     (H) 08/26/2020     Lab Results   Component Value Date    CHOL 181 07/24/2018    CHOL 215 (H) 07/21/2017    CHOL 198 07/20/2016     Lab Results   Component Value Date    TRIG 128 03/11/2022    TRIG 113 08/28/2021    TRIG 170 (H) 08/26/2020     Lab Results   Component Value Date    HDL 52 03/11/2022    HDL 54 08/28/2021    HDL 53 08/26/2020     No results found for: PSA  Lab Results   Component Value Date    WBC 10.7 08/28/2021    HGB 12.3 08/28/2021    HCT 38.6 08/28/2021    MCV 94 08/28/2021     08/28/2021     Lab Results   Component Value Date    TSH 4.220 08/28/2021      Lab Results   Component Value Date    GLUCOSE 119 (H) 03/11/2022    BUN 21 03/11/2022    CREATININE 1.17 (H) 03/11/2022    EGFRIFNONA 48 (L) 08/28/2021    EGFRIFAFRI 55 (L) 08/28/2021    BCR 18 03/11/2022    K 4.4 03/11/2022    CO2 23 03/11/2022    CALCIUM 10.6 (H) 03/11/2022    PROTENTOTREF 7.3 03/11/2022    ALBUMIN 4.7 03/11/2022    LABIL2 1.8 03/11/2022    AST 20 03/11/2022    ALT 13 03/11/2022     No results found for: ESTHER, RF, SEDRATE   No results found for: CRP   No results  found for: IRON, TIBC, FERRITIN   No results found for: [id number]       Assessment & Plan      Medications        Problem List         LOS      Health maintenance.  Doing well, vaccines current.  Discussed health maintenance, screening test, lifestyle modification.  Pap current, followed by Dr. Patiño, mammogram scheduled.  Allergic rhinitis.  OTC Claritin or Zyrtec.  Hypertension.   Persistent elevation today, off metoprolol per cardiology, Bystolic increased.  Hold losartan/nephrology referral scheduled. Clinically improved today on  hydralazine 100 mg twice daily, as needed clonidine.  Monitor blood pressure closely.  Follow-up recheck.  Consider restart amlodipine if persistent elevation.   . Discussed low-sodium diet.  Stress echo benign/elevated right-sided pressure only 2018.  Carotid Dopplers with mild blockage only 2018.  MRI brain benign 2020.  Has had cardiology eval/consider repeat stress testing when blood pressure better controlled..  Life stress/decreased energy.  Multiple stressors.  Good social support.   Initially improved on Cymbalta/Wellbutrin, she discontinued Rx.    Lower extremity edema.  Improved currently with decreased dose amlodipine.  Discussed low-sodium diet.  Follow-up recheck.  Consider sleep study if persistent symptoms.  Osteoporosis.  Tolerating Prolia.  Holding calcium/vitamin D per nephrology.  Follow-up recheck DEXA  Atrophic kidney.  Improved status post nephrectomy, followed by urology,nephrology.  Renal function normal.  Interstitial cystitis.  Improved on Flomax, followed by [facility], history of InterStim placement/subsequent removal.  I and O catheter dependent.  Hypertensive retinopathy.  Followed by ophthalmology ember galeano, status post treatment  Colon screening.  Colonoscopy benign 2016./Diverticulosis only  Carotid stenosis.  50 to 59% on the right, mild on left per Doppler 1/21, stable per repeat ultrasound 10/21.  She is allergic to contrast and  gadolinium.  Repeat Doppler scheduled.  Consider vascular surgery referral if worsening symptoms.  Continue risk factor reduction.  Osteoarthritis.  Worse left hip/knee.    Followed by orthopedics Dr. Angel, x-rays with mild left hip OA, moderate left OA knee, attempting knee bracing.  Consider knee injection.  Hyperlipidemia.    Persistent mild elevation, refractory to red yeast rice, start Crestor. Aggressive LDL target considering carotid stenosis. .  Discussed diet exercise lifestyle modification.  Follow-up recheck.    Memory loss.  Hypertension contributing today.  MRI brain benign 2020.  Follow-up recheck/plan check MMSE.        Diagnoses and all orders for this visit:    1. Atrophic kidney (Primary)    2. Renal insufficiency  -     Cancel: Ambulatory Referral to Nephrology    3. Solitary kidney, acquired  -     Cancel: Ambulatory Referral to Nephrology    4. Benign essential HTN  -     hydrALAZINE (APRESOLINE) 100 MG tablet; Take 1 tablet by mouth 2 (Two) Times a Day.  Dispense: 180 tablet; Refill: 0  -     nebivolol (Bystolic) 20 MG tablet; Take 0.5 tablets by mouth Daily.  Dispense: 90 tablet; Refill: 0    5. Mixed hyperlipidemia  -     nebivolol (Bystolic) 20 MG tablet; Take 0.5 tablets by mouth Daily.  Dispense: 90 tablet; Refill: 0    6. Palpitations  -     nebivolol (Bystolic) 20 MG tablet; Take 0.5 tablets by mouth Daily.  Dispense: 90 tablet; Refill: 0    7. Shortness of breath  -     nebivolol (Bystolic) 20 MG tablet; Take 0.5 tablets by mouth Daily.  Dispense: 90 tablet; Refill: 0              Expected course, medications, and adverse effects discussed.  Call or return if worsening or persistent symptoms.  I wore protective equipment throughout this patient encounter including a mask, gloves, and eye protection.  Hand hygiene was performed before donning protective equipment and after removal when leaving the room. The complete contents of the Assessment and Plan and Data/Lab Results as documented  above have been reviewed and addressed by myself with the patient today as part of an ongoing evaluation / treatment plan.  If some of the documentation has been copied from a previous note and is unchanged it indicates that this problem / plan has been assessed today but is stable from a previous visit and no changes have been recommended.

## 2022-05-23 ENCOUNTER — OFFICE VISIT (OUTPATIENT)
Dept: FAMILY MEDICINE CLINIC | Facility: CLINIC | Age: 72
End: 2022-05-23

## 2022-05-23 VITALS
HEIGHT: 62 IN | HEART RATE: 59 BPM | DIASTOLIC BLOOD PRESSURE: 88 MMHG | SYSTOLIC BLOOD PRESSURE: 162 MMHG | RESPIRATION RATE: 18 BRPM | WEIGHT: 156.2 LBS | TEMPERATURE: 98.7 F | BODY MASS INDEX: 28.74 KG/M2 | OXYGEN SATURATION: 97 %

## 2022-05-23 DIAGNOSIS — E78.2 MIXED HYPERLIPIDEMIA: Chronic | ICD-10-CM

## 2022-05-23 DIAGNOSIS — N28.9 RENAL INSUFFICIENCY: ICD-10-CM

## 2022-05-23 DIAGNOSIS — I10 BENIGN ESSENTIAL HTN: ICD-10-CM

## 2022-05-23 DIAGNOSIS — N26.1 ATROPHIC KIDNEY: Primary | ICD-10-CM

## 2022-05-23 DIAGNOSIS — Z90.5 SOLITARY KIDNEY, ACQUIRED: ICD-10-CM

## 2022-05-23 DIAGNOSIS — R06.02 SHORTNESS OF BREATH: ICD-10-CM

## 2022-05-23 DIAGNOSIS — R00.2 PALPITATIONS: ICD-10-CM

## 2022-05-23 PROCEDURE — 99214 OFFICE O/P EST MOD 30 MIN: CPT | Performed by: FAMILY MEDICINE

## 2022-05-23 RX ORDER — HYDRALAZINE HYDROCHLORIDE 100 MG/1
100 TABLET, FILM COATED ORAL 2 TIMES DAILY
Qty: 180 TABLET | Refills: 0 | Status: SHIPPED | OUTPATIENT
Start: 2022-05-23 | End: 2022-11-16

## 2022-05-23 RX ORDER — NEBIVOLOL 20 MG/1
10 TABLET ORAL DAILY
Qty: 90 TABLET | Refills: 0 | Status: SHIPPED | OUTPATIENT
Start: 2022-05-23 | End: 2022-06-08

## 2022-06-08 ENCOUNTER — OFFICE VISIT (OUTPATIENT)
Dept: FAMILY MEDICINE CLINIC | Facility: CLINIC | Age: 72
End: 2022-06-08

## 2022-06-08 VITALS
HEIGHT: 62 IN | TEMPERATURE: 97.5 F | DIASTOLIC BLOOD PRESSURE: 60 MMHG | SYSTOLIC BLOOD PRESSURE: 130 MMHG | WEIGHT: 156.4 LBS | OXYGEN SATURATION: 98 % | RESPIRATION RATE: 17 BRPM | HEART RATE: 95 BPM | BODY MASS INDEX: 28.78 KG/M2

## 2022-06-08 DIAGNOSIS — E66.3 OVERWEIGHT WITH BODY MASS INDEX (BMI) OF 28 TO 28.9 IN ADULT: ICD-10-CM

## 2022-06-08 DIAGNOSIS — R00.2 PALPITATIONS: ICD-10-CM

## 2022-06-08 DIAGNOSIS — E78.2 MIXED HYPERLIPIDEMIA: Chronic | ICD-10-CM

## 2022-06-08 DIAGNOSIS — R06.02 SHORTNESS OF BREATH: ICD-10-CM

## 2022-06-08 DIAGNOSIS — I10 BENIGN ESSENTIAL HTN: Primary | ICD-10-CM

## 2022-06-08 PROCEDURE — 99214 OFFICE O/P EST MOD 30 MIN: CPT | Performed by: FAMILY MEDICINE

## 2022-06-08 RX ORDER — NEBIVOLOL 20 MG/1
20 TABLET ORAL DAILY
Qty: 90 TABLET | Refills: 3 | Status: SHIPPED | OUTPATIENT
Start: 2022-06-08 | End: 2022-07-06 | Stop reason: SDUPTHER

## 2022-06-13 DIAGNOSIS — E78.2 MIXED HYPERLIPIDEMIA: Chronic | ICD-10-CM

## 2022-06-13 RX ORDER — ROSUVASTATIN CALCIUM 5 MG/1
5 TABLET, COATED ORAL DAILY
Qty: 90 TABLET | Refills: 1 | Status: SHIPPED | OUTPATIENT
Start: 2022-06-13 | End: 2023-04-04

## 2022-07-06 ENCOUNTER — OFFICE VISIT (OUTPATIENT)
Dept: FAMILY MEDICINE CLINIC | Facility: CLINIC | Age: 72
End: 2022-07-06

## 2022-07-06 VITALS
BODY MASS INDEX: 28.52 KG/M2 | RESPIRATION RATE: 18 BRPM | HEART RATE: 61 BPM | DIASTOLIC BLOOD PRESSURE: 88 MMHG | TEMPERATURE: 96.4 F | HEIGHT: 62 IN | WEIGHT: 155 LBS | SYSTOLIC BLOOD PRESSURE: 150 MMHG | OXYGEN SATURATION: 98 %

## 2022-07-06 DIAGNOSIS — I10 BENIGN ESSENTIAL HTN: Primary | ICD-10-CM

## 2022-07-06 DIAGNOSIS — F41.9 ANXIETY: ICD-10-CM

## 2022-07-06 DIAGNOSIS — N30.10 IC (INTERSTITIAL CYSTITIS): ICD-10-CM

## 2022-07-06 DIAGNOSIS — M81.0 AGE-RELATED OSTEOPOROSIS WITHOUT CURRENT PATHOLOGICAL FRACTURE: ICD-10-CM

## 2022-07-06 DIAGNOSIS — R00.2 PALPITATIONS: ICD-10-CM

## 2022-07-06 DIAGNOSIS — R06.02 SHORTNESS OF BREATH: ICD-10-CM

## 2022-07-06 DIAGNOSIS — E66.3 OVERWEIGHT WITH BODY MASS INDEX (BMI) OF 28 TO 28.9 IN ADULT: ICD-10-CM

## 2022-07-06 DIAGNOSIS — E78.2 MIXED HYPERLIPIDEMIA: Chronic | ICD-10-CM

## 2022-07-06 PROCEDURE — 99214 OFFICE O/P EST MOD 30 MIN: CPT | Performed by: FAMILY MEDICINE

## 2022-07-06 RX ORDER — NEBIVOLOL 20 MG/1
20 TABLET ORAL 2 TIMES DAILY
Qty: 180 TABLET | Refills: 1 | Status: SHIPPED | OUTPATIENT
Start: 2022-07-06 | End: 2022-11-23

## 2022-07-06 RX ORDER — DOXAZOSIN MESYLATE 4 MG/1
0.5 TABLET ORAL
COMMUNITY
End: 2022-11-23

## 2022-07-06 NOTE — PROGRESS NOTES
Subjective   Dunia Fabian is a 71 y.o. female.     Chief Complaint   Patient presents with   • Hypertension       Hypertension  This is a chronic problem. The current episode started more than 1 year ago. The problem has been gradually improving since onset. The problem is controlled. Associated symptoms include headaches and malaise/fatigue. Pertinent negatives include no anxiety, blurred vision, chest pain, orthopnea, palpitations, peripheral edema, PND, shortness of breath or sweats. (Patient following up on the medication she was started on the last time she was here. She was started on Bystolic 20 MG tablet. She states that she is still logging her pressures and she states that there has been some higher readings as she takes her reading as soon as she gets up in the morning.   She states that the last time she was here she was instructed to stop the losartan that she was taking and she states that when she went to the pharmacy to get her medication she states that they had the Losartan ready for her and she states that it was for 25 mg which was cut in half from what she was on and she was concerned as she said that she was told to stop it completely. ) There are no associated agents to hypertension. Risk factors for coronary artery disease include post-menopausal state. The current treatment provides moderate improvement. There are no compliance problems.  There is no history of angina, kidney disease, CAD/MI, CVA, heart failure, left ventricular hypertrophy, PVD or retinopathy. There is no history of chronic renal disease, coarctation of the aorta, hyperaldosteronism, hypercortisolism, hyperparathyroidism, a hypertension causing med, pheochromocytoma, renovascular disease, sleep apnea or a thyroid problem.            I personally reviewed and updated the patient's allergies, medications, problem list, and past medical, surgical, social, and family history. I have reviewed and confirmed the accuracy of  the History of Present Illness and Review of Symptoms as documented by the MA/LPN/RN. Tyron Driver MD    Family History   Problem Relation Age of Onset   • Parkinsonism Mother    • Heart disease Mother         cardiovascular disease   • Diabetes Mother         diabetes mellitus    • Heart disease Father         cardiovascular disease   • Heart disease Sister         cardiovascular disease   • Diabetes Sister         diabetes mellitus    • Heart disease Brother         cardiovascular disease   • Diabetes Son         diabetes mellitus    • Heart disease Paternal Uncle         ischemic   • Stomach cancer Maternal Grandmother    • Breast cancer Niece 39   • Ovarian cancer Paternal Grandmother        Social History     Tobacco Use   • Smoking status: Never Smoker   • Smokeless tobacco: Never Used   Vaping Use   • Vaping Use: Never used   Substance Use Topics   • Alcohol use: Not Currently   • Drug use: No       Past Surgical History:   Procedure Laterality Date   • CATARACT EXTRACTION Left 08/2005    with Insert of Lens Prostheti   • CYSTOCELE REPAIR  06/1998    SIMENTAL Cystourethropexy & Cystocele Repair    • KIDNEY SURGERY Right 02/24/2014    Removal   • TONSILLECTOMY  1953   • URETHROLYSIS  10/2000    Transvaginal Urethrolysis & Bone Lenorah Sling   • VAGINAL HYSTERECTOMY  1970   • VITRECTOMY Left 04/2005    & Membrane Peeling       Patient Active Problem List   Diagnosis   • Acid reflux   • Allergic rhinitis   • Anxiety   • Atrophic kidney   • Carotid bruit present   • Carpal tunnel syndrome   • Low back pain   • Neck pain   • Thoracic back pain   • Depressive disorder   • Medicare annual wellness visit, subsequent   • Gout   • Hemorrhoids   • Mixed hyperlipidemia   • IC (interstitial cystitis)   • Irritable bowel syndrome with constipation   • LVH (left ventricular hypertrophy)   • Menopausal syndrome   • Mitral insufficiency, acute   • Onychomycosis   • Disorder of bone and cartilage   • Age-related osteoporosis  without current pathological fracture   • Palpitations   • Osteoarthritis   • Primary pulmonary hypertension (HCC)   • Renal insufficiency   • Plantar fasciitis   • Senile osteoporosis   • Solitary kidney   • Encounter for screening mammogram for malignant neoplasm of breast   • Special screening for malignant neoplasms, colon   • Seborrheic keratosis   • Chronic kidney disease, stage 3 (moderate)   • Renal hypertrophy   • Hypercalcemia   • Acute cystitis with hematuria   • Lower extremity edema   • Overweight with body mass index (BMI) of 28 to 28.9 in adult   • Dizziness   • Left hip pain   • Bilateral primary osteoarthritis of knee   • Elevated fasting blood sugar   • Solitary kidney, acquired   • Benign essential HTN         Current Outpatient Medications:   •  aspirin 81 MG EC tablet, Take 81 mg by mouth Daily., Disp: , Rfl:   •  Bioflavonoid Products (Vitamin C Plus) 1000 MG tablet, Take  by mouth., Disp: , Rfl:   •  cloNIDine (CATAPRES) 0.1 MG tablet, Take 0.1 mg by mouth As Needed., Disp: , Rfl:   •  doxazosin (CARDURA) 4 MG tablet, Take 0.5 tablets by mouth., Disp: , Rfl:   •  hydrALAZINE (APRESOLINE) 100 MG tablet, Take 1 tablet by mouth 2 (Two) Times a Day., Disp: 180 tablet, Rfl: 0  •  Multiple Vitamins-Minerals (WOMENS MULTI) capsule, Take  by mouth Daily., Disp: , Rfl:   •  nebivolol (Bystolic) 20 MG tablet, Take 1 tablet by mouth 2 (Two) Times a Day., Disp: 180 tablet, Rfl: 1  •  rosuvastatin (CRESTOR) 5 MG tablet, TAKE 1 TABLET BY MOUTH DAILY, Disp: 90 tablet, Rfl: 1  •  SUPER B COMPLEX/C capsule, SUPER B COMPLEX/C ORAL CAPSULE, Disp: , Rfl:   •  tamsulosin (FLOMAX) 0.4 MG capsule 24 hr capsule, Take 1 capsule by mouth Daily., Disp: , Rfl:   •  VITAMIN B COMPLEX-C PO, Take  by mouth., Disp: , Rfl:   •  vitamin D3 125 MCG (5000 UT) capsule capsule, Take 5,000 Units by mouth Daily., Disp: , Rfl:   •  Zinc 50 MG capsule, Take  by mouth., Disp: , Rfl:          Review of Systems   Constitutional: Positive  "for malaise/fatigue. Negative for chills and diaphoresis.   Eyes: Negative for blurred vision and visual disturbance.   Respiratory: Negative for shortness of breath.    Cardiovascular: Negative for chest pain, palpitations, orthopnea and PND.   Gastrointestinal: Negative for abdominal pain and nausea.   Endocrine: Negative for polydipsia and polyphagia.   Musculoskeletal: Negative for neck stiffness.   Skin: Negative for color change and pallor.   Neurological: Negative for seizures and syncope.   Hematological: Negative for adenopathy.   Psychiatric/Behavioral: Negative for hallucinations and suicidal ideas.       I have reviewed and confirmed the accuracy of the ROS as documented by the MA/LPN/RN Tyron Driver MD      Objective   /88   Pulse 61   Temp 96.4 °F (35.8 °C)   Resp 18   Ht 157.5 cm (62\")   Wt 70.3 kg (155 lb)   SpO2 98%   Breastfeeding No   BMI 28.35 kg/m²   BP Readings from Last 3 Encounters:   07/06/22 150/88   06/08/22 130/60   05/23/22 162/88     Wt Readings from Last 3 Encounters:   07/06/22 70.3 kg (155 lb)   06/08/22 70.9 kg (156 lb 6.4 oz)   05/23/22 70.9 kg (156 lb 3.2 oz)     Physical Exam  Constitutional:       Appearance: Normal appearance. She is well-developed. She is not diaphoretic.   HENT:      Right Ear: Hearing, tympanic membrane, ear canal and external ear normal.      Left Ear: Hearing, tympanic membrane, ear canal and external ear normal.      Nose: Nose normal. No mucosal edema or congestion.      Right Sinus: No maxillary sinus tenderness or frontal sinus tenderness.      Left Sinus: No maxillary sinus tenderness or frontal sinus tenderness.      Mouth/Throat:      Mouth: No oral lesions.      Pharynx: Uvula midline. No oropharyngeal exudate or posterior oropharyngeal erythema.      Tonsils: No tonsillar exudate.   Eyes:      General: Lids are normal. No scleral icterus.        Right eye: No foreign body or discharge.         Left eye: No foreign body or " discharge.      Extraocular Movements:      Right eye: No nystagmus.      Left eye: No nystagmus.      Conjunctiva/sclera: Conjunctivae normal.      Right eye: Right conjunctiva is not injected. No exudate or hemorrhage.     Left eye: Left conjunctiva is not injected. No exudate or hemorrhage.     Pupils: Pupils are equal, round, and reactive to light.      Funduscopic exam:     Right eye: No hemorrhage, exudate, AV nicking, arteriolar narrowing or papilledema.         Left eye: No hemorrhage, exudate, AV nicking, arteriolar narrowing or papilledema.   Cardiovascular:      Rate and Rhythm: Normal rate and regular rhythm.      Pulses: Normal pulses.      Heart sounds: Normal heart sounds, S1 normal and S2 normal. No murmur heard.    No friction rub. No gallop.   Pulmonary:      Effort: Pulmonary effort is normal. No accessory muscle usage.      Breath sounds: Normal breath sounds. No stridor. No decreased breath sounds, wheezing, rhonchi or rales.   Abdominal:      General: Bowel sounds are normal. There is no distension.      Palpations: Abdomen is soft. Abdomen is not rigid. There is no mass or pulsatile mass.      Tenderness: There is no abdominal tenderness. There is no guarding or rebound. Negative signs include Diego's sign.      Hernia: No hernia is present.   Skin:     General: Skin is warm and dry.      Coloration: Skin is not pale.   Neurological:      Mental Status: She is alert and oriented to person, place, and time.      Cranial Nerves: No cranial nerve deficit.      Sensory: No sensory deficit.      Motor: No tremor, abnormal muscle tone or seizure activity.      Coordination: Coordination normal.      Gait: Gait normal.      Deep Tendon Reflexes: Reflexes are normal and symmetric.         Data / Lab Results:    Hemoglobin A1C   Date Value Ref Range Status   09/10/2021 6.1 % Final        Lab Results   Component Value Date     (H) 03/11/2022     (H) 08/28/2021     (H) 08/26/2020      Lab Results   Component Value Date    CHOL 181 07/24/2018    CHOL 215 (H) 07/21/2017    CHOL 198 07/20/2016     Lab Results   Component Value Date    TRIG 128 03/11/2022    TRIG 113 08/28/2021    TRIG 170 (H) 08/26/2020     Lab Results   Component Value Date    HDL 52 03/11/2022    HDL 54 08/28/2021    HDL 53 08/26/2020     No results found for: PSA  Lab Results   Component Value Date    WBC 10.7 08/28/2021    HGB 12.3 08/28/2021    HCT 38.6 08/28/2021    MCV 94 08/28/2021     08/28/2021     Lab Results   Component Value Date    TSH 4.220 08/28/2021      Lab Results   Component Value Date    GLUCOSE 119 (H) 03/11/2022    BUN 21 03/11/2022    CREATININE 1.17 (H) 03/11/2022    EGFRIFNONA 48 (L) 08/28/2021    EGFRIFAFRI 55 (L) 08/28/2021    BCR 18 03/11/2022    K 4.4 03/11/2022    CO2 23 03/11/2022    CALCIUM 10.6 (H) 03/11/2022    PROTENTOTREF 7.3 03/11/2022    ALBUMIN 4.7 03/11/2022    LABIL2 1.8 03/11/2022    AST 20 03/11/2022    ALT 13 03/11/2022     No results found for: ESTHER, RF, SEDRATE   No results found for: CRP   No results found for: IRON, TIBC, FERRITIN   No results found for: [id number]       Assessment & Plan      Medications        Problem List         LOS  Health maintenance.  Doing well, vaccines current.  Discussed health maintenance, screening test, lifestyle modification.  Pap current, followed by Dr. Patiño, mammogram scheduled.  Allergic rhinitis.  OTC Claritin or Zyrtec.  Hypertension.  Improved today on Bystolic, dose increased to 40 mg daily.  ,Off metoprolol per cardiology, .  Hold losartan/nephrology referral scheduled to Dr Mcgee. Clinically improved today on  hydralazine 100 mg twice daily, as needed clonidine.  Monitor blood pressure closely.  Follow-up recheck.  Consider restart amlodipine if persistent elevation.   . Discussed low-sodium diet.  Stress echo benign/elevated right-sided pressure only 2018.  Carotid Dopplers with mild blockage only 2018.  MRI brain benign 2020.  Has  had cardiology eval/plan repeat stress testing when blood pressure better controlled per cardilogy recs.  Life stress/decreased energy.  Multiple stressors.  Good social support.   Initially improved on Cymbalta/Wellbutrin, she discontinued Rx.    Lower extremity edema.  Improved currently with decreased dose amlodipine.  Discussed low-sodium diet.  Follow-up recheck.  Consider sleep study if persistent symptoms.  Osteoporosis.  Tolerating Prolia.  Holding calcium/vitamin D per nephrology.  Follow-up recheck DEXA  Atrophic kidney.  Improved status post nephrectomy, followed by urology,nephrology.  Renal function normal.  Interstitial cystitis.  Improved on Flomax, followed by [facility], history of InterStim placement/subsequent removal.  I and O catheter dependent.  Hypertensive retinopathy.  Followed by ophthalmology ember galeano, status post treatment  Colon screening.  Colonoscopy benign 2016./Diverticulosis only  Carotid stenosis.  50 to 59% on the right, mild on left per Doppler 1/21, stable per repeat ultrasound 10/21.  She is allergic to contrast and gadolinium.  Repeat Doppler scheduled.  Consider vascular surgery referral if worsening symptoms.  Continue risk factor reduction.  Osteoarthritis.  Worse left hip/knee.    Followed by orthopedics Dr. Angel, x-rays with mild left hip OA, moderate left OA knee, attempting knee bracing.  Consider knee injection.  Hyperlipidemia.    Persistent mild elevation, refractory to red yeast rice, start Crestor. Aggressive LDL target considering carotid stenosis. .  Discussed diet exercise lifestyle modification.  Follow-up recheck.    Memory loss.  Hypertension contributing today.  MRI brain benign 2020.  Follow-up recheck/plan check MMSE.        Diagnoses and all orders for this visit:    1. Benign essential HTN (Primary)  -     nebivolol (Bystolic) 20 MG tablet; Take 1 tablet by mouth 2 (Two) Times a Day.  Dispense: 180 tablet; Refill: 1    2. Overweight with body mass index  (BMI) of 28 to 28.9 in adult  Assessment & Plan:  BMI is >= 25 and <30. (Overweight) The following options were offered after discussion;: exercise counseling/recommendations and nutrition counseling/recommendations        3. Mixed hyperlipidemia  -     nebivolol (Bystolic) 20 MG tablet; Take 1 tablet by mouth 2 (Two) Times a Day.  Dispense: 180 tablet; Refill: 1    4. Palpitations  -     nebivolol (Bystolic) 20 MG tablet; Take 1 tablet by mouth 2 (Two) Times a Day.  Dispense: 180 tablet; Refill: 1    5. Shortness of breath  -     nebivolol (Bystolic) 20 MG tablet; Take 1 tablet by mouth 2 (Two) Times a Day.  Dispense: 180 tablet; Refill: 1    6. Anxiety    7. IC (interstitial cystitis)    8. Age-related osteoporosis without current pathological fracture            Expected course, medications, and adverse effects discussed.  Call or return if worsening or persistent symptoms.  I wore protective equipment throughout this patient encounter including a mask, gloves, and eye protection.  Hand hygiene was performed before donning protective equipment and after removal when leaving the room. The complete contents of the Assessment and Plan and Data/Lab Results as documented above have been reviewed and addressed by myself with the patient today as part of an ongoing evaluation / treatment plan.  If some of the documentation has been copied from a previous note and is unchanged it indicates that this problem / plan has been assessed today but is stable from a previous visit and no changes have been recommended.

## 2022-07-06 NOTE — ASSESSMENT & PLAN NOTE
BMI is >= 25 and <30. (Overweight) The following options were offered after discussion;: exercise counseling/recommendations and nutrition counseling/recommendations

## 2022-08-30 ENCOUNTER — TRANSCRIBE ORDERS (OUTPATIENT)
Dept: ADMINISTRATIVE | Facility: HOSPITAL | Age: 72
End: 2022-08-30

## 2022-08-30 DIAGNOSIS — N18.31 CHRONIC KIDNEY DISEASE, STAGE 3A: Primary | ICD-10-CM

## 2022-09-06 NOTE — PROGRESS NOTES
Subjective   Dunia Fabian is a 71 y.o. female.     Chief Complaint   Patient presents with   • Hypertension   • Hyperlipidemia       Hypertension  This is a chronic problem. The current episode started more than 1 year ago. The problem has been gradually improving since onset. The problem is controlled. Associated symptoms include headaches and malaise/fatigue. Pertinent negatives include no anxiety, blurred vision, chest pain, orthopnea, palpitations, peripheral edema, PND, shortness of breath or sweats. (Patient following up on the medication she was started on the last time she was here. She was started on Bystolic 20 MG tablet. She states that she is still logging her pressures and she states that there has been some higher readings as she takes her reading as soon as she gets up in the morning.   She states that the last time she was here she was instructed to stop the losartan that she was taking and she states that when she went to the pharmacy to get her medication she states that they had the Losartan ready for her and she states that it was for 25 mg which was cut in half from what she was on and she was concerned as she said that she was told to stop it completely. ) There are no associated agents to hypertension. Risk factors for coronary artery disease include post-menopausal state. The current treatment provides moderate improvement. There are no compliance problems.  There is no history of angina, kidney disease, CAD/MI, CVA, heart failure, left ventricular hypertrophy, PVD or retinopathy. There is no history of chronic renal disease, coarctation of the aorta, hyperaldosteronism, hypercortisolism, hyperparathyroidism, a hypertension causing med, pheochromocytoma, renovascular disease, sleep apnea or a thyroid problem.   Hyperlipidemia  This is a chronic problem. The current episode started more than 1 year ago. The problem is controlled. Recent lipid tests were reviewed and are high. She has no  history of chronic renal disease. Factors aggravating her hyperlipidemia include fatty foods. Pertinent negatives include no chest pain, myalgias or shortness of breath. Current antihyperlipidemic treatment includes statins. There are no compliance problems.  Risk factors for coronary artery disease include dyslipidemia and hypertension.            I personally reviewed and updated the patient's allergies, medications, problem list, and past medical, surgical, social, and family history. I have reviewed and confirmed the accuracy of the History of Present Illness and Review of Symptoms as documented by the MA/LPN/RN. Tyron Driver MD    Family History   Problem Relation Age of Onset   • Parkinsonism Mother    • Heart disease Mother         cardiovascular disease   • Diabetes Mother         diabetes mellitus    • Heart disease Father         cardiovascular disease   • Heart disease Sister         cardiovascular disease   • Diabetes Sister         diabetes mellitus    • Heart disease Brother         cardiovascular disease   • Diabetes Son         diabetes mellitus    • Heart disease Paternal Uncle         ischemic   • Stomach cancer Maternal Grandmother    • Breast cancer Niece 39   • Ovarian cancer Paternal Grandmother        Social History     Tobacco Use   • Smoking status: Never Smoker   • Smokeless tobacco: Never Used   Vaping Use   • Vaping Use: Never used   Substance Use Topics   • Alcohol use: Not Currently   • Drug use: No       Past Surgical History:   Procedure Laterality Date   • CATARACT EXTRACTION Left 08/2005    with Insert of Lens Prostheti   • CYSTOCELE REPAIR  06/1998    SIMENTAL Cystourethropexy & Cystocele Repair    • KIDNEY SURGERY Right 02/24/2014    Removal   • TONSILLECTOMY  1953   • URETHROLYSIS  10/2000    Transvaginal Urethrolysis & [facility] Sling   • VAGINAL HYSTERECTOMY  1970   • VITRECTOMY Left 04/2005    & Membrane Peeling       Patient Active Problem List   Diagnosis   • Acid reflux    • Allergic rhinitis   • Anxiety   • Atrophic kidney   • Carotid bruit present   • Carpal tunnel syndrome   • Low back pain   • Neck pain   • Thoracic back pain   • Depressive disorder   • Medicare annual wellness visit, subsequent   • Gout   • Hemorrhoids   • Mixed hyperlipidemia   • IC (interstitial cystitis)   • Irritable bowel syndrome with constipation   • LVH (left ventricular hypertrophy)   • Menopausal syndrome   • Mitral insufficiency, acute   • Onychomycosis   • Disorder of bone and cartilage   • Age-related osteoporosis without current pathological fracture   • Palpitations   • Osteoarthritis   • Primary pulmonary hypertension (HCC)   • Renal insufficiency   • Plantar fasciitis   • Senile osteoporosis   • Solitary kidney   • Encounter for screening mammogram for malignant neoplasm of breast   • Special screening for malignant neoplasms, colon   • Seborrheic keratosis   • Chronic kidney disease, stage 3 (moderate)   • Renal hypertrophy   • Hypercalcemia   • Acute cystitis with hematuria   • Lower extremity edema   • Overweight with body mass index (BMI) of 28 to 28.9 in adult   • Dizziness   • Left hip pain   • Bilateral primary osteoarthritis of knee   • Elevated fasting blood sugar   • Solitary kidney, acquired   • Benign essential HTN         Current Outpatient Medications:   •  aspirin 81 MG EC tablet, Take 81 mg by mouth Daily., Disp: , Rfl:   •  cloNIDine (CATAPRES) 0.1 MG tablet, Take 0.1 mg by mouth As Needed., Disp: , Rfl:   •  doxazosin (CARDURA) 4 MG tablet, Take 0.5 tablets by mouth., Disp: , Rfl:   •  hydrALAZINE (APRESOLINE) 100 MG tablet, Take 1 tablet by mouth 2 (Two) Times a Day., Disp: 180 tablet, Rfl: 0  •  nebivolol (Bystolic) 20 MG tablet, Take 1 tablet by mouth 2 (Two) Times a Day., Disp: 180 tablet, Rfl: 1  •  rosuvastatin (CRESTOR) 5 MG tablet, TAKE 1 TABLET BY MOUTH DAILY, Disp: 90 tablet, Rfl: 1  •  tamsulosin (FLOMAX) 0.4 MG capsule 24 hr capsule, Take 1 capsule by mouth Daily.,  "Disp: , Rfl:   •  trimethoprim (TRIMPEX) 100 MG tablet, , Disp: , Rfl:   •  VITAMIN B COMPLEX-C PO, Take  by mouth., Disp: , Rfl:   •  Bioflavonoid Products (Vitamin C Plus) 1000 MG tablet, Take  by mouth., Disp: , Rfl:   •  Multiple Vitamins-Minerals (WOMENS MULTI) capsule, Take  by mouth Daily., Disp: , Rfl:   •  SPS 15 GM/60ML suspension, , Disp: , Rfl:   •  sulfamethoxazole-trimethoprim (BACTRIM DS,SEPTRA DS) 800-160 MG per tablet, , Disp: , Rfl:   •  SUPER B COMPLEX/C capsule, SUPER B COMPLEX/C ORAL CAPSULE, Disp: , Rfl:   •  vitamin D3 125 MCG (5000 UT) capsule capsule, Take 5,000 Units by mouth Daily., Disp: , Rfl:   •  Zinc 50 MG capsule, Take  by mouth., Disp: , Rfl:          Review of Systems   Constitutional: Positive for malaise/fatigue. Negative for chills, diaphoresis, fatigue, unexpected weight gain and unexpected weight loss.   Eyes: Negative for blurred vision and visual disturbance.   Respiratory: Negative for shortness of breath.    Cardiovascular: Negative for chest pain, palpitations, orthopnea, leg swelling and PND.   Gastrointestinal: Negative for abdominal pain and nausea.   Endocrine: Negative for polydipsia and polyphagia.   Musculoskeletal: Negative for myalgias and neck stiffness.   Skin: Negative for color change, dry skin and pallor.   Neurological: Negative for seizures, syncope and headache.   Hematological: Negative for adenopathy.   Psychiatric/Behavioral: Negative for hallucinations and suicidal ideas.       I have reviewed and confirmed the accuracy of the ROS as documented by the MA/LPN/RN Tyron Driver MD      Objective   /62 (BP Location: Left arm, Patient Position: Sitting, Cuff Size: Adult)   Pulse 60   Temp 98.2 °F (36.8 °C) (Temporal)   Resp 18   Ht 157.5 cm (62\")   Wt 70.9 kg (156 lb 6.4 oz)   SpO2 98%   BMI 28.61 kg/m²   BP Readings from Last 3 Encounters:   09/07/22 144/62   07/06/22 150/88   06/08/22 130/60     Wt Readings from Last 3 Encounters: "   09/07/22 70.9 kg (156 lb 6.4 oz)   07/06/22 70.3 kg (155 lb)   06/08/22 70.9 kg (156 lb 6.4 oz)     Physical Exam  Constitutional:       Appearance: Normal appearance. She is well-developed. She is not diaphoretic.   HENT:      Head: Normocephalic.      Right Ear: Tympanic membrane, ear canal and external ear normal.      Left Ear: Tympanic membrane, ear canal and external ear normal.      Nose: Nose normal.   Eyes:      General: Lids are normal.      Conjunctiva/sclera: Conjunctivae normal.      Pupils: Pupils are equal, round, and reactive to light.   Neck:      Thyroid: No thyromegaly.      Vascular: No carotid bruit or JVD.      Trachea: No tracheal deviation.   Cardiovascular:      Rate and Rhythm: Normal rate and regular rhythm.      Pulses: Normal pulses.      Heart sounds: Normal heart sounds, S1 normal and S2 normal. No murmur heard.    No friction rub. No gallop.   Pulmonary:      Effort: Pulmonary effort is normal. No accessory muscle usage.      Breath sounds: Normal breath sounds. No stridor. No decreased breath sounds, wheezing, rhonchi or rales.   Abdominal:      General: Bowel sounds are normal. There is no distension.      Palpations: Abdomen is soft. Abdomen is not rigid. There is no mass or pulsatile mass.      Tenderness: There is no abdominal tenderness. There is no guarding or rebound. Negative signs include Diego's sign.      Hernia: No hernia is present.   Lymphadenopathy:      Head:      Right side of head: No submental, submandibular, tonsillar, preauricular, posterior auricular or occipital adenopathy.      Left side of head: No submental, submandibular, tonsillar, preauricular, posterior auricular or occipital adenopathy.      Cervical: No cervical adenopathy.   Skin:     General: Skin is warm and dry.      Coloration: Skin is not pale.   Neurological:      Mental Status: She is alert and oriented to person, place, and time.      Cranial Nerves: No cranial nerve deficit.      Sensory:  No sensory deficit.      Motor: No tremor, abnormal muscle tone or seizure activity.      Coordination: Coordination normal.      Gait: Gait normal.      Deep Tendon Reflexes: Reflexes are normal and symmetric.         Data / Lab Results:    Hemoglobin A1C   Date Value Ref Range Status   09/10/2021 6.1 % Final        Lab Results   Component Value Date     (H) 03/11/2022     (H) 08/28/2021     (H) 08/26/2020     Lab Results   Component Value Date    CHOL 181 07/24/2018    CHOL 215 (H) 07/21/2017    CHOL 198 07/20/2016     Lab Results   Component Value Date    TRIG 128 03/11/2022    TRIG 113 08/28/2021    TRIG 170 (H) 08/26/2020     Lab Results   Component Value Date    HDL 52 03/11/2022    HDL 54 08/28/2021    HDL 53 08/26/2020     No results found for: PSA  Lab Results   Component Value Date    WBC 10.7 08/28/2021    HGB 12.3 08/28/2021    HCT 38.6 08/28/2021    MCV 94 08/28/2021     08/28/2021     Lab Results   Component Value Date    TSH 4.220 08/28/2021      Lab Results   Component Value Date    GLUCOSE 119 (H) 03/11/2022    BUN 21 03/11/2022    CREATININE 1.17 (H) 03/11/2022    EGFRIFNONA 48 (L) 08/28/2021    EGFRIFAFRI 55 (L) 08/28/2021    BCR 18 03/11/2022    K 4.4 03/11/2022    CO2 23 03/11/2022    CALCIUM 10.6 (H) 03/11/2022    PROTENTOTREF 7.3 03/11/2022    ALBUMIN 4.7 03/11/2022    LABIL2 1.8 03/11/2022    AST 20 03/11/2022    ALT 13 03/11/2022     No results found for: ESTHER, RF, SEDRATE   No results found for: CRP   No results found for: IRON, TIBC, FERRITIN   No results found for: [id number]       Assessment & Plan      Medications        Problem List         LOS    Health maintenance.  Doing well, vaccines current.  Discussed health maintenance, screening test, lifestyle modification.  Pap current, followed by Dr. Patiño, mammogram scheduled.  Allergic rhinitis.  OTC Claritin or Zyrtec.  Hypertension.  Improved today on increased dose Bystolivo 40 mg daily.  ,Off metoprolol  per cardiology, .  Hold losartan/has had nephrology eval per Dr. Mcgee, has had repeat blood work, will get records, renal ultrasound/renal artery Doppler upcoming.  Clinically improved today on  hydralazine 100 mg twice daily, as needed clonidine.  Monitor blood pressure closely.  Follow-up recheck.  Consider restart amlodipine if persistent elevation.   . Discussed low-sodium diet.  Stress echo benign/elevated right-sided pressure only 2018.  Carotid Dopplers with mild blockage only 2018.  MRI brain benign 2020.  Has had cardiology eval/plan repeat stress testing when blood pressure better controlled per cardilogy recs.  Life stress/decreased energy.  Multiple stressors.  Good social support.   Initially improved on Cymbalta/Wellbutrin, she discontinued Rx.    Lower extremity edema.  Improved currently with decreased dose amlodipine.  Discussed low-sodium diet.  Follow-up recheck.  Consider sleep study if persistent symptoms.  Osteoporosis.  Tolerating Prolia.  Holding calcium/vitamin D per nephrology.  Follow-up recheck DEXA  Atrophic kidney.  Improved status post nephrectomy, followed by urology,nephrology.  Renal function normal.  Interstitial cystitis.  Improved on Flomax, followed by [facility], history of InterStim placement/subsequent removal.  I and O catheter dependent.  Hypertensive retinopathy.  Followed by ophthalmology ember galeano, status post treatment  Colon screening.  Colonoscopy benign 2016./Diverticulosis only  Carotid stenosis.  50 to 59% on the right, mild on left per Doppler 1/21, stable per repeat ultrasound 10/21.  She is allergic to contrast and gadolinium.  Repeat Doppler scheduled.  Consider vascular surgery referral if worsening symptoms.  Continue risk factor reduction.  Osteoarthritis.  Worse left hip/knee.    Followed by orthopedics Dr. Angel, x-rays with mild left hip OA, moderate left OA knee, attempting knee bracing.  Consider knee injection.  Hyperlipidemia.    Persistent mild  elevation, refractory to red yeast rice, start Crestor. Aggressive LDL target considering carotid stenosis. .  Discussed diet exercise lifestyle modification.  Follow-up recheck.    Memory loss.  Hypertension contributing today.  MRI brain benign 2020.  Follow-up recheck/plan check MMSE.      Diagnoses and all orders for this visit:    1. Mixed hyperlipidemia (Primary)    2. Benign essential HTN    3. Overweight with body mass index (BMI) of 28 to 28.9 in adult    4. Depressive disorder    5. IC (interstitial cystitis)    6. Atrophic kidney              Expected course, medications, and adverse effects discussed.  Call or return if worsening or persistent symptoms.  I wore protective equipment throughout this patient encounter including a mask, gloves, and eye protection.  Hand hygiene was performed before donning protective equipment and after removal when leaving the room. The complete contents of the Assessment and Plan and Data/Lab Results as documented above have been reviewed and addressed by myself with the patient today as part of an ongoing evaluation / treatment plan.  If some of the documentation has been copied from a previous note and is unchanged it indicates that this problem / plan has been assessed today but is stable from a previous visit and no changes have been recommended.

## 2022-09-07 ENCOUNTER — OFFICE VISIT (OUTPATIENT)
Dept: FAMILY MEDICINE CLINIC | Facility: CLINIC | Age: 72
End: 2022-09-07

## 2022-09-07 VITALS
HEART RATE: 60 BPM | DIASTOLIC BLOOD PRESSURE: 62 MMHG | BODY MASS INDEX: 28.78 KG/M2 | SYSTOLIC BLOOD PRESSURE: 144 MMHG | TEMPERATURE: 98.2 F | HEIGHT: 62 IN | RESPIRATION RATE: 18 BRPM | WEIGHT: 156.4 LBS | OXYGEN SATURATION: 98 %

## 2022-09-07 DIAGNOSIS — E66.3 OVERWEIGHT WITH BODY MASS INDEX (BMI) OF 28 TO 28.9 IN ADULT: ICD-10-CM

## 2022-09-07 DIAGNOSIS — I10 BENIGN ESSENTIAL HTN: ICD-10-CM

## 2022-09-07 DIAGNOSIS — N26.1 ATROPHIC KIDNEY: ICD-10-CM

## 2022-09-07 DIAGNOSIS — F32.A DEPRESSIVE DISORDER: ICD-10-CM

## 2022-09-07 DIAGNOSIS — N30.10 IC (INTERSTITIAL CYSTITIS): ICD-10-CM

## 2022-09-07 DIAGNOSIS — E78.2 MIXED HYPERLIPIDEMIA: Primary | Chronic | ICD-10-CM

## 2022-09-07 PROCEDURE — 99214 OFFICE O/P EST MOD 30 MIN: CPT | Performed by: FAMILY MEDICINE

## 2022-09-07 RX ORDER — TRIMETHOPRIM 100 MG/1
TABLET ORAL
COMMUNITY
Start: 2022-08-05 | End: 2022-11-23

## 2022-09-07 RX ORDER — SULFAMETHOXAZOLE AND TRIMETHOPRIM 800; 160 MG/1; MG/1
TABLET ORAL
COMMUNITY
Start: 2022-08-05 | End: 2022-11-23

## 2022-09-07 RX ORDER — SODIUM POLYSTYRENE SULFONATE 15 G/60ML
SUSPENSION ORAL; RECTAL
COMMUNITY
Start: 2022-08-30 | End: 2022-12-05

## 2022-09-19 ENCOUNTER — HOSPITAL ENCOUNTER (OUTPATIENT)
Dept: ULTRASOUND IMAGING | Facility: HOSPITAL | Age: 72
Discharge: HOME | End: 2022-09-19

## 2022-09-19 ENCOUNTER — HOSPITAL ENCOUNTER (OUTPATIENT)
Dept: CARDIOLOGY | Facility: HOSPITAL | Age: 72
Discharge: HOME | End: 2022-09-19

## 2022-09-19 DIAGNOSIS — N18.31 CHRONIC KIDNEY DISEASE, STAGE 3A: ICD-10-CM

## 2022-09-19 LAB
BH CV ECHO MEAS - DIST REN A EDV LEFT: 12.6 CM/S
BH CV ECHO MEAS - DIST REN A PSV LEFT: 75.8 CM/S
BH CV ECHO MEAS - MID REN A EDV LEFT: 10.4 CM/S
BH CV ECHO MEAS - MID REN A PSV LEFT: 70.3 CM/S
BH CV ECHO MEAS - PROX REN A EDV LEFT: 18.1 CM/S
BH CV ECHO MEAS - PROX REN A PSV LEFT: 106 CM/S
BH CV VAS RENAL AORTIC MID PSV: 110 CM/S
BH CV XLRA MEAS - KID L LEFT: 12.2 CM
BH CV XLRA MEAS RAR LEFT: 0.96
LEFT RENAL UPPER PARENCHYMA MAX: 20.2 CM/S
LEFT RENAL UPPER PARENCHYMA MIN: 6.88 CM/S
LEFT RENAL UPPER PARENCHYMA RI: 0.66
MAXIMAL PREDICTED HEART RATE: 147 BPM
STRESS TARGET HR: 125 BPM

## 2022-09-19 PROCEDURE — 76775 US EXAM ABDO BACK WALL LIM: CPT

## 2022-09-19 PROCEDURE — 93975 VASCULAR STUDY: CPT

## 2022-10-07 ENCOUNTER — FLU SHOT (OUTPATIENT)
Dept: FAMILY MEDICINE CLINIC | Facility: CLINIC | Age: 72
End: 2022-10-07

## 2022-10-07 DIAGNOSIS — Z23 NEED FOR INFLUENZA VACCINATION: Primary | ICD-10-CM

## 2022-10-07 PROCEDURE — 90662 IIV NO PRSV INCREASED AG IM: CPT | Performed by: FAMILY MEDICINE

## 2022-10-07 PROCEDURE — G0008 ADMIN INFLUENZA VIRUS VAC: HCPCS | Performed by: FAMILY MEDICINE

## 2022-10-24 ENCOUNTER — TELEPHONE (OUTPATIENT)
Dept: FAMILY MEDICINE CLINIC | Facility: CLINIC | Age: 72
End: 2022-10-24

## 2022-10-27 ENCOUNTER — CLINICAL SUPPORT (OUTPATIENT)
Dept: FAMILY MEDICINE CLINIC | Facility: CLINIC | Age: 72
End: 2022-10-27

## 2022-10-27 DIAGNOSIS — M81.0 SENILE OSTEOPOROSIS: Primary | ICD-10-CM

## 2022-10-27 DIAGNOSIS — N95.1 MENOPAUSAL SYNDROME: ICD-10-CM

## 2022-10-27 PROCEDURE — 96372 THER/PROPH/DIAG INJ SC/IM: CPT | Performed by: FAMILY MEDICINE

## 2022-11-08 ENCOUNTER — TRANSCRIBE ORDERS (OUTPATIENT)
Dept: ADMINISTRATIVE | Facility: HOSPITAL | Age: 72
End: 2022-11-08

## 2022-11-08 DIAGNOSIS — Z12.31 VISIT FOR SCREENING MAMMOGRAM: Primary | ICD-10-CM

## 2022-11-16 ENCOUNTER — OFFICE VISIT (OUTPATIENT)
Dept: FAMILY MEDICINE CLINIC | Facility: CLINIC | Age: 72
End: 2022-11-16

## 2022-11-16 VITALS
BODY MASS INDEX: 29.37 KG/M2 | RESPIRATION RATE: 16 BRPM | WEIGHT: 159.6 LBS | HEART RATE: 68 BPM | TEMPERATURE: 98.4 F | SYSTOLIC BLOOD PRESSURE: 148 MMHG | OXYGEN SATURATION: 99 % | DIASTOLIC BLOOD PRESSURE: 88 MMHG | HEIGHT: 62 IN

## 2022-11-16 DIAGNOSIS — M81.0 SENILE OSTEOPOROSIS: ICD-10-CM

## 2022-11-16 DIAGNOSIS — J06.9 ACUTE URI: ICD-10-CM

## 2022-11-16 DIAGNOSIS — E66.3 OVERWEIGHT WITH BODY MASS INDEX (BMI) OF 29 TO 29.9 IN ADULT: ICD-10-CM

## 2022-11-16 DIAGNOSIS — I10 BENIGN ESSENTIAL HTN: Primary | ICD-10-CM

## 2022-11-16 DIAGNOSIS — M17.0 BILATERAL PRIMARY OSTEOARTHRITIS OF KNEE: ICD-10-CM

## 2022-11-16 DIAGNOSIS — I49.8 FLUTTERING HEART: ICD-10-CM

## 2022-11-16 DIAGNOSIS — R07.9 CHEST PAIN, UNSPECIFIED TYPE: ICD-10-CM

## 2022-11-16 DIAGNOSIS — E78.2 MIXED HYPERLIPIDEMIA: Chronic | ICD-10-CM

## 2022-11-16 DIAGNOSIS — Z90.5 SOLITARY KIDNEY, ACQUIRED: ICD-10-CM

## 2022-11-16 PROCEDURE — 99214 OFFICE O/P EST MOD 30 MIN: CPT | Performed by: FAMILY MEDICINE

## 2022-11-16 RX ORDER — AMLODIPINE BESYLATE 2.5 MG/1
2.5 TABLET ORAL DAILY
Qty: 90 TABLET | Refills: 3 | Status: SHIPPED | OUTPATIENT
Start: 2022-11-16 | End: 2022-11-23 | Stop reason: DRUGHIGH

## 2022-11-16 RX ORDER — HYDRALAZINE HYDROCHLORIDE 50 MG/1
50 TABLET, FILM COATED ORAL 2 TIMES DAILY
Qty: 90 TABLET | Refills: 3 | Status: SHIPPED | OUTPATIENT
Start: 2022-11-16 | End: 2022-12-05 | Stop reason: DRUGHIGH

## 2022-11-23 ENCOUNTER — OFFICE VISIT (OUTPATIENT)
Dept: CARDIOLOGY | Facility: CLINIC | Age: 72
End: 2022-11-23

## 2022-11-23 VITALS
BODY MASS INDEX: 29.08 KG/M2 | WEIGHT: 158 LBS | HEART RATE: 53 BPM | RESPIRATION RATE: 18 BRPM | HEIGHT: 62 IN | SYSTOLIC BLOOD PRESSURE: 174 MMHG | DIASTOLIC BLOOD PRESSURE: 66 MMHG

## 2022-11-23 DIAGNOSIS — R07.89 CHEST PAIN, ATYPICAL: Primary | ICD-10-CM

## 2022-11-23 PROCEDURE — 99214 OFFICE O/P EST MOD 30 MIN: CPT | Performed by: INTERNAL MEDICINE

## 2022-11-23 RX ORDER — CARVEDILOL 12.5 MG/1
12.5 TABLET ORAL 2 TIMES DAILY
Qty: 60 TABLET | Refills: 5 | Status: SHIPPED | OUTPATIENT
Start: 2022-11-23 | End: 2022-11-28

## 2022-11-23 RX ORDER — MULTIPLE VITAMINS W/ MINERALS TAB 9MG-400MCG
1 TAB ORAL DAILY
COMMUNITY

## 2022-11-23 RX ORDER — DENOSUMAB 60 MG/ML
INJECTION SUBCUTANEOUS
COMMUNITY

## 2022-11-23 RX ORDER — AMLODIPINE BESYLATE 5 MG/1
5 TABLET ORAL 2 TIMES DAILY
Qty: 60 TABLET | Refills: 5 | Status: SHIPPED | OUTPATIENT
Start: 2022-11-23 | End: 2022-11-28

## 2022-11-23 NOTE — PROGRESS NOTES
Cardiology Clinic Note  Marco A Winston MD, PhD    Subjective:     Encounter Date:11/23/2022      Patient ID: Dunia Fabian is a 72 y.o. female.    Chief Complaint:  Chief Complaint   Patient presents with   • Chest Pain       HPI:    I had the pleasure to see this very pleasant 72-year-old female today as a new patient referred for chest pain.  She has a history of CKD stage III with solitary kidney with nephrectomy in the past.  History of some mitral valve insufficiency.  She has very uncontrolled blood pressures at 174/66 today with heart rate in the 50s on bisoprolol.  She has history of palpitations hypertension hyperlipidemia, murmur systolic in nature, mother and father and brother all have heart disease but unspecified, she is not diabetic and she is a non-smoker.  She has some chest pain substernal without significant radiation but blood pressures been very uncontrolled recently which is her primary reason for referral.  Doppler arterial ultrasound of the left renal which is her remaining kidney demonstrated normal Doppler findings with no evidence of stenosis.  Surgically absent right Candy.  Her losartan was recently discontinued with elevated creatinine of 1.6 and August, September value was much better at 1.1.  She does drink a lot of water liberally to hydrate her remaining kidney she says.  She does have JVD HJR with some lower extremity edema on exam likely contributing to her blood pressures.  She is not currently on diuretics.  Overall she says she just feels poorly and her blood pressures been very frustrating and concerning    Review of systems otherwise negative x14 point review of systems except as mentioned above    Historical data copied forward from previous encounters in EMR including the history, exam, and assessment/plan has been reviewed and is unchanged unless noted otherwise.    Cardiac medicines reviewed with risk, benefits, and necessity of each discussed.    Risk and benefit of  "cardiac testing reviewed including death heart attack stroke pain bleeding infection need for vascular /cardiovascular surgery were discussed and the patient     Objective:         /66 (BP Location: Left arm, Patient Position: Sitting)   Pulse 53   Resp 18   Ht 157.5 cm (62.01\")   Wt 71.7 kg (158 lb)   BMI 28.89 kg/m²     Physical Exam  Bradycardic at 55-60, no rubs gallops heave or lift  1 out of 6 systolic ejection murmur left sternal border  No clubbing cyanosis with trace to 1+ edema  Positive HJR and JVD  Clear to auscultation  Obese soft nontender nondistended  Normal pulses normal cap refill  Tach grossly  Normocephalic/atraumatic pupils are ground  Assessment:         Diagnoses and all orders for this visit:    1. Chest pain, atypical (Primary)  -     Adult Transthoracic Echo Complete W/ Cont if Necessary Per Protocol; Future    Other orders  -     carvedilol (COREG) 12.5 MG tablet; Take 1 tablet by mouth 2 (Two) Times a Day.  Dispense: 60 tablet; Refill: 5  -     amLODIPine (NORVASC) 5 MG tablet; Take 1 tablet by mouth 2 (Two) Times a Day.  Dispense: 60 tablet; Refill: 5      Uncontrolled hypertension  Volume overload  CKD stage III with remaining solitary left kidney, right kidney removed remotely  Atypical chest pain  Systolic ejection murmur on exam    Stop Bystolic and change to carvedilol 12.5 twice daily for better afterload reduction  Increase amlodipine to 5 mg twice a day  Continue hydralazine 25 to 50 mg twice a day  Clonidine will be moved to as needed for greater than 160 systolic  Remain off Cardura presently  Avoid nitrates presently    Control her blood pressure less than 135 systolic  She remains with chest discomfort would recommend ischemic evaluation  2D echo for structural functional evaluation with systolic ejection murmur and evaluate EF    See her back in 2 to 4 weeks    The pleasure to be involved in this patient's cardiovascular care.  Please call with any questions or " concerns  Marco A Winston MD, PhD    Most recent EKG as reviewed and interpreted by me:  Procedures     Most recent echo as reviewed and interpreted by me:  Results for orders placed during the hospital encounter of 01/20/21    Adult Transthoracic Echo Complete W/ Cont if Necessary Per Protocol    Interpretation Summary  · Left ventricular ejection fraction appears to be 61 - 65%.  · The right ventricular cavity is mildly dilated.  · No pericardial effusion noted      Most recent stress test as reviewed and interpreted by me:      Most recent cardiac catheterization as reviewed interpreted by me:  No results found for this or any previous visit.    The following portions of the patient's history were reviewed and updated as appropriate: allergies, current medications, past medical history, past social history, past surgical history and problem list.      ROS:  14 point review of systems negative except as mentioned above    Current Outpatient Medications:   •  aspirin 81 MG EC tablet, Take 81 mg by mouth Daily., Disp: , Rfl:   •  cloNIDine (CATAPRES) 0.1 MG tablet, Take 0.1 mg by mouth Every 4 (Four) Hours As Needed., Disp: , Rfl:   •  denosumab (Prolia) 60 MG/ML solution prefilled syringe syringe, Inject  under the skin into the appropriate area as directed Every 6 (Six) Months., Disp: , Rfl:   •  hydrALAZINE (APRESOLINE) 50 MG tablet, Take 1 tablet by mouth 2 (Two) Times a Day. (Patient taking differently: Take 25 mg by mouth 2 (Two) Times a Day.), Disp: 90 tablet, Rfl: 3  •  multivitamin with minerals (HAIR SKIN & NAILS ADVANCED PO), Take 1 tablet by mouth Daily., Disp: , Rfl:   •  rosuvastatin (CRESTOR) 5 MG tablet, TAKE 1 TABLET BY MOUTH DAILY, Disp: 90 tablet, Rfl: 1  •  SUPER B COMPLEX/C capsule, SUPER B COMPLEX/C ORAL CAPSULE, Disp: , Rfl:   •  tamsulosin (FLOMAX) 0.4 MG capsule 24 hr capsule, Take 1 capsule by mouth Daily., Disp: , Rfl:   •  vitamin D3 125 MCG (5000 UT) capsule capsule, Take 5,000 Units by  mouth Daily., Disp: , Rfl:   •  amLODIPine (NORVASC) 5 MG tablet, Take 1 tablet by mouth 2 (Two) Times a Day., Disp: 60 tablet, Rfl: 5  •  carvedilol (COREG) 12.5 MG tablet, Take 1 tablet by mouth 2 (Two) Times a Day., Disp: 60 tablet, Rfl: 5  •  SPS 15 GM/60ML suspension, , Disp: , Rfl:     Problem List:  Patient Active Problem List   Diagnosis   • Acid reflux   • Allergic rhinitis   • Anxiety   • Atrophic kidney   • Carotid bruit present   • Carpal tunnel syndrome   • Low back pain   • Neck pain   • Thoracic back pain   • Depressive disorder   • Medicare annual wellness visit, subsequent   • Gout   • Hemorrhoids   • Mixed hyperlipidemia   • IC (interstitial cystitis)   • Irritable bowel syndrome with constipation   • LVH (left ventricular hypertrophy)   • Menopausal syndrome   • Mitral insufficiency, acute   • Onychomycosis   • Disorder of bone and cartilage   • Age-related osteoporosis without current pathological fracture   • Palpitations   • Osteoarthritis   • Primary pulmonary hypertension (HCC)   • Renal insufficiency   • Plantar fasciitis   • Senile osteoporosis   • Solitary kidney   • Encounter for screening mammogram for malignant neoplasm of breast   • Special screening for malignant neoplasms, colon   • Seborrheic keratosis   • Chronic kidney disease, stage 3 (moderate)   • Renal hypertrophy   • Hypercalcemia   • Acute cystitis with hematuria   • Lower extremity edema   • Overweight with body mass index (BMI) of 29 to 29.9 in adult   • Dizziness   • Left hip pain   • Bilateral primary osteoarthritis of knee   • Elevated fasting blood sugar   • Solitary kidney, acquired   • Benign essential HTN     Past Medical History:  Past Medical History:   Diagnosis Date   • Acid reflux     Impression: Discussed diet, exercise, lifestyle modification. start ppi Call / return if persistent symptoms.   • Acute bronchitis     Impression: Discussed the plan of care and anticipated course of illness with antibiotic  treatment. Educated on side effects of antibiotics and hydromet. Pt was educated on the effects of decongestants on bp. She was encouraged to stop nyquil and decongestants. Frequent fluids and rest were encouraged. Pt was told if he symptoms do no improve within one week to return for further evaluation.   • Acute cystitis with hematuria     Impression: Findings discussed. All questions answered. Medication and medication adverse effects discussed. Drug education given and explained to patient. Patient verbalized understanding. Follow-up in approximately 3 days for reevaluation if not improved. Follow-up sooner for worsening symptoms or any concerns. Increase fluids   • Acute sinusitis    • Allergic rhinitis     Impression: Stable.   • Anxiety    • Arm weakness     Impression: episode weakness / stiffness l hand and foot, resolved currently ddx includes tia / flare arhtritis / carpal tunnel consider recent working hard moving, rx in progress check carotid dopplers, echo, restart asa Follow up 2 months. Call / return if if recurrant symptoms.   • Arthralgia     Impression: Will call with lab results. May need referral to Rheumatology.   • Arthritis     Impression: history of treatment for lupus with medication in remote past - recheck bloodwork   • Atrophic kidney     Impression: improve status post nephrectomy followed by urology   • Back pain     Impression: traumatic ice 20 minutes bid Rehabilitation exercises discussed. xray without fracture Consider further imaging if symptoms persist   • Blurry vision     Impression: transient episode, resolved has had benign optho eval per her report, will get records ddx ncludes tia check carotid dopplers, holter continue asa Follow up [id number] Call / return if if recurrant symptoms.   • Carotid bruit    • Carpal tunnel syndrome     Impression: followed by hand surgery continue qhs bracing consider Follow up for repeat injxn, surgery if persistent symptoms.   • Cellulitis      Impression: Topical care discussed. Call / return if persistent symptoms.   • Chest pain    • Constipation     Impression: improved today - increase fluids, fiber - did not tolerate metamucil, change to fiber tablet / titratete - continue otc lactulose prm - consider amitiza - Follow up recheck   • Contusion of knee, right     Impression: Ice. Elevate. Rest. Discussed anticipated course. Given copy of x-rays on disc to take to Dr. Warren tomorrow.   • Depressive disorder     Impression: conflict with family currently, expects resolution when moves to [place] later this mos to live with new  Good social support start ativan prn, use sparingly consider ssri if persistent symptoms.   • Disorder of bone and cartilage     Impression: stable / slightly worse per dexa (osteopenia, fn -1.5) discussed calcium, vit d start prolia Discussed diet, exercise, lifestyle modification. recheck 1 year   • Disorder of skin and subcutaneous tissue    • Dog bite     Impression: her neighbors dog, is healthy Topical care discussed. Signs and symptoms of infecton discussed. Call / return if fever, worsening symptoms.   • Dysuria     Impression: likely 2nd uti / IC flare ct with atrophic r kidney with stones in r kidney only, xray lspine without fracture, wbc normal continue antibiothiics + urinary retentinon / catheter in place urology Follow up scheduled monday go to ED if fever, unable to keep fluids down, worsening symptoms.   • Encounter for long-term (current) use of medications    • Enrolled in chronic care management    • External otitis     Impression: Topical care discussed. Call / return if persistent symptoms.   • Flank pain    • Fracture, foot     Impression: Left. [facility]ed at [facility] today - will call for records. Keep appointment with Dr. Warren for tomorrow. She was given prescription for Oxycodone in the ER.   • Fracture, toe     Impression: base prox phalax great toe, nondisplaced, improving nita taping / hard soled shoe  Call / return if persistent symptoms.   • Generalized abdominal pain     Impression: llq, suprapubic, rlq cbc normal likely 2nd flare interstitial cystitis, uti, self cathing currently ddx includes diverticultis start antibiotics, cx sent, Follow up recheck has urology Follow up next week Call / return if fever, unable to keep fluids down, worsening symptoms. Consider imaging if persistent symptoms.   • Gout     Impression: uric acid 7 on 4/12 doing well on allopurinol Follow up recheck levels Discussed diet, lifestyle modification.   • H/O drug therapy    • Hematuria, microscopic    • Hemorrhoids     Impression: improved, increase fluids / fiber Topical care discussed. consider colorectal surgery referrall if persistent symptoms.   • Hip pain     Impression: oa, worse / flare, refractory to pt ortho ref sched ice 20 minutes bid Rehabilitation exercises discussed. consider add tramadol   • Hyperlipidemia     Impression: good control ------------- Stable Compliant with meds Is getting adequate diet and exercise Goals developed at last visit were met Care management needs are self addressed. Discussed diet, exercise, lifestyle modification.   • Hypertension     Impression: good control Discussed low sodium diet, lifestyle modification.   • Hypertension, benign     Impression: good control holding lisinopril secondary to renal insufficiency   • IC (interstitial cystitis)     Impression: improved on flomax, followed by [facility]lan h/o interstim placement / subsequent removal h/o improved on rx per morena, pt Discussed diet, lifestyle modification. followed by Zulema / Ernie, s/p recentt removal interstim 2nd inneffective   • Inflammatory and toxic neuropathy (HCC)     Impression: Trial of medications to see if she can get some relief.   • Influenza     Impression: Push fluids, bland diet. Signs and symptoms of dehydration discussed. Prophylactic rx written for close contacts. Call / return if unable to keep fluids down,  worsening symptoms.   • Irritable bowel syndrome with constipation     Impression: Occasional flares. Change with stress and diet.   • Knee pain     Impression: Right. X-ray without obvious acute process - awaiting Radiologist's official report.   • Low back pain     Impression: improved lumbar MRI with moderate ddd 2017, x-ray left hip with mild arthritis. Doing well, improved with epidural injections currently. s/p course Physical therapy. followed by Dr. Beavers / improved with epidural injxn, consider repeat course.   • LVH (left ventricular hypertrophy)     Impression: htn well controlled recheck echo   • Malaise and fatigue     Impression: much improved today possibly 2nd recent nephrectomy bloodwork normal consider further eval if persistent symptoms. follow up 2 to 3 months   • Medicare annual wellness visit, subsequent     Impression: doing well, vaccines current Age specific anticipatory guidance and warning signs discussed. Diet, exercise, and lifestyle modification discussed. Safety, seatbelts, and routine screening examinations discussed. Discussed self-examinations.   • Menopausal syndrome     Impression: current on mammogram / followed by ob/gyn (Dr. Claros) Recommend follow up visit for comprehensive physical exam, coordination of care, and screening tests.   • Mitral insufficiency, acute    • Myalgia    • Neck pain     Impression: strain ice 20 minutes bid Rehabilitation exercises discussed. Consider imaging if persistent symptoms.   • Onychomycosis    • Osteoarthritis     Impression: She has an appointment for another opinion.   • Osteoporosis     Impression: improved, tolerating prolia discussed calcium, vit d Follow up recheck dxa   • Overweight (BMI 25.0-29.9)    • Pain in soft tissues of limb    • Palpitations    • Plantar fasciitis     Impression: qhs bracing / heel cups ice 20 minutes bid nsaids Rehabilitation exercises discussed. Call / return if persistent symptoms.   • Primary pulmonary  hypertension (HCC)    • Pruritus    • Psoriasis    • Rectal bleeding     Impression: likely 2nd hemorrhoids, rx in orogress cbc normal, no tachycardia Follow up recheck Call / return if worsening symptoms.   • Renal insufficiency     Impression: mild / stable / improved s/p left nephrectomy bell avoid nsaids, improved off lisinopril / hctz / pyridium no blood draws in right arm   • Rotator cuff tendonitis, right     Impression: strain ice 20 minutes bid Rehabilitation exercises discussed. Consider imaging, joint injxn if persistent symptoms.   • RUQ pain     Impression: much improved on ppi gerd vs gb pathology ruq us neg Follow up recheck Call / return if unable to keep fluids down, fever, worsening symptoms.   • Screening for depression     Negative Depression Screening (4 or less) ()   • Screening mammogram, encounter for    • Seborrheic keratosis     Impression: left forehead, benign appearance Topical care discussed. Follow up recheck apt with dermatology upcoming continue to monitor ccliniclly / consider resectino if worsening symptoms.   • Solar lentiginosis     Impression: r face, benign appearance Topical care discussed. Call / return if lesion increases in size, changes in shape or color, or becomes tender or inflamed. Discussed skin care, use of sun block and protective clothing.   • Solitary kidney     Impression: doing well, renal f negative normal avoid nsaids / continue to monitor   • Special screening for malignant neoplasms, colon     Impression: Negative colonoscopy by Dr. Goff in 2016 (diverticulosis only)   • Telogen effluvium    • Thoracic back pain    • TMJ arthritis     Impression: Rehabilitation exercises discussed. restart robaxin qhs prn   • Tricuspid valve disorder    • URI, acute     Impression: Increase fluid intake. Mucinex Call / return if fever, respiratory difficulty, worsening symptoms.   • Urinary incontinence     Impression: She has been self catheterizing.   • Urinary  "retention     Impression: catheter in place / urology Follow up scheduled     Past Surgical History:  Past Surgical History:   Procedure Laterality Date   • CATARACT EXTRACTION Left 08/2005    with Insert of Lens Prostheti   • CYSTOCELE REPAIR  06/1998    SIMENTAL Cystourethropexy & Cystocele Repair    • KIDNEY SURGERY Right 02/24/2014    Removal   • TONSILLECTOMY  1953   • URETHROLYSIS  10/2000    Transvaginal Urethrolysis & Bone [place] Sling   • VAGINAL HYSTERECTOMY  1970   • VITRECTOMY Left 04/2005    & Membrane Peeling     Social History:  Social History     Socioeconomic History   • Marital status: Single   Tobacco Use   • Smoking status: Never   • Smokeless tobacco: Never   Vaping Use   • Vaping Use: Never used   Substance and Sexual Activity   • Alcohol use: Not Currently   • Drug use: No   • Sexual activity: Defer     Allergies:  Allergies   Allergen Reactions   • Contrast Dye Anaphylaxis   • Gadolinium Hives     \"crawling\" feeling    • Iodine Anaphylaxis   • Shrimp Anaphylaxis     Immunizations:  Immunization History   Administered Date(s) Administered   • Covid-19 (Pfizer) Gray Cap 02/03/2021, 02/24/2021, 09/24/2021   • Flu Vaccine Quad PF >36MO 08/31/2018   • Fluzone High Dose =>65 Years ([employer] ONLY) 09/13/2019   • Fluzone High-Dose 65+yrs 10/07/2022   • Hepatitis A 12/12/2018, 08/06/2019   • PEDS-Pneumococcal Conjugate (PCV7) 10/04/2019   • Pneumococcal Conjugate 13-Valent (PCV13) 08/31/2018   • Pneumococcal Polysaccharide (PPSV23) 10/04/2019   • Pneumococcal, Unspecified 09/25/2013   • Tdap 11/14/2007, 10/24/2012   • Zostavax 12/12/2018   • Zoster, Unspecified 10/01/2013, 08/31/2018, 12/13/2018            In-Office Procedure(s):  No orders to display        ASCVD RIsk Score::  The 10-year ASCVD risk score (Nicol DK, et al., 2019) is: 32.2%    Values used to calculate the score:      Age: 72 years      Sex: Female      Is Non- : No      Diabetic: No      Tobacco smoker: No      " Systolic Blood Pressure: 174 mmHg      Is BP treated: Yes      HDL Cholesterol: 52 mg/dL      Total Cholesterol: 187 mg/dL    Imaging:    Results for orders placed in visit on 04/27/21    XR Hip With or Without Pelvis 2 - 3 View Left    Narrative  bilateral Hip X-Ray  Indication: Pain and discomfort of both the hips.  AP and lateral  Findings: Early osteoarthritis with some narrowing of the joint space  no bony lesion  Soft tissues within normal limits  decreased joint spaces  Hardware appropriately positioned not applicable      no prior studies available for comparison.    X-RAY was ordered and reviewed by Abdulaziz Angel MD       Results for orders placed during the hospital encounter of 09/19/22    US Renal Bilateral    Narrative  DATE OF EXAM:  9/19/2022 9:41 AM    PROCEDURE:  US RENAL BILATERAL-    INDICATIONS:  CHRONIC KIDNEY DISEASE; N18.31-Chronic kidney disease, stage 3a. HISTORY  of right nephrectomy.    COMPARISON:  Renal ultrasound 1/27/2021    TECHNIQUE:  Grayscale and color Doppler ultrasound evaluation of the kidneys and  urinary bladder was performed.      FINDINGS:  The right kidney is surgically absent. The left kidney measures 13.2 x  4.4 x 4.8 cm. The left kidney maintains normal cortical thickness and  cortical echotexture. No cystic or solid left renal lesion is  identified. No shadowing left renal stone or hydronephrosis is  identified.    The urinary bladder is not visualized. It is decompressed.    Impression  1. Normal sonographic appearance of the left kidney. Right nephrectomy.  Urinary bladder is not visualized, as it is decompressed.    Electronically Signed By-Caryl Huang MD On:9/19/2022 12:16 PM  This report was finalized on [id number] by  Caryl Huang MD.          Lab Review:   Hospital Outpatient Visit on 09/19/2022   Component Date Value   • Target HR (85%) 09/19/2022 125    • Max. Pred. HR (100%) 09/19/2022 147    • Kid L 09/19/2022 12.2    • PROX CARRIE A PSV LEFT 09/19/2022  106.0    • PROX CARRIE A EDV LEFT 09/19/2022 18.1    • MID CARRIE A PSV LEFT 09/19/2022 70.3    • MID CARRIE A EDV LEFT 09/19/2022 10.4    • DIST CARRIE A PSV LEFT 09/19/2022 75.8    • DIST CARRIE A EDV LEFT 09/19/2022 12.6    • Left renal upper parench* 09/19/2022 20.2    • Left renal upper parench* 09/19/2022 6.88    • Aortic Mid PSV 09/19/2022 110    • RAR LEFT 09/19/2022 0.96    • Left renal upper parench* 09/19/2022 0.66      Recent labs reviewed and interpreted for clinical significance and application            Level of Care:           Marco A Winston MD  11/23/22  .

## 2022-11-28 RX ORDER — AMLODIPINE BESYLATE 5 MG/1
TABLET ORAL
Qty: 180 TABLET | Refills: 1 | Status: SHIPPED | OUTPATIENT
Start: 2022-11-28

## 2022-11-28 RX ORDER — CARVEDILOL 12.5 MG/1
TABLET ORAL
Qty: 180 TABLET | Refills: 1 | Status: SHIPPED | OUTPATIENT
Start: 2022-11-28

## 2022-12-01 ENCOUNTER — HOSPITAL ENCOUNTER (OUTPATIENT)
Dept: CARDIOLOGY | Facility: HOSPITAL | Age: 72
Discharge: HOME | End: 2022-12-01
Admitting: INTERNAL MEDICINE

## 2022-12-01 VITALS
BODY MASS INDEX: 29.09 KG/M2 | SYSTOLIC BLOOD PRESSURE: 132 MMHG | DIASTOLIC BLOOD PRESSURE: 56 MMHG | WEIGHT: 158.07 LBS | HEIGHT: 62 IN

## 2022-12-01 DIAGNOSIS — R07.89 CHEST PAIN, ATYPICAL: ICD-10-CM

## 2022-12-01 LAB
BH CV ECHO MEAS - ACS: 1.51 CM
BH CV ECHO MEAS - AI P1/2T: 668 MSEC
BH CV ECHO MEAS - AO MAX PG: 20.9 MMHG
BH CV ECHO MEAS - AO MEAN PG: 9.7 MMHG
BH CV ECHO MEAS - AO ROOT DIAM: 2.5 CM
BH CV ECHO MEAS - AO V2 MAX: 228.6 CM/SEC
BH CV ECHO MEAS - AO V2 VTI: 49.6 CM
BH CV ECHO MEAS - AVA(I,D): 2.19 CM2
BH CV ECHO MEAS - EDV(CUBED): 107.6 ML
BH CV ECHO MEAS - EDV(MOD-SP4): 53.1 ML
BH CV ECHO MEAS - EF(MOD-BP): 65 %
BH CV ECHO MEAS - EF(MOD-SP4): 63.1 %
BH CV ECHO MEAS - ESV(CUBED): 28.5 ML
BH CV ECHO MEAS - ESV(MOD-SP4): 19.6 ML
BH CV ECHO MEAS - FS: 35.8 %
BH CV ECHO MEAS - IVS/LVPW: 0.9 CM
BH CV ECHO MEAS - IVSD: 0.92 CM
BH CV ECHO MEAS - LA DIMENSION: 3.7 CM
BH CV ECHO MEAS - LV DIASTOLIC VOL/BSA (35-75): 30.7 CM2
BH CV ECHO MEAS - LV MASS(C)D: 160.9 GRAMS
BH CV ECHO MEAS - LV MAX PG: 8.8 MMHG
BH CV ECHO MEAS - LV MEAN PG: 4.3 MMHG
BH CV ECHO MEAS - LV SYSTOLIC VOL/BSA (12-30): 11.3 CM2
BH CV ECHO MEAS - LV V1 MAX: 148.4 CM/SEC
BH CV ECHO MEAS - LV V1 VTI: 37 CM
BH CV ECHO MEAS - LVIDD: 4.8 CM
BH CV ECHO MEAS - LVIDS: 3.1 CM
BH CV ECHO MEAS - LVOT AREA: 2.9 CM2
BH CV ECHO MEAS - LVOT DIAM: 1.93 CM
BH CV ECHO MEAS - LVPWD: 1.02 CM
BH CV ECHO MEAS - MR MAX PG: 106.2 MMHG
BH CV ECHO MEAS - MR MAX VEL: 515.3 CM/SEC
BH CV ECHO MEAS - MV A MAX VEL: 110.6 CM/SEC
BH CV ECHO MEAS - MV DEC SLOPE: 338.3 CM/SEC2
BH CV ECHO MEAS - MV DEC TIME: 0.29 MSEC
BH CV ECHO MEAS - MV E MAX VEL: 99.5 CM/SEC
BH CV ECHO MEAS - MV E/A: 0.9
BH CV ECHO MEAS - MV MAX PG: 6.6 MMHG
BH CV ECHO MEAS - MV MEAN PG: 1.92 MMHG
BH CV ECHO MEAS - MV V2 VTI: 41 CM
BH CV ECHO MEAS - MVA(VTI): 2.6 CM2
BH CV ECHO MEAS - PA ACC TIME: 0.07 SEC
BH CV ECHO MEAS - PA PR(ACCEL): 45.6 MMHG
BH CV ECHO MEAS - PA V2 MAX: 119 CM/SEC
BH CV ECHO MEAS - PI END-D VEL: 110.5 CM/SEC
BH CV ECHO MEAS - PULM A REVS DUR: 0.09 SEC
BH CV ECHO MEAS - PULM A REVS VEL: 31.1 CM/SEC
BH CV ECHO MEAS - PULM DIAS VEL: 42 CM/SEC
BH CV ECHO MEAS - PULM S/D: 1.69
BH CV ECHO MEAS - PULM SYS VEL: 70.9 CM/SEC
BH CV ECHO MEAS - RAP SYSTOLE: 10 MMHG
BH CV ECHO MEAS - RVSP: 38 MMHG
BH CV ECHO MEAS - SI(MOD-SP4): 19.4 ML/M2
BH CV ECHO MEAS - SV(LVOT): 108.5 ML
BH CV ECHO MEAS - SV(MOD-SP4): 33.5 ML
BH CV ECHO MEAS - TAPSE (>1.6): 2.1 CM
BH CV ECHO MEAS - TR MAX PG: 37.5 MMHG
BH CV ECHO MEAS - TR MAX VEL: 304.5 CM/SEC
BH CV XLRA - RV BASE: 3.4 CM
BH CV XLRA - RV MID: 2 CM
MAXIMAL PREDICTED HEART RATE: 146 BPM
STRESS TARGET HR: 124 BPM

## 2022-12-01 PROCEDURE — 93306 TTE W/DOPPLER COMPLETE: CPT | Performed by: INTERNAL MEDICINE

## 2022-12-01 PROCEDURE — 93306 TTE W/DOPPLER COMPLETE: CPT

## 2022-12-02 ENCOUNTER — PATIENT ROUNDING (BHMG ONLY) (OUTPATIENT)
Dept: CARDIOLOGY | Facility: CLINIC | Age: 72
End: 2022-12-02

## 2022-12-02 NOTE — PROGRESS NOTES
December 2, 2022    Hello, may I speak with Dunia Fabian?    My name is Jing Jackson      I am  with [facility]  [address] [place] [address].    Before we get started may I verify your date of birth? 1950    I am calling to officially welcome you to our practice and ask about your recent visit. Is this a good time to talk? yes    Tell me about your visit with us. What things went well?  it went really good, Dr Winston was wonderful       We're always looking for ways to make our patients' experiences even better. Do you have recommendations on ways we may improve?  no  Everything was good    Overall were you satisfied with your first visit to our practice? Yes       I appreciate you taking the time to speak with me today. Is there anything else I can do for you? NO      Thank you, and have a great day.

## 2022-12-05 ENCOUNTER — OFFICE VISIT (OUTPATIENT)
Dept: CARDIOLOGY | Facility: CLINIC | Age: 72
End: 2022-12-05
Payer: MEDICARE

## 2022-12-05 VITALS
HEART RATE: 69 BPM | BODY MASS INDEX: 28.89 KG/M2 | HEIGHT: 62 IN | SYSTOLIC BLOOD PRESSURE: 147 MMHG | WEIGHT: 157 LBS | RESPIRATION RATE: 18 BRPM | DIASTOLIC BLOOD PRESSURE: 69 MMHG

## 2022-12-05 DIAGNOSIS — I51.7 LVH (LEFT VENTRICULAR HYPERTROPHY): ICD-10-CM

## 2022-12-05 DIAGNOSIS — E78.2 MIXED HYPERLIPIDEMIA: ICD-10-CM

## 2022-12-05 DIAGNOSIS — R07.89 CHEST PAIN, ATYPICAL: Primary | ICD-10-CM

## 2022-12-05 DIAGNOSIS — R00.2 PALPITATIONS: ICD-10-CM

## 2022-12-05 DIAGNOSIS — R09.89 CAROTID BRUIT, UNSPECIFIED LATERALITY: ICD-10-CM

## 2022-12-05 DIAGNOSIS — I10 BENIGN ESSENTIAL HTN: ICD-10-CM

## 2022-12-05 PROCEDURE — 1159F MED LIST DOCD IN RCRD: CPT | Performed by: INTERNAL MEDICINE

## 2022-12-05 PROCEDURE — 1160F RVW MEDS BY RX/DR IN RCRD: CPT | Performed by: INTERNAL MEDICINE

## 2022-12-05 PROCEDURE — 3078F DIAST BP <80 MM HG: CPT | Performed by: INTERNAL MEDICINE

## 2022-12-05 PROCEDURE — 99214 OFFICE O/P EST MOD 30 MIN: CPT | Performed by: INTERNAL MEDICINE

## 2022-12-05 PROCEDURE — 3077F SYST BP >= 140 MM HG: CPT | Performed by: INTERNAL MEDICINE

## 2022-12-05 RX ORDER — HYDRALAZINE HYDROCHLORIDE 25 MG/1
25 TABLET, FILM COATED ORAL 2 TIMES DAILY
Qty: 60 TABLET | Refills: 5 | Status: SHIPPED | OUTPATIENT
Start: 2022-12-05 | End: 2022-12-06

## 2022-12-06 RX ORDER — HYDRALAZINE HYDROCHLORIDE 25 MG/1
TABLET, FILM COATED ORAL
Qty: 180 TABLET | Refills: 1 | Status: SHIPPED | OUTPATIENT
Start: 2022-12-06 | End: 2023-04-03

## 2022-12-19 ENCOUNTER — HOSPITAL ENCOUNTER (OUTPATIENT)
Dept: MAMMOGRAPHY | Facility: HOSPITAL | Age: 72
Discharge: HOME | End: 2022-12-19
Admitting: OBSTETRICS & GYNECOLOGY

## 2022-12-19 DIAGNOSIS — Z12.31 VISIT FOR SCREENING MAMMOGRAM: ICD-10-CM

## 2022-12-19 PROCEDURE — 77063 BREAST TOMOSYNTHESIS BI: CPT

## 2022-12-19 PROCEDURE — 77067 SCR MAMMO BI INCL CAD: CPT

## 2023-01-02 NOTE — PROGRESS NOTES
Cardiology Clinic Note  Marco A Winston MD, PhD    Subjective:     Encounter Date:12/05/2022      Patient ID: Dunia Fabian is a 72 y.o. female.    Chief Complaint:  Chief Complaint   Patient presents with   • Follow-up       HPI:    I had the pleasure to see this very pleasant 72-year-old female today as a new patient initially referred for chest pain.  She has a history of CKD stage III with solitary kidney with nephrectomy in the past.  History of some mitral valve insufficiency.  She previously had very uncontrolled blood pressures at 174/66 on initial encounter with heart rate in the 50s on bisoprolol.  She has history of palpitations hypertension hyperlipidemia, murmur systolic in nature, mother and father and brother all have heart disease but unspecified, she is not diabetic and she is a non-smoker.  She has some chest pain substernal without significant radiation but blood pressures been very uncontrolled recently which is her primary reason for referral.  Doppler arterial ultrasound of the left renal which is her remaining kidney demonstrated normal Doppler findings with no evidence of stenosis.    Surgically absent right kidney.  Her losartan was  discontinued with elevated creatinine of 1.6 and August, September value was much better at 1.1.  She does drink a lot of water liberally to hydrate her remaining kidney she says.  Blood pressures are well controlled after previous changes to her regimen, 90-1 35 systolic at home.  She denies any unstable angina.  2D echo revealed normal EF at 65% with normal diastolic function with mild TR and normal pulmonary pressures.  She is reassured by these findings.  Wrist optimizing her regimens on Coreg amlodipine and hydralazine trying to decrease hydralazine to off as able or only as needed with long-acting medications once or twice a day only.    Review of systems otherwise negative x14 point review of systems except as mentioned above     Historical data copied  forward from previous encounters in EMR including the history, exam, and assessment/plan has been reviewed and is unchanged unless noted otherwise.     Cardiac medicines reviewed with risk, benefits, and necessity of each discussed.     Risk and benefit of cardiac testing reviewed including death heart attack stroke pain bleeding infection need for vascular /cardiovascular surgery were discussed and the patient      Objective:         Objective          Most recent blood pressure at home 100-1 35 systolic, today 147/69, repeat manual 137/62, heart rate of 69     Physical Exam  Regular rate and rhythm no rubs gallops heave or lift  1 out of 6 systolic ejection murmur left sternal border  No clubbing cyanosis with trace to 1+ edema  Positive HJR and JVD  Clear to auscultation  Obese soft nontender nondistended  Normal pulses normal cap refill  Tach grossly  Normocephalic/atraumatic pupils are ground  Unchanged  Assessment:         Assessment          Diagnoses and all orders for this visit:     1. Chest pain, atypical (Primary)  -     Adult Transthoracic Echo Complete W/ Cont if Necessary Per Protocol; Future     Other orders  -     carvedilol (COREG) 12.5 MG tablet; Take 1 tablet by mouth 2 (Two) Times a Day.  Dispense: 60 tablet; Refill: 5  -     amLODIPine (NORVASC) 5 MG tablet; Take 1 tablet by mouth 2 (Two) Times a Day.  Dispense: 60 tablet; Refill: 5     Atypical chest pain  Essential hypertension  Volume overload previously  CKD stage III with remaining solitary left kidney, right kidney removed remotely  Systolic ejection murmur on exam     Continue Coreg 12.5 twice daily  Amlodipine 5 twice daily  Decrease hydralazine to 25 twice daily  Clonidine will be moved to as needed for greater than 160 systolic  Remain off Cardura presently  Avoid nitrates presently     Control her blood pressure less than 135 systolic at home on readings, this is well controlled  She remains with chest discomfort would recommend  ischemic evaluation  2D echo for structural functional evaluation revealed an EF of 60 to 65% with normal diastolic function and normal pulmonary pressures with mild TR.    6 months     The pleasure to be involved in this patient's cardiovascular care.  Please call with any questions or concerns  Marco A Winston MD, PhD      Historical data copied forward from previous encounters in EMR including the history, exam, and assessment/plan has been reviewed and is unchanged unless noted otherwise.    Cardiac medicines reviewed with risk, benefits, and necessity of each discussed.    Risk and benefit of cardiac testing reviewed including death heart attack stroke pain bleeding infection need for vascular /cardiovascular surgery were discussed and the patient     Objective:         /69 (BP Location: Left arm, Patient Position: Sitting)   Pulse 69   Resp 18   Ht 157.5 cm (62\")   Wt 71.2 kg (157 lb)   BMI 28.72 kg/m²     Physical Exam    Assessment:         There are no diagnoses linked to this encounter.       Plan:              The pleasure to be involved in this patient's cardiovascular care.  Please call with any questions or concerns  Marco A Winston MD, PhD    Most recent EKG as reviewed and interpreted by me:  Procedures     Most recent echo as reviewed and interpreted by me:  Results for orders placed during the hospital encounter of 12/01/22    Adult Transthoracic Echo Complete W/ Cont if Necessary Per Protocol    Interpretation Summary  •  Left ventricular systolic function is normal. Left ventricular ejection fraction appears to be 61 - 65%.  •  Left ventricular diastolic function was normal.  •  Estimated right ventricular systolic pressure from tricuspid regurgitation is mildly elevated (35-45 mmHg). Calculated right ventricular systolic pressure from tricuspid regurgitation is 38 mmHg.      Most recent stress test as reviewed and interpreted by me:      Most recent cardiac catheterization as reviewed  interpreted by me:  No results found for this or any previous visit.    The following portions of the patient's history were reviewed and updated as appropriate: allergies, current medications, past family history, past medical history, past social history, past surgical history and problem list.      ROS:  14 point review of systems negative except as mentioned above    Current Outpatient Medications:   •  amLODIPine (NORVASC) 5 MG tablet, TAKE 1 TABLET BY MOUTH TWICE DAILY, Disp: 180 tablet, Rfl: 1  •  aspirin 81 MG EC tablet, Take 81 mg by mouth Daily., Disp: , Rfl:   •  carvedilol (COREG) 12.5 MG tablet, TAKE 1 TABLET BY MOUTH TWICE DAILY, Disp: 180 tablet, Rfl: 1  •  cloNIDine (CATAPRES) 0.1 MG tablet, Take 0.1 mg by mouth Every 4 (Four) Hours As Needed., Disp: , Rfl:   •  denosumab (Prolia) 60 MG/ML solution prefilled syringe syringe, Inject  under the skin into the appropriate area as directed Every 6 (Six) Months., Disp: , Rfl:   •  multivitamin with minerals tablet tablet, Take 1 tablet by mouth Daily., Disp: , Rfl:   •  rosuvastatin (CRESTOR) 5 MG tablet, TAKE 1 TABLET BY MOUTH DAILY, Disp: 90 tablet, Rfl: 1  •  SUPER B COMPLEX/C capsule, SUPER B COMPLEX/C ORAL CAPSULE, Disp: , Rfl:   •  tamsulosin (FLOMAX) 0.4 MG capsule 24 hr capsule, Take 1 capsule by mouth Daily., Disp: , Rfl:   •  vitamin D3 125 MCG (5000 UT) capsule capsule, Take 5,000 Units by mouth Daily., Disp: , Rfl:   •  hydrALAZINE (APRESOLINE) 25 MG tablet, TAKE 1 TABLET BY MOUTH TWICE DAILY, Disp: 180 tablet, Rfl: 1    Problem List:  Patient Active Problem List   Diagnosis   • Acid reflux   • Allergic rhinitis   • Anxiety   • Atrophic kidney   • Carotid bruit present   • Carpal tunnel syndrome   • Low back pain   • Neck pain   • Thoracic back pain   • Depressive disorder   • Medicare annual wellness visit, subsequent   • Gout   • Hemorrhoids   • Mixed hyperlipidemia   • IC (interstitial cystitis)   • Irritable bowel syndrome with constipation    • LVH (left ventricular hypertrophy)   • Menopausal syndrome   • Mitral insufficiency, acute   • Onychomycosis   • Disorder of bone and cartilage   • Age-related osteoporosis without current pathological fracture   • Palpitations   • Osteoarthritis   • Primary pulmonary hypertension (HCC)   • Renal insufficiency   • Plantar fasciitis   • Senile osteoporosis   • Solitary kidney   • Encounter for screening mammogram for malignant neoplasm of breast   • Special screening for malignant neoplasms, colon   • Seborrheic keratosis   • Chronic kidney disease, stage 3 (moderate)   • Renal hypertrophy   • Hypercalcemia   • Acute cystitis with hematuria   • Lower extremity edema   • Overweight with body mass index (BMI) of 29 to 29.9 in adult   • Dizziness   • Left hip pain   • Bilateral primary osteoarthritis of knee   • Elevated fasting blood sugar   • Solitary kidney, acquired   • Benign essential HTN     Past Medical History:  Past Medical History:   Diagnosis Date   • Acid reflux     Impression: Discussed diet, exercise, lifestyle modification. start ppi Call / return if persistent symptoms.   • Acute bronchitis     Impression: Discussed the plan of care and anticipated course of illness with antibiotic treatment. Educated on side effects of antibiotics and hydromet. Pt was educated on the effects of decongestants on bp. She was encouraged to stop nyquil and decongestants. Frequent fluids and rest were encouraged. Pt was told if he symptoms do no improve within one week to return for further evaluation.   • Acute cystitis with hematuria     Impression: Findings discussed. All questions answered. Medication and medication adverse effects discussed. Drug education given and explained to patient. Patient verbalized understanding. Follow-up in approximately 3 days for reevaluation if not improved. Follow-up sooner for worsening symptoms or any concerns. Increase fluids   • Acute sinusitis    • Allergic rhinitis      Impression: Stable.   • Anxiety    • Arm weakness     Impression: episode weakness / stiffness l hand and foot, resolved currently ddx includes tia / flare arhtritis / carpal tunnel consider recent working hard moving, rx in progress check carotid dopplers, echo, restart asa Follow up 2 months. Call / return if if recurrant symptoms.   • Arthralgia     Impression: Will call with lab results. May need referral to Rheumatology.   • Arthritis     Impression: history of treatment for lupus with medication in remote past - recheck bloodwork   • Atrophic kidney     Impression: improve status post nephrectomy followed by urology   • Back pain     Impression: traumatic ice 20 minutes bid Rehabilitation exercises discussed. xray without fracture Consider further imaging if symptoms persist   • Blurry vision     Impression: transient episode, resolved has had benign optho eval per her report, will get records ddx ncludes tia check carotid dopplers, holter continue asa Follow up [id number] Call / return if if recurrant symptoms.   • Carotid bruit    • Carpal tunnel syndrome     Impression: followed by hand surgery continue qhs bracing consider Follow up for repeat injxn, surgery if persistent symptoms.   • Cellulitis     Impression: Topical care discussed. Call / return if persistent symptoms.   • Chest pain    • Constipation     Impression: improved today - increase fluids, fiber - did not tolerate metamucil, change to fiber tablet / titratete - continue otc lactulose prm - consider amitiza - Follow up recheck   • Contusion of knee, right     Impression: Ice. Elevate. Rest. Discussed anticipated course. Given copy of x-rays on disc to take to Dr. Warrne tomorrow.   • Depressive disorder     Impression: conflict with family currently, expects resolution when moves to [place] later this mos to live with new  Good social support start ativan prn, use sparingly consider ssri if persistent symptoms.   • Disorder of bone and  cartilage     Impression: stable / slightly worse per dexa (osteopenia, fn -1.5) discussed calcium, vit d start prolia Discussed diet, exercise, lifestyle modification. recheck 1 year   • Disorder of skin and subcutaneous tissue    • Dog bite     Impression: her neighbors dog, is healthy Topical care discussed. Signs and symptoms of infecton discussed. Call / return if fever, worsening symptoms.   • Dysuria     Impression: likely 2nd uti / IC flare ct with atrophic r kidney with stones in r kidney only, xray lspine without fracture, wbc normal continue antibiothiics + urinary retentinon / catheter in place urology Follow up scheduled monday go to ED if fever, unable to keep fluids down, worsening symptoms.   • Encounter for long-term (current) use of medications    • Enrolled in chronic care management    • External otitis     Impression: Topical care discussed. Call / return if persistent symptoms.   • Flank pain    • Fracture, foot     Impression: Left. x-rayed at [facility] ED today - will call for records. Keep appointment with Dr. Warren for tomorrow. She was given prescription for Oxycodone in the ER.   • Fracture, toe     Impression: base prox phalax great toe, nondisplaced, improving nita taping / hard soled shoe Call / return if persistent symptoms.   • Generalized abdominal pain     Impression: llq, suprapubic, rlq cbc normal likely 2nd flare interstitial cystitis, uti, self cathing currently ddx includes diverticultis start antibiotics, cx sent, Follow up recheck has urology Follow up next week Call / return if fever, unable to keep fluids down, worsening symptoms. Consider imaging if persistent symptoms.   • Gout     Impression: uric acid 7 on 4/12 doing well on allopurinol Follow up recheck levels Discussed diet, lifestyle modification.   • H/O drug therapy    • Hematuria, microscopic    • Hemorrhoids     Impression: improved, increase fluids / fiber Topical care discussed. consider colorectal surgery referrall  if persistent symptoms.   • Hip pain     Impression: oa, worse / flare, refractory to pt ortho ref sched ice 20 minutes bid Rehabilitation exercises discussed. consider add tramadol   • Hyperlipidemia     Impression: good control ------------- Stable Compliant with meds Is getting adequate diet and exercise Goals developed at last visit were met Care management needs are self addressed. Discussed diet, exercise, lifestyle modification.   • Hypertension     Impression: good control Discussed low sodium diet, lifestyle modification.   • Hypertension, benign     Impression: good control holding lisinopril secondary to renal insufficiency   • IC (interstitial cystitis)     Impression: improved on flomax, followed by [facility]lan h/o interstim placement / subsequent removal h/o improved on rx per morena, pt Discussed diet, lifestyle modification. followed by Zulema / Ernie, s/p recentt removal interstim 2nd inneffective   • Inflammatory and toxic neuropathy (HCC)     Impression: Trial of medications to see if she can get some relief.   • Influenza     Impression: Push fluids, bland diet. Signs and symptoms of dehydration discussed. Prophylactic rx written for close contacts. Call / return if unable to keep fluids down, worsening symptoms.   • Irritable bowel syndrome with constipation     Impression: Occasional flares. Change with stress and diet.   • Knee pain     Impression: Right. X-ray without obvious acute process - awaiting Radiologist's official report.   • Low back pain     Impression: improved lumbar MRI with moderate ddd 2017, x-ray left hip with mild arthritis. Doing well, improved with epidural injections currently. s/p course Physical therapy. followed by Dr. Beavers / improved with epidural injxn, consider repeat course.   • LVH (left ventricular hypertrophy)     Impression: htn well controlled recheck echo   • Malaise and fatigue     Impression: much improved today possibly 2nd recent nephrectomy bloodwork normal  consider further eval if persistent symptoms. follow up 2 to 3 months   • Medicare annual wellness visit, subsequent     Impression: doing well, vaccines current Age specific anticipatory guidance and warning signs discussed. Diet, exercise, and lifestyle modification discussed. Safety, seatbelts, and routine screening examinations discussed. Discussed self-examinations.   • Menopausal syndrome     Impression: current on mammogram / followed by ob/gyn (Dr. Claros) Recommend follow up visit for comprehensive physical exam, coordination of care, and screening tests.   • Mitral insufficiency, acute    • Myalgia    • Neck pain     Impression: strain ice 20 minutes bid Rehabilitation exercises discussed. Consider imaging if persistent symptoms.   • Onychomycosis    • Osteoarthritis     Impression: She has an appointment for another opinion.   • Osteoporosis     Impression: improved, tolerating prolia discussed calcium, vit d Follow up recheck dxa   • Overweight (BMI 25.0-29.9)    • Pain in soft tissues of limb    • Palpitations    • Plantar fasciitis     Impression: qhs bracing / heel cups ice 20 minutes bid nsaids Rehabilitation exercises discussed. Call / return if persistent symptoms.   • Primary pulmonary hypertension (HCC)    • Pruritus    • Psoriasis    • Rectal bleeding     Impression: likely 2nd hemorrhoids, rx in orogress cbc normal, no tachycardia Follow up recheck Call / return if worsening symptoms.   • Renal insufficiency     Impression: mild / stable / improved s/p left nephrectomy bell avoid nsaids, improved off lisinopril / hctz / pyridium no blood draws in right arm   • Rotator cuff tendonitis, right     Impression: strain ice 20 minutes bid Rehabilitation exercises discussed. Consider imaging, joint injxn if persistent symptoms.   • RUQ pain     Impression: much improved on ppi gerd vs gb pathology ruq us neg Follow up recheck Call / return if unable to keep fluids down, fever, worsening symptoms.    • Screening for depression     Negative Depression Screening (4 or less) ()   • Screening mammogram, encounter for    • Seborrheic keratosis     Impression: left forehead, benign appearance Topical care discussed. Follow up recheck apt with dermatology upcoming continue to monitor ccliniclly / consider resectino if worsening symptoms.   • Solar lentiginosis     Impression: r face, benign appearance Topical care discussed. Call / return if lesion increases in size, changes in shape or color, or becomes tender or inflamed. Discussed skin care, use of sun block and protective clothing.   • Solitary kidney     Impression: doing well, renal f negative normal avoid nsaids / continue to monitor   • Special screening for malignant neoplasms, colon     Impression: Negative colonoscopy by Dr. Goff in 2016 (diverticulosis only)   • Telogen effluvium    • Thoracic back pain    • TMJ arthritis     Impression: Rehabilitation exercises discussed. restart robaxin qhs prn   • Tricuspid valve disorder    • URI, acute     Impression: Increase fluid intake. Mucinex Call / return if fever, respiratory difficulty, worsening symptoms.   • Urinary incontinence     Impression: She has been self catheterizing.   • Urinary retention     Impression: catheter in place / urology Follow up scheduled     Past Surgical History:  Past Surgical History:   Procedure Laterality Date   • CATARACT EXTRACTION Left 08/2005    with Insert of Lens Prostheti   • CYSTOCELE REPAIR  06/1998    SIMENTAL Cystourethropexy & Cystocele Repair    • KIDNEY SURGERY Right 02/24/2014    Removal   • TONSILLECTOMY  1953   • URETHROLYSIS  10/2000    Transvaginal Urethrolysis & Bone [place] Sling   • VAGINAL HYSTERECTOMY  1970   • VITRECTOMY Left 04/2005    & Membrane Peeling     Social History:  Social History     Socioeconomic History   • Marital status: Single   Tobacco Use   • Smoking status: Never   • Smokeless tobacco: Never   Vaping Use   • Vaping Use: Never used    Substance and Sexual Activity   • Alcohol use: Not Currently   • Drug use: No   • Sexual activity: Defer     Allergies:  Allergies   Allergen Reactions   • Contrast Dye Anaphylaxis   • Gadolinium Hives     \"crawling\" feeling    • Iodine Anaphylaxis   • Shrimp Anaphylaxis     Immunizations:  Immunization History   Administered Date(s) Administered   • COVID-19 (PFIZER) PURPLE CAP 02/03/2021, 02/24/2021, 09/24/2021   • Covid-19 (Pfizer) Gray Cap 02/03/2021, 02/24/2021, 09/24/2021   • Flu Vaccine Quad PF >36MO 08/31/2018   • Fluzone High Dose =>65 Years ([employer] ONLY) 09/13/2019   • Fluzone High-Dose 65+yrs 10/07/2022   • Hepatitis A 12/12/2018, 08/06/2019   • Influenza, Unspecified 10/07/2022   • PEDS-Pneumococcal Conjugate (PCV7) 10/04/2019   • Pneumococcal Conjugate 13-Valent (PCV13) 08/31/2018   • Pneumococcal Polysaccharide (PPSV23) 10/04/2019   • Pneumococcal, Unspecified 09/25/2013   • Tdap 11/14/2007, 10/24/2012   • Zostavax 12/12/2018   • Zoster, Unspecified 10/01/2013, 08/31/2018, 12/13/2018            In-Office Procedure(s):  No orders to display        ASCVD RIsk Score::  The 10-year ASCVD risk score (Nicol DK, et al., 2019) is: 24.2%    Values used to calculate the score:      Age: 72 years      Sex: Female      Is Non- : No      Diabetic: No      Tobacco smoker: No      Systolic Blood Pressure: 147 mmHg      Is BP treated: Yes      HDL Cholesterol: 52 mg/dL      Total Cholesterol: 187 mg/dL    Imaging:    Results for orders placed in visit on 04/27/21    XR Hip With or Without Pelvis 2 - 3 View Left    Narrative  bilateral Hip X-Ray  Indication: Pain and discomfort of both the hips.  AP and lateral  Findings: Early osteoarthritis with some narrowing of the joint space  no bony lesion  Soft tissues within normal limits  decreased joint spaces  Hardware appropriately positioned not applicable      no prior studies available for comparison.    X-RAY was ordered and reviewed by  Abdulaziz Angel MD       Results for orders placed during the hospital encounter of 09/19/22    US Renal Bilateral    Narrative  DATE OF EXAM:  9/19/2022 9:41 AM    PROCEDURE:  US RENAL BILATERAL-    INDICATIONS:  CHRONIC KIDNEY DISEASE; N18.31-Chronic kidney disease, stage 3a. HISTORY  of right nephrectomy.    COMPARISON:  Renal ultrasound 1/27/2021    TECHNIQUE:  Grayscale and color Doppler ultrasound evaluation of the kidneys and  urinary bladder was performed.      FINDINGS:  The right kidney is surgically absent. The left kidney measures 13.2 x  4.4 x 4.8 cm. The left kidney maintains normal cortical thickness and  cortical echotexture. No cystic or solid left renal lesion is  identified. No shadowing left renal stone or hydronephrosis is  identified.    The urinary bladder is not visualized. It is decompressed.    Impression  1. Normal sonographic appearance of the left kidney. Right nephrectomy.  Urinary bladder is not visualized, as it is decompressed.    Electronically Signed By-Caryl Huang MD On:9/19/2022 12:16 PM  This report was finalized on [id number] by  Caryl Huang MD.          Lab Review:   Hospital Outpatient Visit on 12/01/2022   Component Date Value   • Target HR (85%) 12/01/2022 124    • Max. Pred. HR (100%) 12/01/2022 146    • LVIDd 12/01/2022 4.8    • LVIDs 12/01/2022 3.1    • IVSd 12/01/2022 0.92    • LVPWd 12/01/2022 1.02    • FS 12/01/2022 35.8    • IVS/LVPW 12/01/2022 0.90    • ESV(cubed) 12/01/2022 28.5    • LV Sys Vol (BSA correcte* 12/01/2022 11.3    • EDV(cubed) 12/01/2022 107.6    • LV Miller Vol (BSA correct* 12/01/2022 30.7    • LVOT area 12/01/2022 2.9    • LV mass(C)d 12/01/2022 160.9    • LVOT diam 12/01/2022 1.93    • EDV(MOD-sp4) 12/01/2022 53.1    • ESV(MOD-sp4) 12/01/2022 19.6    • SV(MOD-sp4) 12/01/2022 33.5    • SI(MOD-sp4) 12/01/2022 19.4    • EF(MOD-sp4) 12/01/2022 63.1    • MV E max augustina 12/01/2022 99.5    • MV A max augustina 12/01/2022 110.6    • MV dec time 12/01/2022  0.29    • MV E/A 12/01/2022 0.90    • Pulm A Revs Dur 12/01/2022 0.09    • SV(LVOT) 12/01/2022 108.5    • LA dimension (2D)  12/01/2022 3.7    • Pulm Sys Eduar 12/01/2022 70.9    • Pulm Miller Eduar 12/01/2022 42.0    • Pulm S/D 12/01/2022 1.69    • Pulm A Revs Eduar 12/01/2022 31.1    • LV V1 max 12/01/2022 148.4    • LV V1 max PG 12/01/2022 8.8    • LV V1 mean PG 12/01/2022 4.3    • LV V1 VTI 12/01/2022 37.0    • Ao pk eduar 12/01/2022 228.6    • Ao max PG 12/01/2022 20.9    • Ao mean PG 12/01/2022 9.7    • Ao V2 VTI 12/01/2022 49.6    • DAVID(I,D) 12/01/2022 2.19    • AI P1/2t 12/01/2022 668.0    • MV max PG 12/01/2022 6.6    • MV mean PG 12/01/2022 1.92    • MV V2 VTI 12/01/2022 41.0    • MVA(VTI) 12/01/2022 2.6    • MV dec slope 12/01/2022 338.3    • MR max eduar 12/01/2022 515.3    • MR max PG 12/01/2022 106.2    • TR max eduar 12/01/2022 304.5    • TR max PG 12/01/2022 37.5    • RVSP(TR) 12/01/2022 38    • RAP systole 12/01/2022 10.0    • PA V2 max 12/01/2022 119.0    • PA acc time 12/01/2022 0.07    • PA pr(Accel) 12/01/2022 45.6    • PI end-d eduar 12/01/2022 110.5    • Ao root diam 12/01/2022 2.50    • ACS 12/01/2022 1.51    • EF(MOD-bp) 12/01/2022 65.0    • RV Base 12/01/2022 3.40    • RV Mid 12/01/2022 2.00    • TAPSE (>1.6) 12/01/2022 2.10    Hospital Outpatient Visit on 09/19/2022   Component Date Value   • Target HR (85%) 09/19/2022 125    • Max. Pred. HR (100%) 09/19/2022 147    • Kid L 09/19/2022 12.2    • PROX CARRIE A PSV LEFT 09/19/2022 106.0    • PROX CARRIE A EDV LEFT 09/19/2022 18.1    • MID CARRIE A PSV LEFT 09/19/2022 70.3    • MID CARRIE A EDV LEFT 09/19/2022 10.4    • DIST CARRIE A PSV LEFT 09/19/2022 75.8    • DIST CARRIE A EDV LEFT 09/19/2022 12.6    • Left renal upper parench* 09/19/2022 20.2    • Left renal upper parench* 09/19/2022 6.88    • Aortic Mid PSV 09/19/2022 110    • RAR LEFT 09/19/2022 0.96    • Left renal upper parench* 09/19/2022 0.66      Recent labs reviewed and interpreted for clinical significance and  application            Level of Care:           Marco A Winston MD  01/02/23  .

## 2023-02-17 ENCOUNTER — HOSPITAL ENCOUNTER (OUTPATIENT)
Dept: GENERAL RADIOLOGY | Facility: HOSPITAL | Age: 73
Discharge: HOME | End: 2023-02-17
Payer: MEDICARE

## 2023-02-17 ENCOUNTER — OFFICE VISIT (OUTPATIENT)
Dept: FAMILY MEDICINE CLINIC | Facility: CLINIC | Age: 73
End: 2023-02-17
Payer: MEDICARE

## 2023-02-17 VITALS
SYSTOLIC BLOOD PRESSURE: 137 MMHG | WEIGHT: 159 LBS | TEMPERATURE: 98.2 F | OXYGEN SATURATION: 95 % | HEIGHT: 62 IN | DIASTOLIC BLOOD PRESSURE: 68 MMHG | HEART RATE: 67 BPM | BODY MASS INDEX: 29.26 KG/M2 | RESPIRATION RATE: 16 BRPM

## 2023-02-17 DIAGNOSIS — M54.2 NECK PAIN: Primary | ICD-10-CM

## 2023-02-17 PROCEDURE — 72040 X-RAY EXAM NECK SPINE 2-3 VW: CPT

## 2023-02-17 PROCEDURE — 73030 X-RAY EXAM OF SHOULDER: CPT

## 2023-02-17 PROCEDURE — 99214 OFFICE O/P EST MOD 30 MIN: CPT

## 2023-02-17 RX ORDER — CARVEDILOL 12.5 MG/1
TABLET ORAL
COMMUNITY
Start: 2023-01-06 | End: 2023-02-17 | Stop reason: SDUPTHER

## 2023-02-17 RX ORDER — AMLODIPINE BESYLATE 5 MG/1
TABLET ORAL
COMMUNITY
Start: 2023-01-07 | End: 2023-02-17

## 2023-02-17 RX ORDER — LIDOCAINE 50 MG/G
1 PATCH TOPICAL EVERY 24 HOURS
Qty: 14 PATCH | Refills: 0 | Status: SHIPPED | OUTPATIENT
Start: 2023-02-17 | End: 2023-03-03

## 2023-02-17 RX ORDER — CYCLOBENZAPRINE HCL 5 MG
5 TABLET ORAL DAILY PRN
Qty: 30 TABLET | Refills: 0 | Status: SHIPPED | OUTPATIENT
Start: 2023-02-17 | End: 2023-04-03

## 2023-02-17 NOTE — PATIENT INSTRUCTIONS
Take 500mg acetaminophen every four to six hours as needed for pain.  Cyclobenzaprine  muscle/relaxer 5mg as needed.    Heat/Ice as needed.

## 2023-02-17 NOTE — PROGRESS NOTES
Chief Complaint  Torticollis    Subjective          Dunia Fabian presents to [facility] for     History of Present Illness  Patient is here for an acute visit complaint of stiff neck.  The patient is managed by .      She reports a stiff neck that began five days ago. She states that she is unaware of what could have caused it to injure it. She has tried heat without help, icy hot patches gave some relief.  She has not tried ice.  She reports using a pillow with cooling gel that she reports some relief with.  She stated she picked up her two year old granddaughter over the weekend, and she attended [employer] on Friday. She experienced zero pain over the weekend but reports waking up Monday with pain. She sleeps on her back and does not sleep on her sides. She denies any change to her bedding or pillows. She uses a . Pillow.    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   No known sick encounters.    Negative for carotid artery disease on left, negative for any prior injury to neck.   Pain with forward motion but increased with backward motion.    Pain with rotation to right at left of neck and pain with turning to left.    She reports taking 325 mg acetaminophen every evening before sleeping.     Medical history and current medications have been reviewed.      Review of systems and  focused physical exam has been completed.    Patient agrees with plan of care and will follow up with primary care provider as needed.         Dunia Fabian  has a past medical history of Acid reflux, Acute bronchitis, Acute cystitis with hematuria, Acute sinusitis, Allergic rhinitis, Anxiety, Arm weakness, Arthralgia, Arthritis, Atrophic kidney, Back pain, Blurry vision, Carotid bruit, Carpal tunnel syndrome, Cellulitis, Chest pain, Constipation, Contusion of knee, right, Depressive disorder, Disorder of bone and cartilage,  Disorder of skin and subcutaneous tissue, Dog bite, Dysuria, Encounter for long-term (current) use of medications, Enrolled in chronic care management, External otitis, Flank pain, Fracture, foot, Fracture, toe, Generalized abdominal pain, Gout, H/O drug therapy, Hematuria, microscopic, Hemorrhoids, Hip pain, Hyperlipidemia, Hypertension, Hypertension, benign, IC (interstitial cystitis), Inflammatory and toxic neuropathy (HCC), Influenza, Irritable bowel syndrome with constipation, Knee pain, Low back pain, LVH (left ventricular hypertrophy), Malaise and fatigue, Medicare annual wellness visit, subsequent, Menopausal syndrome, Mitral insufficiency, acute, Myalgia, Neck pain, Onychomycosis, Osteoarthritis, Osteoporosis, Overweight (BMI 25.0-29.9), Pain in soft tissues of limb, Palpitations, Plantar fasciitis, Primary pulmonary hypertension (HCC), Pruritus, Psoriasis, Rectal bleeding, Renal insufficiency, Rotator cuff tendonitis, right, RUQ pain, Screening for depression, Screening mammogram, encounter for, Seborrheic keratosis, Solar lentiginosis, Solitary kidney, Special screening for malignant neoplasms, colon, Telogen effluvium, Thoracic back pain, TMJ arthritis, Tricuspid valve disorder, URI, acute, Urinary incontinence, and Urinary retention.      Review of Systems   Eyes: Negative for visual disturbance.   Respiratory: Negative for cough, shortness of breath and wheezing.    Cardiovascular: Negative for chest pain, palpitations and leg swelling.   Musculoskeletal: Positive for arthralgias (hip,knee, lower back. left side. ), myalgias, neck pain (left shoulder, left trapezius. ) and neck stiffness.   Neurological: Negative for dizziness, tremors, seizures, syncope, weakness, light-headedness, headache, memory problem and confusion.   Psychiatric/Behavioral: Negative for stress.        Family History   Problem Relation Age of Onset   • Parkinsonism Mother    • Heart disease Mother         cardiovascular disease  "  • Diabetes Mother         diabetes mellitus    • Heart disease Father         cardiovascular disease   • Heart disease Sister         cardiovascular disease   • Diabetes Sister         diabetes mellitus    • Heart disease Brother         cardiovascular disease   • Diabetes Son         diabetes mellitus    • Heart disease Paternal Uncle         ischemic   • Stomach cancer Maternal Grandmother    • Breast cancer Niece 39   • Ovarian cancer Paternal Grandmother         Past Surgical History:   Procedure Laterality Date   • CATARACT EXTRACTION Left 08/2005    with Insert of Lens Prostheti   • CYSTOCELE REPAIR  06/1998    SIMENTAL Cystourethropexy & Cystocele Repair    • KIDNEY SURGERY Right 02/24/2014    Removal   • TONSILLECTOMY  1953   • URETHROLYSIS  10/2000    Transvaginal Urethrolysis & Bone Morning View Sling   • VAGINAL HYSTERECTOMY  1970   • VITRECTOMY Left 04/2005    & Membrane Peeling        Objective   Vitals:    02/17/23 1005   BP: 137/68   BP Location: Right arm   Patient Position: Sitting   Cuff Size: Adult   Pulse: 67   Resp: 16   Temp: 98.2 °F (36.8 °C)   TempSrc: Infrared   SpO2: 95%   Weight: 72.1 kg (159 lb)   Height: 157.5 cm (62\")     Physical Exam  Vitals and nursing note reviewed.   Constitutional:       General: She is not in acute distress.     Appearance: Normal appearance. She is well-groomed. She is not ill-appearing, toxic-appearing or diaphoretic.   HENT:      Head: Normocephalic and atraumatic.   Eyes:      Extraocular Movements: Extraocular movements intact.      Pupils: Pupils are equal, round, and reactive to light.   Neck:      Thyroid: No thyroid mass, thyromegaly or thyroid tenderness.      Vascular: No carotid bruit.      Trachea: Trachea normal.      Comments: Pain with rotation left and right, pain with extension and flexion.   Cardiovascular:      Rate and Rhythm: Normal rate and regular rhythm.      Pulses: Normal pulses.      Heart sounds: Normal heart sounds.   Pulmonary:      " Effort: Pulmonary effort is normal.      Breath sounds: Normal breath sounds.   Musculoskeletal:         General: Tenderness present.        Arms:       Cervical back: Erythema and tenderness present. No signs of trauma. Pain with movement and muscular tenderness present. Decreased range of motion.      Right lower leg: No edema.      Left lower leg: No edema.   Lymphadenopathy:      Cervical: No cervical adenopathy.   Skin:     General: Skin is warm and dry.      Capillary Refill: Capillary refill takes less than 2 seconds.   Neurological:      General: No focal deficit present.      Mental Status: She is alert and oriented to person, place, and time. Mental status is at baseline.      Cranial Nerves: Cranial nerves 2-12 are intact.      Sensory: Sensation is intact.      Motor: Motor function is intact.      Coordination: Coordination is intact.      Gait: Gait is intact.   Psychiatric:         Attention and Perception: Attention normal.         Mood and Affect: Mood normal.         Speech: Speech normal.         Behavior: Behavior normal. Behavior is cooperative.         Thought Content: Thought content normal.            Assessment and Plan    Diagnoses and all orders for this visit:    1. Neck pain (Primary)  Overview:  strain  ice 20 minutes bid  Rehabilitation exercises discussed.  Consider imaging if persistent symptoms.    Orders:  -     XR Spine Cervical 2 or 3 View  -     cyclobenzaprine (FLEXERIL) 5 MG tablet; Take 1 tablet by mouth Daily As Needed for Muscle Spasms.  Dispense: 30 tablet; Refill: 0  -     lidocaine (Lidoderm) 5 %; Place 1 patch on the skin as directed by provider Daily for 14 days. Remove & Discard patch within 12 hours or as directed by MD  Dispense: 14 patch; Refill: 0  -     XR Shoulder 2+ View Left           Follow Up   Return for Follow up with primary care provider as needed..  Patient was given instructions and counseling regarding her condition or for health maintenance advice.  Please see specific information pulled into the AVS if appropriate.    Patient Instructions   Take 500mg acetaminophen every four to six hours as needed for pain.  Cyclobenzaprine  muscle/relaxer 5mg as needed.    Heat/Ice as needed.         This document is intended for medical expert use only. Reading of this document by patients and/or patient's family without participating medical staff guidance may result in misinterpretation and unintended morbidity. Any interpretation of such data is the responsibility of the patient and/or family member responsible for the patient in concert with their primary or specialist providers, not to be left for sources of online searches such as [employer], [employer] or similar queries. Relying on these approaches to knowledge may result in misinterpretation, misguided goals of care and even death should patients or family members try recommendations outside of the realm of professional medical care.

## 2023-03-06 NOTE — PROGRESS NOTES
Subjective   Dunia Fabian is a 72 y.o. female.     Chief Complaint   Patient presents with   • Neck Pain       History of Present Illness  Dunia was seen by Leilani Sneed on 2/17/2023 for neck pain. Naren ordered a X-Ray Spine with impression of no acute osseous abnormality and cervical degenerative findings, and X-Ray Left Shoulder with impression of negative for acute osseous abnormality. She also ordered Flexeril 5mg.     Dunia states she the pain is gradually worsening as the pain is in her neck and both shoulders. The pain was just in her right shoulder and neck, but now has moved to both shoulders.   Neck Pain   This is a new problem. The current episode started more than 1 month ago. The problem occurs constantly. The problem has been gradually worsening. The pain is present in the right side and left side. The quality of the pain is described as aching (stiff). The pain is at a severity of 7/10. The pain is severe. The symptoms are aggravated by position and twisting (movement). The pain is worse during the day. Stiffness is present all day. Pertinent negatives include no chest pain. Associated symptoms comments: Patient states when she first gets out of bed or chair, she feels unbalance due to the pain. She has tried muscle relaxants and acetaminophen (pain patches) for the symptoms. The treatment provided mild relief.            I personally reviewed and updated the patient's allergies, medications, problem list, and past medical, surgical, social, and family history. I have reviewed and confirmed the accuracy of the History of Present Illness and Review of Symptoms as documented by the MA/MAXIMUSN/RN. Tyron Driver MD    Family History   Problem Relation Age of Onset   • Parkinsonism Mother    • Heart disease Mother         cardiovascular disease   • Diabetes Mother         diabetes mellitus    • Heart disease Father         cardiovascular disease   • Heart disease Sister          cardiovascular disease   • Diabetes Sister         diabetes mellitus    • Heart disease Brother         cardiovascular disease   • Diabetes Son         diabetes mellitus    • Heart disease Paternal Uncle         ischemic   • Stomach cancer Maternal Grandmother    • Breast cancer Niece 39   • Ovarian cancer Paternal Grandmother        Social History     Tobacco Use   • Smoking status: Never   • Smokeless tobacco: Never   Vaping Use   • Vaping Use: Never used   Substance Use Topics   • Alcohol use: Not Currently   • Drug use: No       Past Surgical History:   Procedure Laterality Date   • CATARACT EXTRACTION Left 08/2005    with Insert of Lens Prostheti   • CYSTOCELE REPAIR  06/1998    SIMENTAL Cystourethropexy & Cystocele Repair    • KIDNEY SURGERY Right 02/24/2014    Removal   • TONSILLECTOMY  1953   • URETHROLYSIS  10/2000    Transvaginal Urethrolysis & Bone [place] Sling   • VAGINAL HYSTERECTOMY  1970   • VITRECTOMY Left 04/2005    & Membrane Peeling       Patient Active Problem List   Diagnosis   • Acid reflux   • Allergic rhinitis   • Anxiety   • Atrophic kidney   • Carotid bruit present   • Carpal tunnel syndrome   • Low back pain   • Neck pain   • Thoracic back pain   • Depressive disorder   • Medicare annual wellness visit, subsequent   • Gout   • Hemorrhoids   • Mixed hyperlipidemia   • IC (interstitial cystitis)   • Irritable bowel syndrome with constipation   • LVH (left ventricular hypertrophy)   • Menopausal syndrome   • Mitral insufficiency, acute   • Onychomycosis   • Disorder of bone and cartilage   • Age-related osteoporosis without current pathological fracture   • Palpitations   • Osteoarthritis   • Primary pulmonary hypertension (HCC)   • Renal insufficiency   • Plantar fasciitis   • Senile osteoporosis   • Solitary kidney   • Encounter for screening mammogram for malignant neoplasm of breast   • Special screening for malignant neoplasms, colon   • Seborrheic keratosis   • Chronic kidney disease,  stage 3 (moderate)   • Renal hypertrophy   • Hypercalcemia   • Acute cystitis with hematuria   • Lower extremity edema   • Overweight with body mass index (BMI) of 29 to 29.9 in adult   • Dizziness   • Left hip pain   • Bilateral primary osteoarthritis of knee   • Elevated fasting blood sugar   • Solitary kidney, acquired   • Benign essential HTN         Current Outpatient Medications:   •  amLODIPine (NORVASC) 5 MG tablet, TAKE 1 TABLET BY MOUTH TWICE DAILY, Disp: 180 tablet, Rfl: 1  •  aspirin 81 MG EC tablet, Take 1 tablet by mouth Daily., Disp: , Rfl:   •  carvedilol (COREG) 12.5 MG tablet, TAKE 1 TABLET BY MOUTH TWICE DAILY, Disp: 180 tablet, Rfl: 1  •  cloNIDine (CATAPRES) 0.1 MG tablet, Take 1 tablet by mouth Every 4 (Four) Hours As Needed., Disp: , Rfl:   •  cyclobenzaprine (FLEXERIL) 5 MG tablet, Take 1 tablet by mouth Daily As Needed for Muscle Spasms., Disp: 30 tablet, Rfl: 0  •  denosumab (Prolia) 60 MG/ML solution prefilled syringe syringe, Inject  under the skin into the appropriate area as directed Every 6 (Six) Months., Disp: , Rfl:   •  hydrALAZINE (APRESOLINE) 25 MG tablet, TAKE 1 TABLET BY MOUTH TWICE DAILY, Disp: 180 tablet, Rfl: 1  •  multivitamin with minerals tablet tablet, Take 1 tablet by mouth Daily., Disp: , Rfl:   •  rosuvastatin (CRESTOR) 5 MG tablet, TAKE 1 TABLET BY MOUTH DAILY, Disp: 90 tablet, Rfl: 1  •  SUPER B COMPLEX/C capsule, SUPER B COMPLEX/C ORAL CAPSULE, Disp: , Rfl:   •  tamsulosin (FLOMAX) 0.4 MG capsule 24 hr capsule, Take 1 capsule by mouth Daily., Disp: , Rfl:   •  vitamin D3 125 MCG (5000 UT) capsule capsule, Take 1 capsule by mouth Daily., Disp: , Rfl:   •  predniSONE (DELTASONE) 5 MG tablet, 40mg x 3 days, 20mg x 3 days, 10mg x 3 days, 5mg x 3 days, Disp: 45 tablet, Rfl: 0         Review of Systems   Constitutional: Negative for chills and diaphoresis.   Eyes: Negative for visual disturbance.   Respiratory: Negative for shortness of breath.    Cardiovascular: Negative  "for chest pain and palpitations.   Gastrointestinal: Negative for abdominal pain and nausea.   Endocrine: Negative for polydipsia and polyphagia.   Musculoskeletal: Positive for neck pain. Negative for neck stiffness.   Skin: Negative for color change and pallor.   Neurological: Negative for seizures and syncope.   Hematological: Negative for adenopathy.   Psychiatric/Behavioral: Negative for hallucinations and suicidal ideas.       I have reviewed and confirmed the accuracy of the ROS as documented by the MA/LPN/RN Tyron Driver MD      Objective   /66 (BP Location: Right arm, Patient Position: Sitting, Cuff Size: Adult)   Pulse 86   Temp 98.6 °F (37 °C)   Resp 18   Ht 157.5 cm (62\")   Wt 72.4 kg (159 lb 9.6 oz)   SpO2 97%   BMI 29.19 kg/m²   BP Readings from Last 3 Encounters:   03/08/23 124/66   02/17/23 137/68   12/05/22 147/69     Wt Readings from Last 3 Encounters:   03/08/23 72.4 kg (159 lb 9.6 oz)   02/17/23 72.1 kg (159 lb)   12/05/22 71.2 kg (157 lb)     Physical Exam  Constitutional:       Appearance: Normal appearance. She is well-developed. She is not diaphoretic.   HENT:      Head: Normocephalic.      Right Ear: Tympanic membrane, ear canal and external ear normal.      Left Ear: Tympanic membrane, ear canal and external ear normal.      Nose: Nose normal.   Eyes:      General: Lids are normal.      Conjunctiva/sclera: Conjunctivae normal.      Pupils: Pupils are equal, round, and reactive to light.   Neck:      Thyroid: No thyromegaly.      Vascular: No carotid bruit or JVD.      Trachea: No tracheal deviation.   Cardiovascular:      Rate and Rhythm: Normal rate and regular rhythm.      Pulses: Normal pulses.      Heart sounds: Normal heart sounds, S1 normal and S2 normal. No murmur heard.    No friction rub. No gallop.   Pulmonary:      Effort: Pulmonary effort is normal. No accessory muscle usage.      Breath sounds: Normal breath sounds. No stridor. No decreased breath sounds, " wheezing, rhonchi or rales.   Abdominal:      General: Bowel sounds are normal. There is no distension.      Palpations: Abdomen is soft. Abdomen is not rigid. There is no mass or pulsatile mass.      Tenderness: There is no abdominal tenderness. There is no guarding or rebound. Negative signs include Diego's sign.      Hernia: No hernia is present.   Musculoskeletal:      Cervical back: No swelling, deformity, spasms or tenderness.      Thoracic back: Normal. No swelling, edema, deformity, spasms or tenderness. Normal range of motion.      Comments: Spurling Equivocal, positive bilateral trapezius spasm   Lymphadenopathy:      Head:      Right side of head: No submental, submandibular, tonsillar, preauricular, posterior auricular or occipital adenopathy.      Left side of head: No submental, submandibular, tonsillar, preauricular, posterior auricular or occipital adenopathy.      Cervical: No cervical adenopathy.   Skin:     General: Skin is warm and dry.      Coloration: Skin is not pale.   Neurological:      Mental Status: She is alert and oriented to person, place, and time.      Cranial Nerves: No cranial nerve deficit.      Sensory: No sensory deficit.      Motor: No tremor, atrophy, abnormal muscle tone or seizure activity.      Coordination: Coordination normal.      Gait: Gait normal.      Deep Tendon Reflexes: Reflexes are normal and symmetric.         Data / Lab Results:    Hemoglobin A1C   Date Value Ref Range Status   09/10/2021 6.1 % Final        Lab Results   Component Value Date     (H) 03/11/2022     (H) 08/28/2021     (H) 08/26/2020     Lab Results   Component Value Date    CHOL 181 07/24/2018    CHOL 215 (H) 07/21/2017    CHOL 198 07/20/2016     Lab Results   Component Value Date    TRIG 128 03/11/2022    TRIG 113 08/28/2021    TRIG 170 (H) 08/26/2020     Lab Results   Component Value Date    HDL 52 03/11/2022    HDL 54 08/28/2021    HDL 53 08/26/2020     No results found  for: PSA  Lab Results   Component Value Date    WBC 10.7 08/28/2021    HGB 12.3 08/28/2021    HCT 38.6 08/28/2021    MCV 94 08/28/2021     08/28/2021     Lab Results   Component Value Date    TSH 4.220 08/28/2021      Lab Results   Component Value Date    GLUCOSE 119 (H) 03/11/2022    BUN 21 03/11/2022    CREATININE 1.17 (H) 03/11/2022    EGFRIFNONA 48 (L) 08/28/2021    EGFRIFAFRI 55 (L) 08/28/2021    BCR 18 03/11/2022    K 4.4 03/11/2022    CO2 23 03/11/2022    CALCIUM 10.6 (H) 03/11/2022    PROTENTOTREF 7.3 03/11/2022    ALBUMIN 4.7 03/11/2022    LABIL2 1.8 03/11/2022    AST 20 03/11/2022    ALT 13 03/11/2022     No results found for: ESTHER, RF, SEDRATE   No results found for: CRP   No results found for: IRON, TIBC, FERRITIN   No results found for: [id number]       Assessment & Plan      Medications        Problem List         LOS    Neck pain.  Persistent symptoms today, significant bilateral trapezius spasm on exam today with left upper extremity radiculopathy, x-rays benign, positive and significant trauma DDx includes ruptured disc versus cervical disc disease, check MRI, add prednisone, continue muscle relaxant.  Consider PT referral, referral for epidural injections.  Follow-up recheck.  Call return if worsening symptoms.  Health maintenance.  Doing well, vaccines current.  Discussed health maintenance, screening test, lifestyle modification.  Pap current, followed by Dr. Patiño, mammogram scheduled.  Allergic rhinitis.  OTC Claritin or Zyrtec.  Hypertension.  Overall improved today on carvedilol, amlodipine, tolerating decreased dose hydralazine ,Off metoprolol per cardiology followed by Dr. Winston, .  Hold losartan/has had nephrology eval per Dr. Mcgee,  renal ultrasound/renal artery Doppler benign, has follow-up upcoming..  Clinically improved today on  hydralazine 100 mg twice daily, as needed clonidine.  Monitor blood pressure closely.  Follow-up recheck.  Consider restart amlodipine if persistent  elevation.   . Discussed low-sodium diet.  Stress echo benign/elevated right-sided pressure only 2018.  Carotid Dopplers with mild blockage only 2018.  MRI brain benign 2020.  Has had cardiology eval/plan repeat stress testing when blood pressure better controlled per cardilogy recs/cardiology referral scheduled..  Life stress/decreased energy.  Multiple stressors.  Good social support.   Initially improved on Cymbalta/Wellbutrin, she discontinued Rx.    Lower extremity edema.  Improved currently with decreased dose amlodipine.  Discussed low-sodium diet.  Follow-up recheck.  Consider sleep study if persistent symptoms.  Osteoporosis.  Tolerating Prolia.  Holding calcium/vitamin D per nephrology.  Follow-up recheck DEXA  Atrophic kidney.  Improved status post nephrectomy, followed by urology,nephrology.  Renal function normal.  Interstitial cystitis.  Improved on Flomax, followed by [facility], history of InterStim placement/subsequent removal.  I and O catheter dependent.  Hypertensive retinopathy.  Followed by ophthalmology ember galeano, status post treatment  Colon screening.  Colonoscopy benign 2016./Diverticulosis only  Carotid stenosis.  50 to 59% on the right, mild on left per Doppler 1/21, stable per repeat ultrasound 10/21.  She is allergic to contrast and gadolinium.  Repeat Doppler scheduled.  Consider vascular surgery referral if worsening symptoms.  Continue risk factor reduction.  Osteoarthritis.  Worse left hip/knee.    Followed by orthopedics Dr. Angel, x-rays with mild left hip OA, moderate left OA knee, attempting knee bracing.  Consider knee injection.  Hyperlipidemia.    Persistent mild elevation, refractory to red yeast rice, start Crestor. Aggressive LDL target considering carotid stenosis. .  Discussed diet exercise lifestyle modification.  Follow-up recheck.    Memory loss.  Hypertension contributing today.  MRI brain benign 2020.  Follow-up recheck/plan check MMSE.        Diagnoses and all orders  for this visit:    1. Neck pain (Primary)  -     MRI Cervical Spine Without Contrast; Future  -     predniSONE (DELTASONE) 5 MG tablet; 40mg x 3 days, 20mg x 3 days, 10mg x 3 days, 5mg x 3 days  Dispense: 45 tablet; Refill: 0    2. Overweight with body mass index (BMI) of 29 to 29.9 in adult  Assessment & Plan:  Patient's (Body mass index is 29.19 kg/m².) indicates that they are overweight with health conditions that include hypertension and dyslipidemias . Weight is unchanged. BMI is is above average; BMI management plan is completed. We discussed portion control and increasing exercise.       3. Benign essential HTN  -     CBC & Differential  -     Comprehensive Metabolic Panel  -     TSH    4. Mixed hyperlipidemia  -     Lipid Panel With / Chol / HDL Ratio    5. Age-related osteoporosis without current pathological fracture    6. Solitary kidney, acquired            Expected course, medications, and adverse effects discussed.  Call or return if worsening or persistent symptoms.  I wore protective equipment throughout this patient encounter including a mask, gloves, and eye protection.  Hand hygiene was performed before donning protective equipment and after removal when leaving the room. The complete contents of the Assessment and Plan and Data/Lab Results as documented above have been reviewed and addressed by myself with the patient today as part of an ongoing evaluation / treatment plan.  If some of the documentation has been copied from a previous note and is unchanged it indicates that this problem / plan has been assessed today but is stable from a previous visit and no changes have been recommended.

## 2023-03-08 ENCOUNTER — OFFICE VISIT (OUTPATIENT)
Dept: FAMILY MEDICINE CLINIC | Facility: CLINIC | Age: 73
End: 2023-03-08
Payer: MEDICARE

## 2023-03-08 VITALS
TEMPERATURE: 98.6 F | BODY MASS INDEX: 29.37 KG/M2 | RESPIRATION RATE: 18 BRPM | HEIGHT: 62 IN | HEART RATE: 86 BPM | WEIGHT: 159.6 LBS | SYSTOLIC BLOOD PRESSURE: 124 MMHG | DIASTOLIC BLOOD PRESSURE: 66 MMHG | OXYGEN SATURATION: 97 %

## 2023-03-08 DIAGNOSIS — M54.2 NECK PAIN: Primary | ICD-10-CM

## 2023-03-08 DIAGNOSIS — I10 BENIGN ESSENTIAL HTN: ICD-10-CM

## 2023-03-08 DIAGNOSIS — E66.3 OVERWEIGHT WITH BODY MASS INDEX (BMI) OF 29 TO 29.9 IN ADULT: ICD-10-CM

## 2023-03-08 DIAGNOSIS — M81.0 AGE-RELATED OSTEOPOROSIS WITHOUT CURRENT PATHOLOGICAL FRACTURE: ICD-10-CM

## 2023-03-08 DIAGNOSIS — E78.2 MIXED HYPERLIPIDEMIA: Chronic | ICD-10-CM

## 2023-03-08 DIAGNOSIS — Z90.5 SOLITARY KIDNEY, ACQUIRED: ICD-10-CM

## 2023-03-08 PROCEDURE — 3078F DIAST BP <80 MM HG: CPT | Performed by: FAMILY MEDICINE

## 2023-03-08 PROCEDURE — 99214 OFFICE O/P EST MOD 30 MIN: CPT | Performed by: FAMILY MEDICINE

## 2023-03-08 PROCEDURE — 3074F SYST BP LT 130 MM HG: CPT | Performed by: FAMILY MEDICINE

## 2023-03-08 RX ORDER — PREDNISONE 1 MG/1
TABLET ORAL
Qty: 45 TABLET | Refills: 0 | Status: SHIPPED | OUTPATIENT
Start: 2023-03-08

## 2023-03-08 NOTE — ASSESSMENT & PLAN NOTE
Patient's (Body mass index is 29.19 kg/m².) indicates that they are overweight with health conditions that include hypertension and dyslipidemias . Weight is unchanged. BMI is is above average; BMI management plan is completed. We discussed portion control and increasing exercise.

## 2023-03-30 ENCOUNTER — HOSPITAL ENCOUNTER (OUTPATIENT)
Dept: MRI IMAGING | Facility: HOSPITAL | Age: 73
Discharge: HOME | End: 2023-03-30
Admitting: FAMILY MEDICINE
Payer: MEDICARE

## 2023-03-30 DIAGNOSIS — M54.2 NECK PAIN: ICD-10-CM

## 2023-03-30 PROCEDURE — 72141 MRI NECK SPINE W/O DYE: CPT

## 2023-04-03 DIAGNOSIS — M54.2 NECK PAIN: ICD-10-CM

## 2023-04-03 DIAGNOSIS — M54.2 CERVICAL PAIN (NECK): Primary | ICD-10-CM

## 2023-04-03 DIAGNOSIS — E78.2 MIXED HYPERLIPIDEMIA: Chronic | ICD-10-CM

## 2023-04-03 RX ORDER — CYCLOBENZAPRINE HCL 5 MG
5 TABLET ORAL DAILY PRN
Qty: 30 TABLET | Refills: 0 | Status: SHIPPED | OUTPATIENT
Start: 2023-04-03

## 2023-04-03 RX ORDER — HYDRALAZINE HYDROCHLORIDE 25 MG/1
TABLET, FILM COATED ORAL
Qty: 180 TABLET | Refills: 1 | Status: SHIPPED | OUTPATIENT
Start: 2023-04-03

## 2023-04-04 RX ORDER — ROSUVASTATIN CALCIUM 5 MG/1
5 TABLET, COATED ORAL DAILY
Qty: 90 TABLET | Refills: 1 | Status: SHIPPED | OUTPATIENT
Start: 2023-04-04

## 2023-05-03 DIAGNOSIS — M54.2 NECK PAIN: ICD-10-CM

## 2023-05-03 RX ORDER — CYCLOBENZAPRINE HCL 5 MG
5 TABLET ORAL DAILY PRN
Qty: 30 TABLET | Refills: 0 | Status: SHIPPED | OUTPATIENT
Start: 2023-05-03

## 2023-05-04 LAB
ALBUMIN SERPL-MCNC: 4.6 G/DL (ref 3.7–4.7)
ALBUMIN/GLOB SERPL: 1.6 {RATIO} (ref 1.2–2.2)
ALP SERPL-CCNC: 54 IU/L (ref 44–121)
ALT SERPL-CCNC: 14 IU/L (ref 0–32)
AST SERPL-CCNC: 17 IU/L (ref 0–40)
BASOPHILS # BLD AUTO: 0.1 X10E3/UL (ref 0–0.2)
BASOPHILS NFR BLD AUTO: 1 %
BILIRUB SERPL-MCNC: 0.4 MG/DL (ref 0–1.2)
BUN SERPL-MCNC: 20 MG/DL (ref 8–27)
BUN/CREAT SERPL: 16 (ref 12–28)
CALCIUM SERPL-MCNC: 10.4 MG/DL (ref 8.7–10.3)
CHLORIDE SERPL-SCNC: 100 MMOL/L (ref 96–106)
CHOLEST SERPL-MCNC: 170 MG/DL (ref 100–199)
CHOLEST/HDLC SERPL: 3 RATIO (ref 0–4.4)
CO2 SERPL-SCNC: 25 MMOL/L (ref 20–29)
CREAT SERPL-MCNC: 1.23 MG/DL (ref 0.57–1)
EGFRCR SERPLBLD CKD-EPI 2021: 46 ML/MIN/1.73
EOSINOPHIL # BLD AUTO: 0.1 X10E3/UL (ref 0–0.4)
EOSINOPHIL NFR BLD AUTO: 1 %
ERYTHROCYTE [DISTWIDTH] IN BLOOD BY AUTOMATED COUNT: 13 % (ref 11.7–15.4)
GLOBULIN SER CALC-MCNC: 2.8 G/DL (ref 1.5–4.5)
GLUCOSE SERPL-MCNC: 132 MG/DL (ref 70–99)
HCT VFR BLD AUTO: 40.8 % (ref 34–46.6)
HDLC SERPL-MCNC: 56 MG/DL
HGB BLD-MCNC: 13.3 G/DL (ref 11.1–15.9)
IMM GRANULOCYTES # BLD AUTO: 0 X10E3/UL (ref 0–0.1)
IMM GRANULOCYTES NFR BLD AUTO: 0 %
LDLC SERPL CALC-MCNC: 91 MG/DL (ref 0–99)
LYMPHOCYTES # BLD AUTO: 2.5 X10E3/UL (ref 0.7–3.1)
LYMPHOCYTES NFR BLD AUTO: 27 %
MCH RBC QN AUTO: 28.5 PG (ref 26.6–33)
MCHC RBC AUTO-ENTMCNC: 32.6 G/DL (ref 31.5–35.7)
MCV RBC AUTO: 88 FL (ref 79–97)
MONOCYTES # BLD AUTO: 0.9 X10E3/UL (ref 0.1–0.9)
MONOCYTES NFR BLD AUTO: 9 %
NEUTROPHILS # BLD AUTO: 5.7 X10E3/UL (ref 1.4–7)
NEUTROPHILS NFR BLD AUTO: 62 %
PLATELET # BLD AUTO: 305 X10E3/UL (ref 150–450)
POTASSIUM SERPL-SCNC: 4.8 MMOL/L (ref 3.5–5.2)
PROT SERPL-MCNC: 7.4 G/DL (ref 6–8.5)
RBC # BLD AUTO: 4.66 X10E6/UL (ref 3.77–5.28)
SODIUM SERPL-SCNC: 140 MMOL/L (ref 134–144)
TRIGL SERPL-MCNC: 131 MG/DL (ref 0–149)
TSH SERPL DL<=0.005 MIU/L-ACNC: 3.64 UIU/ML (ref 0.45–4.5)
VLDLC SERPL CALC-MCNC: 23 MG/DL (ref 5–40)
WBC # BLD AUTO: 9.2 X10E3/UL (ref 3.4–10.8)

## 2023-05-09 NOTE — PROGRESS NOTES
Subjective   Dunia Fabian is a 72 y.o. female.     Chief Complaint   Patient presents with   • Medicare Wellness-subsequent   • Hypertension   • Hyperlipidemia       The patient is here: to discuss health maintenance and disease prevention to follow up on multiple medical problems.  Last comprehensive physical was on 9/10/2021.  Previous physical was performed by  Tyron Driver MD  Overall has: moderate activity with work/home activities, good appetite, feels well with minor complaints, decreased energy level and is sleeping poorly. Nutrition: appropriate balanced diet, balanced diet and eating a variety of foods. Last tetanus shot was 10/24/2012. Patient is doing routine self breast exams: occasionally Patient's last carotid doppler was: 10/20/2021     Last Completed Mammogram          MAMMOGRAM (Every 2 Years) Next due on 12/19/2024 12/19/2022  Mammo Screening Digital Tomosynthesis Bilateral With CAD    12/15/2021  Mammo Screening Digital Tomosynthesis Bilateral With CAD    12/11/2020  Mammo Screening Digital Tomosynthesis Bilateral With CAD    12/10/2019  Mammo Screening Digital Tomosynthesis Bilateral With CAD    12/04/2018  Mammo Screening Digital Tomosynthesis Bilateral With CAD    Only the first 5 history entries have been loaded, but more history exists.                Dexa Scan   Date Value Ref Range Status   08/01/2017 see report  Final      Last Completed Colonoscopy          COLORECTAL CANCER SCREENING (COLONOSCOPY - Every 10 Years) Next due on 6/21/2026 06/21/2016   Colonoscopy component of  COLONOSCOPY    06/21/2016  SCANNED - COLONOSCOPY                History of Present Illness     Recent Hospitalizations:  No hospitalization(s) within the last year..  ccc    I personally reviewed and updated the patient's allergies, medications, problem list, and past medical, surgical, social, and family history. I have reviewed and confirmed the accuracy of the HPI and ROS as documented by the  MA/LPN/MAGDY Henson MA      Family History   Problem Relation Age of Onset   • Parkinsonism Mother    • Heart disease Mother         cardiovascular disease   • Diabetes Mother         diabetes mellitus    • Heart disease Father         cardiovascular disease   • Heart disease Sister         cardiovascular disease   • Diabetes Sister         diabetes mellitus    • Heart disease Brother         cardiovascular disease   • Diabetes Son         diabetes mellitus    • Heart disease Paternal Uncle         ischemic   • Stomach cancer Maternal Grandmother    • Breast cancer Niece 39   • Ovarian cancer Paternal Grandmother        Social History     Tobacco Use   • Smoking status: Never   • Smokeless tobacco: Never   Vaping Use   • Vaping Use: Never used   Substance Use Topics   • Alcohol use: Not Currently   • Drug use: No       Past Surgical History:   Procedure Laterality Date   • CATARACT EXTRACTION Left 08/2005    with Insert of Lens Prostheti   • CYSTOCELE REPAIR  06/1998    SIMENTAL Cystourethropexy & Cystocele Repair    • KIDNEY SURGERY Right 02/24/2014    Removal   • TONSILLECTOMY  1953   • URETHROLYSIS  10/2000    Transvaginal Urethrolysis & Bone Iona Sling   • VAGINAL HYSTERECTOMY  1970   • VITRECTOMY Left 04/2005    & Membrane Peeling       Patient Active Problem List   Diagnosis   • Acid reflux   • Allergic rhinitis   • Anxiety   • Atrophic kidney   • Carotid bruit present   • Carpal tunnel syndrome   • Low back pain   • Neck pain   • Thoracic back pain   • Depressive disorder   • Medicare annual wellness visit, subsequent   • Gout   • Hemorrhoids   • Mixed hyperlipidemia   • IC (interstitial cystitis)   • Irritable bowel syndrome with constipation   • LVH (left ventricular hypertrophy)   • Menopausal syndrome   • Mitral insufficiency, acute   • Onychomycosis   • Disorder of bone and cartilage   • Age-related osteoporosis without current pathological fracture   • Palpitations   • Osteoarthritis   • Primary  pulmonary hypertension   • Renal insufficiency   • Plantar fasciitis   • Senile osteoporosis   • Solitary kidney   • Encounter for screening mammogram for malignant neoplasm of breast   • Special screening for malignant neoplasms, colon   • Seborrheic keratosis   • Chronic kidney disease, stage 3 (moderate)   • Renal hypertrophy   • Hypercalcemia   • Acute cystitis with hematuria   • Lower extremity edema   • Overweight with body mass index (BMI) of 29 to 29.9 in adult   • Dizziness   • Left hip pain   • Bilateral primary osteoarthritis of knee   • Elevated fasting blood sugar   • Solitary kidney, acquired   • Benign essential HTN         Current Outpatient Medications:   •  amLODIPine (NORVASC) 5 MG tablet, TAKE 1 TABLET BY MOUTH TWICE DAILY, Disp: 180 tablet, Rfl: 1  •  aspirin 81 MG EC tablet, Take 1 tablet by mouth Daily., Disp: , Rfl:   •  carvedilol (COREG) 12.5 MG tablet, TAKE 1 TABLET BY MOUTH TWICE DAILY, Disp: 180 tablet, Rfl: 1  •  cloNIDine (CATAPRES) 0.1 MG tablet, Take 1 tablet by mouth Every 4 (Four) Hours As Needed., Disp: , Rfl:   •  cyclobenzaprine (FLEXERIL) 5 MG tablet, TAKE 1 TABLET BY MOUTH DAILY AS NEEDED FOR MUSCLE SPASMS, Disp: 30 tablet, Rfl: 0  •  denosumab (Prolia) 60 MG/ML solution prefilled syringe syringe, Inject  under the skin into the appropriate area as directed Every 6 (Six) Months., Disp: , Rfl:   •  hydrALAZINE (APRESOLINE) 25 MG tablet, TAKE 1 TABLET BY MOUTH TWICE DAILY, Disp: 180 tablet, Rfl: 1  •  multivitamin with minerals tablet tablet, Take 1 tablet by mouth Daily., Disp: , Rfl:   •  rosuvastatin (CRESTOR) 5 MG tablet, TAKE 1 TABLET BY MOUTH DAILY, Disp: 90 tablet, Rfl: 1  •  SUPER B COMPLEX/C capsule, SUPER B COMPLEX/C ORAL CAPSULE, Disp: , Rfl:   •  tamsulosin (FLOMAX) 0.4 MG capsule 24 hr capsule, Take 1 capsule by mouth Daily., Disp: , Rfl:   •  vitamin D3 125 MCG (5000 UT) capsule capsule, Take 1 capsule by mouth Daily., Disp: , Rfl:       Objective   /72 (BP  "Location: Right arm, Patient Position: Sitting, Cuff Size: Adult)   Pulse 65   Temp 98 °F (36.7 °C) (Temporal)   Resp 16   Ht 157.5 cm (62\")   Wt 72.7 kg (160 lb 3.2 oz)   SpO2 97%   BMI 29.30 kg/m²   BP Readings from Last 3 Encounters:   05/10/23 126/72   23 124/66   23 137/68     Wt Readings from Last 3 Encounters:   05/10/23 72.7 kg (160 lb 3.2 oz)   23 72.4 kg (159 lb 9.6 oz)   23 72.1 kg (159 lb)         Age-appropriate Screening Schedule:  Refer to the list below for future screening recommendations based on patient's age, sex and/or medical conditions. Orders for these Meredith Carmelo tests are listed in the plan section. The patient has been provided with a written plan.    Health Maintenance   Topic Date Due   • ZOSTER VACCINE (2 of 3) 2019   • COVID-19 Vaccine (6 - Booster for Pfizer series) 2021   • TDAP/TD VACCINES (3 - Td or Tdap) 10/24/2022   • DXA SCAN  2022   • HEPATITIS C SCREENING  05/10/2024 (Originally 7/15/2019)   • INFLUENZA VACCINE  2023   • LIPID PANEL  2024   • ANNUAL WELLNESS VISIT  05/10/2024   • MAMMOGRAM  2024   • COLORECTAL CANCER SCREENING  2026   • Pneumococcal Vaccine 65+  Completed       Depression Screen:       5/10/2023     9:43 AM   PHQ-2/PHQ-9 Depression Screening   Little Interest or Pleasure in Doing Things 0-->not at all   Feeling Down, Depressed or Hopeless 1-->several days   PHQ-9: Brief Depression Severity Measure Score 1     I spent more than 16 minutes asking patient questions, counseling and documenting in the chart.    Health Habits and Functional and Cognitive Screenin/10/2023     9:00 AM   Functional & Cognitive Status   Do you have difficulty preparing food and eating? No   Do you have difficulty bathing yourself, getting dressed or grooming yourself? No   Do you have difficulty using the toilet? No   Do you have difficulty moving around from place to place? No   Do you have trouble with " steps or getting out of a bed or a chair? No   Current Diet Well Balanced Diet   Dental Exam Up to date   Eye Exam Up to date   Exercise (times per week) 0 times per week   Current Exercises Include No Regular Exercise   Do you need help using the phone?  No   Are you deaf or do you have serious difficulty hearing?  No   Do you need help with transportation? No   Do you need help shopping? No   Do you need help preparing meals?  No   Do you need help with housework?  No   Do you need help with laundry? No   Do you need help taking your medications? No   Do you need help managing money? No   Do you ever drive or ride in a car without wearing a seat belt? No   Have you felt unusual stress, anger or loneliness in the last month? No   Who do you live with? Alone   If you need help, do you have trouble finding someone available to you? No   Do you have difficulty concentrating, remembering or making decisions? Yes       Does the patient have evidence of cognitive impairment? No    Advance Care Planning:  ACP discussion was held with the patient during this visit. Patient has an advance directive in EMR which is still valid.      A face-to-face visit was completed today with patient.  Counseling explanation, and discussion of advanced directives was performed.   The last advanced care visit was performed in 2021.  In a near life ending situation, from which she is not expected to recover functionally, and she is not able to speak for her, she does not want life sustaining measures. We discussed feeding tubes, ventilators and cardiac support as well as dialysis.    I spent more than 16 minutes discussing Advanced Care Planning information and documenting in the chart.    Identification of Risk Factors:  Risk factors include: Advance Directive Discussion  Colon Cancer Screening  Dementia/Memory   Fall Risk  Immunizations Discussed/Encouraged (specific immunizations; Shingrix and COVID19 )  Obesity/Overweight   Urinary  Incontinence.    Compared to one year ago, the patient feels her physical health is the same.  Compared to one year ago, the patient feels her mental health is worse.    Patient Self-Management and Personalized Health Advice  The patient has been provided with information about: diet, exercise, weight management, fall prevention, designing advance directives and supplements and preventive services including:   · Annual Wellness Visit (AWV)  · Bone Density Measurements  · Cardiovascular Disease Screening Tests (may do this order every 5 years in beneficiaries without signs or symptoms of cardiovascular disease)  · Colorectal Cancer Screening, Colonoscopy  · Counseling to Prevent Tobacco Use (use of smartset and [email or web address] smartphrase for documentation)  · Depression Screening (15 minutes face to face, Code )  · Influenza Vaccine and Administration  · Initial Preventative Physical Examination (IPPE)  · Pneumococcal Vaccine and Administration.      Assessment & Plan      Medications        Problem List         LOS      Diagnoses and all orders for this visit:    1. Medicare annual wellness visit, subsequent (Primary)    2. Benign essential HTN    3. Mixed hyperlipidemia    4. Overweight with body mass index (BMI) of 29 to 29.9 in adult  Assessment & Plan:  Patient's (Body mass index is 29.3 kg/m².) indicates that they are overweight with health conditions that include hypertension and dyslipidemias . Weight is unchanged. BMI is is above average; BMI management plan is completed. We discussed portion control and increasing exercise.       5. Encounter for screening mammogram for malignant neoplasm of breast    6. Special screening for malignant neoplasms, colon      Medicare wellness.  Discussed health maintenance, routine immunizations, screening tests, lifestyle modification.

## 2023-05-09 NOTE — PROGRESS NOTES
Subjective   Dunia Fabian is a 72 y.o. female.     Chief Complaint   Patient presents with   • Medicare Wellness-subsequent   • Hypertension   • Hyperlipidemia       Hypertension  This is a chronic problem. The current episode started more than 1 year ago. The problem has been gradually improving since onset. The problem is controlled. Associated symptoms include malaise/fatigue. Pertinent negatives include no anxiety, blurred vision, chest pain, orthopnea, palpitations, peripheral edema, PND, shortness of breath or sweats. (Dizziness, and neck pain.    Patient following up on the medication she was started on the last time she was here. She was started on Bystolic 20 MG tablet. She states that she is still logging her pressures and she states that there has been some higher readings as she takes her reading as soon as she gets up in the morning.   She states that the last time she was here she was instructed to stop the losartan that she was taking and she states that when she went to the pharmacy to get her medication she states that they had the Losartan ready for her and she states that it was for 25 mg which was cut in half from what she was on and she was concerned as she said that she was told to stop it completely. ) There are no associated agents to hypertension. Risk factors for coronary artery disease include post-menopausal state. Current antihypertension treatment includes beta blockers and calcium channel blockers. The current treatment provides moderate improvement. There are no compliance problems.  There is no history of angina, kidney disease, CAD/MI, CVA, heart failure, left ventricular hypertrophy, PVD or retinopathy. There is no history of chronic renal disease, coarctation of the aorta, hyperaldosteronism, hypercortisolism, hyperparathyroidism, a hypertension causing med, pheochromocytoma, renovascular disease, sleep apnea or a thyroid problem.   Hyperlipidemia  This is a chronic problem.  The current episode started more than 1 year ago. The problem is controlled. Recent lipid tests were reviewed and are high. She has no history of chronic renal disease. Factors aggravating her hyperlipidemia include fatty foods. Pertinent negatives include no chest pain, myalgias or shortness of breath. Current antihyperlipidemic treatment includes statins. There are no compliance problems.  Risk factors for coronary artery disease include dyslipidemia and hypertension.            I personally reviewed and updated the patient's allergies, medications, problem list, and past medical, surgical, social, and family history. I have reviewed and confirmed the accuracy of the History of Present Illness and Review of Symptoms as documented by the MA/LPN/RN. Tyron Driver MD    Family History   Problem Relation Age of Onset   • Parkinsonism Mother    • Heart disease Mother         cardiovascular disease   • Diabetes Mother         diabetes mellitus    • Heart disease Father         cardiovascular disease   • Heart disease Sister         cardiovascular disease   • Diabetes Sister         diabetes mellitus    • Heart disease Brother         cardiovascular disease   • Diabetes Son         diabetes mellitus    • Heart disease Paternal Uncle         ischemic   • Stomach cancer Maternal Grandmother    • Breast cancer Niece 39   • Ovarian cancer Paternal Grandmother        Social History     Tobacco Use   • Smoking status: Never   • Smokeless tobacco: Never   Vaping Use   • Vaping Use: Never used   Substance Use Topics   • Alcohol use: Not Currently   • Drug use: No       Past Surgical History:   Procedure Laterality Date   • CATARACT EXTRACTION Left 08/2005    with Insert of Lens Prostheti   • CYSTOCELE REPAIR  06/1998    SIMENTAL Cystourethropexy & Cystocele Repair    • KIDNEY SURGERY Right 02/24/2014    Removal   • TONSILLECTOMY  1953   • URETHROLYSIS  10/2000    Transvaginal Urethrolysis & Bone [place] Sling   • VAGINAL  HYSTERECTOMY  1970   • VITRECTOMY Left 04/2005    & Membrane Peeling       Patient Active Problem List   Diagnosis   • Acid reflux   • Allergic rhinitis   • Anxiety   • Atrophic kidney   • Carotid bruit present   • Carpal tunnel syndrome   • Low back pain   • Neck pain   • Thoracic back pain   • Depressive disorder   • Medicare annual wellness visit, subsequent   • Gout   • Hemorrhoids   • Mixed hyperlipidemia   • IC (interstitial cystitis)   • Irritable bowel syndrome with constipation   • LVH (left ventricular hypertrophy)   • Menopausal syndrome   • Mitral insufficiency, acute   • Onychomycosis   • Disorder of bone and cartilage   • Age-related osteoporosis without current pathological fracture   • Palpitations   • Osteoarthritis   • Primary pulmonary hypertension   • Renal insufficiency   • Plantar fasciitis   • Senile osteoporosis   • Solitary kidney   • Encounter for screening mammogram for malignant neoplasm of breast   • Special screening for malignant neoplasms, colon   • Seborrheic keratosis   • Chronic kidney disease, stage 3 (moderate)   • Renal hypertrophy   • Hypercalcemia   • Acute cystitis with hematuria   • Lower extremity edema   • Overweight with body mass index (BMI) of 29 to 29.9 in adult   • Dizziness   • Left hip pain   • Bilateral primary osteoarthritis of knee   • Elevated fasting blood sugar   • Solitary kidney, acquired   • Benign essential HTN   • Memory loss         Current Outpatient Medications:   •  amLODIPine (NORVASC) 5 MG tablet, TAKE 1 TABLET BY MOUTH TWICE DAILY, Disp: 180 tablet, Rfl: 1  •  aspirin 81 MG EC tablet, Take 1 tablet by mouth Daily., Disp: , Rfl:   •  carvedilol (COREG) 12.5 MG tablet, TAKE 1 TABLET BY MOUTH TWICE DAILY, Disp: 180 tablet, Rfl: 1  •  cloNIDine (CATAPRES) 0.1 MG tablet, Take 1 tablet by mouth Every 4 (Four) Hours As Needed., Disp: , Rfl:   •  cyclobenzaprine (FLEXERIL) 5 MG tablet, TAKE 1 TABLET BY MOUTH DAILY AS NEEDED FOR MUSCLE SPASMS, Disp: 30  tablet, Rfl: 0  •  denosumab (Prolia) 60 MG/ML solution prefilled syringe syringe, Inject  under the skin into the appropriate area as directed Every 6 (Six) Months., Disp: , Rfl:   •  hydrALAZINE (APRESOLINE) 25 MG tablet, TAKE 1 TABLET BY MOUTH TWICE DAILY, Disp: 180 tablet, Rfl: 1  •  multivitamin with minerals tablet tablet, Take 1 tablet by mouth Daily., Disp: , Rfl:   •  rosuvastatin (CRESTOR) 5 MG tablet, TAKE 1 TABLET BY MOUTH DAILY, Disp: 90 tablet, Rfl: 1  •  SUPER B COMPLEX/C capsule, SUPER B COMPLEX/C ORAL CAPSULE, Disp: , Rfl:   •  tamsulosin (FLOMAX) 0.4 MG capsule 24 hr capsule, Take 1 capsule by mouth Daily., Disp: , Rfl:   •  vitamin D3 125 MCG (5000 UT) capsule capsule, Take 1 capsule by mouth Daily., Disp: , Rfl:   •  Diclofenac Sodium (VOLTAREN) 1 % gel gel, Apply 4 g topically to the appropriate area as directed 3 (Three) Times a Day As Needed (neck pain). Dose is 4 grams for knees, ankles, feet.  Dose is 2 grams for elbows, wrists, hands., Disp: 50 g, Rfl: 5  •  sertraline (Zoloft) 25 MG tablet, Take 1 tablet by mouth Daily., Disp: 30 tablet, Rfl: 5  No current facility-administered medications for this visit.         Review of Systems   Constitutional: Positive for malaise/fatigue. Negative for chills and diaphoresis.   HENT: Negative for trouble swallowing and voice change.    Eyes: Negative for blurred vision and visual disturbance.   Respiratory: Negative for shortness of breath.    Cardiovascular: Negative for chest pain, palpitations, orthopnea and PND.   Gastrointestinal: Negative for abdominal pain and nausea.   Endocrine: Negative for polydipsia and polyphagia.   Genitourinary: Negative for hematuria.   Musculoskeletal: Negative for myalgias and neck stiffness.   Skin: Negative for color change and pallor.   Allergic/Immunologic: Negative for immunocompromised state.   Neurological: Negative for seizures and syncope.   Hematological: Negative for adenopathy.   Psychiatric/Behavioral:  "Negative for hallucinations, sleep disturbance and suicidal ideas.       I have reviewed and confirmed the accuracy of the ROS as documented by the MA/LPN/RN Tyron Driver MD      Objective   /72 (BP Location: Right arm, Patient Position: Sitting, Cuff Size: Adult)   Pulse 65   Temp 98 °F (36.7 °C) (Temporal)   Resp 16   Ht 157.5 cm (62\")   Wt 72.7 kg (160 lb 3.2 oz)   SpO2 97%   BMI 29.30 kg/m²   BP Readings from Last 3 Encounters:   05/10/23 126/72   03/08/23 124/66   02/17/23 137/68     Wt Readings from Last 3 Encounters:   05/10/23 72.7 kg (160 lb 3.2 oz)   03/08/23 72.4 kg (159 lb 9.6 oz)   02/17/23 72.1 kg (159 lb)     Physical Exam  Constitutional:       Appearance: She is well-developed. She is not diaphoretic.   HENT:      Head: Normocephalic.      Right Ear: Hearing, tympanic membrane, ear canal and external ear normal.      Left Ear: Hearing, tympanic membrane, ear canal and external ear normal.      Nose: Nose normal. No mucosal edema or congestion.      Right Sinus: No maxillary sinus tenderness or frontal sinus tenderness.      Left Sinus: No maxillary sinus tenderness or frontal sinus tenderness.      Mouth/Throat:      Mouth: No oral lesions.      Pharynx: Uvula midline. No oropharyngeal exudate or posterior oropharyngeal erythema.      Tonsils: No tonsillar exudate.   Eyes:      General: Lids are normal.      Conjunctiva/sclera: Conjunctivae normal.      Pupils: Pupils are equal, round, and reactive to light.   Neck:      Thyroid: No thyromegaly.      Vascular: No carotid bruit or JVD.      Trachea: No tracheal deviation.   Cardiovascular:      Rate and Rhythm: Normal rate and regular rhythm.      Heart sounds: Normal heart sounds. No murmur heard.    No friction rub. No gallop.   Pulmonary:      Effort: Pulmonary effort is normal.      Breath sounds: Normal breath sounds. No stridor. No decreased breath sounds, wheezing or rales.   Abdominal:      General: Bowel sounds are normal. " There is no distension.      Palpations: Abdomen is soft. There is no mass.      Tenderness: There is no abdominal tenderness. There is no guarding or rebound.      Hernia: No hernia is present.   Lymphadenopathy:      Head:      Right side of head: No submental, submandibular, tonsillar, preauricular, posterior auricular or occipital adenopathy.      Left side of head: No submental, submandibular, tonsillar, preauricular, posterior auricular or occipital adenopathy.      Cervical: No cervical adenopathy.   Skin:     General: Skin is warm and dry.      Coloration: Skin is not pale.   Neurological:      Mental Status: She is alert and oriented to person, place, and time.      Cranial Nerves: No cranial nerve deficit.      Sensory: No sensory deficit.      Motor: No tremor, abnormal muscle tone or seizure activity.      Coordination: Coordination normal.      Gait: Gait normal.      Deep Tendon Reflexes: Reflexes are normal and symmetric.         Data / Lab Results:    Hemoglobin A1C   Date Value Ref Range Status   09/10/2021 6.1 % Final        Lab Results   Component Value Date    LDL 91 05/03/2023     (H) 03/11/2022     (H) 08/28/2021     Lab Results   Component Value Date    CHOL 181 07/24/2018    CHOL 215 (H) 07/21/2017    CHOL 198 07/20/2016     Lab Results   Component Value Date    TRIG 131 05/03/2023    TRIG 128 03/11/2022    TRIG 113 08/28/2021     Lab Results   Component Value Date    HDL 56 05/03/2023    HDL 52 03/11/2022    HDL 54 08/28/2021     No results found for: PSA  Lab Results   Component Value Date    WBC 9.2 05/03/2023    HGB 13.3 05/03/2023    HCT 40.8 05/03/2023    MCV 88 05/03/2023     05/03/2023     Lab Results   Component Value Date    TSH 3.640 05/03/2023      Lab Results   Component Value Date    GLUCOSE 132 (H) 05/03/2023    BUN 20 05/03/2023    CREATININE 1.23 (H) 05/03/2023    EGFRIFNONA 48 (L) 08/28/2021    EGFRIFAFRI 55 (L) 08/28/2021    BCR 16 05/03/2023    K 4.8  05/03/2023    CO2 25 05/03/2023    CALCIUM 10.4 (H) 05/03/2023    PROTENTOTREF 7.4 05/03/2023    ALBUMIN 4.6 05/03/2023    LABIL2 1.6 05/03/2023    AST 17 05/03/2023    ALT 14 05/03/2023     No results found for: ESTHER, RF, SEDRATE   No results found for: CRP   No results found for: IRON, TIBC, FERRITIN   No results found for: [id number]       Assessment & Plan      Medications        Problem List         LOS    Medicare wellness.  Doing well, vaccines current.  Discussed health maintenance, screening test, lifestyle modification.  Pap current, followed by Dr. Patiño, mammogram benign 11/22, further screening declined.  Allergic rhinitis.  OTC Claritin or Zyrtec.  Hypertension.  Overall improved today on carvedilol, amlodipine, tolerating decreased dose hydralazine ,Off metoprolol per cardiology followed by Dr. Winston, .  Hold losartan/has had nephrology eval per Dr. Mcgee,  renal ultrasound/renal artery Doppler benign, has follow-up upcoming..  Clinically improved today on  hydralazine 100 mg twice daily, as needed clonidine.  Monitor blood pressure closely.  Follow-up recheck.  Consider restart amlodipine if persistent elevation.   . Discussed low-sodium diet.  Stress echo benign/elevated right-sided pressure only 2018.  Carotid Dopplers with mild blockage only 2018.  MRI brain benign 2020.  Has had cardiology eval/plan repeat stress testing when blood pressure better controlled per cardilogy recs/cardiology referral scheduled..  Life stress/decreased energy.  Multiple stressors.  Good social support.   Initially improved on Cymbalta/Wellbutrin, she discontinued Rx.  Start sertraline.  Lower extremity edema.  Improved currently with decreased dose amlodipine.  Discussed low-sodium diet.  Follow-up recheck.  Consider sleep study if persistent symptoms.  Osteoporosis.  Tolerating Prolia.  Holding calcium/vitamin D per nephrology.   recheck DEXA  Atrophic kidney.  Improved status post nephrectomy, followed by  urology,nephrology.  Renal function normal.  Interstitial cystitis.  Improved on Flomax, followed by [facility], history of InterStim placement/subsequent removal.  I and O catheter dependent.  Hypertensive retinopathy.  Followed by ophthalmology ember galeano, status post treatment  Colon screening.  Colonoscopy benign 2016./Diverticulosis only  Carotid stenosis.  50 to 59% on the right, mild on left per Doppler 1/21, stable per repeat ultrasound 10/21.  She is allergic to contrast and gadolinium.  Repeat Doppler scheduled.  Consider vascular surgery referral if worsening symptoms.  Continue risk factor reduction.  Osteoarthritis.  Worse left hip/knee.    Followed by orthopedics Dr. Angel, x-rays with mild left hip OA, moderate left OA knee, attempting knee bracing.  Consider knee injection.  Hyperlipidemia.    Improved today, LDL to 90 rosuvastatin, tolerating Rx.   Aggressive LDL target considering carotid stenosis. .  Discussed diet exercise lifestyle modification.  Follow-up recheck.    Memory loss.  Worse today, MMSE today, check B12, start Zoloft.  Good social support.  MRI brain benign 2020.  Follow-up recheck/plan check MMSE.  Neck pain.  Persistent symptoms today, significant bilateral trapezius spasm on exam today with left upper extremity radiculopathy, x-rays benign, positive and significant trauma DDx includes ruptured disc versus cervical disc disease, check MRI, add prednisone, continue muscle relaxant.  Consider PT referral, referral for epidural injections.  Follow-up recheck.  Call return if worsening symptoms.  Elevated fasting blood sugar.  FBS to 132, check A1c.    Diagnoses and all orders for this visit:    1. Medicare annual wellness visit, subsequent (Primary)    2. Benign essential HTN    3. Mixed hyperlipidemia    4. Overweight with body mass index (BMI) of 29 to 29.9 in adult  Assessment & Plan:  Patient's (Body mass index is 29.3 kg/m².) indicates that they are overweight with health conditions  that include hypertension and dyslipidemias . Weight is unchanged. BMI is is above average; BMI management plan is completed. We discussed portion control and increasing exercise.       5. Encounter for screening mammogram for malignant neoplasm of breast    6. Special screening for malignant neoplasms, colon    7. Elevated fasting blood sugar  -     Cancel: Hemoglobin A1c  -     Hemoglobin A1c    8. Anemia, unspecified type  -     Cancel: Folate  -     Cancel: Vitamin B12  -     Folate  -     Vitamin B12    9. Acute stress reaction  -     sertraline (Zoloft) 25 MG tablet; Take 1 tablet by mouth Daily.  Dispense: 30 tablet; Refill: 5    10. Degenerative cervical disc  -     Ambulatory Referral to Physical Therapy    11. Neck pain  -     Ambulatory Referral to Physical Therapy  -     Diclofenac Sodium (VOLTAREN) 1 % gel gel; Apply 4 g topically to the appropriate area as directed 3 (Three) Times a Day As Needed (neck pain). Dose is 4 grams for knees, ankles, feet.  Dose is 2 grams for elbows, wrists, hands.  Dispense: 50 g; Refill: 5    12. Post menopausal problems  -     DEXA Bone Density Axial; Future    13. Carotid bruit, unspecified laterality  -     US Carotid Bilateral; Future    14. Osteoporosis, unspecified osteoporosis type, unspecified pathological fracture presence  -     denosumab (PROLIA) syringe 60 mg  -     Diclofenac Sodium (VOLTAREN) 1 % gel gel; Apply 4 g topically to the appropriate area as directed 3 (Three) Times a Day As Needed (neck pain). Dose is 4 grams for knees, ankles, feet.  Dose is 2 grams for elbows, wrists, hands.  Dispense: 50 g; Refill: 5    15. Age-related osteoporosis without current pathological fracture    16. Atrophic kidney    17. Depressive disorder    18. IC (interstitial cystitis)    19. Solitary kidney, acquired    20. Memory loss            Expected course, medications, and adverse effects discussed.  Call or return if worsening or persistent symptoms.  I wore protective  equipment throughout this patient encounter including a mask, gloves, and eye protection.  Hand hygiene was performed before donning protective equipment and after removal when leaving the room. The complete contents of the Assessment and Plan and Data/Lab Results as documented above have been reviewed and addressed by myself with the patient today as part of an ongoing evaluation / treatment plan.  If some of the documentation has been copied from a previous note and is unchanged it indicates that this problem / plan has been assessed today but is stable from a previous visit and no changes have been recommended.

## 2023-05-10 ENCOUNTER — OFFICE VISIT (OUTPATIENT)
Dept: FAMILY MEDICINE CLINIC | Facility: CLINIC | Age: 73
End: 2023-05-10
Payer: MEDICARE

## 2023-05-10 VITALS
TEMPERATURE: 98 F | HEIGHT: 62 IN | OXYGEN SATURATION: 97 % | HEART RATE: 65 BPM | RESPIRATION RATE: 16 BRPM | DIASTOLIC BLOOD PRESSURE: 72 MMHG | BODY MASS INDEX: 29.48 KG/M2 | WEIGHT: 160.2 LBS | SYSTOLIC BLOOD PRESSURE: 126 MMHG

## 2023-05-10 DIAGNOSIS — N95.9 POST MENOPAUSAL PROBLEMS: ICD-10-CM

## 2023-05-10 DIAGNOSIS — N30.10 IC (INTERSTITIAL CYSTITIS): ICD-10-CM

## 2023-05-10 DIAGNOSIS — E66.3 OVERWEIGHT WITH BODY MASS INDEX (BMI) OF 29 TO 29.9 IN ADULT: ICD-10-CM

## 2023-05-10 DIAGNOSIS — Z90.5 SOLITARY KIDNEY, ACQUIRED: ICD-10-CM

## 2023-05-10 DIAGNOSIS — M54.2 NECK PAIN: ICD-10-CM

## 2023-05-10 DIAGNOSIS — M81.0 AGE-RELATED OSTEOPOROSIS WITHOUT CURRENT PATHOLOGICAL FRACTURE: ICD-10-CM

## 2023-05-10 DIAGNOSIS — M81.0 OSTEOPOROSIS, UNSPECIFIED OSTEOPOROSIS TYPE, UNSPECIFIED PATHOLOGICAL FRACTURE PRESENCE: ICD-10-CM

## 2023-05-10 DIAGNOSIS — E78.2 MIXED HYPERLIPIDEMIA: Chronic | ICD-10-CM

## 2023-05-10 DIAGNOSIS — Z12.31 ENCOUNTER FOR SCREENING MAMMOGRAM FOR MALIGNANT NEOPLASM OF BREAST: ICD-10-CM

## 2023-05-10 DIAGNOSIS — M50.30 DEGENERATIVE CERVICAL DISC: ICD-10-CM

## 2023-05-10 DIAGNOSIS — Z12.11 SPECIAL SCREENING FOR MALIGNANT NEOPLASMS, COLON: ICD-10-CM

## 2023-05-10 DIAGNOSIS — R41.3 MEMORY LOSS: ICD-10-CM

## 2023-05-10 DIAGNOSIS — Z00.00 MEDICARE ANNUAL WELLNESS VISIT, SUBSEQUENT: Primary | ICD-10-CM

## 2023-05-10 DIAGNOSIS — F32.A DEPRESSIVE DISORDER: ICD-10-CM

## 2023-05-10 DIAGNOSIS — I10 BENIGN ESSENTIAL HTN: ICD-10-CM

## 2023-05-10 DIAGNOSIS — R09.89 CAROTID BRUIT, UNSPECIFIED LATERALITY: ICD-10-CM

## 2023-05-10 DIAGNOSIS — F43.0 ACUTE STRESS REACTION: ICD-10-CM

## 2023-05-10 DIAGNOSIS — R73.01 ELEVATED FASTING BLOOD SUGAR: ICD-10-CM

## 2023-05-10 DIAGNOSIS — D64.9 ANEMIA, UNSPECIFIED TYPE: ICD-10-CM

## 2023-05-10 DIAGNOSIS — N26.1 ATROPHIC KIDNEY: ICD-10-CM

## 2023-05-10 RX ORDER — SERTRALINE HYDROCHLORIDE 25 MG/1
25 TABLET, FILM COATED ORAL DAILY
Qty: 30 TABLET | Refills: 5 | Status: SHIPPED | OUTPATIENT
Start: 2023-05-10

## 2023-05-10 NOTE — ASSESSMENT & PLAN NOTE
Patient's (Body mass index is 29.3 kg/m².) indicates that they are overweight with health conditions that include hypertension and dyslipidemias . Weight is unchanged. BMI is is above average; BMI management plan is completed. We discussed portion control and increasing exercise.

## 2023-05-11 LAB
FOLATE SERPL-MCNC: >20 NG/ML
HBA1C MFR BLD: 6.3 % (ref 4.8–5.6)
VIT B12 SERPL-MCNC: 805 PG/ML (ref 232–1245)

## 2023-05-22 ENCOUNTER — HOSPITAL ENCOUNTER (OUTPATIENT)
Dept: BONE DENSITY | Facility: HOSPITAL | Age: 73
Discharge: HOME | End: 2023-05-22
Payer: MEDICARE

## 2023-05-22 ENCOUNTER — HOSPITAL ENCOUNTER (OUTPATIENT)
Dept: ULTRASOUND IMAGING | Facility: HOSPITAL | Age: 73
Discharge: HOME | End: 2023-05-22
Payer: MEDICARE

## 2023-05-22 DIAGNOSIS — R09.89 CAROTID BRUIT, UNSPECIFIED LATERALITY: ICD-10-CM

## 2023-05-22 DIAGNOSIS — N95.9 POST MENOPAUSAL PROBLEMS: ICD-10-CM

## 2023-05-22 PROCEDURE — 77080 DXA BONE DENSITY AXIAL: CPT

## 2023-05-22 PROCEDURE — 93880 EXTRACRANIAL BILAT STUDY: CPT

## 2023-06-02 DIAGNOSIS — M54.2 NECK PAIN: ICD-10-CM

## 2023-06-02 RX ORDER — CYCLOBENZAPRINE HCL 5 MG
5 TABLET ORAL DAILY PRN
Qty: 30 TABLET | Refills: 0 | Status: SHIPPED | OUTPATIENT
Start: 2023-06-02

## 2023-06-05 ENCOUNTER — OFFICE VISIT (OUTPATIENT)
Dept: CARDIOLOGY | Facility: CLINIC | Age: 73
End: 2023-06-05
Payer: MEDICARE

## 2023-06-05 VITALS
HEART RATE: 69 BPM | RESPIRATION RATE: 18 BRPM | DIASTOLIC BLOOD PRESSURE: 70 MMHG | SYSTOLIC BLOOD PRESSURE: 144 MMHG | HEIGHT: 62 IN | BODY MASS INDEX: 29.26 KG/M2 | WEIGHT: 159 LBS

## 2023-06-05 DIAGNOSIS — I10 BENIGN ESSENTIAL HTN: ICD-10-CM

## 2023-06-05 DIAGNOSIS — R07.89 CHEST PAIN, ATYPICAL: ICD-10-CM

## 2023-06-05 DIAGNOSIS — E78.2 MIXED HYPERLIPIDEMIA: Primary | Chronic | ICD-10-CM

## 2023-06-05 PROCEDURE — 3077F SYST BP >= 140 MM HG: CPT | Performed by: INTERNAL MEDICINE

## 2023-06-05 PROCEDURE — 99214 OFFICE O/P EST MOD 30 MIN: CPT | Performed by: INTERNAL MEDICINE

## 2023-06-05 PROCEDURE — 3078F DIAST BP <80 MM HG: CPT | Performed by: INTERNAL MEDICINE

## 2023-06-06 ENCOUNTER — TREATMENT (OUTPATIENT)
Dept: PHYSICAL THERAPY | Facility: CLINIC | Age: 73
End: 2023-06-06
Payer: MEDICARE

## 2023-06-06 DIAGNOSIS — M25.60 LIMITED JOINT RANGE OF MOTION (ROM): ICD-10-CM

## 2023-06-06 DIAGNOSIS — M54.2 PAIN, NECK: Primary | ICD-10-CM

## 2023-06-06 NOTE — PROGRESS NOTES
Physical Therapy Initial Evaluation and Plan of Care  [address] [place] [address] [address] [address]    Patient: Dunia Fabian   : 1950  Diagnosis/ICD-10 Code:  Pain, neck [M54.2]  Referring practitioner: Tyron Driver MD  Date of Initial Visit: 2023  Today's Date: 2023  Patient seen for 1 sessions           Subjective Questionnaire: PT Functional Test: NDI = 13/50, indicating 26% impairment      Subjective Evaluation    History of Present Illness  Mechanism of injury: Pt reports having L sided neck pain for 3-4 months. States pain increases with lifting, using L UE. Cannot lay on L side. Pain goes out to L shoulder and down around shoulder blade. Denies any pain down L UE or weakness. Indicates pain and soreness is along L cervical paraspinals, L UT, and SCM. Has muscle relaxers but does not like taking them. Has diclofenac cream and uses as needed and it helps. Has HA with increased neck pain. Was referred to pain management but does not want to go that route yet. States her neck feels weak if she looks down too far. Difficulty sleeping. Heat helps.    Cervical spine MRI 3/2023:  Multilevel cervical spondylosis change is present as above, appearing most severe at C5-6 where there is moderate to severe spinal canal narrowing and moderate bilateral neuroforaminal stenosis. There is no evidence of cord edema or myelomalacia change.      Patient Occupation: retired Quality of life: excellent    Pain  Current pain ratin  At best pain ratin  At worst pain ratin  Location: L side of neck, L UT  Quality: discomfort and dull ache  Relieving factors: heat and medications  Aggravating factors: lifting, movement, sleeping, prolonged positioning, repetitive movement and outstretched reach    Hand dominance: right    Diagnostic Tests  MRI studies: abnormal    Patient Goals  Patient goals for therapy: decreased pain, increased motion, increased strength and return to sport/leisure activities            Objective          Static Posture     Head  Forward.    Shoulders  Rounded.    Scapulae  Left protracted and right protracted.    Palpation   Left   Hypertonic in the cervical paraspinals, levator scapulae, sternocleidomastoid, suboccipitals and upper trapezius.   Tenderness of the cervical paraspinals, levator scapulae, middle trapezius, sternocleidomastoid and upper trapezius.     Right   Hypertonic in the cervical paraspinals, sternocleidomastoid and suboccipitals.     Active Range of Motion   Cervical/Thoracic Spine   Cervical    Flexion: 30 degrees with pain  Extension: 12 degrees with pain  Left rotation: 42 degrees   Right rotation: 35 degrees with pain    Additional Active Range of Motion Details  B shoulder AROM WNL.    Limited thoracic extension and B rotation.    Strength/Myotome Testing   Cervical Spine     Left   Normal strength    Right   Normal strength    Additional Strength Details  Mild weakness B shoulder abd at 4/5.    Tests   Cervical     Left   Positive cervical distraction.     Right   Positive cervical distraction.         Assessment & Plan     Assessment  Impairments: abnormal muscle firing, abnormal muscle tone, abnormal or restricted ROM, activity intolerance, impaired physical strength, lacks appropriate home exercise program and pain with function  Functional Limitations: carrying objects, lifting, sleeping, pulling, pushing, uncomfortable because of pain, moving in bed, reaching behind back, reaching overhead and unable to perform repetitive tasks  Assessment details: Pt is 71 yo female with 3-4 mo hx of L sided neck pain that radiates out to L shoulder. Pt presents with severely limited cervical AROM in all directions. Pt presents with postural imbalances with significant tightness and tenderness of B UT and cervical paraspinals. Pt is having difficulty turning her head while driving. Increased pain with driving. NDI indicates 26% impairment.    Patient presents with the  impairments listed above and based on the objective findings and the physical therapy evaluation, the patient's condition has the potential to improve in response to therapy.   The patient's condition and/or services required are at a level of complexity that necessitates the skill & supervision of a physical therapist.    Prognosis: good    Goals  Plan Goals: STG to be met in 3 wk:  - Pt to report 50% improvement in L upper trap radicular symptoms.  - Pt to report 50% improvement in sleep related to neck pain.  - Pt to demonstrated improved posture with min verbal cues.  LTG to be met in 12 wk:  - Improve NDI to 15% or less impairment.  - Improve cervical rotation to 50 deg B for improved safety while driving.  - Increase cervical ext to 20 deg in order to look up into overhead cabinets to retrieve glasses, plates.  - Pt to be independent with HEP.    Plan  Therapy options: will be seen for skilled therapy services  Planned modality interventions: thermotherapy (hydrocollator packs), electrical stimulation/Russian stimulation, traction, ultrasound and dry needling  Other planned modality interventions: modalities as indicated  Planned therapy interventions: body mechanics training, flexibility, home exercise program, functional ROM exercises, manual therapy, postural training, soft tissue mobilization, strengthening, stretching, therapeutic activities and neuromuscular re-education  Frequency: 2x week  Duration in weeks: 12  Treatment plan discussed with: patient      Timed:         Manual Therapy:    5     mins  71872;     Therapeutic Exercise:    15     mins  93814;     Neuromuscular Adrienne:        mins  93801;    Therapeutic Activity:          mins  54246;     Gait Training:           mins  20567;     Ultrasound:          mins  20491;    Ionto                                   mins   82030  Self - Care                          mins  55964    Un-Timed:  Electrical Stimulation:         mins  04795 ( );  Dry  Lj          20561/61122  Traction     15     mins 56470  Can Repos          mins 82465  Low Eval          Mins  87270  Mod Eval     25     Mins  09566  High Eval                            Mins  33116      Timed Treatment:   20   mins   Total Treatment:     60   mins      PT SIGNATURE: Cherelle Lozano PT, CLT  [place] Lic # [id number]  Electronically signed by Cherelle Lozano PT, 06/06/23, 9:15 AM EDT    Initial Certification  Certification Period: 6/6/2023 thru 9/3/2023  I certify that the therapy services are furnished while this patient is under my care.  The services outlined above are required by this patient, and will be reviewed every 90 days.     PHYSICIAN: Tyron Driver MD _____________________________________________________  NPI: [id number]                                      DATE:    Please sign and return via fax to [phone]. Thank you, [facility].

## 2023-06-13 NOTE — PROGRESS NOTES
Cardiology Clinic Note  Marco A Winston MD, PhD    Subjective:     Encounter Date:06/05/2023      Patient ID: Dunia Fabian is a 72 y.o. female.    Chief Complaint:  Chief Complaint   Patient presents with    Follow-up       HPI:      I had the pleasure to see this very pleasant 72-year-old female today as a new patient initially referred for chest pain.  She has a history of CKD stage III with solitary kidney with nephrectomy in the past.  History of some mitral valve insufficiency.  She previously had very uncontrolled blood pressures at 174/66 on initial encounter with heart rate in the 50s on bisoprolol.  She has history of palpitations hypertension hyperlipidemia, murmur systolic in nature, mother and father and brother all have heart disease but unspecified, she is not diabetic and she is a non-smoker.  She has some chest pain substernal without significant radiation but blood pressures been very uncontrolled recently which is her primary reason for referral.  Doppler arterial ultrasound of the left renal which is her remaining kidney demonstrated normal Doppler findings with no evidence of stenosis.    Surgically absent right kidney.  Her losartan was  discontinued with elevated creatinine of 1.6 and August, September value was much better at 1.1.  She does drink a lot of water liberally to hydrate her remaining kidney she says.  Blood pressures are well controlled after previous changes to her regimen, 90-1 35 systolic at home.  She denies any unstable angina.  2D echo revealed normal EF at 65% with normal diastolic function with mild TR and normal pulmonary pressures.  She is reassured by these findings.  Wrist optimizing her regimens on Coreg amlodipine and hydralazine trying to decrease hydralazine to off as able or only as needed with long-acting medications once or twice a day only.    Today on repeat encounter she is overall doing well.  Blood pressure at home 130/70, weight is stable at 160, heart  rate is in the 60s.  She is otherwise asymptomatic doing well.  We discussed coronary calcium scoring for restratification with continuation of present goal-directed medical therapy     Review of systems otherwise negative x14 point review of systems except as mentioned above     Historical data copied forward from previous encounters in EMR including the history, exam, and assessment/plan has been reviewed and is unchanged unless noted otherwise.     Cardiac medicines reviewed with risk, benefits, and necessity of each discussed.     Risk and benefit of cardiac testing reviewed including death heart attack stroke pain bleeding infection need for vascular /cardiovascular surgery were discussed and the patient      Objective:         Vitals reviewed below     Physical Exam  Regular rate and rhythm no rubs gallops heave or lift  1 out of 6 systolic ejection murmur left sternal border  No clubbing cyanosis with trace to 1+ edema  Positive HJR and JVD  Clear to auscultation  Obese soft nontender nondistended  Normal pulses normal cap refill  Tach grossly  Normocephalic/atraumatic pupils are ground  Unchanged  Assessment:           Atypical chest pain  Essential hypertension  Volume overload previously  CKD stage III with remaining solitary left kidney, right kidney removed remotely  Systolic ejection murmur on exam     Continue Coreg 12.5 twice daily  Amlodipine 5 twice daily  Continue hydralazine to 25 twice daily, extra dose as needed blood pressure greater than 140 systolic  Clonidine will be moved to as needed for greater than 160 systolic  Remain off Cardura presently  Avoid nitrates presently    Coronary calcium scoring for risk stratification     Control her blood pressure less than 135 systolic at home on readings, this is well controlled  For any recurrent chest pain/chest discomfort would recommend ischemic evaluation  2D echo for structural functional evaluation revealed an EF of 60 to 65% with normal  "diastolic function and normal pulmonary pressures with mild TR.     6 months     The pleasure to be involved in this patient's cardiovascular care.  Please call with any questions or concerns  Marco A Winston MD, PhD        Objective:         /70 (BP Location: Right arm, Patient Position: Sitting)   Pulse 69   Resp 18   Ht 157.5 cm (62\")   Wt 72.1 kg (159 lb)   BMI 29.08 kg/m²     Diagnoses and all orders for this visit:    1. Mixed hyperlipidemia (Primary)  -     CT Cardiac Calcium Score Without Dye; Future    2. Benign essential HTN  -     CT Cardiac Calcium Score Without Dye; Future    3. Chest pain, atypical  -     CT Cardiac Calcium Score Without Dye; Future           Plan:              The pleasure to be involved in this patient's cardiovascular care.  Please call with any questions or concerns  Marco A Winston MD, PhD    Most recent EKG as reviewed and interpreted by me:  Procedures     Most recent echo as reviewed and interpreted by me:  Results for orders placed during the hospital encounter of 12/01/22    Adult Transthoracic Echo Complete W/ Cont if Necessary Per Protocol    Interpretation Summary    Left ventricular systolic function is normal. Left ventricular ejection fraction appears to be 61 - 65%.    Left ventricular diastolic function was normal.    Estimated right ventricular systolic pressure from tricuspid regurgitation is mildly elevated (35-45 mmHg). Calculated right ventricular systolic pressure from tricuspid regurgitation is 38 mmHg.      Most recent stress test as reviewed and interpreted by me:      Most recent cardiac catheterization as reviewed interpreted by me:  No results found for this or any previous visit.    The following portions of the patient's history were reviewed and updated as appropriate: allergies, current medications, past family history, past medical history, past social history, past surgical history, and problem list.      ROS:  14 point review of systems " negative except as mentioned above    Current Outpatient Medications:     amLODIPine (NORVASC) 5 MG tablet, TAKE 1 TABLET BY MOUTH TWICE DAILY, Disp: 180 tablet, Rfl: 1    aspirin 81 MG EC tablet, Take 1 tablet by mouth Daily., Disp: , Rfl:     carvedilol (COREG) 12.5 MG tablet, TAKE 1 TABLET BY MOUTH TWICE DAILY, Disp: 180 tablet, Rfl: 1    denosumab (Prolia) 60 MG/ML solution prefilled syringe syringe, Inject  under the skin into the appropriate area as directed Every 6 (Six) Months., Disp: , Rfl:     Diclofenac Sodium (VOLTAREN) 1 % gel gel, Apply 4 g topically to the appropriate area as directed 3 (Three) Times a Day As Needed (neck pain). Dose is 4 grams for knees, ankles, feet.  Dose is 2 grams for elbows, wrists, hands., Disp: 50 g, Rfl: 5    hydrALAZINE (APRESOLINE) 25 MG tablet, TAKE 1 TABLET BY MOUTH TWICE DAILY, Disp: 180 tablet, Rfl: 1    rosuvastatin (CRESTOR) 5 MG tablet, TAKE 1 TABLET BY MOUTH DAILY, Disp: 90 tablet, Rfl: 1    SUPER B COMPLEX/C capsule, SUPER B COMPLEX/C ORAL CAPSULE, Disp: , Rfl:     tamsulosin (FLOMAX) 0.4 MG capsule 24 hr capsule, Take 1 capsule by mouth Daily., Disp: , Rfl:     vitamin D3 125 MCG (5000 UT) capsule capsule, Take 1 capsule by mouth Daily., Disp: , Rfl:     cloNIDine (CATAPRES) 0.1 MG tablet, Take 1 tablet by mouth Every 4 (Four) Hours As Needed. (Patient not taking: Reported on 6/5/2023), Disp: , Rfl:     cyclobenzaprine (FLEXERIL) 5 MG tablet, TAKE 1 TABLET BY MOUTH DAILY AS NEEDED FOR MUSCLE SPASMS (Patient not taking: Reported on 6/5/2023), Disp: 30 tablet, Rfl: 0    Problem List:  Patient Active Problem List   Diagnosis    Acid reflux    Allergic rhinitis    Anxiety    Atrophic kidney    Carotid bruit present    Carpal tunnel syndrome    Low back pain    Neck pain    Thoracic back pain    Depressive disorder    Medicare annual wellness visit, subsequent    Gout    Hemorrhoids    Mixed hyperlipidemia    IC (interstitial cystitis)    Irritable bowel syndrome with  constipation    LVH (left ventricular hypertrophy)    Menopausal syndrome    Mitral insufficiency, acute    Onychomycosis    Disorder of bone and cartilage    Age-related osteoporosis without current pathological fracture    Palpitations    Osteoarthritis    Primary pulmonary hypertension    Renal insufficiency    Plantar fasciitis    Senile osteoporosis    Solitary kidney    Encounter for screening mammogram for malignant neoplasm of breast    Special screening for malignant neoplasms, colon    Seborrheic keratosis    Chronic kidney disease, stage 3 (moderate)    Renal hypertrophy    Hypercalcemia    Acute cystitis with hematuria    Lower extremity edema    Overweight with body mass index (BMI) of 29 to 29.9 in adult    Dizziness    Left hip pain    Bilateral primary osteoarthritis of knee    Elevated fasting blood sugar    Solitary kidney, acquired    Benign essential HTN    Memory loss     Past Medical History:  Past Medical History:   Diagnosis Date    Acid reflux     Impression: Discussed diet, exercise, lifestyle modification. start ppi Call / return if persistent symptoms.    Acute bronchitis     Impression: Discussed the plan of care and anticipated course of illness with antibiotic treatment. Educated on side effects of antibiotics and hydromet. Pt was educated on the effects of decongestants on bp. She was encouraged to stop nyquil and decongestants. Frequent fluids and rest were encouraged. Pt was told if he symptoms do no improve within one week to return for further evaluation.    Acute cystitis with hematuria     Impression: Findings discussed. All questions answered. Medication and medication adverse effects discussed. Drug education given and explained to patient. Patient verbalized understanding. Follow-up in approximately 3 days for reevaluation if not improved. Follow-up sooner for worsening symptoms or any concerns. Increase fluids    Acute sinusitis     Allergic rhinitis     Impression: Stable.     Anxiety     Arm weakness     Impression: episode weakness / stiffness l hand and foot, resolved currently ddx includes tia / flare arhtritis / carpal tunnel consider recent working hard moving, rx in progress check carotid dopplers, echo, restart asa Follow up 2 months. Call / return if if recurrant symptoms.    Arthralgia     Impression: Will call with lab results. May need referral to Rheumatology.    Arthritis     Impression: history of treatment for lupus with medication in remote past - recheck bloodwork    Atrophic kidney     Impression: improve status post nephrectomy followed by urology    Back pain     Impression: traumatic ice 20 minutes bid Rehabilitation exercises discussed. xray without fracture Consider further imaging if symptoms persist    Blurry vision     Impression: transient episode, resolved has had benign optho eval per her report, will get records ddx ncludes tia check carotid dopplers, holter continue asa Follow up [email or web address] Call / return if if recurrant symptoms.    Carotid bruit     Carpal tunnel syndrome     Impression: followed by hand surgery continue qhs bracing consider Follow up for repeat injxn, surgery if persistent symptoms.    Cellulitis     Impression: Topical care discussed. Call / return if persistent symptoms.    Chest pain     Constipation     Impression: improved today - increase fluids, fiber - did not tolerate metamucil, change to fiber tablet / titratete - continue otc lactulose prm - consider amitiza - Follow up recheck    Contusion of knee, right     Impression: Ice. Elevate. Rest. Discussed anticipated course. Given copy of x-rays on disc to take to Dr. Warren tomorrow.    Depressive disorder     Impression: conflict with family currently, expects resolution when moves to [place] later this mos to live with new  Good social support start ativan prn, use sparingly consider ssri if persistent symptoms.    Disorder of bone and cartilage     Impression: stable /  slightly worse per dexa (osteopenia, fn -1.5) discussed calcium, vit d start prolia Discussed diet, exercise, lifestyle modification. recheck 1 year    Disorder of skin and subcutaneous tissue     Dog bite     Impression: her neighbors dog, is healthy Topical care discussed. Signs and symptoms of infecton discussed. Call / return if fever, worsening symptoms.    Dysuria     Impression: likely 2nd uti / IC flare ct with atrophic r kidney with stones in r kidney only, xray lspine without fracture, wbc normal continue antibiothiics + urinary retentinon / catheter in place urology Follow up scheduled monday go to ED if fever, unable to keep fluids down, worsening symptoms.    Encounter for long-term (current) use of medications     Enrolled in chronic care management     External otitis     Impression: Topical care discussed. Call / return if persistent symptoms.    Flank pain     Fracture, foot     Impression: Left. x-rayed at [facility] today - will call for records. Keep appointment with Dr. Warren for tomorrow. She was given prescription for Oxycodone in the ER.    Fracture, toe     Impression: base prox phalax great toe, nondisplaced, improving nita taping / hard soled shoe Call / return if persistent symptoms.    Generalized abdominal pain     Impression: llq, suprapubic, rlq cbc normal likely 2nd flare interstitial cystitis, uti, self cathing currently ddx includes diverticultis start antibiotics, cx sent, Follow up recheck has urology Follow up next week Call / return if fever, unable to keep fluids down, worsening symptoms. Consider imaging if persistent symptoms.    Gout     Impression: uric acid 7 on 4/12 doing well on allopurinol Follow up recheck levels Discussed diet, lifestyle modification.    H/O drug therapy     Hematuria, microscopic     Hemorrhoids     Impression: improved, increase fluids / fiber Topical care discussed. consider colorectal surgery referrall if persistent symptoms.    Hip pain      Impression: oa, worse / flare, refractory to pt ortho ref sched ice 20 minutes bid Rehabilitation exercises discussed. consider add tramadol    Hyperlipidemia     Impression: good control ------------- Stable Compliant with meds Is getting adequate diet and exercise Goals developed at last visit were met Care management needs are self addressed. Discussed diet, exercise, lifestyle modification.    Hypertension     Impression: good control Discussed low sodium diet, lifestyle modification.    Hypertension, benign     Impression: good control holding lisinopril secondary to renal insufficiency    IC (interstitial cystitis)     Impression: improved on flomax, followed by [facility] h/o interstim placement / subsequent removal h/o improved on rx per morena, pt Discussed diet, lifestyle modification. followed by Zulema / Ernie, s/p recentt removal interstim 2nd inneffective    Inflammatory and toxic neuropathy     Impression: Trial of medications to see if she can get some relief.    Influenza     Impression: Push fluids, bland diet. Signs and symptoms of dehydration discussed. Prophylactic rx written for close contacts. Call / return if unable to keep fluids down, worsening symptoms.    Irritable bowel syndrome with constipation     Impression: Occasional flares. Change with stress and diet.    Knee pain     Impression: Right. X-ray without obvious acute process - awaiting Radiologist's official report.    Low back pain     Impression: improved lumbar MRI with moderate ddd 2017, x-ray left hip with mild arthritis. Doing well, improved with epidural injections currently. s/p course Physical therapy. followed by Dr. Beavers / improved with epidural injxn, consider repeat course.    LVH (left ventricular hypertrophy)     Impression: htn well controlled recheck echo    Malaise and fatigue     Impression: much improved today possibly 2nd recent nephrectomy bloodwork normal consider further eval if persistent symptoms. follow up 2  to 3 months    Medicare annual wellness visit, subsequent     Impression: doing well, vaccines current Age specific anticipatory guidance and warning signs discussed. Diet, exercise, and lifestyle modification discussed. Safety, seatbelts, and routine screening examinations discussed. Discussed self-examinations.    Menopausal syndrome     Impression: current on mammogram / followed by ob/gyn (Dr. Claros) Recommend follow up visit for comprehensive physical exam, coordination of care, and screening tests.    Mitral insufficiency, acute     Myalgia     Neck pain     Impression: strain ice 20 minutes bid Rehabilitation exercises discussed. Consider imaging if persistent symptoms.    Onychomycosis     Osteoarthritis     Impression: She has an appointment for another opinion.    Osteoporosis     Impression: improved, tolerating prolia discussed calcium, vit d Follow up recheck dxa    Overweight (BMI 25.0-29.9)     Pain in soft tissues of limb     Palpitations     Plantar fasciitis     Impression: qhs bracing / heel cups ice 20 minutes bid nsaids Rehabilitation exercises discussed. Call / return if persistent symptoms.    Primary pulmonary hypertension     Pruritus     Psoriasis     Rectal bleeding     Impression: likely 2nd hemorrhoids, rx in orogress cbc normal, no tachycardia Follow up recheck Call / return if worsening symptoms.    Renal insufficiency     Impression: mild / stable / improved s/p left nephrectomy bell avoid nsaids, improved off lisinopril / hctz / pyridium no blood draws in right arm    Rotator cuff tendonitis, right     Impression: strain ice 20 minutes bid Rehabilitation exercises discussed. Consider imaging, joint injxn if persistent symptoms.    RUQ pain     Impression: much improved on ppi gerd vs gb pathology ruq us neg Follow up recheck Call / return if unable to keep fluids down, fever, worsening symptoms.    Screening for depression     Negative Depression Screening (4 or less) ()     Screening mammogram, encounter for     Seborrheic keratosis     Impression: left forehead, benign appearance Topical care discussed. Follow up recheck apt with dermatology upcoming continue to monitor ccliniclly / consider resectino if worsening symptoms.    Solar lentiginosis     Impression: r face, benign appearance Topical care discussed. Call / return if lesion increases in size, changes in shape or color, or becomes tender or inflamed. Discussed skin care, use of sun block and protective clothing.    Solitary kidney     Impression: doing well, renal f negative normal avoid nsaids / continue to monitor    Special screening for malignant neoplasms, colon     Impression: Negative colonoscopy by Dr. Goff in 2016 (diverticulosis only)    Telogen effluvium     Thoracic back pain     TMJ arthritis     Impression: Rehabilitation exercises discussed. restart robaxin qhs prn    Tricuspid valve disorder     URI, acute     Impression: Increase fluid intake. Mucinex Call / return if fever, respiratory difficulty, worsening symptoms.    Urinary incontinence     Impression: She has been self catheterizing.    Urinary retention     Impression: catheter in place / urology Follow up scheduled     Past Surgical History:  Past Surgical History:   Procedure Laterality Date    CATARACT EXTRACTION Left 08/2005    with Insert of Lens Prostheti    CYSTOCELE REPAIR  06/1998    SIMENTAL Cystourethropexy & Cystocele Repair     KIDNEY SURGERY Right 02/24/2014    Removal    TONSILLECTOMY  1953    URETHROLYSIS  10/2000    Transvaginal Urethrolysis & Bone [place] Sling    VAGINAL HYSTERECTOMY  1970    VITRECTOMY Left 04/2005    & Membrane Peeling     Social History:  Social History     Socioeconomic History    Marital status: Single   Tobacco Use    Smoking status: Never    Smokeless tobacco: Never   Vaping Use    Vaping Use: Never used   Substance and Sexual Activity    Alcohol use: Not Currently    Drug use: No    Sexual activity: Defer  "    Allergies:  Allergies   Allergen Reactions    Contrast Dye (Echo Or Unknown Ct/Mr) Anaphylaxis    Gadolinium Hives     \"crawling\" feeling     Iodine Anaphylaxis    Shrimp Anaphylaxis     Immunizations:  Immunization History   Administered Date(s) Administered    COVID-19 (PFIZER) Purple Cap Monovalent 02/03/2021, 02/24/2021, 09/24/2021    Covid-19 (Pfizer) Gray Cap Monovalent 02/03/2021, 02/24/2021, 09/24/2021    Flu Vaccine Quad PF >36MO 08/31/2018    Fluzone High Dose =>65 Years ([employer] ONLY) 09/13/2019    Fluzone High-Dose 65+yrs 10/07/2022    Hepatitis A 12/12/2018, 08/06/2019    Influenza, Unspecified 10/07/2022    PEDS-Pneumococcal Conjugate (PCV7) 10/04/2019    Pneumococcal Conjugate 13-Valent (PCV13) 08/31/2018    Pneumococcal Polysaccharide (PPSV23) 10/04/2019    Pneumococcal, Unspecified 09/25/2013    Tdap 11/14/2007, 10/24/2012    Zostavax 12/12/2018    Zoster, Unspecified 10/01/2013, 08/31/2018, 12/13/2018            In-Office Procedure(s):  No orders to display        ASCVD RIsk Score::  The 10-year ASCVD risk score (Nicol DK, et al., 2019) is: 23%    Values used to calculate the score:      Age: 72 years      Sex: Female      Is Non- : No      Diabetic: No      Tobacco smoker: No      Systolic Blood Pressure: 144 mmHg      Is BP treated: Yes      HDL Cholesterol: 56 mg/dL      Total Cholesterol: 170 mg/dL    Imaging:    Results for orders placed in visit on 02/17/23    XR Shoulder 2+ View Left    Narrative  XR SHOULDER 2+ VW LEFT    Date of Exam: 2/17/2023 11:47 AM EST    Indication: left trapezius pain. (if needed)..    Comparison: None available.    Findings:  Clavicle intact. Minimal AC joint alignment. The proximal humerus is intact. No fracture or dislocation. Scapula intact. Calcified left upper lobe granuloma. No left apical pneumothorax.    Impression  Impression:  Negative for acute osseous abnormality.    Electronically Signed: Jermaine Alexander  2/17/2023 12:07 PM " EST  Workstation ID: [id number]       Results for orders placed during the hospital encounter of 05/22/23    US Carotid Bilateral    Narrative  US CAROTID BILATERAL    Date of Exam: 5/22/2023 3:13 PM EDT    Indication: 72-year-old female with known atherosclerotic carotid disease presents for continued surveillance. She also has a carotid bruit.    Comparison: Carotid duplex ultrasound study, October 20, 2021    Technique: Grayscale, color-flow, and spectral imaging was obtained of the bilateral carotid and vertebral arteries.    Findings:  Right side-the right common carotid artery is patent with scattered areas of atheromatous plaquing. There is bulky moderate grade calcific plaquing in the bulb. Peak stalk velocity measurement within the right carotid bulb is 132 cm/s with a systolic  ratio of 1.5. The right external carotid is patent with some elevated velocity at 272 cm/s. There is antegrade flow within the right vertebral artery.    Left side-the left common carotid artery is patent with diffuse mild disease. There is mild to moderate partially calcific plaquing in the left carotid bulb with a peak systolic velocity of 95 cm/s within the left ICA, and a systolic ratio of 1. The left  external carotid is patent. There is antegrade flow within the left vertebral artery.. There is antegrade flow    Impression  Impression:    1. Bulky calcific plaquing, right carotid bulb, with continued suggestion of a 50 to 69% diameter stenosis. When compared to the prior examination of October, 2021, the category stenosis is stable.  2. Mild to moderate plaquing, left carotid bulb, with the estimated degree of stenosis remaining at less than 50% diameter stenotic.  3. Patent bilateral vertebral arteries with antegrade flow.  4. Ostial stenosis, right external carotid artery.        Electronically Signed: Payam Lanier  5/22/2023 5:29 PM EDT  Workstation ID: [id number]          Lab Review:   Office Visit on 05/10/2023   Component  Date Value    Folate 05/10/2023 >20.0     Hemoglobin A1C 05/10/2023 6.3 (H)     Vitamin B-12 05/10/2023 805    Office Visit on 03/08/2023   Component Date Value    WBC 05/03/2023 9.2     RBC 05/03/2023 4.66     Hemoglobin 05/03/2023 13.3     Hematocrit 05/03/2023 40.8     MCV 05/03/2023 88     MCH 05/03/2023 28.5     MCHC 05/03/2023 32.6     RDW 05/03/2023 13.0     Platelets 05/03/2023 305     Neutrophil Rel % 05/03/2023 62     Lymphocyte Rel % 05/03/2023 27     Monocyte Rel % 05/03/2023 9     Eosinophil Rel % 05/03/2023 1     Basophil Rel % 05/03/2023 1     Neutrophils Absolute 05/03/2023 5.7     Lymphocytes Absolute 05/03/2023 2.5     Monocytes Absolute 05/03/2023 0.9     Eosinophils Absolute 05/03/2023 0.1     Basophils Absolute 05/03/2023 0.1     Immature Granulocyte Rel* 05/03/2023 0     Immature Grans Absolute 05/03/2023 0.0     Glucose 05/03/2023 132 (H)     BUN 05/03/2023 20     Creatinine 05/03/2023 1.23 (H)     EGFR Result 05/03/2023 46 (L)     BUN/Creatinine Ratio 05/03/2023 16     Sodium 05/03/2023 140     Potassium 05/03/2023 4.8     Chloride 05/03/2023 100     Total CO2 05/03/2023 25     Calcium 05/03/2023 10.4 (H)     Total Protein 05/03/2023 7.4     Albumin 05/03/2023 4.6     Globulin 05/03/2023 2.8     A/G Ratio 05/03/2023 1.6     Total Bilirubin 05/03/2023 0.4     Alkaline Phosphatase 05/03/2023 54     AST (SGOT) 05/03/2023 17     ALT (SGPT) 05/03/2023 14     Total Cholesterol 05/03/2023 170     Triglycerides 05/03/2023 131     HDL Cholesterol 05/03/2023 56     VLDL Cholesterol [place] 05/03/2023 23     LDL Chol Calc (NIH) 05/03/2023 91     Chol/HDL Ratio 05/03/2023 3.0     TSH 05/03/2023 3.640      Recent labs reviewed and interpreted for clinical significance and application            Level of Care:           Marco A Winston MD  06/13/23  .

## 2023-06-14 ENCOUNTER — TREATMENT (OUTPATIENT)
Dept: PHYSICAL THERAPY | Facility: CLINIC | Age: 73
End: 2023-06-14
Payer: MEDICARE

## 2023-06-14 DIAGNOSIS — M54.2 PAIN, NECK: Primary | ICD-10-CM

## 2023-06-14 DIAGNOSIS — M25.60 LIMITED JOINT RANGE OF MOTION (ROM): ICD-10-CM

## 2023-06-14 NOTE — PROGRESS NOTES
Physical Therapy Daily Treatment Note      Patient: Dunia Fabian   : 1950  Diagnosis/ICD-10 Code:  Pain, neck [M54.2]   Problems Addressed this Visit    None  Visit Diagnoses       Pain, neck    -  Primary    Limited joint range of motion (ROM)              Diagnoses         Codes Comments    Pain, neck    -  Primary ICD-10-CM: M54.2  ICD-9-CM: 723.1     Limited joint range of motion (ROM)     ICD-10-CM: M25.60  ICD-9-CM: 719.50           Referring practitioner: Tyron Driver MD  Date of Initial Visit: Type: THERAPY  Noted: 2023  Today's Date: 2023    VISIT#: 2    Subjective : Pt reports she felt so good after last PT visit that she went and walked 2 miles at [facility]. Pain returned on Sat when she was in the car for a few hours and did a lot of walking.    Objective : started session with manual techniques, followed by ther ex, and ended with traction.    See Exercise, Manual, and Modality Logs for complete treatment.     Assessment/Plan : Decreased tension B UT. Pt responding well to traction. Good tolerance to ther ex with no increase in symptoms.     Goals  Plan Goals: STG to be met in 3 wk:  - Pt to report 50% improvement in L upper trap radicular symptoms.  - Pt to report 50% improvement in sleep related to neck pain.  - Pt to demonstrated improved posture with min verbal cues.  LTG to be met in 12 wk:  - Improve NDI to 15% or less impairment.  - Improve cervical rotation to 50 deg B for improved safety while driving.  - Increase cervical ext to 20 deg in order to look up into overhead cabinets to retrieve glasses, plates.  - Pt to be independent with HEP.    Progress per Plan of Care and Progress strengthening /stabilization /functional activity         Timed:         Manual Therapy:    10     mins  40301;     Therapeutic Exercise:    20     mins  56742;     Neuromuscular Adrienne:        mins  12875;    Therapeutic Activity:          mins  64746;     Gait Training:           mins  99123;      Ultrasound:          mins  80654;    Ionto                                   mins   79141  Self Care                            mins   49560    Un-Timed:  Electrical Stimulation:         mins  06487 ( );  Dry Needling          mins 70340/40241  Traction     15     mins 02473  Canalith Repos                   mins  87999  Low Eval          Mins  75008  Mod Eval          Mins  36708  High Eval                            Mins  16672  Re-Eval                               mins  60662    Timed Treatment:   30   mins   Total Treatment:     45   mins    Cherelle Lozano PT, CLT, Cert DN  Physical Therapist  [place] Lic # [id number]

## 2023-06-19 ENCOUNTER — TREATMENT (OUTPATIENT)
Dept: PHYSICAL THERAPY | Facility: CLINIC | Age: 73
End: 2023-06-19
Payer: MEDICARE

## 2023-06-19 DIAGNOSIS — M54.2 PAIN, NECK: Primary | ICD-10-CM

## 2023-06-19 DIAGNOSIS — M25.60 LIMITED JOINT RANGE OF MOTION (ROM): ICD-10-CM

## 2023-06-19 PROCEDURE — 97012 MECHANICAL TRACTION THERAPY: CPT | Performed by: PHYSICAL THERAPIST

## 2023-06-19 PROCEDURE — 97110 THERAPEUTIC EXERCISES: CPT | Performed by: PHYSICAL THERAPIST

## 2023-06-19 PROCEDURE — 97140 MANUAL THERAPY 1/> REGIONS: CPT | Performed by: PHYSICAL THERAPIST

## 2023-06-19 NOTE — PROGRESS NOTES
Physical Therapy Daily Treatment Note      Patient: Dunia Fabian   : 1950  Diagnosis/ICD-10 Code:  Pain, neck [M54.2]   Problems Addressed this Visit    None  Visit Diagnoses       Pain, neck    -  Primary    Limited joint range of motion (ROM)              Diagnoses         Codes Comments    Pain, neck    -  Primary ICD-10-CM: M54.2  ICD-9-CM: 723.1     Limited joint range of motion (ROM)     ICD-10-CM: M25.60  ICD-9-CM: 719.50            Referring practitioner: Tyron Driver MD  Date of Initial Visit: Type: THERAPY  Noted: 2023  Today's Date: 2023    VISIT#: 3    Subjective Patient reports feeling continued tightness in neck, but has responded well to traction and PT with gradual improvements of neck mobility while driving.       Objective     See Exercise, Manual, and Modality Logs for complete treatment.     Assessment/Plan Patient continues to respond well to cervical traction and manual interventions with improved cervical rotation. Patient tolerated all performed therapeutic exercise well with no reports of increased symptoms.   Goals  Plan Goals: STG to be met in 3 wk:  - Pt to report 50% improvement in L upper trap radicular symptoms.  - Pt to report 50% improvement in sleep related to neck pain.  - Pt to demonstrated improved posture with min verbal cues.  LTG to be met in 12 wk:  - Improve NDI to 15% or less impairment.  - Improve cervical rotation to 50 deg B for improved safety while driving.  - Increase cervical ext to 20 deg in order to look up into overhead cabinets to retrieve glasses, plates.  - Pt to be independent with HEP.    Progress per Plan of Care and Progress strengthening /stabilization /functional activity         Timed:         Manual Therapy:    10     mins  24635;     Therapeutic Exercise:    20     mins  30907;     Neuromuscular Adrienne:        mins  09700;    Therapeutic Activity:          mins  17308;     Gait Training:           mins  79344;     Ultrasound:           mins  28874;    Ionto                                   mins   58243  Self Care                    _____  mins   72647  Canalith Repos                   mins  85781    Un-Timed:  Electrical Stimulation:         mins  45953 ( );  Dry Needling          mins self-pay   Traction     15     mins 56857    Timed Treatment:   30   mins   Total Treatment:     45   mins    Joshua Gomez PTA  [place] License [id number]  Physical Therapist Assistant

## 2023-08-07 NOTE — PROGRESS NOTES
Subjective   Dunia Fabian is a 72 y.o. female.     Chief Complaint   Patient presents with    Abdominal Pain    Diarrhea       Abdominal Pain  The current episode started 1 to 4 weeks ago. The onset quality is sudden. The problem occurs daily. The problem has been gradually worsening. The pain is located in the generalized abdominal region. The pain is at a severity of 10/10. The pain is severe. The quality of the pain is cramping, sharp and aching. Associated symptoms include belching and diarrhea. Pertinent negatives include no nausea or vomiting. The pain is aggravated by certain positions, eating and movement. The pain is relieved by Bowel movements and being still. She has tried nothing for the symptoms. The treatment provided no relief.   Diarrhea   This is a new problem. The current episode started 1 to 4 weeks ago. The problem has been gradually worsening. The patient states that diarrhea awakens her from sleep. Associated symptoms include abdominal pain. Pertinent negatives include no chills or vomiting. There are no known risk factors.          I personally reviewed and updated the patient's allergies, medications, problem list, and past medical, surgical, social, and family history. I have reviewed and confirmed the accuracy of the History of Present Illness and Review of Symptoms as documented by the MA/MAXIMUSN/RN. Tyron Driver MD    Family History   Problem Relation Age of Onset    Parkinsonism Mother     Heart disease Mother         cardiovascular disease    Diabetes Mother         diabetes mellitus     Heart disease Father         cardiovascular disease    Heart disease Sister         cardiovascular disease    Diabetes Sister         diabetes mellitus     Heart disease Brother         cardiovascular disease    Diabetes Son         diabetes mellitus     Heart disease Paternal Uncle         ischemic    Stomach cancer Maternal Grandmother     Breast cancer Niece 39    Ovarian cancer Paternal  Grandmother        Social History     Tobacco Use    Smoking status: Never     Passive exposure: Never    Smokeless tobacco: Never   Vaping Use    Vaping Use: Never used   Substance Use Topics    Alcohol use: Not Currently    Drug use: No       Past Surgical History:   Procedure Laterality Date    CATARACT EXTRACTION Left 08/2005    with Insert of Lens Prostheti    CYSTOCELE REPAIR  06/1998    SIMENTAL Cystourethropexy & Cystocele Repair     KIDNEY SURGERY Right 02/24/2014    Removal    TONSILLECTOMY  1953    URETHROLYSIS  10/2000    Transvaginal Urethrolysis & Bone [place] Sling    VAGINAL HYSTERECTOMY  1970    VITRECTOMY Left 04/2005    & Membrane Peeling       Patient Active Problem List   Diagnosis    Acid reflux    Allergic rhinitis    Anxiety    Atrophic kidney    Carotid bruit present    Carpal tunnel syndrome    Low back pain    Neck pain    Thoracic back pain    Depressive disorder    Medicare annual wellness visit, subsequent    Gout    Hemorrhoids    Mixed hyperlipidemia    IC (interstitial cystitis)    Irritable bowel syndrome with constipation    LVH (left ventricular hypertrophy)    Menopausal syndrome    Mitral insufficiency, acute    Onychomycosis    Disorder of bone and cartilage    Age-related osteoporosis without current pathological fracture    Palpitations    Osteoarthritis    Primary pulmonary hypertension    Renal insufficiency    Plantar fasciitis    Senile osteoporosis    Solitary kidney    Encounter for screening mammogram for malignant neoplasm of breast    Special screening for malignant neoplasms, colon    Seborrheic keratosis    Chronic kidney disease, stage 3 (moderate)    Renal hypertrophy    Hypercalcemia    Acute cystitis with hematuria    Lower extremity edema    Overweight with body mass index (BMI) of 29 to 29.9 in adult    Dizziness    Left hip pain    Bilateral primary osteoarthritis of knee    Elevated fasting blood sugar    Solitary kidney, acquired    Benign essential HTN     Memory loss         Current Outpatient Medications:     amLODIPine (NORVASC) 5 MG tablet, TAKE 1 TABLET BY MOUTH TWICE DAILY, Disp: 180 tablet, Rfl: 1    aspirin 81 MG EC tablet, Take 1 tablet by mouth Daily., Disp: , Rfl:     carvedilol (COREG) 12.5 MG tablet, TAKE 1 TABLET BY MOUTH TWICE DAILY, Disp: 180 tablet, Rfl: 1    denosumab (Prolia) 60 MG/ML solution prefilled syringe syringe, Inject  under the skin into the appropriate area as directed Every 6 (Six) Months., Disp: , Rfl:     Diclofenac Sodium (VOLTAREN) 1 % gel gel, Apply 4 g topically to the appropriate area as directed 3 (Three) Times a Day As Needed (neck pain). Dose is 4 grams for knees, ankles, feet.  Dose is 2 grams for elbows, wrists, hands., Disp: 50 g, Rfl: 5    hydrALAZINE (APRESOLINE) 25 MG tablet, TAKE 1 TABLET BY MOUTH TWICE DAILY, Disp: 180 tablet, Rfl: 1    Restasis 0.05 % ophthalmic emulsion, , Disp: , Rfl:     rosuvastatin (CRESTOR) 5 MG tablet, TAKE 1 TABLET BY MOUTH DAILY, Disp: 90 tablet, Rfl: 1    sertraline (ZOLOFT) 25 MG tablet, , Disp: , Rfl:     SUPER B COMPLEX/C capsule, SUPER B COMPLEX/C ORAL CAPSULE, Disp: , Rfl:     tamsulosin (FLOMAX) 0.4 MG capsule 24 hr capsule, Take 1 capsule by mouth Daily., Disp: , Rfl:     vitamin D3 125 MCG (5000 UT) capsule capsule, Take 1 capsule by mouth Daily., Disp: , Rfl:     amoxicillin-clavulanate (AUGMENTIN) 875-125 MG per tablet, Take 1 tablet by mouth 2 (Two) Times a Day., Disp: 20 tablet, Rfl: 0    cloNIDine (CATAPRES) 0.1 MG tablet, Take 1 tablet by mouth Every 4 (Four) Hours As Needed. (Patient not taking: Reported on 6/5/2023), Disp: , Rfl:     colestipol (COLESTID) 1 g tablet, Take 1 tablet by mouth 2 (Two) Times a Day. Take other medications either more than one hour before this medication of 4 hours after this medication., Disp: 60 tablet, Rfl: 3    cyclobenzaprine (FLEXERIL) 5 MG tablet, TAKE 1 TABLET BY MOUTH DAILY AS NEEDED FOR MUSCLE SPASMS (Patient not taking: Reported on  "6/5/2023), Disp: 30 tablet, Rfl: 0    dicyclomine (BENTYL) 20 MG tablet, Take 1 tablet by mouth 3 (Three) Times a Day As Needed for Abdominal Cramping. Take with food, Disp: 90 tablet, Rfl: 2    omeprazole (priLOSEC) 40 MG capsule, Take 1 capsule by mouth Daily., Disp: 90 capsule, Rfl: 3         Review of Systems   Constitutional:  Negative for chills and diaphoresis.   Eyes:  Negative for visual disturbance.   Respiratory:  Negative for shortness of breath.    Cardiovascular:  Negative for chest pain and palpitations.   Gastrointestinal:  Positive for abdominal pain and diarrhea. Negative for nausea and vomiting.   Endocrine: Negative for polydipsia and polyphagia.   Musculoskeletal:  Negative for neck stiffness.   Skin:  Negative for color change and pallor.   Neurological:  Negative for seizures and syncope.   Hematological:  Negative for adenopathy.   Psychiatric/Behavioral:  Negative for hallucinations and suicidal ideas.      I have reviewed and confirmed the accuracy of the ROS as documented by the MA/LPN/RN Tyron Driver MD      Objective   /74 (BP Location: Right arm, Patient Position: Sitting, Cuff Size: Adult)   Pulse 70   Temp 98.4 øF (36.9 øC) (Temporal)   Resp 20   Ht 157.5 cm (62.01\")   Wt 72.7 kg (160 lb 3.2 oz)   SpO2 96%   BMI 29.29 kg/mý   BP Readings from Last 3 Encounters:   08/08/23 148/74   06/05/23 144/70   05/10/23 126/72     Wt Readings from Last 3 Encounters:   08/08/23 72.7 kg (160 lb 3.2 oz)   06/05/23 72.1 kg (159 lb)   05/10/23 72.7 kg (160 lb 3.2 oz)     Physical Exam  Constitutional:       Appearance: Normal appearance. She is well-developed. She is not diaphoretic.   HENT:      Head: Normocephalic.      Right Ear: Tympanic membrane, ear canal and external ear normal.      Left Ear: Tympanic membrane, ear canal and external ear normal.      Nose: Nose normal.   Eyes:      General: Lids are normal.      Conjunctiva/sclera: Conjunctivae normal.      Pupils: Pupils are " equal, round, and reactive to light.   Neck:      Thyroid: No thyromegaly.      Vascular: No carotid bruit or JVD.      Trachea: No tracheal deviation.   Cardiovascular:      Rate and Rhythm: Normal rate and regular rhythm.      Pulses: Normal pulses.      Heart sounds: Normal heart sounds, S1 normal and S2 normal. No murmur heard.    No friction rub. No gallop.   Pulmonary:      Effort: Pulmonary effort is normal. No accessory muscle usage.      Breath sounds: Normal breath sounds. No stridor. No decreased breath sounds, wheezing, rhonchi or rales.   Abdominal:      General: Bowel sounds are normal. There is no distension.      Palpations: Abdomen is soft. Abdomen is not rigid. There is no mass or pulsatile mass.      Tenderness: There is no abdominal tenderness. There is no guarding or rebound. Negative signs include Diego's sign.      Hernia: No hernia is present.   Lymphadenopathy:      Head:      Right side of head: No submental, submandibular, tonsillar, preauricular, posterior auricular or occipital adenopathy.      Left side of head: No submental, submandibular, tonsillar, preauricular, posterior auricular or occipital adenopathy.      Cervical: No cervical adenopathy.   Skin:     General: Skin is warm and dry.      Coloration: Skin is not pale.   Neurological:      Mental Status: She is alert and oriented to person, place, and time.      Cranial Nerves: No cranial nerve deficit.      Sensory: No sensory deficit.      Coordination: Coordination normal.      Gait: Gait normal.      Deep Tendon Reflexes: Reflexes are normal and symmetric.       Data / Lab Results:    Hemoglobin A1C   Date Value Ref Range Status   05/10/2023 6.3 (H) 4.8 - 5.6 % Final     Comment:              Prediabetes: 5.7 - 6.4           Diabetes: >6.4           Glycemic control for adults with diabetes: <7.0     09/10/2021 6.1 % Final        Lab Results   Component Value Date    LDL 91 05/03/2023     (H) 03/11/2022     (H)  08/28/2021     Lab Results   Component Value Date    CHOL 181 07/24/2018    CHOL 215 (H) 07/21/2017    CHOL 198 07/20/2016     Lab Results   Component Value Date    TRIG 131 05/03/2023    TRIG 128 03/11/2022    TRIG 113 08/28/2021     Lab Results   Component Value Date    HDL 56 05/03/2023    HDL 52 03/11/2022    HDL 54 08/28/2021     No results found for: PSA  Lab Results   Component Value Date    WBC 10.9 (H) 08/08/2023    HGB 13.6 08/08/2023    HCT 42.0 08/08/2023    MCV 89 08/08/2023     08/08/2023     Lab Results   Component Value Date    TSH 3.640 05/03/2023      Lab Results   Component Value Date    GLUCOSE 105 (H) 08/08/2023    BUN 25 08/08/2023    CREATININE 1.06 (H) 08/08/2023    EGFRIFNONA 48 (L) 08/28/2021    EGFRIFAFRI 55 (L) 08/28/2021    BCR 24 08/08/2023    K 5.0 08/08/2023    CO2 24 08/08/2023    CALCIUM 10.2 08/08/2023    PROTENTOTREF 7.7 08/08/2023    ALBUMIN 5.1 (H) 08/08/2023    LABIL2 2.0 08/08/2023    AST 21 08/08/2023    ALT 13 08/08/2023     No results found for: ESTHER, RF, SEDRATE   No results found for: CRP   No results found for: IRON, TIBC, FERRITIN   Lab Results   Component Value Date    KPPLCNKK97 805 05/10/2023          Assessment & Plan      Medications        Problem List         LOS    Epigastric pain / diarrhea.  Overall benign exam today, no s/s diverticultis.  DDX includes gb pathology, infeciton, gastritis.  Check bloodwork, stool studies, ruq us.  Start antibitotics, ppi, cholestid / bentyl.  Increase fluid intake bland diet. Signs and symptoms of dehydration discussed.. Follow up recheck. Call or return if worsening or persistent symptoms.   Health maintenance.  Doing well, vaccines current.  Discussed health maintenance, screening test, lifestyle modification.  Pap current, followed by Dr. Patiño, mammogram benign 11/22, further screening declined.  Allergic rhinitis.  OTC Claritin or Zyrtec.  Hypertension.  Overall improved today on carvedilol, amlodipine, tolerating  decreased dose hydralazine ,Off metoprolol per cardiology followed by Dr. Winston, .  Hold losartan/has had nephrology eval per Dr. Mcgee,  renal ultrasound/renal artery Doppler benign, has follow-up upcoming..  Clinically improved today on  hydralazine 100 mg twice daily, as needed clonidine.  Monitor blood pressure closely.  Follow-up recheck.  Consider restart amlodipine if persistent elevation.   . Discussed low-sodium diet.  Stress echo benign/elevated right-sided pressure only 2018.  Carotid Dopplers with mild blockage only 2018.  MRI brain benign 2020.  Has had cardiology eval/plan repeat stress testing when blood pressure better controlled per cardilogy recs/cardiology referral scheduled..  Life stress/decreased energy.  Multiple stressors.  Good social support.   Initially improved on Cymbalta/Wellbutrin, she discontinued Rx.  Start sertraline.  Lower extremity edema.  Improved currently with decreased dose amlodipine.  Discussed low-sodium diet.  Follow-up recheck.  Consider sleep study if persistent symptoms.  Osteoporosis.  Tolerating Prolia.  Holding calcium/vitamin D per nephrology.   recheck DEXA  Atrophic kidney.  Improved status post nephrectomy, followed by urology,nephrology.  Renal function normal.  Interstitial cystitis.  Improved on Flomax, followed by [facility], history of InterStim placement/subsequent removal.  I and O catheter dependent.  Hypertensive retinopathy.  Followed by ophthalmology ember galeano, status post treatment  Colon screening.  Colonoscopy benign 2016./Diverticulosis only  Carotid stenosis.  50 to 59% on the right, mild on left per Doppler 1/21, stable per repeat ultrasound 10/21.  She is allergic to contrast and gadolinium.  Repeat Doppler scheduled.  Consider vascular surgery referral if worsening symptoms.  Continue risk factor reduction.  Osteoarthritis.  Worse left hip/knee.    Followed by orthopedics Dr. Angel, x-rays with mild left hip OA, moderate left OA knee, attempting  knee bracing.  Consider knee injection.  Hyperlipidemia.    Improved today, LDL to 90 rosuvastatin, tolerating Rx.   Aggressive LDL target considering carotid stenosis. .  Discussed diet exercise lifestyle modification.  Follow-up recheck.    Memory loss.  Worse today, MMSE today, check B12, start Zoloft.  Good social support.  MRI brain benign 2020.  Follow-up recheck/plan check MMSE.  Neck pain.  Persistent symptoms today, significant bilateral trapezius spasm on exam today with left upper extremity radiculopathy, x-rays benign, positive and significant trauma DDx includes ruptured disc versus cervical disc disease, check MRI, add prednisone, continue muscle relaxant.  Consider PT referral, referral for epidural injections.  Follow-up recheck.  Call return if worsening symptoms.  Elevated fasting blood sugar.  FBS to 132, check A1c.           Diagnoses and all orders for this visit:    1. Overweight with body mass index (BMI) of 29 to 29.9 in adult (Primary)  Assessment & Plan:  Patient's (Body mass index is 29.29 kg/mý.) indicates that they are overweight with health conditions that include hypertension . Weight is unchanged. BMI is is above average; BMI management plan is completed. We discussed portion control and increasing exercise.       2. Abdominal pain, unspecified abdominal location  -     CBC & Differential  -     Comprehensive Metabolic Panel  -     Lipase  -     Helicobacter Pylori, IgA IgG IgM  -     US Abdomen Limited; Future  -     Gastrointestinal Panel, PCR - Stool, Per Rectum  -     Fecal Leukocytes - Stool, Per Rectum  -     Clostridioides difficile Toxin, PCR - Stool, Per Rectum  -     Ova & Parasite Examination - Stool, Per Rectum  -     colestipol (COLESTID) 1 g tablet; Take 1 tablet by mouth 2 (Two) Times a Day. Take other medications either more than one hour before this medication of 4 hours after this medication.  Dispense: 60 tablet; Refill: 3  -     omeprazole (priLOSEC) 40 MG capsule;  Take 1 capsule by mouth Daily.  Dispense: 90 capsule; Refill: 3  -     dicyclomine (BENTYL) 20 MG tablet; Take 1 tablet by mouth 3 (Three) Times a Day As Needed for Abdominal Cramping. Take with food  Dispense: 90 tablet; Refill: 2  -     amoxicillin-clavulanate (AUGMENTIN) 875-125 MG per tablet; Take 1 tablet by mouth 2 (Two) Times a Day.  Dispense: 20 tablet; Refill: 0    3. Diarrhea, unspecified type  -     CBC & Differential  -     Comprehensive Metabolic Panel  -     Lipase  -     Helicobacter Pylori, IgA IgG IgM  -     US Abdomen Limited; Future  -     Gastrointestinal Panel, PCR - Stool, Per Rectum  -     Fecal Leukocytes - Stool, Per Rectum  -     Clostridioides difficile Toxin, PCR - Stool, Per Rectum  -     Ova & Parasite Examination - Stool, Per Rectum  -     colestipol (COLESTID) 1 g tablet; Take 1 tablet by mouth 2 (Two) Times a Day. Take other medications either more than one hour before this medication of 4 hours after this medication.  Dispense: 60 tablet; Refill: 3  -     omeprazole (priLOSEC) 40 MG capsule; Take 1 capsule by mouth Daily.  Dispense: 90 capsule; Refill: 3  -     dicyclomine (BENTYL) 20 MG tablet; Take 1 tablet by mouth 3 (Three) Times a Day As Needed for Abdominal Cramping. Take with food  Dispense: 90 tablet; Refill: 2  -     amoxicillin-clavulanate (AUGMENTIN) 875-125 MG per tablet; Take 1 tablet by mouth 2 (Two) Times a Day.  Dispense: 20 tablet; Refill: 0    4. Nausea and vomiting, unspecified vomiting type  -     CBC & Differential  -     Comprehensive Metabolic Panel  -     Lipase  -     Helicobacter Pylori, IgA IgG IgM  -     US Abdomen Limited; Future  -     Gastrointestinal Panel, PCR - Stool, Per Rectum  -     Fecal Leukocytes - Stool, Per Rectum  -     Clostridioides difficile Toxin, PCR - Stool, Per Rectum  -     Ova & Parasite Examination - Stool, Per Rectum    5. Other specified anemias  -     Gastrointestinal Panel, PCR - Stool, Per Rectum              Expected course,  medications, and adverse effects discussed.  Call or return if worsening or persistent symptoms.  I wore protective equipment throughout this patient encounter including a mask, gloves, and eye protection.  Hand hygiene was performed before donning protective equipment and after removal when leaving the room. The complete contents of the Assessment and Plan and Data/Lab Results as documented above have been reviewed and addressed by myself with the patient today as part of an ongoing evaluation / treatment plan.  If some of the documentation has been copied from a previous note and is unchanged it indicates that this problem / plan has been assessed today but is stable from a previous visit and no changes have been recommended.

## 2023-08-08 ENCOUNTER — OFFICE VISIT (OUTPATIENT)
Dept: FAMILY MEDICINE CLINIC | Facility: CLINIC | Age: 73
End: 2023-08-08
Payer: MEDICARE

## 2023-08-08 VITALS
DIASTOLIC BLOOD PRESSURE: 74 MMHG | HEIGHT: 62 IN | OXYGEN SATURATION: 96 % | SYSTOLIC BLOOD PRESSURE: 148 MMHG | BODY MASS INDEX: 29.48 KG/M2 | WEIGHT: 160.2 LBS | HEART RATE: 70 BPM | RESPIRATION RATE: 20 BRPM | TEMPERATURE: 98.4 F

## 2023-08-08 DIAGNOSIS — R19.7 DIARRHEA, UNSPECIFIED TYPE: ICD-10-CM

## 2023-08-08 DIAGNOSIS — E66.3 OVERWEIGHT WITH BODY MASS INDEX (BMI) OF 29 TO 29.9 IN ADULT: Primary | ICD-10-CM

## 2023-08-08 DIAGNOSIS — R10.9 ABDOMINAL PAIN, UNSPECIFIED ABDOMINAL LOCATION: ICD-10-CM

## 2023-08-08 DIAGNOSIS — D64.89 OTHER SPECIFIED ANEMIAS: ICD-10-CM

## 2023-08-08 DIAGNOSIS — R11.2 NAUSEA AND VOMITING, UNSPECIFIED VOMITING TYPE: ICD-10-CM

## 2023-08-08 PROCEDURE — 3077F SYST BP >= 140 MM HG: CPT | Performed by: FAMILY MEDICINE

## 2023-08-08 PROCEDURE — 3078F DIAST BP <80 MM HG: CPT | Performed by: FAMILY MEDICINE

## 2023-08-08 PROCEDURE — 99214 OFFICE O/P EST MOD 30 MIN: CPT | Performed by: FAMILY MEDICINE

## 2023-08-08 RX ORDER — AMOXICILLIN AND CLAVULANATE POTASSIUM 875; 125 MG/1; MG/1
1 TABLET, FILM COATED ORAL 2 TIMES DAILY
Qty: 20 TABLET | Refills: 0 | Status: SHIPPED | OUTPATIENT
Start: 2023-08-08 | End: 2023-08-29

## 2023-08-08 RX ORDER — OMEPRAZOLE 40 MG/1
40 CAPSULE, DELAYED RELEASE ORAL DAILY
Qty: 90 CAPSULE | Refills: 3 | Status: SHIPPED | OUTPATIENT
Start: 2023-08-08

## 2023-08-08 RX ORDER — CYCLOSPORINE 0.5 MG/ML
EMULSION OPHTHALMIC
COMMUNITY
Start: 2023-07-27

## 2023-08-08 RX ORDER — DICYCLOMINE HCL 20 MG
20 TABLET ORAL 3 TIMES DAILY PRN
Qty: 90 TABLET | Refills: 2 | Status: SHIPPED | OUTPATIENT
Start: 2023-08-08

## 2023-08-08 RX ORDER — SERTRALINE HYDROCHLORIDE 25 MG/1
TABLET, FILM COATED ORAL
COMMUNITY
Start: 2023-06-16

## 2023-08-08 RX ORDER — MONTELUKAST SODIUM 4 MG/1
1 TABLET, CHEWABLE ORAL 2 TIMES DAILY
Qty: 60 TABLET | Refills: 3 | Status: SHIPPED | OUTPATIENT
Start: 2023-08-08

## 2023-08-08 NOTE — ASSESSMENT & PLAN NOTE
Patient's (Body mass index is 29.29 kg/mý.) indicates that they are overweight with health conditions that include hypertension . Weight is unchanged. BMI is is above average; BMI management plan is completed. We discussed portion control and increasing exercise.

## 2023-08-09 LAB
ALBUMIN SERPL-MCNC: 5.1 G/DL (ref 3.8–4.8)
ALBUMIN/GLOB SERPL: 2 {RATIO} (ref 1.2–2.2)
ALP SERPL-CCNC: 55 IU/L (ref 44–121)
ALT SERPL-CCNC: 13 IU/L (ref 0–32)
AST SERPL-CCNC: 21 IU/L (ref 0–40)
BASOPHILS # BLD AUTO: 0.1 X10E3/UL (ref 0–0.2)
BASOPHILS NFR BLD AUTO: 1 %
BILIRUB SERPL-MCNC: 0.3 MG/DL (ref 0–1.2)
BUN SERPL-MCNC: 25 MG/DL (ref 8–27)
BUN/CREAT SERPL: 24 (ref 12–28)
CALCIUM SERPL-MCNC: 10.2 MG/DL (ref 8.7–10.3)
CHLORIDE SERPL-SCNC: 101 MMOL/L (ref 96–106)
CO2 SERPL-SCNC: 24 MMOL/L (ref 20–29)
CREAT SERPL-MCNC: 1.06 MG/DL (ref 0.57–1)
EGFRCR SERPLBLD CKD-EPI 2021: 55 ML/MIN/1.73
EOSINOPHIL # BLD AUTO: 0.1 X10E3/UL (ref 0–0.4)
EOSINOPHIL NFR BLD AUTO: 1 %
ERYTHROCYTE [DISTWIDTH] IN BLOOD BY AUTOMATED COUNT: 12.9 % (ref 11.7–15.4)
GLOBULIN SER CALC-MCNC: 2.6 G/DL (ref 1.5–4.5)
GLUCOSE SERPL-MCNC: 105 MG/DL (ref 70–99)
H PYLORI IGA SER-ACNC: 11.7 UNITS (ref 0–8.9)
H PYLORI IGG SER IA-ACNC: 0.35 INDEX VALUE (ref 0–0.79)
H PYLORI IGM SER-ACNC: <9 UNITS (ref 0–8.9)
HCT VFR BLD AUTO: 42 % (ref 34–46.6)
HGB BLD-MCNC: 13.6 G/DL (ref 11.1–15.9)
IMM GRANULOCYTES # BLD AUTO: 0.1 X10E3/UL (ref 0–0.1)
IMM GRANULOCYTES NFR BLD AUTO: 1 %
LIPASE SERPL-CCNC: 36 U/L (ref 14–85)
LYMPHOCYTES # BLD AUTO: 2.7 X10E3/UL (ref 0.7–3.1)
LYMPHOCYTES NFR BLD AUTO: 25 %
MCH RBC QN AUTO: 28.9 PG (ref 26.6–33)
MCHC RBC AUTO-ENTMCNC: 32.4 G/DL (ref 31.5–35.7)
MCV RBC AUTO: 89 FL (ref 79–97)
MONOCYTES # BLD AUTO: 0.9 X10E3/UL (ref 0.1–0.9)
MONOCYTES NFR BLD AUTO: 9 %
NEUTROPHILS # BLD AUTO: 7 X10E3/UL (ref 1.4–7)
NEUTROPHILS NFR BLD AUTO: 63 %
PLATELET # BLD AUTO: 293 X10E3/UL (ref 150–450)
POTASSIUM SERPL-SCNC: 5 MMOL/L (ref 3.5–5.2)
PROT SERPL-MCNC: 7.7 G/DL (ref 6–8.5)
RBC # BLD AUTO: 4.71 X10E6/UL (ref 3.77–5.28)
SODIUM SERPL-SCNC: 142 MMOL/L (ref 134–144)
WBC # BLD AUTO: 10.9 X10E3/UL (ref 3.4–10.8)

## 2023-08-15 LAB — C DIFF TOX GENS STL QL NAA+PROBE: NEGATIVE

## 2023-08-16 LAB
ADV 40+41 DNA STL QL NAA+NON-PROBE: NOT DETECTED
ASTRO TYP 1-8 RNA STL QL NAA+NON-PROBE: NOT DETECTED
C CAYETANENSIS DNA STL QL NAA+NON-PROBE: NOT DETECTED
C COLI+JEJ+UPSA DNA STL QL NAA+NON-PROBE: DETECTED
C DIF TOX TCDA+TCDB STL QL NAA+NON-PROBE: NOT DETECTED
CRYPTOSP DNA STL QL NAA+NON-PROBE: NOT DETECTED
E COLI O157 DNA STL QL NAA+NON-PROBE: ABNORMAL
E HISTOLYT DNA STL QL NAA+NON-PROBE: NOT DETECTED
EAEC PAA PLAS AGGR+AATA ST NAA+NON-PRB: NOT DETECTED
EC STX1+STX2 GENES STL QL NAA+NON-PROBE: NOT DETECTED
EPEC EAE GENE STL QL NAA+NON-PROBE: NOT DETECTED
ETEC LTA+ST1A+ST1B TOX ST NAA+NON-PROBE: NOT DETECTED
G LAMBLIA DNA STL QL NAA+NON-PROBE: NOT DETECTED
NOROVIRUS GI+II RNA STL QL NAA+NON-PROBE: NOT DETECTED
P SHIGELLOIDES DNA STL QL NAA+NON-PROBE: NOT DETECTED
RVA RNA STL QL NAA+NON-PROBE: NOT DETECTED
S ENT+BONG DNA STL QL NAA+NON-PROBE: NOT DETECTED
SAPO I+II+IV+V RNA STL QL NAA+NON-PROBE: NOT DETECTED
SHIGELLA SP+EIEC IPAH ST NAA+NON-PROBE: NOT DETECTED
V CHOL+PARA+VUL DNA STL QL NAA+NON-PROBE: NOT DETECTED
V CHOLERAE DNA STL QL NAA+NON-PROBE: NOT DETECTED
Y ENTEROCOL DNA STL QL NAA+NON-PROBE: NOT DETECTED

## 2023-08-18 ENCOUNTER — HOSPITAL ENCOUNTER (OUTPATIENT)
Dept: ULTRASOUND IMAGING | Facility: HOSPITAL | Age: 73
Discharge: HOME | End: 2023-08-18
Admitting: FAMILY MEDICINE
Payer: MEDICARE

## 2023-08-18 DIAGNOSIS — R11.2 NAUSEA AND VOMITING, UNSPECIFIED VOMITING TYPE: ICD-10-CM

## 2023-08-18 DIAGNOSIS — R19.7 DIARRHEA, UNSPECIFIED TYPE: ICD-10-CM

## 2023-08-18 DIAGNOSIS — R10.9 ABDOMINAL PAIN, UNSPECIFIED ABDOMINAL LOCATION: ICD-10-CM

## 2023-08-18 PROCEDURE — 76705 ECHO EXAM OF ABDOMEN: CPT

## 2023-08-24 LAB
O+P SPEC MICRO: NORMAL
O+P STL CONC: NORMAL
WBC STL QL MICRO: NORMAL
WBC STL QL MICRO: NORMAL

## 2023-08-25 NOTE — PROGRESS NOTES
Subjective   Dunia Fabian is a 72 y.o. female.     Chief Complaint   Patient presents with    Abdominal Pain     Follow Up    Diarrhea       Abdominal Pain  This is a new problem. The current episode started 1 to 4 weeks ago. The onset quality is sudden. The problem occurs daily. The problem has been gradually improving. The pain is located in the generalized abdominal region. The pain is moderate. The quality of the pain is cramping, sharp and aching. The abdominal pain does not radiate. Associated symptoms include belching and diarrhea. Pertinent negatives include no nausea or vomiting. The pain is aggravated by certain positions, eating and movement. The pain is relieved by Bowel movements and being still. She has tried antibiotics (Bentyl, Colestid, and Augmentin) for the symptoms. The treatment provided moderate relief.   Diarrhea   This is a new problem. The current episode started 1 to 4 weeks ago. The problem has been gradually improving. The patient states that diarrhea awakens her from sleep. Associated symptoms include abdominal pain. Pertinent negatives include no chills or vomiting. There are no known risk factors. Treatments tried: Bentyl and Colestid.          I personally reviewed and updated the patient's allergies, medications, problem list, and past medical, surgical, social, and family history. I have reviewed and confirmed the accuracy of the History of Present Illness and Review of Symptoms as documented by the MA/LPN/RN. Tyron Driver MD    Family History   Problem Relation Age of Onset    Parkinsonism Mother     Heart disease Mother         cardiovascular disease    Diabetes Mother         diabetes mellitus     Heart disease Father         cardiovascular disease    Heart disease Sister         cardiovascular disease    Diabetes Sister         diabetes mellitus     Heart disease Brother         cardiovascular disease    Diabetes Son         diabetes mellitus     Heart disease Paternal  Uncle         ischemic    Stomach cancer Maternal Grandmother     Breast cancer Niece 39    Ovarian cancer Paternal Grandmother        Social History     Tobacco Use    Smoking status: Never     Passive exposure: Never    Smokeless tobacco: Never   Vaping Use    Vaping Use: Never used   Substance Use Topics    Alcohol use: Not Currently    Drug use: No       Past Surgical History:   Procedure Laterality Date    CATARACT EXTRACTION Left 08/2005    with Insert of Lens Prostheti    CYSTOCELE REPAIR  06/1998    SIMENTAL Cystourethropexy & Cystocele Repair     KIDNEY SURGERY Right 02/24/2014    Removal    TONSILLECTOMY  1953    URETHROLYSIS  10/2000    Transvaginal Urethrolysis & Bone [place] Sling    VAGINAL HYSTERECTOMY  1970    VITRECTOMY Left 04/2005    & Membrane Peeling       Patient Active Problem List   Diagnosis    Acid reflux    Allergic rhinitis    Anxiety    Atrophic kidney    Carotid bruit present    Carpal tunnel syndrome    Low back pain    Neck pain    Thoracic back pain    Depressive disorder    Medicare annual wellness visit, subsequent    Gout    Hemorrhoids    Mixed hyperlipidemia    IC (interstitial cystitis)    Irritable bowel syndrome with constipation    LVH (left ventricular hypertrophy)    Menopausal syndrome    Mitral insufficiency, acute    Onychomycosis    Disorder of bone and cartilage    Age-related osteoporosis without current pathological fracture    Palpitations    Osteoarthritis    Primary pulmonary hypertension    Renal insufficiency    Plantar fasciitis    Senile osteoporosis    Solitary kidney    Encounter for screening mammogram for malignant neoplasm of breast    Special screening for malignant neoplasms, colon    Seborrheic keratosis    Chronic kidney disease, stage 3 (moderate)    Renal hypertrophy    Hypercalcemia    Acute cystitis with hematuria    Lower extremity edema    Overweight with body mass index (BMI) of 29 to 29.9 in adult    Dizziness    Left hip pain    Bilateral  primary osteoarthritis of knee    Elevated fasting blood sugar    Solitary kidney, acquired    Benign essential HTN    Memory loss    Campylobacter diarrhea         Current Outpatient Medications:     amLODIPine (NORVASC) 5 MG tablet, TAKE 1 TABLET BY MOUTH TWICE DAILY, Disp: 180 tablet, Rfl: 1    aspirin 81 MG EC tablet, Take 1 tablet by mouth Daily., Disp: , Rfl:     carvedilol (COREG) 12.5 MG tablet, TAKE 1 TABLET BY MOUTH TWICE DAILY, Disp: 180 tablet, Rfl: 1    colestipol (COLESTID) 1 g tablet, Take 1 tablet by mouth 2 (Two) Times a Day. Take other medications either more than one hour before this medication of 4 hours after this medication., Disp: 60 tablet, Rfl: 3    denosumab (Prolia) 60 MG/ML solution prefilled syringe syringe, Inject  under the skin into the appropriate area as directed Every 6 (Six) Months., Disp: , Rfl:     Diclofenac Sodium (VOLTAREN) 1 % gel gel, Apply 4 g topically to the appropriate area as directed 3 (Three) Times a Day As Needed (neck pain). Dose is 4 grams for knees, ankles, feet.  Dose is 2 grams for elbows, wrists, hands., Disp: 50 g, Rfl: 5    dicyclomine (BENTYL) 20 MG tablet, Take 1 tablet by mouth 3 (Three) Times a Day As Needed for Abdominal Cramping. Take with food, Disp: 90 tablet, Rfl: 2    hydrALAZINE (APRESOLINE) 25 MG tablet, TAKE 1 TABLET BY MOUTH TWICE DAILY, Disp: 180 tablet, Rfl: 1    omeprazole (priLOSEC) 40 MG capsule, Take 1 capsule by mouth Daily., Disp: 90 capsule, Rfl: 3    Restasis 0.05 % ophthalmic emulsion, , Disp: , Rfl:     rosuvastatin (CRESTOR) 5 MG tablet, TAKE 1 TABLET BY MOUTH DAILY, Disp: 90 tablet, Rfl: 1    sertraline (ZOLOFT) 25 MG tablet, , Disp: , Rfl:     SUPER B COMPLEX/C capsule, SUPER B COMPLEX/C ORAL CAPSULE, Disp: , Rfl:     tamsulosin (FLOMAX) 0.4 MG capsule 24 hr capsule, Take 1 capsule by mouth Daily., Disp: , Rfl:     vitamin D3 125 MCG (5000 UT) capsule capsule, Take 1 capsule by mouth Daily., Disp: , Rfl:     cloNIDine (CATAPRES)  "0.1 MG tablet, Take 1 tablet by mouth Every 4 (Four) Hours As Needed. (Patient not taking: Reported on 6/5/2023), Disp: , Rfl:     cyclobenzaprine (FLEXERIL) 5 MG tablet, TAKE 1 TABLET BY MOUTH DAILY AS NEEDED FOR MUSCLE SPASMS (Patient not taking: Reported on 6/5/2023), Disp: 30 tablet, Rfl: 0         Review of Systems   Constitutional:  Negative for chills and diaphoresis.   Eyes:  Negative for visual disturbance.   Respiratory:  Negative for shortness of breath.    Cardiovascular:  Negative for chest pain and palpitations.   Gastrointestinal:  Positive for abdominal pain and diarrhea. Negative for nausea and vomiting.   Endocrine: Negative for polydipsia and polyphagia.   Musculoskeletal:  Negative for neck stiffness.   Skin:  Negative for color change and pallor.   Neurological:  Negative for seizures and syncope.   Hematological:  Negative for adenopathy.   Psychiatric/Behavioral:  Negative for hallucinations and suicidal ideas.      I have reviewed and confirmed the accuracy of the ROS as documented by the MA/LPN/RN Tyron Driver MD      Objective   /74 (BP Location: Right arm, Patient Position: Sitting, Cuff Size: Adult)   Pulse 71   Temp 97.7 °F (36.5 °C) (Temporal)   Resp 16   Ht 157.5 cm (62.01\")   Wt 74.4 kg (164 lb)   SpO2 97%   BMI 29.99 kg/m²   BP Readings from Last 3 Encounters:   08/29/23 132/74   08/08/23 148/74   06/05/23 144/70     Wt Readings from Last 3 Encounters:   08/29/23 74.4 kg (164 lb)   08/08/23 72.7 kg (160 lb 3.2 oz)   06/05/23 72.1 kg (159 lb)     Physical Exam  Constitutional:       Appearance: She is well-developed. She is not diaphoretic.   HENT:      Head: Normocephalic.      Right Ear: Tympanic membrane, ear canal and external ear normal.      Left Ear: Tympanic membrane, ear canal and external ear normal.      Nose: Nose normal.   Eyes:      General: Lids are normal.      Conjunctiva/sclera: Conjunctivae normal.      Pupils: Pupils are equal, round, and reactive to " light.   Neck:      Thyroid: No thyromegaly.      Vascular: No carotid bruit or JVD.      Trachea: No tracheal deviation.   Cardiovascular:      Rate and Rhythm: Normal rate and regular rhythm.      Heart sounds: Normal heart sounds. No murmur heard.    No friction rub. No gallop.   Pulmonary:      Effort: Pulmonary effort is normal.      Breath sounds: Normal breath sounds. No stridor. No decreased breath sounds, wheezing or rales.   Abdominal:      General: Bowel sounds are normal. There is no distension.      Palpations: Abdomen is soft. There is no mass.      Tenderness: There is no abdominal tenderness. There is no guarding or rebound.      Hernia: No hernia is present.   Lymphadenopathy:      Head:      Right side of head: No submental, submandibular, tonsillar, preauricular, posterior auricular or occipital adenopathy.      Left side of head: No submental, submandibular, tonsillar, preauricular, posterior auricular or occipital adenopathy.      Cervical: No cervical adenopathy.   Skin:     General: Skin is warm and dry.      Coloration: Skin is not pale.   Neurological:      Mental Status: She is alert and oriented to person, place, and time.      Cranial Nerves: No cranial nerve deficit.      Sensory: No sensory deficit.      Coordination: Coordination normal.      Gait: Gait normal.      Deep Tendon Reflexes: Reflexes are normal and symmetric.       Data / Lab Results:    Hemoglobin A1C   Date Value Ref Range Status   05/10/2023 6.3 (H) 4.8 - 5.6 % Final     Comment:              Prediabetes: 5.7 - 6.4           Diabetes: >6.4           Glycemic control for adults with diabetes: <7.0     09/10/2021 6.1 % Final        Lab Results   Component Value Date    LDL 91 05/03/2023     (H) 03/11/2022     (H) 08/28/2021     Lab Results   Component Value Date    CHOL 181 07/24/2018    CHOL 215 (H) 07/21/2017    CHOL 198 07/20/2016     Lab Results   Component Value Date    TRIG 131 05/03/2023    TRIG 128  03/11/2022    TRIG 113 08/28/2021     Lab Results   Component Value Date    HDL 56 05/03/2023    HDL 52 03/11/2022    HDL 54 08/28/2021     No results found for: PSA  Lab Results   Component Value Date    WBC 10.9 (H) 08/08/2023    HGB 13.6 08/08/2023    HCT 42.0 08/08/2023    MCV 89 08/08/2023     08/08/2023     Lab Results   Component Value Date    TSH 3.640 05/03/2023      Lab Results   Component Value Date    GLUCOSE 105 (H) 08/08/2023    BUN 25 08/08/2023    CREATININE 1.06 (H) 08/08/2023    EGFRIFNONA 48 (L) 08/28/2021    EGFRIFAFRI 55 (L) 08/28/2021    BCR 24 08/08/2023    K 5.0 08/08/2023    CO2 24 08/08/2023    CALCIUM 10.2 08/08/2023    PROTENTOTREF 7.7 08/08/2023    ALBUMIN 5.1 (H) 08/08/2023    LABIL2 2.0 08/08/2023    AST 21 08/08/2023    ALT 13 08/08/2023     No results found for: ESTHER, RF, SEDRATE   No results found for: CRP   No results found for: IRON, TIBC, FERRITIN   Lab Results   Component Value Date    ARHGNRSX85 805 05/10/2023          Assessment & Plan      Medications        Problem List         LOS  .   Health maintenance.  Doing well, vaccines current.  Discussed health maintenance, screening test, lifestyle modification.  Pap current, followed by Dr. Patiño, mammogram benign 11/22, further screening declined.  Allergic rhinitis.  OTC Claritin or Zyrtec.  Hypertension.  Overall improved today on carvedilol, amlodipine, tolerating decreased dose hydralazine ,Off metoprolol per cardiology followed by Dr. Winston, .  Hold losartan/has had nephrology eval per Dr. Mcgee,  renal ultrasound/renal artery Doppler benign, has follow-up upcoming..  Clinically improved today on  hydralazine 100 mg twice daily, as needed clonidine.  Monitor blood pressure closely.  Follow-up recheck.  Consider restart amlodipine if persistent elevation.   . Discussed low-sodium diet.  Stress echo benign/elevated right-sided pressure only 2018.  Carotid Dopplers with mild blockage only 2018.  MRI brain benign 2020.   Has had cardiology eval/plan repeat stress testing when blood pressure better controlled per cardilogy recs/cardiology referral scheduled..  Life stress/decreased energy.  Multiple stressors.  Good social support.   Initially improved on Cymbalta/Wellbutrin, she discontinued Rx.  Start sertraline.  Lower extremity edema.  Improved currently with decreased dose amlodipine.  Discussed low-sodium diet.  Follow-up recheck.  Consider sleep study if persistent symptoms.  Osteoporosis.  Tolerating Prolia.  Holding calcium/vitamin D per nephrology.   recheck DEXA  Atrophic kidney.  Improved status post nephrectomy, followed by urology,nephrology.  Renal function normal.  Interstitial cystitis.  Improved on Flomax, followed by [facility], history of InterStim placement/subsequent removal.  I and O catheter dependent.  Hypertensive retinopathy.  Followed by ophthalmology ember galeano, status post treatment  Colon screening.  Colonoscopy benign 2016./Diverticulosis only  Carotid stenosis.  50 to 59% on the right, mild on left per Doppler 1/21, stable per repeat ultrasound 10/21.  She is allergic to contrast and gadolinium.  Repeat Doppler scheduled.  Consider vascular surgery referral if worsening symptoms.  Continue risk factor reduction.  Osteoarthritis.  Worse left hip/knee.    Followed by orthopedics Dr. Angel, x-rays with mild left hip OA, moderate left OA knee, attempting knee bracing.  Consider knee injection.  Hyperlipidemia.    Improved today, LDL to 90 rosuvastatin, tolerating Rx.   Aggressive LDL target considering carotid stenosis. .  Discussed diet exercise lifestyle modification.  Follow-up recheck.    Memory loss.  Worse today, MMSE today, check B12, start Zoloft.  Good social support.  MRI brain benign 2020.  Follow-up recheck/plan check MMSE.  Neck pain.  Persistent symptoms today, significant bilateral trapezius spasm on exam today with left upper extremity radiculopathy, x-rays benign, positive and significant  trauma DDx includes ruptured disc versus cervical disc disease, check MRI, add prednisone, continue muscle relaxant.  Consider PT referral, referral for epidural injections.  Follow-up recheck.  Call return if worsening symptoms.  Elevated fasting blood sugar.  FBS to 132, check A1c.  Epigastric pain / diarrhea.  Improved today, stool cultures with Campylobacter, completing course Augmentin, add probiotics.  Overall benign exAm today, no s/s diverticultis.  DDX includes gb pathology, infeciton, gastritis.  Check bloodwork, ruq us Benign.  Start antibitotics, ppi, cholestid / bentyl.  Increase fluid intake bland diet. Signs and symptoms of dehydration discussed.. Follow up recheck. Call or return if worsening or persistent symptoms      Diagnoses and all orders for this visit:    1. Abdominal pain, unspecified abdominal location (Primary)    2. Diarrhea, unspecified type    3. Overweight with body mass index (BMI) of 29 to 29.9 in adult  Assessment & Plan:  Patient's (Body mass index is 29.99 kg/m².) indicates that they are overweight with health conditions that include hypertension and dyslipidemias . Weight is unchanged. BMI is is above average; BMI management plan is completed. We discussed portion control and increasing exercise.       4. Campylobacter diarrhea    5. IC (interstitial cystitis)    6. Solitary kidney, acquired    7. Benign essential HTN              Expected course, medications, and adverse effects discussed.  Call or return if worsening or persistent symptoms.  I wore protective equipment throughout this patient encounter including a mask, gloves, and eye protection.  Hand hygiene was performed before donning protective equipment and after removal when leaving the room. The complete contents of the Assessment and Plan and Data/Lab Results as documented above have been reviewed and addressed by myself with the patient today as part of an ongoing evaluation / treatment plan.  If some of the  documentation has been copied from a previous note and is unchanged it indicates that this problem / plan has been assessed today but is stable from a previous visit and no changes have been recommended.

## 2023-08-29 ENCOUNTER — OFFICE VISIT (OUTPATIENT)
Dept: FAMILY MEDICINE CLINIC | Facility: CLINIC | Age: 73
End: 2023-08-29
Payer: MEDICARE

## 2023-08-29 VITALS
SYSTOLIC BLOOD PRESSURE: 132 MMHG | BODY MASS INDEX: 30.18 KG/M2 | HEIGHT: 62 IN | RESPIRATION RATE: 16 BRPM | DIASTOLIC BLOOD PRESSURE: 74 MMHG | WEIGHT: 164 LBS | TEMPERATURE: 97.7 F | OXYGEN SATURATION: 97 % | HEART RATE: 71 BPM

## 2023-08-29 DIAGNOSIS — A04.5 CAMPYLOBACTER DIARRHEA: ICD-10-CM

## 2023-08-29 DIAGNOSIS — E66.3 OVERWEIGHT WITH BODY MASS INDEX (BMI) OF 29 TO 29.9 IN ADULT: ICD-10-CM

## 2023-08-29 DIAGNOSIS — Z90.5 SOLITARY KIDNEY, ACQUIRED: ICD-10-CM

## 2023-08-29 DIAGNOSIS — N30.10 IC (INTERSTITIAL CYSTITIS): ICD-10-CM

## 2023-08-29 DIAGNOSIS — R10.9 ABDOMINAL PAIN, UNSPECIFIED ABDOMINAL LOCATION: Primary | ICD-10-CM

## 2023-08-29 DIAGNOSIS — I10 BENIGN ESSENTIAL HTN: ICD-10-CM

## 2023-08-29 DIAGNOSIS — R19.7 DIARRHEA, UNSPECIFIED TYPE: ICD-10-CM

## 2023-08-29 PROCEDURE — 3078F DIAST BP <80 MM HG: CPT | Performed by: FAMILY MEDICINE

## 2023-08-29 PROCEDURE — 99214 OFFICE O/P EST MOD 30 MIN: CPT | Performed by: FAMILY MEDICINE

## 2023-08-29 PROCEDURE — 3075F SYST BP GE 130 - 139MM HG: CPT | Performed by: FAMILY MEDICINE

## 2023-08-29 NOTE — ASSESSMENT & PLAN NOTE
Patient's (Body mass index is 29.99 kg/m².) indicates that they are overweight with health conditions that include hypertension and dyslipidemias . Weight is unchanged. BMI is is above average; BMI management plan is completed. We discussed portion control and increasing exercise.

## 2023-09-11 PROBLEM — A04.5 CAMPYLOBACTER DIARRHEA: Status: ACTIVE | Noted: 2023-09-11

## 2023-09-29 ENCOUNTER — HOSPITAL ENCOUNTER (OUTPATIENT)
Dept: CT IMAGING | Facility: HOSPITAL | Age: 73
Discharge: HOME | End: 2023-09-29
Admitting: INTERNAL MEDICINE

## 2023-09-29 DIAGNOSIS — I10 BENIGN ESSENTIAL HTN: ICD-10-CM

## 2023-09-29 DIAGNOSIS — R07.89 CHEST PAIN, ATYPICAL: ICD-10-CM

## 2023-09-29 DIAGNOSIS — E78.2 MIXED HYPERLIPIDEMIA: Chronic | ICD-10-CM

## 2023-09-29 PROCEDURE — 75571 CT HRT W/O DYE W/CA TEST: CPT

## 2023-09-30 DIAGNOSIS — E78.2 MIXED HYPERLIPIDEMIA: Chronic | ICD-10-CM

## 2023-10-02 RX ORDER — ROSUVASTATIN CALCIUM 5 MG/1
5 TABLET, COATED ORAL DAILY
Qty: 90 TABLET | Refills: 1 | Status: SHIPPED | OUTPATIENT
Start: 2023-10-02

## 2023-10-02 RX ORDER — AMLODIPINE BESYLATE 5 MG/1
TABLET ORAL
Qty: 180 TABLET | Refills: 1 | Status: SHIPPED | OUTPATIENT
Start: 2023-10-02

## 2023-10-02 RX ORDER — CARVEDILOL 12.5 MG/1
TABLET ORAL
Qty: 180 TABLET | Refills: 1 | Status: SHIPPED | OUTPATIENT
Start: 2023-10-02

## 2023-10-10 ENCOUNTER — OFFICE VISIT (OUTPATIENT)
Dept: FAMILY MEDICINE CLINIC | Facility: CLINIC | Age: 73
End: 2023-10-10
Payer: MEDICARE

## 2023-10-10 VITALS
DIASTOLIC BLOOD PRESSURE: 70 MMHG | TEMPERATURE: 98 F | OXYGEN SATURATION: 96 % | HEIGHT: 62 IN | SYSTOLIC BLOOD PRESSURE: 124 MMHG | HEART RATE: 73 BPM | RESPIRATION RATE: 18 BRPM | WEIGHT: 159.2 LBS | BODY MASS INDEX: 29.3 KG/M2

## 2023-10-10 DIAGNOSIS — J01.00 ACUTE NON-RECURRENT MAXILLARY SINUSITIS: Primary | ICD-10-CM

## 2023-10-10 PROCEDURE — 99213 OFFICE O/P EST LOW 20 MIN: CPT | Performed by: FAMILY MEDICINE

## 2023-10-10 PROCEDURE — 3074F SYST BP LT 130 MM HG: CPT | Performed by: FAMILY MEDICINE

## 2023-10-10 PROCEDURE — 3078F DIAST BP <80 MM HG: CPT | Performed by: FAMILY MEDICINE

## 2023-10-10 RX ORDER — AMOXICILLIN 500 MG/1
500 TABLET, FILM COATED ORAL 3 TIMES DAILY
Qty: 30 TABLET | Refills: 0 | Status: SHIPPED | OUTPATIENT
Start: 2023-10-10 | End: 2023-10-20

## 2023-10-10 NOTE — PROGRESS NOTES
Subjective   Dunia Fabian is a 73 y.o. female.   Chief Complaint   Patient presents with    Cough    sinus pressure        History of Present Illness  Pt says she took a covid test and they came back negative.   Pt also states she had her flu shot last week as well didn't know if that could be it possibly. Flu shot card is with her to show the type she received last week  Cough  This is a new problem. The current episode started in the past 7 days. The problem has been gradually worsening. The problem occurs constantly. The cough is Non-productive. Associated symptoms include ear congestion, ear pain, headaches and a sore throat. Pertinent negatives include no fever. Nothing aggravates the symptoms. Treatments tried: musnex. The treatment provided no relief.        The following portions of the patient's history were reviewed and updated as appropriate: allergies, current medications, past family history, past medical history, past social history, past surgical history, and problem list  .    Patient Active Problem List   Diagnosis    Acid reflux    Allergic rhinitis    Anxiety    Atrophic kidney    Carotid bruit present    Carpal tunnel syndrome    Low back pain    Neck pain    Thoracic back pain    Depressive disorder    Medicare annual wellness visit, subsequent    Gout    Hemorrhoids    Mixed hyperlipidemia    IC (interstitial cystitis)    Irritable bowel syndrome with constipation    LVH (left ventricular hypertrophy)    Menopausal syndrome    Mitral insufficiency, acute    Onychomycosis    Disorder of bone and cartilage    Age-related osteoporosis without current pathological fracture    Palpitations    Osteoarthritis    Primary pulmonary hypertension    Renal insufficiency    Plantar fasciitis    Senile osteoporosis    Solitary kidney    Encounter for screening mammogram for malignant neoplasm of breast    Special screening for malignant neoplasms, colon    Seborrheic keratosis    Chronic kidney disease,  stage 3 (moderate)    Renal hypertrophy    Hypercalcemia    Acute cystitis with hematuria    Lower extremity edema    Overweight with body mass index (BMI) of 29 to 29.9 in adult    Dizziness    Left hip pain    Bilateral primary osteoarthritis of knee    Elevated fasting blood sugar    Solitary kidney, acquired    Benign essential HTN    Memory loss    Campylobacter diarrhea       Current Outpatient Medications on File Prior to Visit   Medication Sig Dispense Refill    amLODIPine (NORVASC) 5 MG tablet TAKE 1 TABLET BY MOUTH TWICE DAILY 180 tablet 1    aspirin 81 MG EC tablet Take 1 tablet by mouth Daily.      carvedilol (COREG) 12.5 MG tablet TAKE 1 TABLET BY MOUTH TWICE DAILY 180 tablet 1    colestipol (COLESTID) 1 g tablet Take 1 tablet by mouth 2 (Two) Times a Day. Take other medications either more than one hour before this medication of 4 hours after this medication. 60 tablet 3    dicyclomine (BENTYL) 20 MG tablet Take 1 tablet by mouth 3 (Three) Times a Day As Needed for Abdominal Cramping. Take with food 90 tablet 2    hydrALAZINE (APRESOLINE) 25 MG tablet TAKE 1 TABLET BY MOUTH TWICE DAILY 180 tablet 1    omeprazole (priLOSEC) 40 MG capsule Take 1 capsule by mouth Daily. 90 capsule 3    rosuvastatin (CRESTOR) 5 MG tablet TAKE 1 TABLET BY MOUTH DAILY 90 tablet 1    SUPER B COMPLEX/C capsule SUPER B COMPLEX/C ORAL CAPSULE      vitamin D3 125 MCG (5000 UT) capsule capsule Take 1 capsule by mouth Daily.      cloNIDine (CATAPRES) 0.1 MG tablet Take 1 tablet by mouth Every 4 (Four) Hours As Needed. (Patient not taking: Reported on 6/5/2023)      cyclobenzaprine (FLEXERIL) 5 MG tablet TAKE 1 TABLET BY MOUTH DAILY AS NEEDED FOR MUSCLE SPASMS (Patient not taking: Reported on 6/5/2023) 30 tablet 0    denosumab (Prolia) 60 MG/ML solution prefilled syringe syringe Inject  under the skin into the appropriate area as directed Every 6 (Six) Months. (Patient not taking: Reported on 10/10/2023)      Diclofenac Sodium  "(VOLTAREN) 1 % gel gel Apply 4 g topically to the appropriate area as directed 3 (Three) Times a Day As Needed (neck pain). Dose is 4 grams for knees, ankles, feet.  Dose is 2 grams for elbows, wrists, hands. (Patient not taking: Reported on 10/10/2023) 50 g 5    Restasis 0.05 % ophthalmic emulsion  (Patient not taking: Reported on 10/10/2023)      sertraline (ZOLOFT) 25 MG tablet  (Patient not taking: Reported on 10/10/2023)      tamsulosin (FLOMAX) 0.4 MG capsule 24 hr capsule Take 1 capsule by mouth Daily. (Patient not taking: Reported on 10/10/2023)       No current facility-administered medications on file prior to visit.     Current outpatient and discharge medications have been reconciled for the patient.  Reviewed by: Rodrigo Najera MD      Allergies   Allergen Reactions    Contrast Dye (Echo Or Unknown Ct/Mr) Anaphylaxis    Gadolinium Hives     \"crawling\" feeling     Iodine Anaphylaxis    Shrimp Anaphylaxis       Review of Systems   Constitutional:  Negative for activity change, appetite change, fever and unexpected weight gain.   HENT:  Positive for ear pain and sore throat.    Respiratory:  Positive for cough.    Gastrointestinal:  Negative for abdominal distention, blood in stool, constipation, diarrhea and vomiting.   Skin:  Negative for color change.   Psychiatric/Behavioral:  Negative for behavioral problems.      I have reviewed and confirmed the accuracy of the ROS as documented by the MA/LPN/RN Rodrigo Najera MD    Objective   Visit Vitals  /70 (BP Location: Right arm, Patient Position: Sitting, Cuff Size: Adult)   Pulse 73   Temp 98 øF (36.7 øC) (Temporal)   Resp 18   Ht 157.5 cm (62.01\")   Wt 72.2 kg (159 lb 3.2 oz)   SpO2 96% Comment: room air   BMI 29.11 kg/mý      **  Physical Exam  Constitutional:       Appearance: She is well-developed.   HENT:      Head: Normocephalic and atraumatic.      Right Ear: External ear normal.      Left Ear: External ear normal.      Nose: " Nasal tenderness and congestion present.   Eyes:      Pupils: Pupils are equal, round, and reactive to light.   Cardiovascular:      Rate and Rhythm: Normal rate and regular rhythm.      Heart sounds: Normal heart sounds.   Pulmonary:      Effort: Pulmonary effort is normal.      Breath sounds: Normal breath sounds.   Abdominal:      General: Bowel sounds are normal.      Palpations: Abdomen is soft.   Musculoskeletal:         General: Normal range of motion.      Cervical back: Normal range of motion and neck supple.   Skin:     General: Skin is warm and dry.   Neurological:      Mental Status: She is alert and oriented to person, place, and time.   Psychiatric:         Behavior: Behavior normal.         Thought Content: Thought content normal.         Judgment: Judgment normal.       Derm Physical Exam  Ortho Exam   Neurologic Exam     Mental Status   Oriented to person, place, and time.     Cranial Nerves     CN III, IV, VI   Pupils are equal, round, and reactive to light.       Diagnoses and all orders for this visit:    1. Acute non-recurrent maxillary sinusitis (Primary)  -     amoxicillin (AMOXIL) 500 MG tablet; Take 1 tablet by mouth 3 (Three) Times a Day for 10 days.  Dispense: 30 tablet; Refill: 0     Findings discussed. All questions answered.  Medication and medication adverse effects discussed.  Drug education given and explained to patient. Patient verbalized understanding.  Follow-up for routine health maintenance as indicated. Follow-up in 2 weeks if not better.  Follow-up sooner for worsening symptoms or for any concerns.            Expected course, medications, and adverse effects discussed as appropriate.  Call or return if worsening or persistent symptoms.     This document is intended for medical professional use only.

## 2023-10-16 RX ORDER — HYDRALAZINE HYDROCHLORIDE 25 MG/1
TABLET, FILM COATED ORAL
Qty: 180 TABLET | Refills: 0 | Status: SHIPPED | OUTPATIENT
Start: 2023-10-16

## 2023-10-18 DIAGNOSIS — R07.89 CHEST PAIN, ATYPICAL: Primary | ICD-10-CM

## 2023-10-30 ENCOUNTER — OFFICE VISIT (OUTPATIENT)
Dept: FAMILY MEDICINE CLINIC | Facility: CLINIC | Age: 73
End: 2023-10-30
Payer: MEDICARE

## 2023-10-30 VITALS
WEIGHT: 154.8 LBS | HEART RATE: 73 BPM | OXYGEN SATURATION: 97 % | RESPIRATION RATE: 18 BRPM | TEMPERATURE: 97.5 F | HEIGHT: 62 IN | DIASTOLIC BLOOD PRESSURE: 70 MMHG | SYSTOLIC BLOOD PRESSURE: 118 MMHG | BODY MASS INDEX: 28.49 KG/M2

## 2023-10-30 DIAGNOSIS — R19.7 DIARRHEA, UNSPECIFIED TYPE: ICD-10-CM

## 2023-10-30 DIAGNOSIS — K58.1 IRRITABLE BOWEL SYNDROME WITH CONSTIPATION: ICD-10-CM

## 2023-10-30 DIAGNOSIS — N26.1 ATROPHIC KIDNEY: ICD-10-CM

## 2023-10-30 DIAGNOSIS — J06.9 ACUTE URI: ICD-10-CM

## 2023-10-30 DIAGNOSIS — A04.5 CAMPYLOBACTER DIARRHEA: ICD-10-CM

## 2023-10-30 DIAGNOSIS — I10 BENIGN ESSENTIAL HTN: ICD-10-CM

## 2023-10-30 DIAGNOSIS — R06.2 WHEEZING: ICD-10-CM

## 2023-10-30 DIAGNOSIS — E66.3 OVERWEIGHT WITH BODY MASS INDEX (BMI) OF 28 TO 28.9 IN ADULT: ICD-10-CM

## 2023-10-30 DIAGNOSIS — R10.9 ABDOMINAL PAIN, UNSPECIFIED ABDOMINAL LOCATION: ICD-10-CM

## 2023-10-30 DIAGNOSIS — F41.9 ANXIETY: ICD-10-CM

## 2023-10-30 DIAGNOSIS — K21.9 GASTROESOPHAGEAL REFLUX DISEASE, UNSPECIFIED WHETHER ESOPHAGITIS PRESENT: ICD-10-CM

## 2023-10-30 DIAGNOSIS — R05.1 ACUTE COUGH: Primary | ICD-10-CM

## 2023-10-30 LAB
EXPIRATION DATE: NORMAL
FLUAV AG UPPER RESP QL IA.RAPID: NOT DETECTED
FLUBV AG UPPER RESP QL IA.RAPID: NOT DETECTED
INTERNAL CONTROL: NORMAL
Lab: NORMAL
SARS-COV-2 AG UPPER RESP QL IA.RAPID: NOT DETECTED

## 2023-10-30 PROCEDURE — 3078F DIAST BP <80 MM HG: CPT | Performed by: FAMILY MEDICINE

## 2023-10-30 PROCEDURE — 87428 SARSCOV & INF VIR A&B AG IA: CPT | Performed by: FAMILY MEDICINE

## 2023-10-30 PROCEDURE — 3074F SYST BP LT 130 MM HG: CPT | Performed by: FAMILY MEDICINE

## 2023-10-30 PROCEDURE — 99214 OFFICE O/P EST MOD 30 MIN: CPT | Performed by: FAMILY MEDICINE

## 2023-10-30 RX ORDER — MONTELUKAST SODIUM 4 MG/1
TABLET, CHEWABLE ORAL
Qty: 60 TABLET | Refills: 3 | Status: SHIPPED | OUTPATIENT
Start: 2023-10-30

## 2023-10-30 RX ORDER — HYDROCODONE BITARTRATE AND HOMATROPINE METHYLBROMIDE ORAL SOLUTION 5; 1.5 MG/5ML; MG/5ML
5 LIQUID ORAL NIGHTLY PRN
Qty: 180 ML | Refills: 0 | Status: SHIPPED | OUTPATIENT
Start: 2023-10-30

## 2023-10-30 RX ORDER — CEFPROZIL 500 MG/1
500 TABLET, FILM COATED ORAL 2 TIMES DAILY
Qty: 20 TABLET | Refills: 0 | Status: SHIPPED | OUTPATIENT
Start: 2023-10-30

## 2023-10-30 RX ORDER — CEPHALEXIN 500 MG/1
500 CAPSULE ORAL 3 TIMES DAILY
Qty: 30 CAPSULE | Refills: 0 | Status: CANCELLED | OUTPATIENT
Start: 2023-10-30

## 2023-10-30 RX ORDER — PREDNISONE 5 MG/1
TABLET ORAL
Qty: 45 TABLET | Refills: 0 | Status: SHIPPED | OUTPATIENT
Start: 2023-10-30

## 2023-10-30 NOTE — ASSESSMENT & PLAN NOTE
Patient's (Body mass index is 28.31 kg/m².) indicates that they are overweight with health conditions that include hypertension and dyslipidemias . Weight is improving with lifestyle modifications. BMI is is above average; BMI management plan is completed. We discussed portion control and increasing exercise.

## 2023-10-30 NOTE — PROGRESS NOTES
6 Subjective   Dunia Fabian is a 73 y.o. female.     Chief Complaint   Patient presents with    Cough    URI    Wheezing       URI   This is a recurrent problem. The current episode started 1 to 4 weeks ago (10/10/2023). The problem has been unchanged. Maximum temperature: 10/28/2023- felt sweaty and fever like. Associated symptoms include congestion, coughing, rhinorrhea, sneezing and wheezing. Pertinent negatives include no abdominal pain, chest pain or nausea. She has tried increased fluids and decongestant (amoxicillin 500 MG) for the symptoms.   Cough  This is a recurrent problem. The current episode started 1 to 4 weeks ago. The problem has been unchanged. The problem occurs every few minutes. The cough is Non-productive. Associated symptoms include rhinorrhea, shortness of breath and wheezing. Pertinent negatives include no chest pain or chills. The symptoms are aggravated by exercise and pollens. Treatments tried: amoxicillin 500 MG.   Okay so I        I personally reviewed and updated the patient's allergies, medications, problem list, and past medical, surgical, social, and family history. I have reviewed and confirmed the accuracy of the History of Present Illness and Review of Symptoms as documented by the MA/TARA/RN. Tyron Driver MD    Family History   Problem Relation Age of Onset    Parkinsonism Mother     Heart disease Mother         cardiovascular disease    Diabetes Mother         diabetes mellitus     Heart disease Father         cardiovascular disease    Heart disease Sister         cardiovascular disease    Diabetes Sister         diabetes mellitus     Heart disease Brother         cardiovascular disease    Diabetes Son         diabetes mellitus     Heart disease Paternal Uncle         ischemic    Stomach cancer Maternal Grandmother     Breast cancer Niece 39    Ovarian cancer Paternal Grandmother        Social History     Tobacco Use    Smoking status: Never     Passive exposure: Never     Smokeless tobacco: Never   Vaping Use    Vaping Use: Never used   Substance Use Topics    Alcohol use: Not Currently    Drug use: No       Past Surgical History:   Procedure Laterality Date    CATARACT EXTRACTION Left 08/2005    with Insert of Lens Prostheti    CYSTOCELE REPAIR  06/1998    SIMENTAL Cystourethropexy & Cystocele Repair     KIDNEY SURGERY Right 02/24/2014    Removal    TONSILLECTOMY  1953    URETHROLYSIS  10/2000    Transvaginal Urethrolysis & Bone [place] Sling    VAGINAL HYSTERECTOMY  1970    VITRECTOMY Left 04/2005    & Membrane Peeling       Patient Active Problem List   Diagnosis    Acid reflux    Allergic rhinitis    Anxiety    Atrophic kidney    Carotid bruit present    Carpal tunnel syndrome    Low back pain    Neck pain    Thoracic back pain    Depressive disorder    Medicare annual wellness visit, subsequent    Gout    Hemorrhoids    Mixed hyperlipidemia    IC (interstitial cystitis)    Irritable bowel syndrome with constipation    LVH (left ventricular hypertrophy)    Menopausal syndrome    Mitral insufficiency, acute    Onychomycosis    Disorder of bone and cartilage    Age-related osteoporosis without current pathological fracture    Palpitations    Osteoarthritis    Primary pulmonary hypertension    Renal insufficiency    Plantar fasciitis    Senile osteoporosis    Solitary kidney    Encounter for screening mammogram for malignant neoplasm of breast    Special screening for malignant neoplasms, colon    Seborrheic keratosis    Chronic kidney disease, stage 3 (moderate)    Renal hypertrophy    Hypercalcemia    Acute cystitis with hematuria    Lower extremity edema    Overweight with body mass index (BMI) of 28 to 28.9 in adult    Dizziness    Left hip pain    Bilateral primary osteoarthritis of knee    Elevated fasting blood sugar    Solitary kidney, acquired    Benign essential HTN    Memory loss    Campylobacter diarrhea         Current Outpatient Medications:     amLODIPine (NORVASC) 5 MG  tablet, TAKE 1 TABLET BY MOUTH TWICE DAILY, Disp: 180 tablet, Rfl: 1    aspirin 81 MG EC tablet, Take 1 tablet by mouth Daily., Disp: , Rfl:     carvedilol (COREG) 12.5 MG tablet, TAKE 1 TABLET BY MOUTH TWICE DAILY, Disp: 180 tablet, Rfl: 1    colestipol (COLESTID) 1 g tablet, TAKE 1 TABLET BY MOUTH TWICE DAILY. TAKE OTHER MEDICATIONS AT LEAST 1 HOUR BEFORE OR 4 HOURS AFTER THIS MEDICATION., Disp: 60 tablet, Rfl: 3    denosumab (Prolia) 60 MG/ML solution prefilled syringe syringe, Inject  under the skin into the appropriate area as directed Every 6 (Six) Months., Disp: , Rfl:     Diclofenac Sodium (VOLTAREN) 1 % gel gel, Apply 4 g topically to the appropriate area as directed 3 (Three) Times a Day As Needed (neck pain). Dose is 4 grams for knees, ankles, feet.  Dose is 2 grams for elbows, wrists, hands., Disp: 50 g, Rfl: 5    dicyclomine (BENTYL) 20 MG tablet, Take 1 tablet by mouth 3 (Three) Times a Day As Needed for Abdominal Cramping. Take with food, Disp: 90 tablet, Rfl: 2    hydrALAZINE (APRESOLINE) 25 MG tablet, TAKE 1 TABLET BY MOUTH TWICE DAILY, Disp: 180 tablet, Rfl: 0    omeprazole (priLOSEC) 40 MG capsule, Take 1 capsule by mouth Daily., Disp: 90 capsule, Rfl: 3    rosuvastatin (CRESTOR) 5 MG tablet, TAKE 1 TABLET BY MOUTH DAILY, Disp: 90 tablet, Rfl: 1    SUPER B COMPLEX/C capsule, SUPER B COMPLEX/C ORAL CAPSULE, Disp: , Rfl:     tamsulosin (FLOMAX) 0.4 MG capsule 24 hr capsule, Take 1 capsule by mouth Daily., Disp: , Rfl:     vitamin D3 125 MCG (5000 UT) capsule capsule, Take 1 capsule by mouth Daily., Disp: , Rfl:     cefprozil (CEFZIL) 500 MG tablet, Take 1 tablet by mouth 2 (Two) Times a Day., Disp: 20 tablet, Rfl: 0    cloNIDine (CATAPRES) 0.1 MG tablet, Take 1 tablet by mouth Every 4 (Four) Hours As Needed. (Patient not taking: Reported on 6/5/2023), Disp: , Rfl:     cyclobenzaprine (FLEXERIL) 5 MG tablet, TAKE 1 TABLET BY MOUTH DAILY AS NEEDED FOR MUSCLE SPASMS (Patient not taking: Reported on  "6/5/2023), Disp: 30 tablet, Rfl: 0    HYDROcodone Bit-Homatrop MBr (HYCODAN) 5-1.5 MG/5ML solution, Take 5 mL by mouth At Night As Needed for Cough., Disp: 180 mL, Rfl: 0    predniSONE (DELTASONE) 5 MG tablet, 40mg x 3 days, 20mg x 3 days, 10mg x 3 days, 5mg x 3 days, Disp: 45 tablet, Rfl: 0    Restasis 0.05 % ophthalmic emulsion, , Disp: , Rfl:     sertraline (ZOLOFT) 25 MG tablet, , Disp: , Rfl:          Review of Systems   Constitutional:  Negative for chills and diaphoresis.   HENT:  Positive for congestion, rhinorrhea and sneezing.    Eyes:  Negative for visual disturbance.   Respiratory:  Positive for cough, shortness of breath and wheezing.    Cardiovascular:  Negative for chest pain and palpitations.   Gastrointestinal:  Negative for abdominal pain and nausea.   Endocrine: Negative for polydipsia and polyphagia.   Musculoskeletal:  Negative for neck stiffness.   Skin:  Negative for color change and pallor.   Neurological:  Negative for seizures and syncope.   Hematological:  Negative for adenopathy.   Psychiatric/Behavioral:  Negative for hallucinations and suicidal ideas.        I have reviewed and confirmed the accuracy of the ROS as documented by the MA/LPN/RN Tyron Driver MD      Objective   /70 (BP Location: Right arm, Patient Position: Sitting, Cuff Size: Adult)   Pulse 73   Temp 97.5 °F (36.4 °C) (Temporal)   Resp 18   Ht 157.5 cm (62.01\")   Wt 70.2 kg (154 lb 12.8 oz)   SpO2 97%   BMI 28.31 kg/m²   BP Readings from Last 3 Encounters:   10/30/23 118/70   10/10/23 124/70   08/29/23 132/74     Wt Readings from Last 3 Encounters:   10/30/23 70.2 kg (154 lb 12.8 oz)   10/10/23 72.2 kg (159 lb 3.2 oz)   08/29/23 74.4 kg (164 lb)     Physical Exam  Constitutional:       Appearance: Normal appearance. She is well-developed. She is not diaphoretic.   HENT:      Head: Normocephalic.      Right Ear: Hearing, tympanic membrane, ear canal and external ear normal. A middle ear effusion is present. " Tympanic membrane is erythematous.      Left Ear: Hearing, tympanic membrane, ear canal and external ear normal. A middle ear effusion is present. Tympanic membrane is erythematous.      Nose: Nose normal. Congestion present.      Right Sinus: Maxillary sinus tenderness and frontal sinus tenderness present.      Left Sinus: Maxillary sinus tenderness and frontal sinus tenderness present.      Mouth/Throat:      Pharynx: Posterior oropharyngeal erythema present.      Tonsils: No tonsillar abscesses. 1+ on the right. 1+ on the left.   Eyes:      General: Lids are normal.      Conjunctiva/sclera: Conjunctivae normal.      Pupils: Pupils are equal, round, and reactive to light.   Neck:      Thyroid: No thyromegaly.      Vascular: No carotid bruit or JVD.      Trachea: No tracheal deviation.      Meningeal: Brudzinski's sign and Kernig's sign absent.   Cardiovascular:      Rate and Rhythm: Normal rate and regular rhythm.      Pulses: Normal pulses.      Heart sounds: Normal heart sounds, S1 normal and S2 normal. No murmur heard.     No friction rub. No gallop.   Pulmonary:      Effort: Pulmonary effort is normal. No accessory muscle usage or respiratory distress.      Breath sounds: Normal breath sounds. No stridor. Examination of the right-upper field reveals wheezing and rhonchi. Examination of the left-upper field reveals wheezing and rhonchi. Examination of the right-middle field reveals wheezing and rhonchi. Examination of the left-middle field reveals wheezing and rhonchi. Examination of the right-lower field reveals wheezing and rhonchi. Examination of the left-lower field reveals wheezing and rhonchi. Wheezing and rhonchi present. No decreased breath sounds or rales.   Abdominal:      General: Bowel sounds are normal. There is no distension.      Palpations: Abdomen is soft. There is no mass.      Tenderness: There is no abdominal tenderness. There is no guarding or rebound.      Hernia: No hernia is present.  "  Lymphadenopathy:      Head:      Right side of head: No submental, submandibular, tonsillar, preauricular, posterior auricular or occipital adenopathy.      Left side of head: No submental, submandibular, tonsillar, preauricular, posterior auricular or occipital adenopathy.      Cervical: No cervical adenopathy.   Skin:     General: Skin is warm and dry.      Coloration: Skin is not pale.   Neurological:      Mental Status: She is alert and oriented to person, place, and time.      Cranial Nerves: No cranial nerve deficit.      Sensory: No sensory deficit.      Coordination: Coordination normal.      Gait: Gait normal.      Deep Tendon Reflexes: Reflexes are normal and symmetric.         Data / Lab Results:    Hemoglobin A1C   Date Value Ref Range Status   05/10/2023 6.3 (H) 4.8 - 5.6 % Final     Comment:              Prediabetes: 5.7 - 6.4           Diabetes: >6.4           Glycemic control for adults with diabetes: <7.0     09/10/2021 6.1 % Final        Lab Results   Component Value Date    LDL 91 05/03/2023     (H) 03/11/2022     (H) 08/28/2021     Lab Results   Component Value Date    CHOL 181 07/24/2018    CHOL 215 (H) 07/21/2017    CHOL 198 07/20/2016     Lab Results   Component Value Date    TRIG 131 05/03/2023    TRIG 128 03/11/2022    TRIG 113 08/28/2021     Lab Results   Component Value Date    HDL 56 05/03/2023    HDL 52 03/11/2022    HDL 54 08/28/2021     No results found for: \"PSA\"  Lab Results   Component Value Date    WBC 10.9 (H) 08/08/2023    HGB 13.6 08/08/2023    HCT 42.0 08/08/2023    MCV 89 08/08/2023     08/08/2023     Lab Results   Component Value Date    TSH 3.640 05/03/2023      Lab Results   Component Value Date    GLUCOSE 105 (H) 08/08/2023    BUN 25 08/08/2023    CREATININE 1.06 (H) 08/08/2023    EGFRIFNONA 48 (L) 08/28/2021    EGFRIFAFRI 55 (L) 08/28/2021    BCR 24 08/08/2023    K 5.0 08/08/2023    CO2 24 08/08/2023    CALCIUM 10.2 08/08/2023    PROTENTOTREF 7.7 " "08/08/2023    ALBUMIN 5.1 (H) 08/08/2023    LABIL2 2.0 08/08/2023    AST 21 08/08/2023    ALT 13 08/08/2023     No results found for: \"ESTHER\", \"RF\", \"SEDRATE\"   No results found for: \"CRP\"   No results found for: \"IRON\", \"TIBC\", \"FERRITIN\"   Lab Results   Component Value Date    [id number] 805 05/10/2023          Assessment & Plan      Medications        Problem List         [facility]    Health maintenance.  Doing well, vaccines current.  Discussed health maintenance, screening test, lifestyle modification.  Pap current, followed by Dr. Patiño, mammogram benign 11/22, further screening declined.  Allergic rhinitis.  OTC Claritin or Zyrtec.  Hypertension.  Overall improved today on carvedilol, amlodipine, tolerating decreased dose hydralazine ,Off metoprolol per cardiology followed by Dr. Winston, .  Hold losartan/has had nephrology eval per Dr. Mcgee,  renal ultrasound/renal artery Doppler benign, has follow-up upcoming..  Clinically improved today on  hydralazine 100 mg twice daily, as needed clonidine.  Monitor blood pressure closely.  Follow-up recheck.  Consider restart amlodipine if persistent elevation.   . Discussed low-sodium diet.  Stress echo benign/elevated right-sided pressure only 2018.  Carotid Dopplers with mild blockage only 2018.  MRI brain benign 2020.  Has had cardiology eval/plan repeat stress testing when blood pressure better controlled per cardilogy recs/cardiology referral scheduled..  Life stress/decreased energy.  Multiple stressors.  Good social support.   Initially improved on Cymbalta/Wellbutrin, she discontinued Rx.  Start sertraline.  Lower extremity edema.  Improved currently with decreased dose amlodipine.  Discussed low-sodium diet.  Follow-up recheck.  Consider sleep study if persistent symptoms.  Osteoporosis.  Tolerating Prolia.  Holding calcium/vitamin D per nephrology.   recheck DEXA  Atrophic kidney.  Improved status post nephrectomy, followed by urology,nephrology.  Renal function " normal.  Interstitial cystitis.  Improved on Flomax, followed by [facility], history of InterStim placement/subsequent removal.  I and O catheter dependent.  Hypertensive retinopathy.  Followed by ophthalmology ember galeano, status post treatment  Colon screening.  Colonoscopy benign 2016./Diverticulosis only  Carotid stenosis.  50 to 59% on the right, mild on left per Doppler 1/21, stable per repeat ultrasound 10/21.  She is allergic to contrast and gadolinium.  Repeat Doppler scheduled.  Consider vascular surgery referral if worsening symptoms.  Continue risk factor reduction.  Osteoarthritis.  Worse left hip/knee.    Followed by orthopedics Dr. Angel, x-rays with mild left hip OA, moderate left OA knee, attempting knee bracing.  Consider knee injection.  Hyperlipidemia.    Improved today, LDL to 90 rosuvastatin, tolerating Rx.   Aggressive LDL target considering carotid stenosis. .  Discussed diet exercise lifestyle modification.  Follow-up recheck.    Memory loss.  Worse today, MMSE today, check B12, start Zoloft.  Good social support.  MRI brain benign 2020.  Follow-up recheck/plan check MMSE.  Neck pain.  Persistent symptoms today, significant bilateral trapezius spasm on exam today with left upper extremity radiculopathy, x-rays benign, positive and significant trauma DDx includes ruptured disc versus cervical disc disease, check MRI, add prednisone, continue muscle relaxant.  Consider PT referral, referral for epidural injections.  Follow-up recheck.  Call return if worsening symptoms.  Elevated fasting blood sugar.  FBS to 132, check A1c.  Epigastric pain / diarrhea.  Improved /Resolved today, stool cultures with Campylobacter, completing course Augmentin, add probiotics.  Overall benign exAm today, no s/s diverticultis.  DDX includes gb pathology, infeciton, gastritis. bloodwork, ruq us Benign.  Has completed course antibitotics, improvrd on ppi, cholestid / bentyl.  Increase fluid intake bland diet. Signs and  symptoms of dehydration discussed.. Follow up recheck. Call or return if worsening or persistent symptoms  Acute bacterial bronchitis.  Persistent today, refractory to amoxicillin.  Start prednisone/prednisone.  COVID swab negative.  Rest, increase fluid intake.  Consider chest x-ray if persistent symptoms.      Diagnoses and all orders for this visit:    1. Acute cough (Primary)  -     POCT SARS-CoV-2 + Flu Antigen SYED  -     predniSONE (DELTASONE) 5 MG tablet; 40mg x 3 days, 20mg x 3 days, 10mg x 3 days, 5mg x 3 days  Dispense: 45 tablet; Refill: 0  -     cefprozil (CEFZIL) 500 MG tablet; Take 1 tablet by mouth 2 (Two) Times a Day.  Dispense: 20 tablet; Refill: 0  -     HYDROcodone Bit-Homatrop MBr (HYCODAN) 5-1.5 MG/5ML solution; Take 5 mL by mouth At Night As Needed for Cough.  Dispense: 180 mL; Refill: 0    2. Acute URI  -     POCT SARS-CoV-2 + Flu Antigen SYED  -     predniSONE (DELTASONE) 5 MG tablet; 40mg x 3 days, 20mg x 3 days, 10mg x 3 days, 5mg x 3 days  Dispense: 45 tablet; Refill: 0  -     cefprozil (CEFZIL) 500 MG tablet; Take 1 tablet by mouth 2 (Two) Times a Day.  Dispense: 20 tablet; Refill: 0  -     HYDROcodone Bit-Homatrop MBr (HYCODAN) 5-1.5 MG/5ML solution; Take 5 mL by mouth At Night As Needed for Cough.  Dispense: 180 mL; Refill: 0    3. Wheezing  -     POCT SARS-CoV-2 + Flu Antigen SYED  -     predniSONE (DELTASONE) 5 MG tablet; 40mg x 3 days, 20mg x 3 days, 10mg x 3 days, 5mg x 3 days  Dispense: 45 tablet; Refill: 0    4. Overweight with body mass index (BMI) of 28 to 28.9 in adult  Assessment & Plan:  Patient's (Body mass index is 28.31 kg/m².) indicates that they are overweight with health conditions that include hypertension and dyslipidemias . Weight is improving with lifestyle modifications. BMI is is above average; BMI management plan is completed. We discussed portion control and increasing exercise.       5. Gastroesophageal reflux disease, unspecified whether esophagitis  present    6. Anxiety    7. Atrophic kidney    8. Benign essential HTN    9. Irritable bowel syndrome with constipation    10. Campylobacter diarrhea              Expected course, medications, and adverse effects discussed.  Call or return if worsening or persistent symptoms.  I wore protective equipment throughout this patient encounter including a mask, gloves, and eye protection.  Hand hygiene was performed before donning protective equipment and after removal when leaving the room. The complete contents of the Assessment and Plan and Data/Lab Results as documented above have been reviewed and addressed by myself with the patient today as part of an ongoing evaluation / treatment plan.  If some of the documentation has been copied from a previous note and is unchanged it indicates that this problem / plan has been assessed today but is stable from a previous visit and no changes have been recommended.

## 2023-11-15 ENCOUNTER — TELEPHONE (OUTPATIENT)
Dept: FAMILY MEDICINE CLINIC | Facility: CLINIC | Age: 73
End: 2023-11-15

## 2023-11-15 NOTE — TELEPHONE ENCOUNTER
We do have this and in fridge on [place]. LM for patient to come in for nurse line today or Friday if available to get this administered

## 2023-11-15 NOTE — TELEPHONE ENCOUNTER
Caller: Dunia Fabian    Relationship to patient: Self    Best call back number: [phone]    Patient is needing:     CALLING TO SEE IF THE OFFICE HAS RECEIVED HER PLOLIA INJECTION. IF SO, SHE WOULD LIKE TO SCHEDULE THAT.

## 2023-11-17 ENCOUNTER — CLINICAL SUPPORT (OUTPATIENT)
Dept: FAMILY MEDICINE CLINIC | Facility: CLINIC | Age: 73
End: 2023-11-17
Payer: MEDICARE

## 2023-11-17 DIAGNOSIS — M81.0 AGE-RELATED OSTEOPOROSIS WITHOUT CURRENT PATHOLOGICAL FRACTURE: Primary | ICD-10-CM

## 2023-11-17 DIAGNOSIS — N95.1 MENOPAUSAL SYNDROME: ICD-10-CM

## 2023-11-17 DIAGNOSIS — M81.0 SENILE OSTEOPOROSIS: ICD-10-CM

## 2023-11-24 ENCOUNTER — HOSPITAL ENCOUNTER (OUTPATIENT)
Dept: NUCLEAR MEDICINE | Facility: HOSPITAL | Age: 73
Discharge: HOME | End: 2023-11-24
Payer: MEDICARE

## 2023-11-24 DIAGNOSIS — R07.89 CHEST PAIN, ATYPICAL: ICD-10-CM

## 2023-11-24 PROCEDURE — 25010000002 REGADENOSON 0.4 MG/5ML SOLUTION: Performed by: INTERNAL MEDICINE

## 2023-11-24 PROCEDURE — 78452 HT MUSCLE IMAGE SPECT MULT: CPT

## 2023-11-24 PROCEDURE — A9502 TC99M TETROFOSMIN: HCPCS | Performed by: INTERNAL MEDICINE

## 2023-11-24 PROCEDURE — 93017 CV STRESS TEST TRACING ONLY: CPT

## 2023-11-24 PROCEDURE — 0 TECHNETIUM TETROFOSMIN KIT: Performed by: INTERNAL MEDICINE

## 2023-11-24 RX ORDER — REGADENOSON 0.08 MG/ML
0.4 INJECTION, SOLUTION INTRAVENOUS
Status: COMPLETED | OUTPATIENT
Start: 2023-11-24 | End: 2023-11-24

## 2023-11-24 RX ADMIN — TETROFOSMIN 1 DOSE: 1.38 INJECTION, POWDER, LYOPHILIZED, FOR SOLUTION INTRAVENOUS at 11:01

## 2023-11-24 RX ADMIN — REGADENOSON 0.4 MG: 0.08 INJECTION, SOLUTION INTRAVENOUS at 11:43

## 2023-11-24 RX ADMIN — TETROFOSMIN 1 DOSE: 1.38 INJECTION, POWDER, LYOPHILIZED, FOR SOLUTION INTRAVENOUS at 11:43

## 2023-11-27 LAB
BH CV REST NUCLEAR ISOTOPE DOSE: 11 MCI
BH CV STRESS BP STAGE 1: NORMAL
BH CV STRESS BP STAGE 2: NORMAL
BH CV STRESS COMMENTS STAGE 1: NORMAL
BH CV STRESS COMMENTS STAGE 2: NORMAL
BH CV STRESS DOSE REGADENOSON STAGE 1: 0.4
BH CV STRESS DURATION MIN STAGE 1: 0
BH CV STRESS DURATION MIN STAGE 2: 4
BH CV STRESS DURATION SEC STAGE 1: 10
BH CV STRESS DURATION SEC STAGE 2: 0
BH CV STRESS HR STAGE 1: 68
BH CV STRESS HR STAGE 2: 74
BH CV STRESS NUCLEAR ISOTOPE DOSE: 30.5 MCI
BH CV STRESS PROTOCOL 1: NORMAL
BH CV STRESS RECOVERY BP: NORMAL MMHG
BH CV STRESS RECOVERY HR: 83 BPM
BH CV STRESS STAGE 1: 1
BH CV STRESS STAGE 2: 2
LV EF NUC BP: 79 %
MAXIMAL PREDICTED HEART RATE: 145 BPM
PERCENT MAX PREDICTED HR: 60 %
STRESS BASELINE BP: NORMAL MMHG
STRESS BASELINE HR: 68 BPM
STRESS PERCENT HR: 71 %
STRESS POST PEAK BP: NORMAL MMHG
STRESS POST PEAK HR: 87 BPM
STRESS TARGET HR: 123 BPM

## 2023-11-29 DIAGNOSIS — R10.9 ABDOMINAL PAIN, UNSPECIFIED ABDOMINAL LOCATION: ICD-10-CM

## 2023-11-29 DIAGNOSIS — R19.7 DIARRHEA, UNSPECIFIED TYPE: ICD-10-CM

## 2023-11-29 RX ORDER — DICYCLOMINE HCL 20 MG
TABLET ORAL
Qty: 90 TABLET | Refills: 2 | Status: SHIPPED | OUTPATIENT
Start: 2023-11-29

## 2023-12-05 NOTE — PROGRESS NOTES
Subjective   Dunia Fabian is a 73 y.o. female.     Chief Complaint   Patient presents with    Abdominal Pain    Diarrhea    Trouble Swallowing       History of Present Illness  Dunia would like to discuss trouble swallowing. She states this has been happening since 10/30/2023, the last time she was sick with a cough and congestion. Aggravated by eating and drinking. Denies any SOB or trouble breathing.    Abdominal Pain  This is a recurrent problem. The current episode started more than 1 month ago. The onset quality is sudden. The problem occurs daily. The problem has been gradually improving. The pain is located in the generalized abdominal region. The pain is mild. The quality of the pain is cramping, sharp and aching. The abdominal pain does not radiate. Associated symptoms include belching and diarrhea. Pertinent negatives include no nausea or vomiting. The pain is aggravated by certain positions, eating and movement. The pain is relieved by Bowel movements and being still. She has tried antibiotics (Bentyl, Colestid, and Augmentin) for the symptoms. The treatment provided moderate relief.   Diarrhea   This is a recurrent problem. The current episode started more than 1 month ago. The problem has been resolved. The patient states that diarrhea awakens her from sleep. Associated symptoms include abdominal pain. Pertinent negatives include no chills or vomiting. There are no known risk factors. Treatments tried: Bentyl and Colestid.            I personally reviewed and updated the patient's allergies, medications, problem list, and past medical, surgical, social, and family history. I have reviewed and confirmed the accuracy of the History of Present Illness and Review of Symptoms as documented by the MA/LPN/RN. Tyron Driver MD    Family History   Problem Relation Age of Onset    Parkinsonism Mother     Heart disease Mother         cardiovascular disease    Diabetes Mother         diabetes mellitus      Heart disease Father         cardiovascular disease    Heart disease Sister         cardiovascular disease    Diabetes Sister         diabetes mellitus     Heart disease Brother         cardiovascular disease    Diabetes Son         diabetes mellitus     Heart disease Paternal Uncle         ischemic    Stomach cancer Maternal Grandmother     Breast cancer Niece 39    Ovarian cancer Paternal Grandmother        Social History     Tobacco Use    Smoking status: Never     Passive exposure: Never    Smokeless tobacco: Never   Vaping Use    Vaping Use: Never used   Substance Use Topics    Alcohol use: Not Currently    Drug use: No       Past Surgical History:   Procedure Laterality Date    CATARACT EXTRACTION Left 08/2005    with Insert of Lens Prostheti    CYSTOCELE REPAIR  06/1998    SIMENTAL Cystourethropexy & Cystocele Repair     KIDNEY SURGERY Right 02/24/2014    Removal    TONSILLECTOMY  1953    URETHROLYSIS  10/2000    Transvaginal Urethrolysis & Bone [place] Sling    VAGINAL HYSTERECTOMY  1970    VITRECTOMY Left 04/2005    & Membrane Peeling       Patient Active Problem List   Diagnosis    Acid reflux    Allergic rhinitis    Anxiety    Atrophic kidney    Carotid bruit present    Carpal tunnel syndrome    Low back pain    Neck pain    Thoracic back pain    Depressive disorder    Medicare annual wellness visit, subsequent    Gout    Hemorrhoids    Mixed hyperlipidemia    IC (interstitial cystitis)    Irritable bowel syndrome with constipation    LVH (left ventricular hypertrophy)    Menopausal syndrome    Mitral insufficiency, acute    Onychomycosis    Disorder of bone and cartilage    Age-related osteoporosis without current pathological fracture    Palpitations    Osteoarthritis    Primary pulmonary hypertension    Renal insufficiency    Plantar fasciitis    Senile osteoporosis    Solitary kidney    Encounter for screening mammogram for malignant neoplasm of breast    Special screening for malignant neoplasms, colon     Seborrheic keratosis    Chronic kidney disease, stage 3 (moderate)    Renal hypertrophy    Hypercalcemia    Acute cystitis with hematuria    Lower extremity edema    Overweight with body mass index (BMI) of 28 to 28.9 in adult    Dizziness    Left hip pain    Bilateral primary osteoarthritis of knee    Elevated fasting blood sugar    Solitary kidney, acquired    Benign essential HTN    Memory loss    Campylobacter diarrhea         Current Outpatient Medications:     amLODIPine (NORVASC) 5 MG tablet, TAKE 1 TABLET BY MOUTH TWICE DAILY, Disp: 180 tablet, Rfl: 1    aspirin 81 MG EC tablet, Take 1 tablet by mouth Daily., Disp: , Rfl:     carvedilol (COREG) 12.5 MG tablet, TAKE 1 TABLET BY MOUTH TWICE DAILY, Disp: 180 tablet, Rfl: 1    cloNIDine (CATAPRES) 0.1 MG tablet, Take 1 tablet by mouth Every 4 (Four) Hours As Needed., Disp: , Rfl:     denosumab (Prolia) 60 MG/ML solution prefilled syringe syringe, Inject  under the skin into the appropriate area as directed Every 6 (Six) Months., Disp: , Rfl:     Diclofenac Sodium (VOLTAREN) 1 % gel gel, Apply 4 g topically to the appropriate area as directed 3 (Three) Times a Day As Needed (neck pain). Dose is 4 grams for knees, ankles, feet.  Dose is 2 grams for elbows, wrists, hands., Disp: 50 g, Rfl: 5    hydrALAZINE (APRESOLINE) 25 MG tablet, TAKE 1 TABLET BY MOUTH TWICE DAILY, Disp: 180 tablet, Rfl: 0    rosuvastatin (CRESTOR) 5 MG tablet, TAKE 1 TABLET BY MOUTH DAILY, Disp: 90 tablet, Rfl: 1    SUPER B COMPLEX/C capsule, SUPER B COMPLEX/C ORAL CAPSULE, Disp: , Rfl:     tamsulosin (FLOMAX) 0.4 MG capsule 24 hr capsule, Take 1 capsule by mouth Daily., Disp: , Rfl:     vitamin D3 125 MCG (5000 UT) capsule capsule, Take 1 capsule by mouth Daily., Disp: , Rfl:     colestipol (COLESTID) 1 g tablet, TAKE 1 TABLET BY MOUTH TWICE DAILY. TAKE OTHER MEDICATIONS AT LEAST 1 HOUR BEFORE OR 4 HOURS AFTER THIS MEDICATION. (Patient not taking: Reported on 12/6/2023), Disp: 60 tablet,  "Rfl: 3    cyclobenzaprine (FLEXERIL) 5 MG tablet, TAKE 1 TABLET BY MOUTH DAILY AS NEEDED FOR MUSCLE SPASMS (Patient not taking: Reported on 12/6/2023), Disp: 30 tablet, Rfl: 0    dicyclomine (BENTYL) 20 MG tablet, TAKE 1 TABLET BY MOUTH THREE TIMES DAILY WITH FOOD AS NEEDED FOR ABDOMINAL CRAMPS (Patient not taking: Reported on 12/6/2023), Disp: 90 tablet, Rfl: 2    omeprazole (priLOSEC) 40 MG capsule, Take 1 capsule by mouth Daily. (Patient not taking: Reported on 12/6/2023), Disp: 90 capsule, Rfl: 3    Restasis 0.05 % ophthalmic emulsion, , Disp: , Rfl:     sertraline (ZOLOFT) 25 MG tablet, , Disp: , Rfl:          Review of Systems   Constitutional:  Negative for chills and diaphoresis.   Eyes:  Negative for visual disturbance.   Respiratory:  Negative for shortness of breath.    Cardiovascular:  Negative for chest pain and palpitations.   Gastrointestinal:  Positive for abdominal pain and diarrhea. Negative for nausea and vomiting.   Endocrine: Negative for polydipsia and polyphagia.   Musculoskeletal:  Negative for neck stiffness.   Skin:  Negative for color change and pallor.   Neurological:  Negative for seizures and syncope.   Hematological:  Negative for adenopathy.   Psychiatric/Behavioral:  Negative for hallucinations and suicidal ideas.        I have reviewed and confirmed the accuracy of the ROS as documented by the MA/LPN/RN Tyron Driver MD      Objective   /64 (BP Location: Right arm, Patient Position: Sitting, Cuff Size: Adult)   Pulse 76   Temp 98.4 °F (36.9 °C) (Temporal)   Resp 16   Ht 157.5 cm (62.01\")   Wt 72.3 kg (159 lb 6.4 oz)   SpO2 96%   BMI 29.15 kg/m²   BP Readings from Last 3 Encounters:   12/06/23 126/64   10/30/23 118/70   10/10/23 124/70     Wt Readings from Last 3 Encounters:   12/06/23 72.3 kg (159 lb 6.4 oz)   10/30/23 70.2 kg (154 lb 12.8 oz)   10/10/23 72.2 kg (159 lb 3.2 oz)     Physical Exam  Constitutional:       Appearance: She is well-developed. She is not " diaphoretic.   HENT:      Head: Normocephalic.      Right Ear: Hearing, tympanic membrane, ear canal and external ear normal.      Left Ear: Hearing, tympanic membrane, ear canal and external ear normal.      Nose: Nose normal. No mucosal edema or congestion.      Right Sinus: No maxillary sinus tenderness or frontal sinus tenderness.      Left Sinus: No maxillary sinus tenderness or frontal sinus tenderness.      Mouth/Throat:      Mouth: No oral lesions.      Pharynx: Uvula midline. No oropharyngeal exudate or posterior oropharyngeal erythema.      Tonsils: No tonsillar exudate.   Eyes:      General: Lids are normal.      Conjunctiva/sclera: Conjunctivae normal.      Pupils: Pupils are equal, round, and reactive to light.   Neck:      Thyroid: No thyromegaly.      Vascular: No carotid bruit or JVD.      Trachea: No tracheal deviation.   Cardiovascular:      Rate and Rhythm: Normal rate and regular rhythm.      Heart sounds: Normal heart sounds. No murmur heard.     No friction rub. No gallop.   Pulmonary:      Effort: Pulmonary effort is normal.      Breath sounds: Normal breath sounds. No stridor. No decreased breath sounds, wheezing or rales.   Abdominal:      General: Bowel sounds are normal. There is no distension.      Palpations: Abdomen is soft. There is no mass.      Tenderness: There is no abdominal tenderness. There is no guarding or rebound.      Hernia: No hernia is present.   Lymphadenopathy:      Head:      Right side of head: No submental, submandibular, tonsillar, preauricular, posterior auricular or occipital adenopathy.      Left side of head: No submental, submandibular, tonsillar, preauricular, posterior auricular or occipital adenopathy.      Cervical: No cervical adenopathy.   Skin:     General: Skin is warm and dry.      Coloration: Skin is not pale.   Neurological:      Mental Status: She is alert and oriented to person, place, and time.      Cranial Nerves: No cranial nerve deficit.       "Sensory: No sensory deficit.      Coordination: Coordination normal.      Gait: Gait normal.      Deep Tendon Reflexes: Reflexes are normal and symmetric.         Data / Lab Results:    Hemoglobin A1C   Date Value Ref Range Status   05/10/2023 6.3 (H) 4.8 - 5.6 % Final     Comment:              Prediabetes: 5.7 - 6.4           Diabetes: >6.4           Glycemic control for adults with diabetes: <7.0     09/10/2021 6.1 % Final        Lab Results   Component Value Date    LDL 91 05/03/2023     (H) 03/11/2022     (H) 08/28/2021     Lab Results   Component Value Date    CHOL 181 07/24/2018    CHOL 215 (H) 07/21/2017    CHOL 198 07/20/2016     Lab Results   Component Value Date    TRIG 131 05/03/2023    TRIG 128 03/11/2022    TRIG 113 08/28/2021     Lab Results   Component Value Date    HDL 56 05/03/2023    HDL 52 03/11/2022    HDL 54 08/28/2021     No results found for: \"PSA\"  Lab Results   Component Value Date    WBC 10.9 (H) 08/08/2023    HGB 13.6 08/08/2023    HCT 42.0 08/08/2023    MCV 89 08/08/2023     08/08/2023     Lab Results   Component Value Date    TSH 3.640 05/03/2023      Lab Results   Component Value Date    GLUCOSE 105 (H) 08/08/2023    BUN 25 08/08/2023    CREATININE 1.06 (H) 08/08/2023    EGFRIFNONA 48 (L) 08/28/2021    EGFRIFAFRI 55 (L) 08/28/2021    BCR 24 08/08/2023    K 5.0 08/08/2023    CO2 24 08/08/2023    CALCIUM 10.2 08/08/2023    PROTENTOTREF 7.7 08/08/2023    ALBUMIN 5.1 (H) 08/08/2023    LABIL2 2.0 08/08/2023    AST 21 08/08/2023    ALT 13 08/08/2023     No results found for: \"ESTHER\", \"RF\", \"SEDRATE\"   No results found for: \"CRP\"   No results found for: \"IRON\", \"TIBC\", \"FERRITIN\"   Lab Results   Component Value Date    EDCIVUOR20 805 05/10/2023          Assessment & Plan      Medications        Problem List         LOS  Health maintenance.  Doing well, vaccines current.  Discussed health maintenance, screening test, lifestyle modification.  Pap current, followed by  " [facility], mammogram benign 11/22, further screening declined.  Allergic rhinitis.  OTC Claritin or Zyrtec.  Hypertension.  Overall improved today on carvedilol, amlodipine, tolerating decreased dose hydralazine ,Off metoprolol per cardiology followed by Dr. Winston, .  Hold losartan/has had nephrology eval per Dr. Mcgee,  renal ultrasound/renal artery Doppler benign, has follow-up upcoming..  Clinically improved today on  hydralazine 100 mg twice daily, as needed clonidine.  Monitor blood pressure closely.  Follow-up recheck.  Consider restart amlodipine if persistent elevation.   . Discussed low-sodium diet.  Stress echo benign/elevated right-sided pressure only 2018.  Carotid Dopplers with mild blockage only 2018.  MRI brain benign 2020.  Has had cardiology eval/plan repeat stress testing when blood pressure better controlled per cardilogy recs/cardiology referral scheduled..  Life stress/decreased energy.  Multiple stressors.  Good social support.   Initially improved on Cymbalta/Wellbutrin, she discontinued Rx.  Start sertraline.  Lower extremity edema.  Improved currently with decreased dose amlodipine.  Discussed low-sodium diet.  Follow-up recheck.  Consider sleep study if persistent symptoms.  Osteoporosis.  Tolerating Prolia.  Holding calcium/vitamin D per nephrology.   recheck DEXA  Atrophic kidney.  Improved status post nephrectomy, followed by urology,nephrology.  Renal function gft 46..  Has had recent follow-up visit Dr. Mcgee, repeat blood work, we will get records.  We will get  Interstitial cystitis.  Improved on Flomax, followed by [facility], history of InterStim placement/subsequent removal.  I and O catheter dependent.  Hypertensive retinopathy.  Followed by ophthalmology ember galeano, status post treatment  Colon screening.  Colonoscopy benign 2016./Diverticulosis only  Carotid stenosis.  50 to 59% on the right, mild on left per Doppler 1/21, stable per repeat ultrasound 10/21.  She is allergic to contrast  and gadolinium.  Repeat Doppler scheduled.  Consider vascular surgery referral if worsening symptoms.  Continue risk factor reduction.  Osteoarthritis.  Worse left hip/knee.    Followed by orthopedics Dr. Angel, x-rays with mild left hip OA, moderate left OA knee, attempting knee bracing.  Consider knee injection.  Hyperlipidemia.    Improved today, LDL to 90 rosuvastatin, tolerating Rx.   Aggressive LDL target considering carotid stenosis. .  Discussed diet exercise lifestyle modification.  Follow-up recheck.    Memory loss.  Worse today, MMSE today, check B12, start Zoloft.  Good social support.  MRI brain benign 2020.  Follow-up recheck/plan check MMSE.  Neck pain.  Persistent symptoms today, significant bilateral trapezius spasm on exam today with left upper extremity radiculopathy, x-rays benign, positive and significant trauma DDx includes ruptured disc versus cervical disc disease, check MRI, add prednisone, continue muscle relaxant.  Consider PT referral, referral for epidural injections.  Follow-up recheck.  Call return if worsening symptoms.  Elevated fasting blood sugar.  FBS to 132, check A1c.  Epigastric pain / diarrhea.  Improved /Resolved today, stool cultures with Campylobacter, completing course Augmentin, add probiotics.  Overall benign exAm today, no s/s diverticultis.  DDX includes gb pathology, infeciton, gastritis. bloodwork, ruq us Benign.  Has completed course antibitotics, improvrd on ppi, cholestid / bentyl.  Increase fluid intake bland diet. Signs and symptoms of dehydration discussed.. Follow up recheck. Call or return if worsening or persistent symptoms  Acute bacterial bronchitis.  Clinically improved/resolved course antibiotics/steroids.  Dysphagia.  Likely secondary to GERD, restart PPI.  Recommend EGD, gastroenterology referral scheduled.  Discussed drink plenty of fluids, she feels throughout the, caution with movement, go to ED if worsening symptoms.      Diagnoses and all orders  for this visit:    1. Abdominal pain, unspecified abdominal location (Primary)    2. Diarrhea, unspecified type    3. Overweight with body mass index (BMI) of 28 to 28.9 in adult    4. Dysphagia, unspecified type  -     Ambulatory Referral to Gastroenterology    5. Choking, initial encounter  -     Ambulatory Referral to Gastroenterology    6. Gastroesophageal reflux disease, unspecified whether esophagitis present    7. Benign essential HTN    8. Atrophic kidney    BMI is >= 25 and <30. (Overweight) The following options were offered after discussion;: exercise counseling/recommendations and nutrition counseling/recommendations            Expected course, medications, and adverse effects discussed.  Call or return if worsening or persistent symptoms.  I wore protective equipment throughout this patient encounter including a mask, gloves, and eye protection.  Hand hygiene was performed before donning protective equipment and after removal when leaving the room. The complete contents of the Assessment and Plan and Data/Lab Results as documented above have been reviewed and addressed by myself with the patient today as part of an ongoing evaluation / treatment plan.  If some of the documentation has been copied from a previous note and is unchanged it indicates that this problem / plan has been assessed today but is stable from a previous visit and no changes have been recommended.

## 2023-12-06 ENCOUNTER — TELEPHONE (OUTPATIENT)
Dept: FAMILY MEDICINE CLINIC | Facility: CLINIC | Age: 73
End: 2023-12-06

## 2023-12-06 ENCOUNTER — OFFICE VISIT (OUTPATIENT)
Dept: FAMILY MEDICINE CLINIC | Facility: CLINIC | Age: 73
End: 2023-12-06
Payer: MEDICARE

## 2023-12-06 VITALS
SYSTOLIC BLOOD PRESSURE: 126 MMHG | TEMPERATURE: 98.4 F | BODY MASS INDEX: 29.33 KG/M2 | WEIGHT: 159.4 LBS | DIASTOLIC BLOOD PRESSURE: 64 MMHG | HEART RATE: 76 BPM | OXYGEN SATURATION: 96 % | HEIGHT: 62 IN | RESPIRATION RATE: 16 BRPM

## 2023-12-06 DIAGNOSIS — R19.7 DIARRHEA, UNSPECIFIED TYPE: ICD-10-CM

## 2023-12-06 DIAGNOSIS — N26.1 ATROPHIC KIDNEY: ICD-10-CM

## 2023-12-06 DIAGNOSIS — R10.9 ABDOMINAL PAIN, UNSPECIFIED ABDOMINAL LOCATION: Primary | ICD-10-CM

## 2023-12-06 DIAGNOSIS — R13.10 DYSPHAGIA, UNSPECIFIED TYPE: ICD-10-CM

## 2023-12-06 DIAGNOSIS — E66.3 OVERWEIGHT WITH BODY MASS INDEX (BMI) OF 28 TO 28.9 IN ADULT: ICD-10-CM

## 2023-12-06 DIAGNOSIS — I10 BENIGN ESSENTIAL HTN: ICD-10-CM

## 2023-12-06 DIAGNOSIS — K21.9 GASTROESOPHAGEAL REFLUX DISEASE, UNSPECIFIED WHETHER ESOPHAGITIS PRESENT: ICD-10-CM

## 2023-12-06 DIAGNOSIS — T17.308A CHOKING, INITIAL ENCOUNTER: ICD-10-CM

## 2024-01-02 NOTE — PROGRESS NOTES
CHIEF COMPLAINT:  Chief Complaint   Patient presents with   • Establish Care       HPI:  Ms. Evie Thornton is a pleasant 29 year old  female, who is here today to establish care and for annual physical.    Complain off restless legs at night. She also admitted on ice craving.    Currently following with gyn and heme Onc regarding abnormal bleeding.  Had 2 units PRBC on 3/18/2023, tolerated well. She is currently on estrogen tablets. Reports that her bleeding has subsided.    She is also receiving IV iron, tolerating well.    No other complaints.      OTHER SIGNIFICANT PROBLEMS:  I have reviewed the patient's medications and allergies, past medical, surgical, social and family history, updating these as appropriate. See Histories section of the EMR for a display of this information.    Recent PHQ 2/9 Score    PHQ 2:  Date Adult PHQ 2 Score Adult PHQ 2 Interpretation   3/21/2023 0 No further screening needed       PHQ 9:  Date Adult PHQ 9 Score   3/21/2023 0         REVIEW OF SYSTEMS:  ROS  Negative except for as above.      PHYSICAL EXAM:  Physical Exam  Vital Signs:   Visit Vitals  /76 (BP Location: RUE - Right upper extremity, Patient Position: Sitting, Cuff Size: Large Adult)   Pulse 93   Resp 14   Ht 5' 5\" (1.651 m)   Wt (!) 144.1 kg (317 lb 9.6 oz)   LMP 02/01/2023   SpO2 99%   BMI 52.85 kg/m²     General: No acute distress, obese, pleasant female  HEENT: Extraocular movements intact, pupils equal, round, reactive to light, no scleral icterus, conjunctival pallor present.  Neck: Trachea midline, supple, no thyromegaly, no cervical or supraclavicular lymphadenopathy.  Pulmonary: No retractions or increased work of breathing, clear to auscultation bilaterally.  Cardiovascular: Regular rate and rhythm, normal S1 S2, no murmurs, no jugular venous distention, carotids 2+ and symmetric, no carotid bruits.  Abdomen:  Positive bowel sounds, soft, nontender.  Extremities: No edema or  Chief Complaint  Dizziness and Balance Issues    History of Present Illness  Dunia Fabian presents today with her son for complaint of dizziness and balance issue    Vertigo - Dizziness:   Patient presents with dizziness .  The dizziness has been present for 3 days. The patient describes the symptoms as lightheadedness and loss of balance. Symptoms are exacerbated by rising from squatting or sitting position The patient also complains of  ankle swelling . Patient denies tinnitus  hearing loss.  She has been treated with nothing.   Is better in the evenings.   Did not wake up with the dizziness this morning.  States last 3 BP readings over the last couple of days were 130/60, 140/71, and 125/62 this am    Symptoms do not feel the same as inner ear infection in the '90's    Had to get out walker previously used for hip.    Completed course of physical therapy for stiff neck in the summer.     Did see her nephrologist Dr Hickman last month  Scheduled with Dr. Driver in February  And with her cardiologist, Dr Winston in March.  She believes her daughter may have contacted Dr. Winston earlier today regarding the symptoms as she is getting messages from his office.    Patient states she has 1 kidney and was told to drink more fluids.  She apparently overdid this and cardiologist told her she was overdoing it so she is cut back significantly and may not be drinking enough at this time.  Denies any slurred speech or muscle weakness.  No history of stroke.  Other symptoms have included blurry vision not improved with eyedrops and general fatigue.      Vitals:    01/03/24 1320 01/03/24 1457 01/03/24 1500 01/03/24 1501   Orthostatic BP:  178/92 170/100 168/60   Orthostatic Pulse:  59 72 65   Patient Position: Sitting Lying Sitting Standing        Patient Care Team:  Tyron Driver MD as PCP - General   Current Outpatient Medications on File Prior to Visit   Medication Sig    amLODIPine (NORVASC) 5 MG tablet TAKE 1 TABLET BY  "MOUTH TWICE DAILY    aspirin 81 MG EC tablet Take 1 tablet by mouth Daily.    carvedilol (COREG) 12.5 MG tablet TAKE 1 TABLET BY MOUTH TWICE DAILY    cloNIDine (CATAPRES) 0.1 MG tablet Take 1 tablet by mouth Every 4 (Four) Hours As Needed. (Patient not taking: Reported on 1/10/2024)    hydrALAZINE (APRESOLINE) 25 MG tablet TAKE 1 TABLET BY MOUTH TWICE DAILY    rosuvastatin (CRESTOR) 5 MG tablet TAKE 1 TABLET BY MOUTH DAILY    SUPER B COMPLEX/C capsule SUPER B COMPLEX/C ORAL CAPSULE    tamsulosin (FLOMAX) 0.4 MG capsule 24 hr capsule Take 1 capsule by mouth Daily. (Patient not taking: Reported on 1/10/2024)    vitamin D3 125 MCG (5000 UT) capsule capsule Take 1 capsule by mouth Daily.    colestipol (COLESTID) 1 g tablet TAKE 1 TABLET BY MOUTH TWICE DAILY. TAKE OTHER MEDICATIONS AT LEAST 1 HOUR BEFORE OR 4 HOURS AFTER THIS MEDICATION.    Diclofenac Sodium (VOLTAREN) 1 % gel gel Apply 4 g topically to the appropriate area as directed 3 (Three) Times a Day As Needed (neck pain). Dose is 4 grams for knees, ankles, feet.  Dose is 2 grams for elbows, wrists, hands.    Restasis 0.05 % ophthalmic emulsion      No current facility-administered medications on file prior to visit.       Objective   Vital Signs:   /70 (BP Location: Left arm, Patient Position: Sitting, Cuff Size: Adult)   Pulse 70   Temp 98.2 °F (36.8 °C) (Temporal)   Resp 18   Ht 157.5 cm (62\")   Wt 72.1 kg (159 lb)   SpO2 97%   BMI 29.08 kg/m²    BP Readings from Last 3 Encounters:   01/10/24 132/60   01/08/24 170/77   01/03/24 130/70     Wt Readings from Last 3 Encounters:   01/10/24 71.7 kg (158 lb)   01/08/24 72.1 kg (159 lb)   01/03/24 72.1 kg (159 lb)         Physical Exam  Vitals and nursing note reviewed.   Constitutional:       General: She is not in acute distress.     Appearance: Normal appearance. She is well-developed. She is not ill-appearing.   HENT:      Head: Normocephalic and atraumatic.      Right Ear: Tympanic membrane, ear canal " cyanosis.  Musculoskeletal: Range of motion and motor strength grossly normal, no joint erythema or swelling.  Skin: No rashes, jaundice or other lesions.  Neurologic: Cranial nerves 2-12 grossly intact, no gross motor or sensory deficits  Psych: Affect and mood appropriate.      LABS:  Lab Services on 03/21/2023   Component Date Value   • Prothrombin Time 03/21/2023 9.8    • INR 03/21/2023 1.0    Office Visit on 03/17/2023   Component Date Value   • Vitamin B12 03/17/2023 345    • Folate 03/17/2023 9.7    • WBC 03/17/2023 7.0    • RBC 03/17/2023 4.21    • HGB 03/17/2023 7.2 (A)    • HCT 03/17/2023 26.5 (A)    • MCV 03/17/2023 62.9 (A)    • MCH 03/17/2023 17.1 (A)    • MCHC 03/17/2023 27.2 (A)    • RDW-CV 03/17/2023 26.7 (A)    • RDW-SD 03/17/2023 57.1 (A)    • PLT 03/17/2023 343    • NRBC 03/17/2023 0    • Neutrophil, Percent 03/17/2023 72    • Lymphocytes, Percent 03/17/2023 17    • Mono, Percent 03/17/2023 7    • Eosinophils, Percent 03/17/2023 2    • Basophils, Percent 03/17/2023 1    • Immature Granulocytes 03/17/2023 1    • Absolute Neutrophils 03/17/2023 5.1    • Absolute Lymphocytes 03/17/2023 1.2    • Absolute Monocytes 03/17/2023 0.5    • Absolute Eosinophils  03/17/2023 0.1    • Absolute Basophils 03/17/2023 0.0    • Absolute Immature Granul* 03/17/2023 0.0    Admission on 03/15/2023, Discharged on 03/15/2023   Component Date Value   • ABO/RH(D) 03/15/2023 A Rh Positive    • ANTIBODY SCREEN 03/15/2023 Negative    • TYPE AND SCREEN EXPIRATI* 03/15/2023 03/18/2023 23:59    • UNIT BLOOD TYPE 03/15/2023 A Pos    • ISBT BLOOD TYPE 03/15/2023 6200    • BLOOD EXPIRATION DATE 03/15/2023 [id number]    • UNIT NUMBER 03/15/2023 [id number]    • DISPENSE STATUS 03/15/2023 Transfused    • PRODUCT ID 03/15/2023 Red Blood Cells    • PRODUCT CODE 03/15/2023 [id number]    • PRODUCT DESCRIPTION 03/15/2023 RBC AS-1 LR    • UNIT BLOOD TYPE 03/15/2023 A Pos    • ISBT BLOOD TYPE 03/15/2023 6200    • BLOOD EXPIRATION DATE  03/15/2023 [id number]    • UNIT NUMBER 03/15/2023 [id number]    • DISPENSE STATUS 03/15/2023 Transfused    • PRODUCT ID 03/15/2023 Red Blood Cells    • PRODUCT CODE 03/15/2023 [id number]    • PRODUCT DESCRIPTION 03/15/2023 RBC ACD-A LR    • CROSSMATCH RESULT 03/15/2023 Compatible    • CROSSMATCH RESULT 03/15/2023 Compatible    • ISSUE DATE/TIME 03/15/2023 [id number]    • ISSUE DATE/TIME 03/15/2023 [id number]    Lab Services on 03/15/2023   Component Date Value   • FSH 03/15/2023 6.0    • LH 03/15/2023 6.8    • Estradiol 03/15/2023 69    • Prolactin 03/15/2023 11.3    • TSH 03/15/2023 3.433    • T4, Free 03/15/2023 1.1    • Fasting Status 03/15/2023 12    • Glucose 03/15/2023 119 (A)    • Insulin, Fasting 03/15/2023 32 (A)    • Iron 03/15/2023 12 (A)    • Iron Binding Capacity 03/15/2023 456 (A)    • Iron, Percent Saturation 03/15/2023 3 (A)    • Ferritin 03/15/2023 5 (A)    • WBC 03/15/2023 7.9    • RBC 03/15/2023 3.88 (A)    • HGB 03/15/2023 5.6 (A)    • HCT 03/15/2023 22.2 (A)    • MCV 03/15/2023 57.2 (A)    • MCH 03/15/2023 14.4 (A)    • MCHC 03/15/2023 25.2 (A)    • RDW-CV 03/15/2023 22.1 (A)    • RDW-SD 03/15/2023 42.6    • PLT 03/15/2023 361    • NRBC 03/15/2023 0    • Neutrophil, Percent 03/15/2023 73    • Lymphocytes, Percent 03/15/2023 20    • Mono, Percent 03/15/2023 5    • Eosinophils, Percent 03/15/2023 1    • Basophils, Percent 03/15/2023 0    • Immature Granulocytes 03/15/2023 1    • Absolute Neutrophils 03/15/2023 5.8    • Absolute Lymphocytes 03/15/2023 1.6    • Absolute Monocytes 03/15/2023 0.4    • Absolute Eosinophils  03/15/2023 0.1    • Absolute Basophils 03/15/2023 0.0    • Absolute Immature Granul* 03/15/2023 0.1    • Hemoglobin A1C 03/15/2023 5.8 (A)        No results found.      ASSESSMENT AND PLAN:    Evie Thornton in the office today to establish care and for annual physical labs. Has normal physical exam except for obesity.  Balanced and low-calorie diet discussed with the  and external ear normal.      Left Ear: Tympanic membrane, ear canal and external ear normal.      Mouth/Throat:      Mouth: Mucous membranes are moist.      Pharynx: No oropharyngeal exudate or posterior oropharyngeal erythema.   Eyes:      Extraocular Movements: Extraocular movements intact.      Conjunctiva/sclera: Conjunctivae normal.      Pupils: Pupils are equal, round, and reactive to light.   Cardiovascular:      Rate and Rhythm: Normal rate and regular rhythm.      Heart sounds: No murmur heard.  Pulmonary:      Effort: Pulmonary effort is normal.      Breath sounds: Normal breath sounds. No wheezing.   Musculoskeletal:         General: Normal range of motion.   Skin:     General: Skin is warm and dry.      Capillary Refill: Capillary refill takes less than 2 seconds.      Findings: No rash.   Neurological:      Mental Status: She is alert and oriented to person, place, and time.            Office Visit on 01/03/2024   Component Date Value Ref Range Status    Hemoglobin A1C 01/03/2024 6.8 (A)  4.5 - 5.7 % Final    Lot Number 01/03/2024 [id number]   Final    Expiration Date 01/03/2024 10/31/2025   Final    Hemoglobin A1C 01/05/2024 6.3 (H)  4.8 - 5.6 % Final    Comment:          Prediabetes: 5.7 - 6.4           Diabetes: >6.4           Glycemic control for adults with diabetes: <7.0      Glucose 01/05/2024 111 (H)  70 - 99 mg/dL Final    BUN 01/05/2024 21  8 - 27 mg/dL Final    Creatinine 01/05/2024 1.15 (H)  0.57 - 1.00 mg/dL Final    EGFR Result 01/05/2024 50 (L)  >59 mL/min/1.73 Final    BUN/Creatinine Ratio 01/05/2024 18  12 - 28 Final    Sodium 01/05/2024 143  134 - 144 mmol/L Final    Potassium 01/05/2024 4.5  3.5 - 5.2 mmol/L Final    Chloride 01/05/2024 105  96 - 106 mmol/L Final    Total CO2 01/05/2024 22  20 - 29 mmol/L Final    Calcium 01/05/2024 10.0  8.7 - 10.3 mg/dL Final    Total Protein 01/05/2024 7.2  6.0 - 8.5 g/dL Final    Albumin 01/05/2024 4.6  3.8 - 4.8 g/dL Final    Globulin 01/05/2024  2.6  1.5 - 4.5 g/dL Final    A/G Ratio 01/05/2024 1.8  1.2 - 2.2 Final    Total Bilirubin 01/05/2024 0.3  0.0 - 1.2 mg/dL Final    Alkaline Phosphatase 01/05/2024 49  44 - 121 IU/L Final    AST (SGOT) 01/05/2024 18  0 - 40 IU/L Final    ALT (SGPT) 01/05/2024 14  0 - 32 IU/L Final    WBC 01/05/2024 8.3  3.4 - 10.8 x10E3/uL Final    RBC 01/05/2024 4.55  3.77 - 5.28 x10E6/uL Final    Hemoglobin 01/05/2024 13.3  11.1 - 15.9 g/dL Final    Hematocrit 01/05/2024 40.5  34.0 - 46.6 % Final    MCV 01/05/2024 89  79 - 97 fL Final    MCH 01/05/2024 29.2  26.6 - 33.0 pg Final    MCHC 01/05/2024 32.8  31.5 - 35.7 g/dL Final    RDW 01/05/2024 13.5  11.7 - 15.4 % Final    Platelets 01/05/2024 299  150 - 450 x10E3/uL Final    Neutrophil Rel % 01/05/2024 60  Not Estab. % Final    Lymphocyte Rel % 01/05/2024 30  Not Estab. % Final    Monocyte Rel % 01/05/2024 8  Not Estab. % Final    Eosinophil Rel % 01/05/2024 1  Not Estab. % Final    Basophil Rel % 01/05/2024 1  Not Estab. % Final    Neutrophils Absolute 01/05/2024 5.0  1.4 - 7.0 x10E3/uL Final    Lymphocytes Absolute 01/05/2024 2.5  0.7 - 3.1 x10E3/uL Final    Monocytes Absolute 01/05/2024 0.7  0.1 - 0.9 x10E3/uL Final    Eosinophils Absolute 01/05/2024 0.1  0.0 - 0.4 x10E3/uL Final    Basophils Absolute 01/05/2024 0.1  0.0 - 0.2 x10E3/uL Final    Immature Granulocyte Rel % 01/05/2024 0  Not Estab. % Final    Immature Grans Absolute 01/05/2024 0.0  0.0 - 0.1 x10E3/uL Final    TSH 01/05/2024 2.620  0.450 - 4.500 uIU/mL Final     A1C Last 3 Results          5/10/2023    10:58 1/3/2024    15:11 1/5/2024    09:16   HGBA1C Last 3 Results   Hemoglobin A1C 6.3  6.8  6.3      Lab Results   Component Value Date    CHOL 181 07/24/2018    CHLPL 170 05/03/2023    TRIG 131 05/03/2023    HDL 56 05/03/2023    LDL 91 05/03/2023     Lab Results   Component Value Date    TSH 2.620 01/05/2024     Lab Results   Component Value Date    GLUCOSE 111 (H) 01/05/2024    BUN 21 01/05/2024    CREATININE 1.15  patient.      The most recent blood work discussed with the patient as well.    A1 c 5.8%, patient is prediabetic. Advised on carbohydrate restriction, and will repeat A1 c in 3-6 months.    Lipid panel should be performed as well due to obesity.    New line explained to the patient what her restless leg syndrome and ice craving is most likely caused by anemia stop these symptoms should disappear once her anemia is corrected.    Abnormal uterine bleeding.  Iron deficiency anemia due to chronic blood loss:  Followed and managed by gyn and heme Onc.    Healthcare maintenance:  Patient refused all age-appropriate immunizations.       1. Encounter to establish care    2. Annual physical exam    3. Restless leg    4. Iron deficiency anemia due to chronic blood loss    5. Abnormal uterine bleeding (AUB)    6. Class 3 severe obesity without serious comorbidity with body mass index (BMI) of 50.0 to 59.9 in adult, unspecified obesity type (CMD)  - LIPID PANEL WITH REFLEX; Future    7. Prediabetes        Patient agrees with above plan and verbalizes understanding.      Return in about 1 year (around 3/21/2024) for annual physical, fasting labs prior. labs today also.        Portions of this note were dictated and transcribed using a voice-activated software. Appropriate corrections to the document have been made, however, areas of comission, omission, or outright mistakes in wording may still exist.        Dragana Durdevic, MD   (H) 01/05/2024    EGFRIFNONA 48 (L) 08/28/2021    EGFRIFAFRI 55 (L) 08/28/2021    BCR 18 01/05/2024    K 4.5 01/05/2024    CO2 22 01/05/2024    CALCIUM 10.0 01/05/2024    PROTENTOTREF 7.2 01/05/2024    ALBUMIN 4.6 01/05/2024    LABIL2 1.8 01/05/2024    AST 18 01/05/2024    ALT 14 01/05/2024     Lab Results   Component Value Date    WBC 8.3 01/05/2024    HGB 13.3 01/05/2024    HCT 40.5 01/05/2024    MCV 89 01/05/2024     01/05/2024                      Assessment and Plan    Diagnoses and all orders for this visit:    1. Dizziness (Primary)  -     Hemoglobin A1c  -     Comprehensive Metabolic Panel  -     CBC & Differential  -     TSH Rfx On Abnormal To Free T4    2. Balance problem  -     TSH Rfx On Abnormal To Free T4    3. Overweight with body mass index (BMI) of 29 to 29.9 in adult  -     TSH Rfx On Abnormal To Free T4    4. Elevated fasting blood sugar  -     POC Glycosylated Hemoglobin (Hb A1C)  -     Hemoglobin A1c  -     Comprehensive Metabolic Panel  -     TSH Rfx On Abnormal To Free T4    5. Screening for thyroid disorder  -     TSH Rfx On Abnormal To Free T4    6. Elevated hemoglobin A1c measurement  -     Hemoglobin A1c  -     TSH Rfx On Abnormal To Free T4    7. Other fatigue  -     TSH Rfx On Abnormal To Free T4    8. Primary pulmonary hypertension  -     TSH Rfx On Abnormal To Free T4    9. Stage 3a chronic kidney disease  -     TSH Rfx On Abnormal To Free T4      Dizziness/disequilibrium and fatigue, possible dehydration/fluid imbalance since she has started restricting fluids.  Patient does have history of elevated A1c diagnosis of diabetes, has not been checking sugars, A1c in office up to 6-8 from previous 6.3.  Additional labs as ordered above including repeating A1c for confirmation of elevation, to evaluate for other possible causes of disequilibrium.   blood pressure is significantly elevated and somewhat orthostatic stressed need to increase fluids.  Do recommend following up with   [place] her cardiologist.  Deferring changing blood pressure medication to her regular medical team, Dr. Mcgee, Dr. Winston, and Dr. Driver.  There are no discontinued medications.      Follow Up     Return for Recheck with Dr Devries.    Patient was given instructions and counseling regarding her condition or for health maintenance advice. Please see specific information pulled into the AVS if appropriate.

## 2024-01-03 ENCOUNTER — OFFICE VISIT (OUTPATIENT)
Dept: FAMILY MEDICINE CLINIC | Facility: CLINIC | Age: 74
End: 2024-01-03
Payer: MEDICARE

## 2024-01-03 VITALS
SYSTOLIC BLOOD PRESSURE: 130 MMHG | DIASTOLIC BLOOD PRESSURE: 70 MMHG | HEART RATE: 70 BPM | BODY MASS INDEX: 29.26 KG/M2 | TEMPERATURE: 98.2 F | HEIGHT: 62 IN | RESPIRATION RATE: 18 BRPM | OXYGEN SATURATION: 97 % | WEIGHT: 159 LBS

## 2024-01-03 DIAGNOSIS — Z13.29 SCREENING FOR THYROID DISORDER: ICD-10-CM

## 2024-01-03 DIAGNOSIS — E66.3 OVERWEIGHT WITH BODY MASS INDEX (BMI) OF 29 TO 29.9 IN ADULT: ICD-10-CM

## 2024-01-03 DIAGNOSIS — I27.0 PRIMARY PULMONARY HYPERTENSION: ICD-10-CM

## 2024-01-03 DIAGNOSIS — R26.89 BALANCE PROBLEM: ICD-10-CM

## 2024-01-03 DIAGNOSIS — R53.83 OTHER FATIGUE: ICD-10-CM

## 2024-01-03 DIAGNOSIS — R42 DIZZINESS: Primary | ICD-10-CM

## 2024-01-03 DIAGNOSIS — R73.09 ELEVATED HEMOGLOBIN A1C MEASUREMENT: ICD-10-CM

## 2024-01-03 DIAGNOSIS — R73.01 ELEVATED FASTING BLOOD SUGAR: ICD-10-CM

## 2024-01-03 DIAGNOSIS — N18.31 STAGE 3A CHRONIC KIDNEY DISEASE: ICD-10-CM

## 2024-01-03 LAB
EXPIRATION DATE: ABNORMAL
HBA1C MFR BLD: 6.8 % (ref 4.5–5.7)
Lab: ABNORMAL

## 2024-01-04 ENCOUNTER — TELEPHONE (OUTPATIENT)
Dept: CARDIOLOGY | Facility: CLINIC | Age: 74
End: 2024-01-04
Payer: MEDICARE

## 2024-01-04 NOTE — TELEPHONE ENCOUNTER
Patient states that she has been having dizziness and swelling in her feet and legs since Sunday. It has caused her to almost fall at times. She said that it happens first thing in the morning when she gets out of bed. Therefore, she has been going straight to her recliner and elevating her feet for some time. She stays there until she needs to go to the restroom and at that point is when she takes her morning meds. She also gets blurry vision when it happens. Today her BP is 146/72 and heart rate of 67. She has not yet has her meds this morning. On Tuesday night, she slept in her recliner and she said that she did not have the symptoms on Wednesday morning. It only seems to happen when she has been in bed/laying flat. Yesterday she saw her PCP and they did BP checks with her laying, sitting up, and standing. They said that her BP was all over the place with doing this and that they want her to see Dr Winston. I told the patient to call back by 1pm today if she had not yet heard back from us.

## 2024-01-06 LAB
ALBUMIN SERPL-MCNC: 4.6 G/DL (ref 3.8–4.8)
ALBUMIN/GLOB SERPL: 1.8 {RATIO} (ref 1.2–2.2)
ALP SERPL-CCNC: 49 IU/L (ref 44–121)
ALT SERPL-CCNC: 14 IU/L (ref 0–32)
AST SERPL-CCNC: 18 IU/L (ref 0–40)
BASOPHILS # BLD AUTO: 0.1 X10E3/UL (ref 0–0.2)
BASOPHILS NFR BLD AUTO: 1 %
BILIRUB SERPL-MCNC: 0.3 MG/DL (ref 0–1.2)
BUN SERPL-MCNC: 21 MG/DL (ref 8–27)
BUN/CREAT SERPL: 18 (ref 12–28)
CALCIUM SERPL-MCNC: 10 MG/DL (ref 8.7–10.3)
CHLORIDE SERPL-SCNC: 105 MMOL/L (ref 96–106)
CO2 SERPL-SCNC: 22 MMOL/L (ref 20–29)
CREAT SERPL-MCNC: 1.15 MG/DL (ref 0.57–1)
EGFRCR SERPLBLD CKD-EPI 2021: 50 ML/MIN/1.73
EOSINOPHIL # BLD AUTO: 0.1 X10E3/UL (ref 0–0.4)
EOSINOPHIL NFR BLD AUTO: 1 %
ERYTHROCYTE [DISTWIDTH] IN BLOOD BY AUTOMATED COUNT: 13.5 % (ref 11.7–15.4)
GLOBULIN SER CALC-MCNC: 2.6 G/DL (ref 1.5–4.5)
GLUCOSE SERPL-MCNC: 111 MG/DL (ref 70–99)
HBA1C MFR BLD: 6.3 % (ref 4.8–5.6)
HCT VFR BLD AUTO: 40.5 % (ref 34–46.6)
HGB BLD-MCNC: 13.3 G/DL (ref 11.1–15.9)
IMM GRANULOCYTES # BLD AUTO: 0 X10E3/UL (ref 0–0.1)
IMM GRANULOCYTES NFR BLD AUTO: 0 %
LYMPHOCYTES # BLD AUTO: 2.5 X10E3/UL (ref 0.7–3.1)
LYMPHOCYTES NFR BLD AUTO: 30 %
MCH RBC QN AUTO: 29.2 PG (ref 26.6–33)
MCHC RBC AUTO-ENTMCNC: 32.8 G/DL (ref 31.5–35.7)
MCV RBC AUTO: 89 FL (ref 79–97)
MONOCYTES # BLD AUTO: 0.7 X10E3/UL (ref 0.1–0.9)
MONOCYTES NFR BLD AUTO: 8 %
NEUTROPHILS # BLD AUTO: 5 X10E3/UL (ref 1.4–7)
NEUTROPHILS NFR BLD AUTO: 60 %
PLATELET # BLD AUTO: 299 X10E3/UL (ref 150–450)
POTASSIUM SERPL-SCNC: 4.5 MMOL/L (ref 3.5–5.2)
PROT SERPL-MCNC: 7.2 G/DL (ref 6–8.5)
RBC # BLD AUTO: 4.55 X10E6/UL (ref 3.77–5.28)
SODIUM SERPL-SCNC: 143 MMOL/L (ref 134–144)
TSH SERPL DL<=0.005 MIU/L-ACNC: 2.62 UIU/ML (ref 0.45–4.5)
WBC # BLD AUTO: 8.3 X10E3/UL (ref 3.4–10.8)

## 2024-01-08 ENCOUNTER — OFFICE VISIT (OUTPATIENT)
Dept: CARDIOLOGY | Facility: CLINIC | Age: 74
End: 2024-01-08
Payer: MEDICARE

## 2024-01-08 VITALS
HEART RATE: 68 BPM | DIASTOLIC BLOOD PRESSURE: 77 MMHG | RESPIRATION RATE: 14 BRPM | OXYGEN SATURATION: 94 % | WEIGHT: 159 LBS | HEIGHT: 62 IN | SYSTOLIC BLOOD PRESSURE: 170 MMHG | BODY MASS INDEX: 29.26 KG/M2

## 2024-01-08 DIAGNOSIS — R55 SYNCOPE AND COLLAPSE: Primary | ICD-10-CM

## 2024-01-08 PROCEDURE — 99214 OFFICE O/P EST MOD 30 MIN: CPT | Performed by: INTERNAL MEDICINE

## 2024-01-08 PROCEDURE — 3078F DIAST BP <80 MM HG: CPT | Performed by: INTERNAL MEDICINE

## 2024-01-08 PROCEDURE — 3077F SYST BP >= 140 MM HG: CPT | Performed by: INTERNAL MEDICINE

## 2024-01-08 PROCEDURE — 93000 ELECTROCARDIOGRAM COMPLETE: CPT | Performed by: INTERNAL MEDICINE

## 2024-01-08 RX ORDER — MECLIZINE HYDROCHLORIDE 25 MG/1
25 TABLET ORAL 3 TIMES DAILY PRN
Qty: 90 TABLET | Refills: 1 | Status: SHIPPED | OUTPATIENT
Start: 2024-01-08

## 2024-01-08 RX ORDER — EZETIMIBE 10 MG/1
10 TABLET ORAL DAILY
Qty: 90 TABLET | Refills: 3 | Status: SHIPPED | OUTPATIENT
Start: 2024-01-08

## 2024-01-08 NOTE — PROGRESS NOTES
Cardiology Clinic Note  Marco A Winston MD, PhD    Subjective:     Encounter Date:01/08/2024      Patient ID: Dunia Fabian is a 73 y.o. female.    Chief Complaint:  Chief Complaint   Patient presents with    Hypertension    Hyperlipidemia    Follow-up       HPI:        I had the pleasure to see this very pleasant 72-year-old female today as a new patient initially referred for chest pain.  She has a history of CKD stage III with solitary kidney with nephrectomy in the past.  History of some mitral valve insufficiency.  She previously had very uncontrolled blood pressures at 174/66 on initial encounter with heart rate in the 50s on bisoprolol.  She has history of palpitations hypertension hyperlipidemia, murmur systolic in nature, mother and father and brother all have heart disease but unspecified, she is not diabetic and she is a non-smoker.  She has some chest pain substernal without significant radiation but blood pressures been very uncontrolled recently which is her primary reason for referral.  Doppler arterial ultrasound of the left renal which is her remaining kidney demonstrated normal Doppler findings with no evidence of stenosis.    Surgically absent right kidney.  Her losartan was  discontinued with elevated creatinine of 1.6 and August, September value was much better at 1.1.  She does drink a lot of water liberally to hydrate her remaining kidney she says.  Blood pressures are well controlled after previous changes to her regimen, 90-1 35 systolic at home.  She denies any unstable angina.  2D echo revealed normal EF at 65% with normal diastolic function with mild TR and normal pulmonary pressures.  She is reassured by these findings.  Wrist optimizing her regimens on Coreg amlodipine and hydralazine trying to decrease hydralazine to off as able or only as needed with long-acting medications once or twice a day only.     Today on repeat encounter she is overall doing well symptomatically but  hypertensive today stress testing and echo results below are benign with past workup although calcium score was significantly elevated at 454 with 218 in the right, circumflex 87, , left main 13.  She is chest pain-free.  We discussed blood pressure logs twice daily which are needed, along with goal LDL less than 70 with her calcium score high risk of events over the next 5 years.  She continues to be dizzy at times and we discussed tilt table testing, as needed meclizine as well as CTA of the head and neck which she is agreeable for      Stress testing November 2023, EF greater than 70%, low risk study, possible diaphragmatic GI attenuation versus small fixed inferolateral defect, no reversibility identified, low risk for reversible ischemia    2D echo December 2022, EF 60 to 65% with normal diastolic function,  Mild TR, borderline elevated pulmonary pressures by instantaneous gradient likely not significant, normal RV size and function, normal atrial sizes grossly     Review of systems otherwise negative x14 point review of systems except as mentioned above     Historical data copied forward from previous encounters in EMR including the history, exam, and assessment/plan has been reviewed and is unchanged unless noted otherwise.     Cardiac medicines reviewed with risk, benefits, and necessity of each discussed.     Risk and benefit of cardiac testing reviewed including death heart attack stroke pain bleeding infection need for vascular /cardiovascular surgery were discussed and the patient      Objective:         Vitals reviewed below     Physical Exam  Regular rate and rhythm no rubs gallops heave or lift  1 out of 6 systolic ejection murmur left sternal border  No clubbing cyanosis with trace to 1+ edema  Positive HJR and JVD  Clear to auscultation  Obese soft nontender nondistended  Normal pulses normal cap refill  Tach grossly  Normocephalic/atraumatic pupils are ground  Unchanged  Assessment:           "  Atypical chest pain  Essential hypertension  Volume overload previously  CKD stage III with remaining solitary left kidney, right kidney removed remotely  Systolic ejection murmur on exam  Severely elevated coronary calcium score  Normal stress evaluation November 2023     Continue Coreg 12.5 twice daily  Amlodipine 5 twice daily  Continue hydralazine to 25 twice daily, extra dose as needed blood pressure greater than 140 systolic at home, twice daily blood pressure logs  Clonidine will be moved to as needed for greater than 160 systolic  Remain off Cardura presently  Avoid nitrates presently     Coronary calcium scoring severely elevated at 454  Functional stress evaluation revealed no reversible ischemia, EF greater than 70% with no reversibility suggestive of ischemia and was a low risk study, November 2023  Chest pain-free  Add Zetia 10 mg daily, titrate up Crestor as allowed for goal LDL less than 70 optimally less than 55     For any recurrent chest pain/chest discomfort would recommend ischemic evaluation with left heart catheterization  2D echo for structural functional evaluation revealed an EF of 60 to 65% with normal diastolic function and normal pulmonary pressures with mild TR.     Marco A Winston MD, PhD       Objective:         /77 (BP Location: Right arm, Patient Position: Sitting, Cuff Size: Adult)   Pulse 68   Resp 14   Ht 157.5 cm (62.01\")   Wt 72.1 kg (159 lb)   SpO2 94%   BMI 29.07 kg/m²     Physical Exam    Assessment:         There are no diagnoses linked to this encounter.       Plan:              The pleasure to be involved in this patient's cardiovascular care.  Please call with any questions or concerns  Marco A Winston MD, PhD    Most recent EKG as reviewed and interpreted by me:    ECG 12 Lead    Date/Time: 1/8/2024 9:13 AM  Performed by: Marco A Winston MD    Authorized by: Marco A Winston MD  Comparison: not compared with previous ECG   Previous ECG: no " previous ECG available  Rhythm: sinus rhythm  Rate: normal  QRS axis: normal  Other findings: non-specific ST-T wave changes    Clinical impression: non-specific ECG           Most recent echo as reviewed and interpreted by me:  Results for orders placed during the hospital encounter of 12/01/22    Adult Transthoracic Echo Complete W/ Cont if Necessary Per Protocol    Interpretation Summary    Left ventricular systolic function is normal. Left ventricular ejection fraction appears to be 61 - 65%.    Left ventricular diastolic function was normal.    Estimated right ventricular systolic pressure from tricuspid regurgitation is mildly elevated (35-45 mmHg). Calculated right ventricular systolic pressure from tricuspid regurgitation is 38 mmHg.      Most recent stress test as reviewed and interpreted by me:  Results for orders placed during the hospital encounter of 11/24/23    Stress Test With Myocardial Perfusion (1 Day)    Interpretation Summary    Left ventricular ejection fraction is hyperdynamic (Calculated EF > 70%).    Findings consistent with an equivocal ECG stress test.    Low risk study  Fixed small mild inferolateral defect, possible diaphragmatic or GI attenuation  No reversibility identified  Hyperdynamic EF 79%  Stress ECG did not reach target heart rate with no reversible ST-T wave abnormality at peak stress or recovery, no arrhythmias seen  Normal size ventricle    Low risk study      Most recent cardiac catheterization as reviewed interpreted by me:  No results found for this or any previous visit.    The following portions of the patient's history were reviewed and updated as appropriate: allergies, current medications, past family history, past medical history, past social history, past surgical history, and problem list.      ROS:  14 point review of systems negative except as mentioned above    Current Outpatient Medications:     amLODIPine (NORVASC) 5 MG tablet, TAKE 1 TABLET BY MOUTH TWICE  DAILY, Disp: 180 tablet, Rfl: 1    aspirin 81 MG EC tablet, Take 1 tablet by mouth Daily., Disp: , Rfl:     carvedilol (COREG) 12.5 MG tablet, TAKE 1 TABLET BY MOUTH TWICE DAILY, Disp: 180 tablet, Rfl: 1    cloNIDine (CATAPRES) 0.1 MG tablet, Take 1 tablet by mouth Every 4 (Four) Hours As Needed., Disp: , Rfl:     colestipol (COLESTID) 1 g tablet, TAKE 1 TABLET BY MOUTH TWICE DAILY. TAKE OTHER MEDICATIONS AT LEAST 1 HOUR BEFORE OR 4 HOURS AFTER THIS MEDICATION., Disp: 60 tablet, Rfl: 3    Diclofenac Sodium (VOLTAREN) 1 % gel gel, Apply 4 g topically to the appropriate area as directed 3 (Three) Times a Day As Needed (neck pain). Dose is 4 grams for knees, ankles, feet.  Dose is 2 grams for elbows, wrists, hands., Disp: 50 g, Rfl: 5    hydrALAZINE (APRESOLINE) 25 MG tablet, TAKE 1 TABLET BY MOUTH TWICE DAILY, Disp: 180 tablet, Rfl: 0    Restasis 0.05 % ophthalmic emulsion, , Disp: , Rfl:     rosuvastatin (CRESTOR) 5 MG tablet, TAKE 1 TABLET BY MOUTH DAILY, Disp: 90 tablet, Rfl: 1    SUPER B COMPLEX/C capsule, SUPER B COMPLEX/C ORAL CAPSULE, Disp: , Rfl:     tamsulosin (FLOMAX) 0.4 MG capsule 24 hr capsule, Take 1 capsule by mouth Daily., Disp: , Rfl:     vitamin D3 125 MCG (5000 UT) capsule capsule, Take 1 capsule by mouth Daily., Disp: , Rfl:     Problem List:  Patient Active Problem List   Diagnosis    Acid reflux    Allergic rhinitis    Anxiety    Atrophic kidney    Carotid bruit present    Carpal tunnel syndrome    Low back pain    Neck pain    Thoracic back pain    Depressive disorder    Medicare annual wellness visit, subsequent    Gout    Hemorrhoids    Mixed hyperlipidemia    IC (interstitial cystitis)    Irritable bowel syndrome with constipation    LVH (left ventricular hypertrophy)    Menopausal syndrome    Mitral insufficiency, acute    Onychomycosis    Disorder of bone and cartilage    Age-related osteoporosis without current pathological fracture    Palpitations    Osteoarthritis    Primary pulmonary  hypertension    Renal insufficiency    Plantar fasciitis    Senile osteoporosis    Solitary kidney    Encounter for screening mammogram for malignant neoplasm of breast    Special screening for malignant neoplasms, colon    Seborrheic keratosis    Chronic kidney disease, stage 3 (moderate)    Renal hypertrophy    Hypercalcemia    Acute cystitis with hematuria    Lower extremity edema    Overweight with body mass index (BMI) of 28 to 28.9 in adult    Dizziness    Left hip pain    Bilateral primary osteoarthritis of knee    Elevated fasting blood sugar    Solitary kidney, acquired    Benign essential HTN    Memory loss    Campylobacter diarrhea    Screening for thyroid disorder    Elevated hemoglobin A1c measurement     Past Medical History:  Past Medical History:   Diagnosis Date    Acid reflux     Impression: Discussed diet, exercise, lifestyle modification. start ppi Call / return if persistent symptoms.    Acute bronchitis     Impression: Discussed the plan of care and anticipated course of illness with antibiotic treatment. Educated on side effects of antibiotics and hydromet. Pt was educated on the effects of decongestants on bp. She was encouraged to stop nyquil and decongestants. Frequent fluids and rest were encouraged. Pt was told if he symptoms do no improve within one week to return for further evaluation.    Acute cystitis with hematuria     Impression: Findings discussed. All questions answered. Medication and medication adverse effects discussed. Drug education given and explained to patient. Patient verbalized understanding. Follow-up in approximately 3 days for reevaluation if not improved. Follow-up sooner for worsening symptoms or any concerns. Increase fluids    Acute sinusitis     Allergic rhinitis     Impression: Stable.    Anxiety     Arm weakness     Impression: episode weakness / stiffness l hand and foot, resolved currently ddx includes tia / flare arhtritis / carpal tunnel consider recent  working hard moving, rx in progress check carotid dopplers, echo, restart asa Follow up 2 months. Call / return if if recurrant symptoms.    Arthralgia     Impression: Will call with lab results. May need referral to Rheumatology.    Arthritis     Impression: history of treatment for lupus with medication in remote past - recheck bloodwork    Atrophic kidney     Impression: improve status post nephrectomy followed by urology    Back pain     Impression: traumatic ice 20 minutes bid Rehabilitation exercises discussed. xray without fracture Consider further imaging if symptoms persist    Blurry vision     Impression: transient episode, resolved has had benign optho eval per her report, will get records ddx ncludes tia check carotid dopplers, holter continue asa Follow up [id number] Call / return if if recurrant symptoms.    Carotid bruit     Carpal tunnel syndrome     Impression: followed by hand surgery continue qhs bracing consider Follow up for repeat injxn, surgery if persistent symptoms.    Cellulitis     Impression: Topical care discussed. Call / return if persistent symptoms.    Chest pain     Constipation     Impression: improved today - increase fluids, fiber - did not tolerate metamucil, change to fiber tablet / titratete - continue otc lactulose prm - consider amitiza - Follow up recheck    Contusion of knee, right     Impression: Ice. Elevate. Rest. Discussed anticipated course. Given copy of x-rays on disc to take to Dr. Warren tomorrow.    Depressive disorder     Impression: conflict with family currently, expects resolution when moves to [place] later this mos to live with new  Good social support start ativan prn, use sparingly consider ssri if persistent symptoms.    Disorder of bone and cartilage     Impression: stable / slightly worse per dexa (osteopenia, fn -1.5) discussed calcium, vit d start prolia Discussed diet, exercise, lifestyle modification. recheck 1 year    Disorder of skin and  subcutaneous tissue     Dog bite     Impression: her neighbors dog, is healthy Topical care discussed. Signs and symptoms of infecton discussed. Call / return if fever, worsening symptoms.    Dysuria     Impression: likely 2nd uti / IC flare ct with atrophic r kidney with stones in r kidney only, xray lspine without fracture, wbc normal continue antibiothiics + urinary retentinon / catheter in place urology Follow up scheduled monday go to ED if fever, unable to keep fluids down, worsening symptoms.    Encounter for long-term (current) use of medications     Enrolled in chronic care management     External otitis     Impression: Topical care discussed. Call / return if persistent symptoms.    Flank pain     Fracture, foot     Impression: Left. x-rayed at [facility] today - will call for records. Keep appointment with Dr. Warren for tomorrow. She was given prescription for Oxycodone in the ER.    Fracture, toe     Impression: base prox phalax great toe, nondisplaced, improving nita taping / hard soled shoe Call / return if persistent symptoms.    Generalized abdominal pain     Impression: llq, suprapubic, rlq cbc normal likely 2nd flare interstitial cystitis, uti, self cathing currently ddx includes diverticultis start antibiotics, cx sent, Follow up recheck has urology Follow up next week Call / return if fever, unable to keep fluids down, worsening symptoms. Consider imaging if persistent symptoms.    Gout     Impression: uric acid 7 on 4/12 doing well on allopurinol Follow up recheck levels Discussed diet, lifestyle modification.    H/O drug therapy     Hematuria, microscopic     Hemorrhoids     Impression: improved, increase fluids / fiber Topical care discussed. consider colorectal surgery referrall if persistent symptoms.    Hip pain     Impression: oa, worse / flare, refractory to pt ortho ref sched ice 20 minutes bid Rehabilitation exercises discussed. consider add tramadol    Hyperlipidemia     Impression: good  control ------------- Stable Compliant with meds Is getting adequate diet and exercise Goals developed at last visit were met Care management needs are self addressed. Discussed diet, exercise, lifestyle modification.    Hypertension     Impression: good control Discussed low sodium diet, lifestyle modification.    Hypertension, benign     Impression: good control holding lisinopril secondary to renal insufficiency    IC (interstitial cystitis)     Impression: improved on flomax, followed by [facility] h/o interstim placement / subsequent removal h/o improved on rx per morena, pt Discussed diet, lifestyle modification. followed by Zulema / Ernie, s/p recentt removal interstim 2nd inneffective    Inflammatory and toxic neuropathy     Impression: Trial of medications to see if she can get some relief.    Influenza     Impression: Push fluids, bland diet. Signs and symptoms of dehydration discussed. Prophylactic rx written for close contacts. Call / return if unable to keep fluids down, worsening symptoms.    Irritable bowel syndrome with constipation     Impression: Occasional flares. Change with stress and diet.    Knee pain     Impression: Right. X-ray without obvious acute process - awaiting Radiologist's official report.    Low back pain     Impression: improved lumbar MRI with moderate ddd 2017, x-ray left hip with mild arthritis. Doing well, improved with epidural injections currently. s/p course Physical therapy. followed by Dr. Beavers / improved with epidural injxn, consider repeat course.    LVH (left ventricular hypertrophy)     Impression: htn well controlled recheck echo    Malaise and fatigue     Impression: much improved today possibly 2nd recent nephrectomy bloodwork normal consider further eval if persistent symptoms. follow up 2 to 3 months    Medicare annual wellness visit, subsequent     Impression: doing well, vaccines current Age specific anticipatory guidance and warning signs discussed. Diet,  exercise, and lifestyle modification discussed. Safety, seatbelts, and routine screening examinations discussed. Discussed self-examinations.    Menopausal syndrome     Impression: current on mammogram / followed by ob/gyn (Dr. Claros) Recommend follow up visit for comprehensive physical exam, coordination of care, and screening tests.    Mitral insufficiency, acute     Myalgia     Neck pain     Impression: strain ice 20 minutes bid Rehabilitation exercises discussed. Consider imaging if persistent symptoms.    Onychomycosis     Osteoarthritis     Impression: She has an appointment for another opinion.    Osteoporosis     Impression: improved, tolerating prolia discussed calcium, vit d Follow up recheck dxa    Overweight (BMI 25.0-29.9)     Pain in soft tissues of limb     Palpitations     Plantar fasciitis     Impression: qhs bracing / heel cups ice 20 minutes bid nsaids Rehabilitation exercises discussed. Call / return if persistent symptoms.    Primary pulmonary hypertension     Pruritus     Psoriasis     Rectal bleeding     Impression: likely 2nd hemorrhoids, rx in orogress cbc normal, no tachycardia Follow up recheck Call / return if worsening symptoms.    Renal insufficiency     Impression: mild / stable / improved s/p left nephrectomy bell avoid nsaids, improved off lisinopril / hctz / pyridium no blood draws in right arm    Rotator cuff tendonitis, right     Impression: strain ice 20 minutes bid Rehabilitation exercises discussed. Consider imaging, joint injxn if persistent symptoms.    RUQ pain     Impression: much improved on ppi gerd vs gb pathology ruq us neg Follow up recheck Call / return if unable to keep fluids down, fever, worsening symptoms.    Screening for depression     Negative Depression Screening (4 or less) ()    Screening mammogram, encounter for     Seborrheic keratosis     Impression: left forehead, benign appearance Topical care discussed. Follow up recheck apt with dermatology  upcoming continue to monitor ccliniclly / consider resectino if worsening symptoms.    Solar lentiginosis     Impression: r face, benign appearance Topical care discussed. Call / return if lesion increases in size, changes in shape or color, or becomes tender or inflamed. Discussed skin care, use of sun block and protective clothing.    Solitary kidney     Impression: doing well, renal f negative normal avoid nsaids / continue to monitor    Special screening for malignant neoplasms, colon     Impression: Negative colonoscopy by Dr. Goff in 2016 (diverticulosis only)    Special screening for malignant neoplasms, colon 08/06/2019    Telogen effluvium     Thoracic back pain     TMJ arthritis     Impression: Rehabilitation exercises discussed. restart robaxin qhs prn    Tricuspid valve disorder     URI, acute     Impression: Increase fluid intake. Mucinex Call / return if fever, respiratory difficulty, worsening symptoms.    Urinary incontinence     Impression: She has been self catheterizing.    Urinary retention     Impression: catheter in place / urology Follow up scheduled     Past Surgical History:  Past Surgical History:   Procedure Laterality Date    CATARACT EXTRACTION Left 08/2005    with Insert of Lens Prostheti    CYSTOCELE REPAIR  06/1998    SIMENTAL Cystourethropexy & Cystocele Repair     KIDNEY SURGERY Right 02/24/2014    Removal    TONSILLECTOMY  1953    URETHROLYSIS  10/2000    Transvaginal Urethrolysis & Bone [place] Sling    VAGINAL HYSTERECTOMY  1970    VITRECTOMY Left 04/2005    & Membrane Peeling     Social History:  Social History     Socioeconomic History    Marital status: Single   Tobacco Use    Smoking status: Never     Passive exposure: Never    Smokeless tobacco: Never   Vaping Use    Vaping Use: Never used   Substance and Sexual Activity    Alcohol use: Not Currently    Drug use: No    Sexual activity: Defer     Allergies:  Allergies   Allergen Reactions    Contrast Dye (Echo Or Unknown  "Ct/Mr) Anaphylaxis    Gadolinium Hives     \"crawling\" feeling     Iodine Anaphylaxis    Shrimp Anaphylaxis     Immunizations:  Immunization History   Administered Date(s) Administered    COVID-19 (PFIZER) Purple Cap Monovalent 02/03/2021, 02/24/2021, 09/24/2021, 10/20/2021    Covid-19 (Pfizer) Gray Cap Monovalent 02/03/2021, 02/24/2021, 09/24/2021    Flu Vaccine Quad PF >36MO 08/31/2018    Fluzone High Dose =>65 Years ([employer] ONLY) 09/13/2019    Fluzone High-Dose 65+yrs 10/07/2022    Hepatitis A 12/12/2018, 08/06/2019    Influenza, Unspecified 10/07/2022    PEDS-Pneumococcal Conjugate (PCV7) 10/04/2019    Pneumococcal Conjugate 13-Valent (PCV13) 08/31/2018    Pneumococcal Polysaccharide (PPSV23) 10/04/2019    Pneumococcal, Unspecified 09/25/2013    Tdap 11/14/2007, 10/24/2012    Zostavax 12/12/2018    Zoster, Unspecified 10/01/2013, 08/31/2018, 12/13/2018            In-Office Procedure(s):  No orders to display        ASCVD RIsk Score::  The 10-year ASCVD risk score (Nicol DK, et al., 2019) is: 33.8%    Values used to calculate the score:      Age: 73 years      Sex: Female      Is Non- : No      Diabetic: No      Tobacco smoker: No      Systolic Blood Pressure: 170 mmHg      Is BP treated: Yes      HDL Cholesterol: 56 mg/dL      Total Cholesterol: 170 mg/dL    Imaging:    Results for orders placed in visit on 02/17/23    XR Shoulder 2+ View Left    Narrative  XR SHOULDER 2+ VW LEFT    Date of Exam: 2/17/2023 11:47 AM EST    Indication: left trapezius pain. (if needed)..    Comparison: None available.    Findings:  Clavicle intact. Minimal AC joint alignment. The proximal humerus is intact. No fracture or dislocation. Scapula intact. Calcified left upper lobe granuloma. No left apical pneumothorax.    Impression  Impression:  Negative for acute osseous abnormality.    Electronically Signed: Jermaine Alexander  2/17/2023 12:07 PM EST  Workstation ID: [id number]       Results for orders placed during " the hospital encounter of 09/29/23    CT Cardiac Calcium Score Without Dye    Narrative  CT CARDIAC CALCIUM SCORE WO DYE    Date of Exam: 9/29/2023 7:00 AM EDT    Indication: chest pain.    Comparison: Chest x-ray 7/19/2012    Technique: Multiple thin section CT axial images were obtained with prospective ECG-gating without intravenous contrast.    Findings:  Agatston scores for individual coronary arteries are as follows:  Left main (LM): 13.9  Left anterior descending (LAD): 134.6  Left circumflex (CX): 87.5  Right coronary artery (RCA): 218.9  Total: 454.9    Cardiomegaly. There are calcified hilar and mediastinal lymph nodes. Small hiatal hernia. Calcified granulomas in the right lower lobe centrally.    Impression  Impression:  Extensive plaque burden. High risk of cardiac events in the next 5 years.    Calcium Score Interpretation  0 -- Negative examination, no identifiable atherosclerotic plaque.  Very low risk of cardiac events in the next 5 years.  1-10 -- Minimal plaque burden.  Low risk of cardiac events in the next 5 years.  11- 100 -- Mild Plaque burden.  Mild risk of cardiac events in the next 5 years.  101 - 400 -- Moderate plaque burden.  Moderate risk of cardiac events in the next 5 years.  Over 400 -- Extensive plaque burden.  High risk of cardiac events in the next 5 years.      Electronically Signed: Yuli Gregory MD  9/29/2023 7:57 AM EDT  Workstation ID: [id number]      Results for orders placed during the hospital encounter of 09/29/23    CT Cardiac Calcium Score Without Dye    Narrative  CT CARDIAC CALCIUM SCORE WO DYE    Date of Exam: 9/29/2023 7:00 AM EDT    Indication: chest pain.    Comparison: Chest x-ray 7/19/2012    Technique: Multiple thin section CT axial images were obtained with prospective ECG-gating without intravenous contrast.    Findings:  Agatston scores for individual coronary arteries are as follows:  Left main (LM): 13.9  Left anterior descending (LAD): 134.6  Left circumflex  (CX): 87.5  Right coronary artery (RCA): 218.9  Total: 454.9    Cardiomegaly. There are calcified hilar and mediastinal lymph nodes. Small hiatal hernia. Calcified granulomas in the right lower lobe centrally.    Impression  Impression:  Extensive plaque burden. High risk of cardiac events in the next 5 years.    Calcium Score Interpretation  0 -- Negative examination, no identifiable atherosclerotic plaque.  Very low risk of cardiac events in the next 5 years.  1-10 -- Minimal plaque burden.  Low risk of cardiac events in the next 5 years.  11- 100 -- Mild Plaque burden.  Mild risk of cardiac events in the next 5 years.  101 - 400 -- Moderate plaque burden.  Moderate risk of cardiac events in the next 5 years.  Over 400 -- Extensive plaque burden.  High risk of cardiac events in the next 5 years.      Electronically Signed: Yuli Gregory MD  9/29/2023 7:57 AM EDT  Workstation ID: [id number]      Lab Review:   Office Visit on 01/03/2024   Component Date Value    Hemoglobin A1C 01/03/2024 6.8 (A)     Lot Number 01/03/2024 [id number]     Expiration Date 01/03/2024 10/31/2025     Hemoglobin A1C 01/05/2024 6.3 (H)     Glucose 01/05/2024 111 (H)     BUN 01/05/2024 21     Creatinine 01/05/2024 1.15 (H)     EGFR Result 01/05/2024 50 (L)     BUN/Creatinine Ratio 01/05/2024 18     Sodium 01/05/2024 143     Potassium 01/05/2024 4.5     Chloride 01/05/2024 105     Total CO2 01/05/2024 22     Calcium 01/05/2024 10.0     Total Protein 01/05/2024 7.2     Albumin 01/05/2024 4.6     Globulin 01/05/2024 2.6     A/G Ratio 01/05/2024 1.8     Total Bilirubin 01/05/2024 0.3     Alkaline Phosphatase 01/05/2024 49     AST (SGOT) 01/05/2024 18     ALT (SGPT) 01/05/2024 14     WBC 01/05/2024 8.3     RBC 01/05/2024 4.55     Hemoglobin 01/05/2024 13.3     Hematocrit 01/05/2024 40.5     MCV 01/05/2024 89     MCH 01/05/2024 29.2     MCHC 01/05/2024 32.8     RDW 01/05/2024 13.5     Platelets 01/05/2024 299     Neutrophil Rel % 01/05/2024 60      Lymphocyte Rel % 01/05/2024 30     Monocyte Rel % 01/05/2024 8     Eosinophil Rel % 01/05/2024 1     Basophil Rel % 01/05/2024 1     Neutrophils Absolute 01/05/2024 5.0     Lymphocytes Absolute 01/05/2024 2.5     Monocytes Absolute 01/05/2024 0.7     Eosinophils Absolute 01/05/2024 0.1     Basophils Absolute 01/05/2024 0.1     Immature Granulocyte Rel* 01/05/2024 0     Immature Grans Absolute 01/05/2024 0.0     TSH 01/05/2024 2.620    Hospital Outpatient Visit on 11/24/2023   Component Date Value    BH CV STRESS PROTOCOL 1 11/24/2023 Pharmacologic     Stage 1 11/24/2023 1.0     HR Stage 1 11/24/2023 68     BP Stage 1 11/24/2023 152/71     Duration Min Stage 1 11/24/2023 0     Duration Sec Stage 1 11/24/2023 10     Stress Dose Regadenoson * 11/24/2023 0.40     Stress Comments Stage 1 11/24/2023 10 sec bolus injection     Stage 2 11/24/2023 2.0     HR Stage 2 11/24/2023 74     BP Stage 2 11/24/2023 145/44     Duration Min Stage 2 11/24/2023 4     Duration Sec Stage 2 11/24/2023 0     Stress Comments Stage 2 11/24/2023 recovery     Baseline HR 11/24/2023 68     Baseline BP 11/24/2023 152/70     Peak HR 11/24/2023 87     Peak BP 11/24/2023 142/47     Recovery HR 11/24/2023 83     Recovery BP 11/24/2023 136/56     Target HR (85%) 11/24/2023 123     Max. Pred. HR (100%) 11/24/2023 145     Percent Max Pred HR 11/24/2023 60.00     Percent Target HR 11/24/2023 71     BH CV REST NUCLEAR ISOTO* 11/24/2023 11.0     BH CV STRESS NUCLEAR ISO* 11/24/2023 30.5     Nuc Stress EF 11/24/2023 79    Office Visit on 10/30/2023   Component Date Value    SARS Antigen 10/30/2023 Not Detected     Influenza A Antigen SYED 10/30/2023 Not Detected     Influenza B Antigen SYED 10/30/2023 Not Detected     Internal Control 10/30/2023 Passed     Lot Number 10/30/2023 [id number]     Expiration Date 10/30/2023 4/10/24    Orders Only on 08/14/2023   Component Date Value    C difficile Toxin Gene N* 08/14/2023 Negative     Campylobacter 08/14/2023  Detected (A)     C.difficile toxin A/B 08/14/2023 Not Detected     Plesiomonas shigelloides 08/14/2023 Not Detected     Salmonella 08/14/2023 Not Detected     Vibrio 08/14/2023 Not Detected     Vibrio cholerae 08/14/2023 Not Detected     Yersinia enterocolitica 08/14/2023 Not Detected     Enteroaggregative E. coli 08/14/2023 Not Detected     Enteropathogenic E. coli 08/14/2023 Not Detected     Enterotoxigenic E. coli 08/14/2023 Not Detected     Shiga-like toxin-produci* 08/14/2023 Not Detected     E. coli O157 08/14/2023 Not applicable     Shigella enteroinvasive * 08/14/2023 Not Detected     Cryptosporidium 08/14/2023 Not Detected     Cyclospora cayetanensis 08/14/2023 Not Detected     Entamoeba Histolytica Ag 08/14/2023 Not Detected     Giardia lamblia 08/14/2023 Not Detected     Adenovirus 40/41 Ag 08/14/2023 Not Detected     Astrovirus 08/14/2023 Not Detected     Norovirus GI/GII 08/14/2023 Not Detected     Rotavirus A 08/14/2023 Not Detected     Sapovirus 08/14/2023 Not Detected     Ova + Parasite Exam 08/14/2023 Final report     Ova + Parasite Result 1 08/14/2023 Comment     Fecal Leukocytes 08/14/2023 Final report     Result 1 08/14/2023 Comment    Office Visit on 08/08/2023   Component Date Value    WBC 08/08/2023 10.9 (H)     RBC 08/08/2023 4.71     Hemoglobin 08/08/2023 13.6     Hematocrit 08/08/2023 42.0     MCV 08/08/2023 89     MCH 08/08/2023 28.9     MCHC 08/08/2023 32.4     RDW 08/08/2023 12.9     Platelets 08/08/2023 293     Neutrophil Rel % 08/08/2023 63     Lymphocyte Rel % 08/08/2023 25     Monocyte Rel % 08/08/2023 9     Eosinophil Rel % 08/08/2023 1     Basophil Rel % 08/08/2023 1     Neutrophils Absolute 08/08/2023 7.0     Lymphocytes Absolute 08/08/2023 2.7     Monocytes Absolute 08/08/2023 0.9     Eosinophils Absolute 08/08/2023 0.1     Basophils Absolute 08/08/2023 0.1     Immature Granulocyte Rel* 08/08/2023 1     Immature Grans Absolute 08/08/2023 0.1     Glucose 08/08/2023 105 (H)     BUN  08/08/2023 25     Creatinine 08/08/2023 1.06 (H)     EGFR Result 08/08/2023 55 (L)     BUN/Creatinine Ratio 08/08/2023 24     Sodium 08/08/2023 142     Potassium 08/08/2023 5.0     Chloride 08/08/2023 101     Total CO2 08/08/2023 24     Calcium 08/08/2023 10.2     Total Protein 08/08/2023 7.7     Albumin 08/08/2023 5.1 (H)     Globulin 08/08/2023 2.6     A/G Ratio 08/08/2023 2.0     Total Bilirubin 08/08/2023 0.3     Alkaline Phosphatase 08/08/2023 55     AST (SGOT) 08/08/2023 21     ALT (SGPT) 08/08/2023 13     Lipase 08/08/2023 36     H. pylori IgG 08/08/2023 0.35     H. pylori, IgA ABS 08/08/2023 11.7 (H)     H. Pylori, IgM 08/08/2023 <9.0      Recent labs reviewed and interpreted for clinical significance and application            Level of Care:           Marco A Winston MD  01/08/24  .

## 2024-01-10 ENCOUNTER — OFFICE VISIT (OUTPATIENT)
Dept: FAMILY MEDICINE CLINIC | Facility: CLINIC | Age: 74
End: 2024-01-10
Payer: MEDICARE

## 2024-01-10 VITALS
RESPIRATION RATE: 18 BRPM | WEIGHT: 158 LBS | HEART RATE: 65 BPM | BODY MASS INDEX: 29.08 KG/M2 | TEMPERATURE: 98.4 F | DIASTOLIC BLOOD PRESSURE: 60 MMHG | HEIGHT: 62 IN | OXYGEN SATURATION: 96 % | SYSTOLIC BLOOD PRESSURE: 132 MMHG

## 2024-01-10 DIAGNOSIS — N28.9 RENAL INSUFFICIENCY: ICD-10-CM

## 2024-01-10 DIAGNOSIS — J06.9 UPPER RESPIRATORY TRACT INFECTION, UNSPECIFIED TYPE: ICD-10-CM

## 2024-01-10 DIAGNOSIS — R06.2 WHEEZING: ICD-10-CM

## 2024-01-10 DIAGNOSIS — Z90.5 SOLITARY KIDNEY, ACQUIRED: ICD-10-CM

## 2024-01-10 DIAGNOSIS — R42 VERTIGO: Primary | ICD-10-CM

## 2024-01-10 DIAGNOSIS — R05.1 ACUTE COUGH: ICD-10-CM

## 2024-01-10 DIAGNOSIS — J06.9 ACUTE URI: ICD-10-CM

## 2024-01-10 DIAGNOSIS — I10 BENIGN ESSENTIAL HTN: ICD-10-CM

## 2024-01-10 PROCEDURE — 3075F SYST BP GE 130 - 139MM HG: CPT | Performed by: FAMILY MEDICINE

## 2024-01-10 PROCEDURE — 3078F DIAST BP <80 MM HG: CPT | Performed by: FAMILY MEDICINE

## 2024-01-10 PROCEDURE — 99214 OFFICE O/P EST MOD 30 MIN: CPT | Performed by: FAMILY MEDICINE

## 2024-01-10 RX ORDER — CEPHALEXIN 500 MG/1
500 CAPSULE ORAL 3 TIMES DAILY
Qty: 30 CAPSULE | Refills: 0 | Status: SHIPPED | OUTPATIENT
Start: 2024-01-10

## 2024-01-10 RX ORDER — PREDNISONE 5 MG/1
TABLET ORAL
Qty: 45 TABLET | Refills: 0 | Status: SHIPPED | OUTPATIENT
Start: 2024-01-10

## 2024-01-10 NOTE — PROGRESS NOTES
Subjective   Dunia Fabian is a 73 y.o. female.     Chief Complaint   Patient presents with    Dizziness       History of Present Illness  Dunia was seen by Dr Serrano on 1/3/24 for Dizziness, and did some blood work. She states that she also seen Dr Winston her cardiologist yesterday and was given some additional cholesterol meds that she would like to discuss with Dr Driver.    Dizziness  This is a new problem. The current episode started in the past 7 days. The problem has been gradually improving. Associated symptoms include headaches, myalgias, neck pain and vertigo. Pertinent negatives include no abdominal pain, chest pain, chills, diaphoresis, fatigue, fever or nausea. Associated symptoms comments: Ankle swelling, Lightheadedness, loss of balance. The symptoms are aggravated by standing. She has tried rest and position changes (antivert) for the symptoms. The treatment provided mild relief.            I personally reviewed and updated the patient's allergies, medications, problem list, and past medical, surgical, social, and family history. I have reviewed and confirmed the accuracy of the History of Present Illness and Review of Symptoms as documented by the MA/LPN/RN. Jeny Gonzalez MA    Family History   Problem Relation Age of Onset    Parkinsonism Mother     Heart disease Mother         cardiovascular disease    Diabetes Mother         diabetes mellitus     Heart disease Father         cardiovascular disease    Heart disease Sister         cardiovascular disease    Diabetes Sister         diabetes mellitus     Heart disease Brother         cardiovascular disease    Diabetes Son         diabetes mellitus     Heart disease Paternal Uncle         ischemic    Stomach cancer Maternal Grandmother     Breast cancer Niece 39    Ovarian cancer Paternal Grandmother        Social History     Tobacco Use    Smoking status: Never     Passive exposure: Never    Smokeless tobacco: Never   Vaping Use     Vaping Use: Never used   Substance Use Topics    Alcohol use: Not Currently    Drug use: No       Past Surgical History:   Procedure Laterality Date    CATARACT EXTRACTION Left 08/2005    with Insert of Lens Prostheti    CYSTOCELE REPAIR  06/1998    SIMENTAL Cystourethropexy & Cystocele Repair     KIDNEY SURGERY Right 02/24/2014    Removal    TONSILLECTOMY  1953    URETHROLYSIS  10/2000    Transvaginal Urethrolysis & Bone [place] Sling    VAGINAL HYSTERECTOMY  1970    VITRECTOMY Left 04/2005    & Membrane Peeling       Patient Active Problem List   Diagnosis    Acid reflux    Allergic rhinitis    Anxiety    Atrophic kidney    Carotid bruit present    Carpal tunnel syndrome    Low back pain    Neck pain    Thoracic back pain    Depressive disorder    Medicare annual wellness visit, subsequent    Gout    Hemorrhoids    Mixed hyperlipidemia    IC (interstitial cystitis)    Irritable bowel syndrome with constipation    LVH (left ventricular hypertrophy)    Menopausal syndrome    Mitral insufficiency, acute    Onychomycosis    Disorder of bone and cartilage    Age-related osteoporosis without current pathological fracture    Palpitations    Osteoarthritis    Primary pulmonary hypertension    Renal insufficiency    Plantar fasciitis    Senile osteoporosis    Solitary kidney    Encounter for screening mammogram for malignant neoplasm of breast    Special screening for malignant neoplasms, colon    Seborrheic keratosis    Chronic kidney disease, stage 3 (moderate)    Renal hypertrophy    Hypercalcemia    Acute cystitis with hematuria    Lower extremity edema    Overweight with body mass index (BMI) of 28 to 28.9 in adult    Dizziness    Left hip pain    Bilateral primary osteoarthritis of knee    Elevated fasting blood sugar    Solitary kidney, acquired    Benign essential HTN    Memory loss    Campylobacter diarrhea    Screening for thyroid disorder    Elevated hemoglobin A1c measurement         Current Outpatient Medications:      amLODIPine (NORVASC) 5 MG tablet, TAKE 1 TABLET BY MOUTH TWICE DAILY, Disp: 180 tablet, Rfl: 1    aspirin 81 MG EC tablet, Take 1 tablet by mouth Daily., Disp: , Rfl:     carvedilol (COREG) 12.5 MG tablet, TAKE 1 TABLET BY MOUTH TWICE DAILY, Disp: 180 tablet, Rfl: 1    colestipol (COLESTID) 1 g tablet, TAKE 1 TABLET BY MOUTH TWICE DAILY. TAKE OTHER MEDICATIONS AT LEAST 1 HOUR BEFORE OR 4 HOURS AFTER THIS MEDICATION., Disp: 60 tablet, Rfl: 3    Diclofenac Sodium (VOLTAREN) 1 % gel gel, Apply 4 g topically to the appropriate area as directed 3 (Three) Times a Day As Needed (neck pain). Dose is 4 grams for knees, ankles, feet.  Dose is 2 grams for elbows, wrists, hands., Disp: 50 g, Rfl: 5    hydrALAZINE (APRESOLINE) 25 MG tablet, TAKE 1 TABLET BY MOUTH TWICE DAILY, Disp: 180 tablet, Rfl: 0    meclizine (ANTIVERT) 25 MG tablet, Take 1 tablet by mouth 3 (Three) Times a Day As Needed for Dizziness., Disp: 90 tablet, Rfl: 1    Restasis 0.05 % ophthalmic emulsion, , Disp: , Rfl:     rosuvastatin (CRESTOR) 5 MG tablet, TAKE 1 TABLET BY MOUTH DAILY, Disp: 90 tablet, Rfl: 1    SUPER B COMPLEX/C capsule, SUPER B COMPLEX/C ORAL CAPSULE, Disp: , Rfl:     vitamin D3 125 MCG (5000 UT) capsule capsule, Take 1 capsule by mouth Daily., Disp: , Rfl:     cloNIDine (CATAPRES) 0.1 MG tablet, Take 1 tablet by mouth Every 4 (Four) Hours As Needed. (Patient not taking: Reported on 1/10/2024), Disp: , Rfl:     ezetimibe (ZETIA) 10 MG tablet, Take 1 tablet by mouth Daily. (Patient not taking: Reported on 1/10/2024), Disp: 90 tablet, Rfl: 3    tamsulosin (FLOMAX) 0.4 MG capsule 24 hr capsule, Take 1 capsule by mouth Daily. (Patient not taking: Reported on 1/10/2024), Disp: , Rfl:          Review of Systems   Constitutional:  Negative for chills, diaphoresis, fatigue and fever.   Eyes:  Negative for visual disturbance.   Respiratory:  Negative for shortness of breath.    Cardiovascular:  Negative for chest pain and palpitations.  "  Gastrointestinal:  Negative for abdominal pain and nausea.   Endocrine: Negative for polydipsia and polyphagia.   Musculoskeletal:  Positive for myalgias and neck pain. Negative for neck stiffness.   Skin:  Negative for color change and pallor.   Neurological:  Positive for dizziness and vertigo. Negative for seizures and syncope.   Hematological:  Negative for adenopathy.   Psychiatric/Behavioral:  Negative for hallucinations and suicidal ideas.        I have reviewed and confirmed the accuracy of the ROS as documented by the MA/LPN/RN Jeny Gonzalez MA      Objective   Pulse 65   Temp 98.4 °F (36.9 °C)   Resp 18   Ht 157.5 cm (62\")   Wt 71.7 kg (158 lb)   SpO2 96%   BMI 28.90 kg/m²   BP Readings from Last 3 Encounters:   01/08/24 170/77   01/03/24 130/70   12/06/23 126/64     Wt Readings from Last 3 Encounters:   01/10/24 71.7 kg (158 lb)   01/08/24 72.1 kg (159 lb)   01/03/24 72.1 kg (159 lb)     Physical Exam  Constitutional:       Appearance: Normal appearance. She is well-developed. She is not diaphoretic.   HENT:      Head: Normocephalic.      Right Ear: Hearing, tympanic membrane, ear canal and external ear normal. A middle ear effusion is present. Tympanic membrane is erythematous.      Left Ear: Hearing, tympanic membrane, ear canal and external ear normal. A middle ear effusion is present. Tympanic membrane is erythematous.      Nose: Nose normal. Congestion present.      Right Sinus: Maxillary sinus tenderness and frontal sinus tenderness present.      Left Sinus: Maxillary sinus tenderness and frontal sinus tenderness present.      Mouth/Throat:      Pharynx: Posterior oropharyngeal erythema present.      Tonsils: No tonsillar abscesses. 1+ on the right. 1+ on the left.   Eyes:      General: Lids are normal.      Conjunctiva/sclera: Conjunctivae normal.      Pupils: Pupils are equal, round, and reactive to light.   Neck:      Thyroid: No thyromegaly.      Vascular: No carotid bruit or JVD. "      Trachea: No tracheal deviation.      Meningeal: Brudzinski's sign and Kernig's sign absent.   Cardiovascular:      Rate and Rhythm: Normal rate and regular rhythm.      Pulses: Normal pulses.      Heart sounds: Normal heart sounds, S1 normal and S2 normal. No murmur heard.     No friction rub. No gallop.   Pulmonary:      Effort: Pulmonary effort is normal. No accessory muscle usage or respiratory distress.      Breath sounds: Normal breath sounds. No stridor. Examination of the right-upper field reveals wheezing and rhonchi. Examination of the left-upper field reveals wheezing and rhonchi. Examination of the right-middle field reveals wheezing and rhonchi. Examination of the left-middle field reveals wheezing and rhonchi. Examination of the right-lower field reveals wheezing and rhonchi. Examination of the left-lower field reveals wheezing and rhonchi. No decreased breath sounds, wheezing or rales.   Abdominal:      General: Bowel sounds are normal. There is no distension.      Palpations: Abdomen is soft. There is no mass.      Tenderness: There is no abdominal tenderness. There is no guarding or rebound.      Hernia: No hernia is present.   Lymphadenopathy:      Head:      Right side of head: No submental, submandibular, tonsillar, preauricular, posterior auricular or occipital adenopathy.      Left side of head: No submental, submandibular, tonsillar, preauricular, posterior auricular or occipital adenopathy.      Cervical: No cervical adenopathy.   Skin:     General: Skin is warm and dry.      Coloration: Skin is not pale.   Neurological:      Mental Status: She is alert and oriented to person, place, and time.      Cranial Nerves: No cranial nerve deficit.      Sensory: No sensory deficit.      Motor: No tremor, abnormal muscle tone or seizure activity.      Coordination: Coordination normal.      Gait: Gait normal.      Deep Tendon Reflexes: Reflexes are normal and symmetric.         Data / Lab  "Results:    Hemoglobin A1C   Date Value Ref Range Status   01/05/2024 6.3 (H) 4.8 - 5.6 % Final     Comment:              Prediabetes: 5.7 - 6.4           Diabetes: >6.4           Glycemic control for adults with diabetes: <7.0     01/03/2024 6.8 (A) 4.5 - 5.7 % Final   05/10/2023 6.3 (H) 4.8 - 5.6 % Final     Comment:              Prediabetes: 5.7 - 6.4           Diabetes: >6.4           Glycemic control for adults with diabetes: <7.0     09/10/2021 6.1 % Final        Lab Results   Component Value Date    LDL 91 05/03/2023     (H) 03/11/2022     (H) 08/28/2021     Lab Results   Component Value Date    CHOL 181 07/24/2018    CHOL 215 (H) 07/21/2017    CHOL 198 07/20/2016     Lab Results   Component Value Date    TRIG 131 05/03/2023    TRIG 128 03/11/2022    TRIG 113 08/28/2021     Lab Results   Component Value Date    HDL 56 05/03/2023    HDL 52 03/11/2022    HDL 54 08/28/2021     No results found for: \"PSA\"  Lab Results   Component Value Date    WBC 8.3 01/05/2024    HGB 13.3 01/05/2024    HCT 40.5 01/05/2024    MCV 89 01/05/2024     01/05/2024     Lab Results   Component Value Date    TSH 2.620 01/05/2024      Lab Results   Component Value Date    GLUCOSE 111 (H) 01/05/2024    BUN 21 01/05/2024    CREATININE 1.15 (H) 01/05/2024    EGFRIFNONA 48 (L) 08/28/2021    EGFRIFAFRI 55 (L) 08/28/2021    BCR 18 01/05/2024    K 4.5 01/05/2024    CO2 22 01/05/2024    CALCIUM 10.0 01/05/2024    PROTENTOTREF 7.2 01/05/2024    ALBUMIN 4.6 01/05/2024    LABIL2 1.8 01/05/2024    AST 18 01/05/2024    ALT 14 01/05/2024     No results found for: \"ESTHER\", \"RF\", \"SEDRATE\"   No results found for: \"CRP\"   No results found for: \"IRON\", \"TIBC\", \"FERRITIN\"   Lab Results   Component Value Date    QOJVONFR10 805 05/10/2023          Assessment & Plan      Medications        Problem List         LOS      Vertigo.  Likely 2nd acute bacterial sinusitis with eustachain tube dysfxn, start antibotics, prednisone.  Increase fluid " intake.  Home blood pressure results reviewd / good control. Has had caridology eval, tilt table, brain imagery planned.  Follow up recheck. Call or return if worsening or persistent symptoms.  Risk/benefits/side effects of prednisone Rx discussed, she has tolerated Rx in past without difficulty, okay to decrease or discontinue dose if side effects develop.  Tolerating  Health maintenance.  Doing well, vaccines current.  Discussed health maintenance, screening test, lifestyle modification.  Pap current, followed by Dr. Patiño, mammogram benign 11/22, further screening declined.  Allergic rhinitis.  OTC Claritin or Zyrtec.  Hypertension.  Overall improved today on carvedilol, amlodipine, tolerating decreased dose hydralazine ,Off metoprolol per cardiology followed by Dr. Winston, .  Hold losartan/has had nephrology eval per Dr. Mcgee,  renal ultrasound/renal artery Doppler benign, has follow-up upcoming..  Clinically improved today on  hydralazine 100 mg twice daily, as needed clonidine.  Monitor blood pressure closely.  Follow-up recheck.  Consider restart amlodipine if persistent elevation.   . Discussed low-sodium diet.  Stress echo benign/elevated right-sided pressure only 2018.  Carotid Dopplers with mild blockage only 2018.  MRI brain benign 2020.  Has had cardiology eval/plan repeat stress testing when blood pressure better controlled per cardilogy recs/cardiology referral scheduled..  Life stress/decreased energy.  Multiple stressors.  Good social support.   Initially improved on Cymbalta/Wellbutrin, she discontinued Rx.  Start sertraline.  Lower extremity edema.  Improved currently with decreased dose amlodipine.  Discussed low-sodium diet.  Follow-up recheck.  Consider sleep study if persistent symptoms.  Osteoporosis.  Tolerating Prolia.  Holding calcium/vitamin D per nephrology.   recheck DEXA  Atrophic kidney.  Improved status post nephrectomy, followed by urology,nephrology.  Renal function gft 46..  Has  had recent follow-up visit Dr. Mcgee, repeat blood work, we will get records.  We will get  Interstitial cystitis.  Improved on Flomax, followed by [facility], history of InterStim placement/subsequent removal.  I and O catheter dependent.  Hypertensive retinopathy.  Followed by ophthalmology ember galeano, status post treatment  Colon screening.  Colonoscopy benign 2016./Diverticulosis only  Carotid stenosis.  50 to 59% on the right, mild on left per Doppler 1/21, stable per repeat ultrasound 5/23.  She is allergic to contrast and gadolinium.  Repeat Doppler 1 year.  Consider vascular surgery referral if worsening symptoms.  Continue risk factor reduction.  Osteoarthritis.  Worse left hip/knee.    Followed by orthopedics Dr. Angel, x-rays with mild left hip OA, moderate left OA knee, attempting knee bracing.  Consider knee injection.  Hyperlipidemia.    Improved today, LDL to 90 rosuvastatin, tolerating Rx.   Aggressive LDL target considering carotid stenosis. .  Discussed diet exercise lifestyle modification.  Follow-up recheck.    Memory loss.  Worse today, MMSE today, check B12, start Zoloft.  Good social support.  MRI brain benign 2020.  Follow-up recheck/plan check MMSE.  Neck pain.  Persistent symptoms today, significant bilateral trapezius spasm on exam today with left upper extremity radiculopathy, x-rays benign, positive and significant trauma DDx includes ruptured disc versus cervical disc disease, check MRI, add prednisone, continue muscle relaxant.  Consider PT referral, referral for epidural injections.  Follow-up recheck.  Call return if worsening symptoms.  Elevated fasting blood sugar.  FBS to 132, check A1c.  Epigastric pain / diarrhea.  Improved /Resolved today, stool cultures with Campylobacter, completing course Augmentin, add probiotics.  Overall benign exAm today, no s/s diverticultis.  DDX includes gb pathology, infeciton, gastritis. bloodwork, ruq us Benign.  Has completed course antibitotics,  improvrd on ppi, cholestid / bentyl.  Increase fluid intake bland diet. Signs and symptoms of dehydration discussed.. Follow up recheck. Call or return if worsening or persistent symptoms  Acute bacterial bronchitis.  Clinically improved/resolved course antibiotics/steroids.  Dysphagia.  Likely secondary to GERD, restart PPI.  Recommend EGD, gastroenterology referral scheduled.  Discussed drink plenty of fluids, she feels throughout the, caution with movement, go to ED if worsening symptoms.      There are no diagnoses linked to this encounter.            Expected course, medications, and adverse effects discussed.  Call or return if worsening or persistent symptoms.  I wore protective equipment throughout this patient encounter including a mask, gloves, and eye protection.  Hand hygiene was performed before donning protective equipment and after removal when leaving the room. The complete contents of the Assessment and Plan and Data/Lab Results as documented above have been reviewed and addressed by myself with the patient today as part of an ongoing evaluation / treatment plan.  If some of the documentation has been copied from a previous note and is unchanged it indicates that this problem / plan has been assessed today but is stable from a previous visit and no changes have been recommended.

## 2024-01-11 ENCOUNTER — TELEPHONE (OUTPATIENT)
Dept: CARDIOLOGY | Facility: CLINIC | Age: 74
End: 2024-01-11
Payer: MEDICARE

## 2024-01-11 NOTE — TELEPHONE ENCOUNTER
Called and scheduled patient for the tilt table Dr Winston ordered patient is scheduled on 01/18/2024 at 2:00. Patient stated that she's on anabiotic and steroid for fluid in her ear, and she wants to make sure that's okay.

## 2024-01-12 NOTE — PROGRESS NOTES
I have sent the following message to patient through [employer], just an FYI:      Elijah Duque for the delay in getting back to you with your lab results.  Appears you have already seen Dr. Driver and Dr. Winston and likely already been informed of results.  A1c is elevated but not quite as high as in office.  Just under cuff to diagnose diabetes.  You need to follow a low concentrated sweets diet to reduce chance of developing diabetes.  Would recommend A1c be monitored every 3 to 6 months.  Creatinine was up slightly and GFR was down slightly from previous check 5 months prior.  This could mean you had an decrease in fluid/mild dehydration and unless told otherwise by cardiology or nephrology should liberalize (increase) fluids somewhat.  Thyroid testing and blood count was normal (not anemic).  Hope the antibiotics for the sinus infection helps with the dizziness and you continue to feel better.  Sussy Serrano MD

## 2024-01-14 ENCOUNTER — PATIENT MESSAGE (OUTPATIENT)
Dept: FAMILY MEDICINE CLINIC | Facility: CLINIC | Age: 74
End: 2024-01-14
Payer: MEDICARE

## 2024-01-15 ENCOUNTER — TELEPHONE (OUTPATIENT)
Dept: FAMILY MEDICINE CLINIC | Facility: CLINIC | Age: 74
End: 2024-01-15
Payer: MEDICARE

## 2024-01-15 RX ORDER — HYDRALAZINE HYDROCHLORIDE 25 MG/1
TABLET, FILM COATED ORAL
Qty: 180 TABLET | Refills: 0 | Status: SHIPPED | OUTPATIENT
Start: 2024-01-15

## 2024-01-15 NOTE — TELEPHONE ENCOUNTER
----- Message from Dunia Fabian sent at 1/14/2024  1:44 PM EST -----  Regarding: Vertigo test  Contact: [phone]  I have an appointment on Thursday for the “spinning test” for my dizziness   Since taking the vertigo meds and the antibiotics and steroids I feel better but not 100%  Should I keep that appointment for the test?  Thank you

## 2024-01-15 NOTE — TELEPHONE ENCOUNTER
If she is improving with the steroids it is from my standpoint is okay to hold off on the tilt table test for now, could reschedule this if her symptoms persist

## 2024-01-24 DIAGNOSIS — J06.9 UPPER RESPIRATORY TRACT INFECTION, UNSPECIFIED TYPE: ICD-10-CM

## 2024-01-24 RX ORDER — CEPHALEXIN 500 MG/1
500 CAPSULE ORAL 3 TIMES DAILY
Qty: 30 CAPSULE | Refills: 0 | Status: SHIPPED | OUTPATIENT
Start: 2024-01-24

## 2024-01-26 DIAGNOSIS — R06.2 WHEEZING: ICD-10-CM

## 2024-01-26 DIAGNOSIS — R05.1 ACUTE COUGH: ICD-10-CM

## 2024-01-26 DIAGNOSIS — J06.9 ACUTE URI: ICD-10-CM

## 2024-01-26 RX ORDER — PREDNISONE 5 MG/1
TABLET ORAL
Qty: 45 TABLET | Refills: 0 | Status: SHIPPED | OUTPATIENT
Start: 2024-01-26

## 2024-02-27 ENCOUNTER — OFFICE (AMBULATORY)
Dept: URBAN - METROPOLITAN AREA CLINIC 64 | Facility: CLINIC | Age: 74
End: 2024-02-27

## 2024-02-27 VITALS
WEIGHT: 160 LBS | SYSTOLIC BLOOD PRESSURE: 127 MMHG | HEIGHT: 61 IN | HEART RATE: 69 BPM | DIASTOLIC BLOOD PRESSURE: 68 MMHG

## 2024-02-27 DIAGNOSIS — K64.9 UNSPECIFIED HEMORRHOIDS: ICD-10-CM

## 2024-02-27 DIAGNOSIS — K59.00 CONSTIPATION, UNSPECIFIED: ICD-10-CM

## 2024-02-27 DIAGNOSIS — R13.10 DYSPHAGIA, UNSPECIFIED: ICD-10-CM

## 2024-02-27 DIAGNOSIS — K21.9 GASTRO-ESOPHAGEAL REFLUX DISEASE WITHOUT ESOPHAGITIS: ICD-10-CM

## 2024-02-27 PROCEDURE — 99203 OFFICE O/P NEW LOW 30 MIN: CPT | Performed by: NURSE PRACTITIONER

## 2024-02-27 NOTE — PROGRESS NOTES
Subjective   Dunia Fabian is a 73 y.o. female.     Chief Complaint   Patient presents with    Abdominal Pain    Foot Swelling    Obesity       History of Present Illness  Foot Pain: Patient complains of bilateral pain which is described as aching, burning, numbing, sharp, shooting, throbbing, tingling, and itching.  Patient has numbness/tingling. Patient reports that the pain has been present for several weeks.  Symptoms are relieved by elevation. The swelling and pain increases throughout the day.   Abdominal Pain  This is a recurrent problem. The current episode started more than 1 month ago. The onset quality is gradual. The problem occurs intermittently. Duration: 5 minutes. The problem has been unchanged. The pain is located in the RUQ. The pain is at a severity of 7/10. The pain is moderate. The quality of the pain is sharp. The abdominal pain does not radiate. Associated symptoms include dysuria. Pertinent negatives include no belching, constipation, diarrhea, fever, flatus, headaches, nausea or vomiting. Nothing aggravates the pain. Relieved by: rubbing it out. She has tried antibiotics (bentyl, steroid) for the symptoms.   Obesity  This is a recurrent problem. The current episode started more than 1 month ago. Associated symptoms include abdominal pain. Pertinent negatives include no chest pain, chills, diaphoresis, fever, headaches, nausea or vomiting.            I personally reviewed and updated the patient's allergies, medications, problem list, and past medical, surgical, social, and family history. I have reviewed and confirmed the accuracy of the History of Present Illness and Review of Symptoms as documented by the MA/TARA/RN. Tyron Driver MD    Family History   Problem Relation Age of Onset    Parkinsonism Mother     Heart disease Mother         cardiovascular disease    Diabetes Mother         diabetes mellitus     Heart disease Father         cardiovascular disease    Heart disease Sister          cardiovascular disease    Diabetes Sister         diabetes mellitus     Heart disease Brother         cardiovascular disease    Diabetes Son         diabetes mellitus     Heart disease Paternal Uncle         ischemic    Stomach cancer Maternal Grandmother     Breast cancer Niece 39    Ovarian cancer Paternal Grandmother        Social History     Tobacco Use    Smoking status: Never     Passive exposure: Never    Smokeless tobacco: Never   Vaping Use    Vaping status: Never Used   Substance Use Topics    Alcohol use: Not Currently    Drug use: No       Past Surgical History:   Procedure Laterality Date    CATARACT EXTRACTION Left 08/2005    with Insert of Lens Prostheti    CYSTOCELE REPAIR  06/1998    SIMENTAL Cystourethropexy & Cystocele Repair     KIDNEY SURGERY Right 02/24/2014    Removal    TONSILLECTOMY  1953    URETHROLYSIS  10/2000    Transvaginal Urethrolysis & Bone Lynden Sling    VAGINAL HYSTERECTOMY  1970    VITRECTOMY Left 04/2005    & Membrane Peeling       Patient Active Problem List   Diagnosis    Acid reflux    Allergic rhinitis    Anxiety    Atrophic kidney    Carotid bruit present    Carpal tunnel syndrome    Low back pain    Neck pain    Thoracic back pain    Depressive disorder    Medicare annual wellness visit, subsequent    Gout    Hemorrhoids    Mixed hyperlipidemia    IC (interstitial cystitis)    Irritable bowel syndrome with constipation    LVH (left ventricular hypertrophy)    Menopausal syndrome    Mitral insufficiency, acute    Onychomycosis    Disorder of bone and cartilage    Age-related osteoporosis without current pathological fracture    Palpitations    Osteoarthritis    Primary pulmonary hypertension    Renal insufficiency    Plantar fasciitis    Senile osteoporosis    Solitary kidney    Encounter for screening mammogram for malignant neoplasm of breast    Special screening for malignant neoplasms, colon    Seborrheic keratosis    Chronic kidney disease, stage 3 (moderate)     Renal hypertrophy    Hypercalcemia    Acute cystitis with hematuria    Lower extremity edema    Overweight with body mass index (BMI) of 28 to 28.9 in adult    Dizziness    Left hip pain    Bilateral primary osteoarthritis of knee    Elevated fasting blood sugar    Solitary kidney, acquired    Benign essential HTN    Memory loss    Campylobacter diarrhea    Screening for thyroid disorder    Elevated hemoglobin A1c measurement    Vertigo         Current Outpatient Medications:     aspirin 81 MG EC tablet, Take 1 tablet by mouth Daily., Disp: , Rfl:     carvedilol (COREG) 12.5 MG tablet, TAKE 1 TABLET BY MOUTH TWICE DAILY, Disp: 180 tablet, Rfl: 1    cloNIDine (CATAPRES) 0.1 MG tablet, Take 1 tablet by mouth Every 4 (Four) Hours As Needed., Disp: , Rfl:     Diclofenac Sodium (VOLTAREN) 1 % gel gel, Apply 4 g topically to the appropriate area as directed 3 (Three) Times a Day As Needed (neck pain). Dose is 4 grams for knees, ankles, feet.  Dose is 2 grams for elbows, wrists, hands. (Patient not taking: Reported on 3/5/2024), Disp: 50 g, Rfl: 5    ezetimibe (ZETIA) 10 MG tablet, Take 1 tablet by mouth Daily., Disp: 90 tablet, Rfl: 3    hydrALAZINE (APRESOLINE) 25 MG tablet, TAKE 1 TABLET BY MOUTH TWICE DAILY, Disp: 180 tablet, Rfl: 0    meclizine (ANTIVERT) 25 MG tablet, Take 1 tablet by mouth 3 (Three) Times a Day As Needed for Dizziness., Disp: 90 tablet, Rfl: 1    Restasis 0.05 % ophthalmic emulsion, , Disp: , Rfl:     SUPER B COMPLEX/C capsule, SUPER B COMPLEX/C ORAL CAPSULE, Disp: , Rfl:     tamsulosin (FLOMAX) 0.4 MG capsule 24 hr capsule, Take 1 capsule by mouth Daily., Disp: , Rfl:     vitamin D3 125 MCG (5000 UT) capsule capsule, Take 1 capsule by mouth Daily., Disp: , Rfl:     amLODIPine (NORVASC) 5 MG tablet, Take 1 tablet by mouth Daily., Disp: 90 tablet, Rfl: 1    colestipol (COLESTID) 1 g tablet, TAKE 1 TABLET BY MOUTH TWICE DAILY. TAKE OTHER MEDICATIONS AT LEAST 2 HOUR BEFORE OR 4 HOURS AFTER THIS  "MEDICATIONS, Disp: 60 tablet, Rfl: 3    dicyclomine (BENTYL) 20 MG tablet, TAKE 1 TABLET BY MOUTH THREE TIMES DAILY WITH FOOD AS NEEDED FOR ABDOMINAL CRAMPS, Disp: 90 tablet, Rfl: 1    hydroCHLOROthiazide (MICROZIDE) 12.5 MG capsule, Take 1 capsule by mouth Daily., Disp: 90 capsule, Rfl: 3    rosuvastatin (CRESTOR) 5 MG tablet, TAKE 1 TABLET BY MOUTH DAILY, Disp: 90 tablet, Rfl: 1         Review of Systems   Constitutional:  Negative for chills, diaphoresis and fever.   Eyes:  Negative for visual disturbance.   Respiratory:  Negative for shortness of breath.    Cardiovascular:  Negative for chest pain and palpitations.   Gastrointestinal:  Positive for abdominal pain. Negative for constipation, diarrhea, flatus, nausea and vomiting.   Endocrine: Negative for polydipsia and polyphagia.   Genitourinary:  Positive for dysuria.   Musculoskeletal:  Negative for neck stiffness.   Skin:  Negative for color change and pallor.   Neurological:  Negative for seizures and syncope.   Hematological:  Negative for adenopathy.   Psychiatric/Behavioral:  Negative for hallucinations and suicidal ideas.        I have reviewed and confirmed the accuracy of the ROS as documented by the MA/LPN/RN Tyron Driver MD      Objective   /76 (BP Location: Right arm, Patient Position: Sitting, Cuff Size: Adult)   Pulse 74   Temp 97.5 °F (36.4 °C) (Temporal)   Resp 16   Ht 157.5 cm (62.01\")   Wt 74.4 kg (164 lb)   SpO2 97% Comment: room air  BMI 29.99 kg/m²   BP Readings from Last 3 Encounters:   03/05/24 117/75   02/28/24 130/76   01/10/24 132/60     Wt Readings from Last 3 Encounters:   03/05/24 74.4 kg (164 lb)   02/28/24 74.4 kg (164 lb)   01/10/24 71.7 kg (158 lb)     Physical Exam  Constitutional:       Appearance: She is well-developed. She is not diaphoretic.   HENT:      Head: Normocephalic.      Right Ear: Tympanic membrane, ear canal and external ear normal.      Left Ear: Tympanic membrane, ear canal and external ear " normal.      Nose: Nose normal.   Eyes:      General: Lids are normal.      Conjunctiva/sclera: Conjunctivae normal.      Pupils: Pupils are equal, round, and reactive to light.   Neck:      Thyroid: No thyromegaly.      Vascular: No carotid bruit or JVD.      Trachea: No tracheal deviation.   Cardiovascular:      Rate and Rhythm: Normal rate and regular rhythm.      Heart sounds: Normal heart sounds. No murmur heard.     No friction rub. No gallop.   Pulmonary:      Effort: Pulmonary effort is normal.      Breath sounds: Normal breath sounds. No stridor. No decreased breath sounds, wheezing or rales.   Abdominal:      General: Bowel sounds are normal. There is no distension.      Palpations: Abdomen is soft. There is no mass.      Tenderness: There is no abdominal tenderness. There is no guarding or rebound.      Hernia: No hernia is present.   Lymphadenopathy:      Head:      Right side of head: No submental, submandibular, tonsillar, preauricular, posterior auricular or occipital adenopathy.      Left side of head: No submental, submandibular, tonsillar, preauricular, posterior auricular or occipital adenopathy.      Cervical: No cervical adenopathy.   Skin:     General: Skin is warm and dry.      Coloration: Skin is not pale.   Neurological:      Mental Status: She is alert and oriented to person, place, and time.      Cranial Nerves: No cranial nerve deficit.      Sensory: No sensory deficit.      Coordination: Coordination normal.      Gait: Gait normal.      Deep Tendon Reflexes: Reflexes are normal and symmetric.         Data / Lab Results:    Hemoglobin A1C   Date Value Ref Range Status   01/05/2024 6.3 (H) 4.8 - 5.6 % Final     Comment:              Prediabetes: 5.7 - 6.4           Diabetes: >6.4           Glycemic control for adults with diabetes: <7.0     01/03/2024 6.8 (A) 4.5 - 5.7 % Final   05/10/2023 6.3 (H) 4.8 - 5.6 % Final     Comment:              Prediabetes: 5.7 - 6.4           Diabetes: >6.4    "        Glycemic control for adults with diabetes: <7.0     09/10/2021 6.1 % Final        Lab Results   Component Value Date    LDL 91 05/03/2023     (H) 03/11/2022     (H) 08/28/2021     Lab Results   Component Value Date    CHOL 181 07/24/2018    CHOL 215 (H) 07/21/2017    CHOL 198 07/20/2016     Lab Results   Component Value Date    TRIG 131 05/03/2023    TRIG 128 03/11/2022    TRIG 113 08/28/2021     Lab Results   Component Value Date    HDL 56 05/03/2023    HDL 52 03/11/2022    HDL 54 08/28/2021     No results found for: \"PSA\"  Lab Results   Component Value Date    WBC 8.3 01/05/2024    HGB 13.3 01/05/2024    HCT 40.5 01/05/2024    MCV 89 01/05/2024     01/05/2024     Lab Results   Component Value Date    TSH 2.620 01/05/2024      Lab Results   Component Value Date    GLUCOSE 111 (H) 01/05/2024    BUN 21 01/05/2024    CREATININE 1.15 (H) 01/05/2024    EGFRIFNONA 48 (L) 08/28/2021    EGFRIFAFRI 55 (L) 08/28/2021    BCR 18 01/05/2024    K 4.5 01/05/2024    CO2 22 01/05/2024    CALCIUM 10.0 01/05/2024    PROTENTOTREF 7.2 01/05/2024    ALBUMIN 4.6 01/05/2024    LABIL2 1.8 01/05/2024    AST 18 01/05/2024    ALT 14 01/05/2024     No results found for: \"ESTHER\", \"RF\", \"SEDRATE\"   No results found for: \"CRP\"   No results found for: \"IRON\", \"TIBC\", \"FERRITIN\"   Lab Results   Component Value Date    [id number] 805 05/10/2023          Assessment & Plan      Medications        Problem List         LOS      Vertigo.  Much improved/resolved today.  Likely 2nd acute bacterial sinusitis with eustachain tube dysfxn, start antibotics, prednisone.  Increase fluid intake.  Home blood pressure results reviewd / good control. Has had caridology eval, tilt table, brain imagery planned.  Follow up recheck. Call or return if worsening or persistent symptoms.  Risk/benefits/side effects of prednisone Rx discussed, she has tolerated Rx in past without difficulty, okay to decrease or discontinue dose if side effects " develop.  Tolerating  Health maintenance.  Doing well, vaccines current.  Discussed health maintenance, screening test, lifestyle modification.  Pap current, followed by Dr. Patiño, mammogram benign 11/22, further screening declined.  Allergic rhinitis.  OTC Claritin or Zyrtec.  Hypertension.  Overall improved today on carvedilol, amlodipine, tolerating decreased dose hydralazine ,Off metoprolol per cardiology followed by Dr. Winston, .  Hold losartan/has had nephrology eval per Dr. Mcgee,  renal ultrasound/renal artery Doppler benign, has follow-up upcoming..  Clinically improved today on  hydralazine 100 mg twice daily, as needed clonidine.  Monitor blood pressure closely.  Follow-up recheck.  Consider restart amlodipine if persistent elevation.   . Discussed low-sodium diet.  Stress echo benign/elevated right-sided pressure only 2018.  Carotid Dopplers with mild blockage only 2018.  MRI brain benign 2020.  Has had cardiology eval/plan repeat stress testing when blood pressure better controlled per cardilogy recs/cardiology referral scheduled..  Life stress/decreased energy.  Multiple stressors.  Good social support.   Initially improved on Cymbalta/Wellbutrin, she discontinued Rx.  Start sertraline.  Lower extremity edema.  Improved currently with decreased dose amlodipine.  Discussed low-sodium diet.  Follow-up recheck.  Consider sleep study if persistent symptoms.  Osteoporosis.  Tolerating Prolia.  Holding calcium/vitamin D per nephrology.   recheck DEXA  Atrophic kidney.  Improved status post nephrectomy, followed by urology,nephrology.  Renal function gft 46..  Has had recent follow-up visit Dr. Mcgee, repeat blood work, we will get records.  We will get  Interstitial cystitis.  Improved on Flomax, followed by [facility], history of InterStim placement/subsequent removal.  I and O catheter dependent.  Hypertensive retinopathy.  Followed by ophthalmology ember galeano, status post treatment  Colon screening.  Colonoscopy  benign 2016./Diverticulosis only  Carotid stenosis.  50 to 59% on the right, mild on left per Doppler 1/21, stable per repeat ultrasound 5/23.  She is allergic to contrast and gadolinium.  Repeat Doppler 1 year.  Consider vascular surgery referral if worsening symptoms.  Continue risk factor reduction.  Osteoarthritis.  Worse left hip/knee.    Followed by orthopedics Dr. Angel, x-rays with mild left hip OA, moderate left OA knee, attempting knee bracing.  Consider knee injection.  Hyperlipidemia.    Improved today, LDL to 90 rosuvastatin, tolerating Rx.   Aggressive LDL target considering carotid stenosis. .  Discussed diet exercise lifestyle modification.  Follow-up recheck.    Memory loss.  Worse today, MMSE today, check B12, start Zoloft.  Good social support.  MRI brain benign 2020.  Follow-up recheck/plan check MMSE.  Neck pain.  Persistent symptoms today, significant bilateral trapezius spasm on exam today with left upper extremity radiculopathy, x-rays benign, positive and significant trauma DDx includes ruptured disc versus cervical disc disease, check MRI, add prednisone, continue muscle relaxant.  Consider PT referral, referral for epidural injections.  Follow-up recheck.  Call return if worsening symptoms.  Elevated fasting blood sugar.  FBS to 132, check A1c.  Epigastric pain / diarrhea.  Improved /Resolved today, stool cultures with Campylobacter, completing course Augmentin, add probiotics.  Overall benign exAm today, no s/s diverticultis.  DDX includes gb pathology, infeciton, gastritis. bloodwork, ruq us Benign.  Has completed course antibitotics, improvrd on ppi, cholestid / bentyl.  Increase fluid intake bland diet. Signs and symptoms of dehydration discussed.. Follow up recheck. Call or return if worsening or persistent symptoms  Acute bacterial bronchitis.  Clinically improved/resolved course antibiotics/steroids.  Dysphagia.  Likely secondary to GERD, restart PPI.  Has had gastroenterology eval,  repeat EGD upcoming..  Discussed drink plenty of fluids, she feels throughout the, caution with movement, go to ED if worsening symptoms.  Lower extremity edema.  New onset, overall mild symptoms today.  DDx includes secondary to peripheral vascular disease, recent steroid use, amlodipine Rx.  Discussed low-sodium diet, compression.  Follow-up recheck.  expect resolution now that she has completed course of prednisone.  Consider decrease amlodipine Rx if persistent symptoms.  Has cardiology follow-up upcoming.      Diagnoses and all orders for this visit:    1. Abdominal pain, unspecified abdominal location (Primary)    2. Foot swelling    3. Overweight with body mass index (BMI) of 28 to 28.9 in adult    4. Stenosis of carotid artery, unspecified laterality    5. Carotid bruit, unspecified laterality  -     US Carotid Bilateral; Future    6. Gastroesophageal reflux disease, unspecified whether esophagitis present    7. Atrophic kidney    8. Benign essential HTN    9. Depressive disorder    10. Lower extremity edema      BMI is >= 25 and <30. (Overweight) The following options were offered after discussion;: exercise counseling/recommendations and nutrition counseling/recommendations          Expected course, medications, and adverse effects discussed.  Call or return if worsening or persistent symptoms.  I wore protective equipment throughout this patient encounter including a mask, gloves, and eye protection.  Hand hygiene was performed before donning protective equipment and after removal when leaving the room. The complete contents of the Assessment and Plan and Data/Lab Results as documented above have been reviewed and addressed by myself with the patient today as part of an ongoing evaluation / treatment plan.  If some of the documentation has been copied from a previous note and is unchanged it indicates that this problem / plan has been assessed today but is stable from a previous visit and no changes have  been recommended.

## 2024-02-28 ENCOUNTER — OFFICE VISIT (OUTPATIENT)
Dept: FAMILY MEDICINE CLINIC | Facility: CLINIC | Age: 74
End: 2024-02-28
Payer: MEDICARE

## 2024-02-28 VITALS
WEIGHT: 164 LBS | HEIGHT: 62 IN | DIASTOLIC BLOOD PRESSURE: 76 MMHG | RESPIRATION RATE: 16 BRPM | HEART RATE: 74 BPM | SYSTOLIC BLOOD PRESSURE: 130 MMHG | BODY MASS INDEX: 30.18 KG/M2 | OXYGEN SATURATION: 97 % | TEMPERATURE: 97.5 F

## 2024-02-28 DIAGNOSIS — I65.29 STENOSIS OF CAROTID ARTERY, UNSPECIFIED LATERALITY: ICD-10-CM

## 2024-02-28 DIAGNOSIS — E66.3 OVERWEIGHT WITH BODY MASS INDEX (BMI) OF 28 TO 28.9 IN ADULT: ICD-10-CM

## 2024-02-28 DIAGNOSIS — K21.9 GASTROESOPHAGEAL REFLUX DISEASE, UNSPECIFIED WHETHER ESOPHAGITIS PRESENT: ICD-10-CM

## 2024-02-28 DIAGNOSIS — M79.89 FOOT SWELLING: ICD-10-CM

## 2024-02-28 DIAGNOSIS — R10.9 ABDOMINAL PAIN, UNSPECIFIED ABDOMINAL LOCATION: Primary | ICD-10-CM

## 2024-02-28 DIAGNOSIS — I10 BENIGN ESSENTIAL HTN: ICD-10-CM

## 2024-02-28 DIAGNOSIS — N26.1 ATROPHIC KIDNEY: ICD-10-CM

## 2024-02-28 DIAGNOSIS — R09.89 CAROTID BRUIT, UNSPECIFIED LATERALITY: ICD-10-CM

## 2024-02-28 DIAGNOSIS — F32.A DEPRESSIVE DISORDER: ICD-10-CM

## 2024-02-28 DIAGNOSIS — R60.0 LOWER EXTREMITY EDEMA: ICD-10-CM

## 2024-02-28 RX ORDER — DICYCLOMINE HCL 20 MG
TABLET ORAL
COMMUNITY
Start: 2024-01-23 | End: 2024-03-08

## 2024-02-28 RX ORDER — OMEPRAZOLE 40 MG/1
CAPSULE, DELAYED RELEASE ORAL
COMMUNITY
Start: 2024-01-23 | End: 2024-03-05

## 2024-03-05 ENCOUNTER — OFFICE VISIT (OUTPATIENT)
Dept: CARDIOLOGY | Facility: CLINIC | Age: 74
End: 2024-03-05
Payer: MEDICARE

## 2024-03-05 VITALS
HEART RATE: 67 BPM | RESPIRATION RATE: 18 BRPM | BODY MASS INDEX: 30.18 KG/M2 | SYSTOLIC BLOOD PRESSURE: 117 MMHG | HEIGHT: 62 IN | WEIGHT: 164 LBS | DIASTOLIC BLOOD PRESSURE: 75 MMHG

## 2024-03-05 DIAGNOSIS — I51.7 LVH (LEFT VENTRICULAR HYPERTROPHY): ICD-10-CM

## 2024-03-05 DIAGNOSIS — R00.2 PALPITATIONS: ICD-10-CM

## 2024-03-05 DIAGNOSIS — R07.89 CHEST PAIN, ATYPICAL: ICD-10-CM

## 2024-03-05 DIAGNOSIS — R55 SYNCOPE AND COLLAPSE: Primary | ICD-10-CM

## 2024-03-05 DIAGNOSIS — I10 BENIGN ESSENTIAL HTN: ICD-10-CM

## 2024-03-05 DIAGNOSIS — R09.89 CAROTID BRUIT, UNSPECIFIED LATERALITY: ICD-10-CM

## 2024-03-05 DIAGNOSIS — E78.2 MIXED HYPERLIPIDEMIA: ICD-10-CM

## 2024-03-05 PROCEDURE — 99214 OFFICE O/P EST MOD 30 MIN: CPT | Performed by: INTERNAL MEDICINE

## 2024-03-05 PROCEDURE — G2211 COMPLEX E/M VISIT ADD ON: HCPCS | Performed by: INTERNAL MEDICINE

## 2024-03-05 PROCEDURE — 3074F SYST BP LT 130 MM HG: CPT | Performed by: INTERNAL MEDICINE

## 2024-03-05 PROCEDURE — 3078F DIAST BP <80 MM HG: CPT | Performed by: INTERNAL MEDICINE

## 2024-03-05 RX ORDER — AMLODIPINE BESYLATE 5 MG/1
5 TABLET ORAL DAILY
Qty: 90 TABLET | Refills: 1 | Status: SHIPPED | OUTPATIENT
Start: 2024-03-05

## 2024-03-05 RX ORDER — HYDROCHLOROTHIAZIDE 12.5 MG/1
12.5 CAPSULE, GELATIN COATED ORAL DAILY
Qty: 90 CAPSULE | Refills: 3 | Status: SHIPPED | OUTPATIENT
Start: 2024-03-05

## 2024-03-05 NOTE — PROGRESS NOTES
Cardiology Clinic Note  Marco A Winston MD, PhD    Subjective:     Encounter Date:03/05/2024      Patient ID: Dunia Fabian is a 73 y.o. female.    Chief Complaint:  Chief Complaint   Patient presents with    Follow-up       HPI:           I had the pleasure to see this very pleasant 72-year-old female today as a new patient initially referred for chest pain.  She has a history of CKD stage III with solitary kidney with nephrectomy in the past.  History of some mitral valve insufficiency.  She previously had very uncontrolled blood pressures at 174/66 on initial encounter with heart rate in the 50s on bisoprolol.  She has history of palpitations hypertension hyperlipidemia, murmur systolic in nature, mother and father and brother all have heart disease but unspecified, she is not diabetic and she is a non-smoker.  She has some chest pain substernal without significant radiation but blood pressures been very uncontrolled recently which is her primary reason for referral.  Doppler arterial ultrasound of the left renal which is her remaining kidney demonstrated normal Doppler findings with no evidence of stenosis.    Surgically absent right kidney.  Her losartan was  discontinued with elevated creatinine of 1.6 and August, September value was much better at 1.1.  She does drink a lot of water liberally to hydrate her remaining kidney she says.  Blood pressures are well controlled after previous changes to her regimen, 90-1 35 systolic at home.  She denies any unstable angina.  2D echo revealed normal EF at 65% with normal diastolic function with mild TR and normal pulmonary pressures.  She is reassured by these findings.  Wrist optimizing her regimens on Coreg amlodipine and hydralazine trying to decrease hydralazine to off as able or only as needed with long-acting medications once or twice a day only.     Today on repeat encounter she is overall doing well symptomatically but blood pressure much better controlled,  stress testing and echo results below are benign with past workup although calcium score was significantly elevated at 454 with 218 in the right, circumflex 87, , left main 13.  She is chest pain-free.  She has 2+ pitting edema below the knees which is tender to palpation at the ankles, her goal LDL less than 70 with her calcium score high risk of events over the next 5 years.        Stress testing November 2023, EF greater than 70%, low risk study, possible diaphragmatic GI attenuation versus small fixed inferolateral defect, no reversibility identified, low risk for reversible ischemia     2D echo December 2022, EF 60 to 65% with normal diastolic function,  Mild TR, borderline elevated pulmonary pressures by instantaneous gradient likely not significant, normal RV size and function, normal atrial sizes grossly    Carotid Dopplers demonstrated bulky calcific plaque in the right carotid bulb 50 to 69% stenosis, mild to moderate left carotid bulb less than 50%, antegrade flow bilateral vertebrals, ostial stenosis right external  carotid     Review of systems otherwise negative x14 point review of systems except as mentioned above     Historical data copied forward from previous encounters in EMR including the history, exam, and assessment/plan has been reviewed and is unchanged unless noted otherwise.     Cardiac medicines reviewed with risk, benefits, and necessity of each discussed.     Risk and benefit of cardiac testing reviewed including death heart attack stroke pain bleeding infection need for vascular /cardiovascular surgery were discussed and the patient      Objective:         Vitals reviewed below     Physical Exam  Regular rate and rhythm no rubs gallops heave or lift  1 out of 6 systolic ejection murmur left sternal border  No clubbing cyanosis with trace to 2+ edema  Positive HJR and JVD  Clear to auscultation  Obese soft nontender nondistended  Normal pulses normal cap refill  Tach  "grossly  Normocephalic/atraumatic pupils are ground  Unchanged  Assessment:            Atypical chest pain  Essential hypertension  Volume overload previously  CKD stage III with remaining solitary left kidney, right kidney removed remotely  Systolic ejection murmur on exam  Severely elevated coronary calcium score  Coronary artery disease  Normal stress evaluation November 2023  Episodic dizziness  Peripheral edema     Continue Coreg 12.5 twice daily  Amlodipine 5 decreased to daily only with peripheral edema  HCTZ 12.5 daily  Continue hydralazine to 25 twice daily, extra dose as needed blood pressure greater than 140 systolic at home, twice daily blood pressure logs  Clonidine will be moved to as needed for greater than 160 systolic  Remain off Cardura presently  Avoid nitrates presently     Coronary calcium scoring severely elevated at 454  Functional stress evaluation revealed no reversible ischemia, EF greater than 70% with no reversibility suggestive of ischemia and was a low risk study, November 2023  Chest pain-free  Add Zetia 10 mg daily, titrate up Crestor as allowed for goal LDL less than 70 optimally less than 55     For any recurrent chest pain/chest discomfort would recommend ischemic evaluation with left heart catheterization    2D echo for structural functional evaluation revealed an EF of 60 to 65% with normal diastolic function and normal pulmonary pressures with mild TR.    Believe peripheral edema likely secondary to medications, free water intake, optimizing decreasing calcium channel blockers, add gentle diuresis, can discuss with nephrology as well she is followed with single remaining kidney.    6-month follow-up  Marco A Winston MD, PhD  Objective:         /75 (BP Location: Left arm, Patient Position: Sitting)   Pulse 67   Resp 18   Ht 157.5 cm (62\")   Wt 74.4 kg (164 lb)   BMI 30.00 kg/m²       The pleasure to be involved in this patient's cardiovascular care.  Please call with " any questions or concerns  Marco A Winsotn MD, PhD    Most recent EKG as reviewed and interpreted by me:  Procedures     Most recent echo as reviewed and interpreted by me:  Results for orders placed during the hospital encounter of 12/01/22    Adult Transthoracic Echo Complete W/ Cont if Necessary Per Protocol    Interpretation Summary    Left ventricular systolic function is normal. Left ventricular ejection fraction appears to be 61 - 65%.    Left ventricular diastolic function was normal.    Estimated right ventricular systolic pressure from tricuspid regurgitation is mildly elevated (35-45 mmHg). Calculated right ventricular systolic pressure from tricuspid regurgitation is 38 mmHg.      Most recent stress test as reviewed and interpreted by me:  Results for orders placed during the hospital encounter of 11/24/23    Stress Test With Myocardial Perfusion (1 Day)    Interpretation Summary    Left ventricular ejection fraction is hyperdynamic (Calculated EF > 70%).    Findings consistent with an equivocal ECG stress test.    Low risk study  Fixed small mild inferolateral defect, possible diaphragmatic or GI attenuation  No reversibility identified  Hyperdynamic EF 79%  Stress ECG did not reach target heart rate with no reversible ST-T wave abnormality at peak stress or recovery, no arrhythmias seen  Normal size ventricle    Low risk study      Most recent cardiac catheterization as reviewed interpreted by me:  No results found for this or any previous visit.    The following portions of the patient's history were reviewed and updated as appropriate: allergies, current medications, past family history, past medical history, past social history, past surgical history, and problem list.      ROS:  14 point review of systems negative except as mentioned above    Current Outpatient Medications:     amLODIPine (NORVASC) 5 MG tablet, TAKE 1 TABLET BY MOUTH TWICE DAILY, Disp: 180 tablet, Rfl: 1    aspirin 81 MG EC tablet,  Take 1 tablet by mouth Daily., Disp: , Rfl:     carvedilol (COREG) 12.5 MG tablet, TAKE 1 TABLET BY MOUTH TWICE DAILY, Disp: 180 tablet, Rfl: 1    cloNIDine (CATAPRES) 0.1 MG tablet, Take 1 tablet by mouth Every 4 (Four) Hours As Needed., Disp: , Rfl:     ezetimibe (ZETIA) 10 MG tablet, Take 1 tablet by mouth Daily., Disp: 90 tablet, Rfl: 3    hydrALAZINE (APRESOLINE) 25 MG tablet, TAKE 1 TABLET BY MOUTH TWICE DAILY, Disp: 180 tablet, Rfl: 0    meclizine (ANTIVERT) 25 MG tablet, Take 1 tablet by mouth 3 (Three) Times a Day As Needed for Dizziness., Disp: 90 tablet, Rfl: 1    Restasis 0.05 % ophthalmic emulsion, , Disp: , Rfl:     rosuvastatin (CRESTOR) 5 MG tablet, TAKE 1 TABLET BY MOUTH DAILY, Disp: 90 tablet, Rfl: 1    SUPER B COMPLEX/C capsule, SUPER B COMPLEX/C ORAL CAPSULE, Disp: , Rfl:     tamsulosin (FLOMAX) 0.4 MG capsule 24 hr capsule, Take 1 capsule by mouth Daily., Disp: , Rfl:     vitamin D3 125 MCG (5000 UT) capsule capsule, Take 1 capsule by mouth Daily., Disp: , Rfl:     Diclofenac Sodium (VOLTAREN) 1 % gel gel, Apply 4 g topically to the appropriate area as directed 3 (Three) Times a Day As Needed (neck pain). Dose is 4 grams for knees, ankles, feet.  Dose is 2 grams for elbows, wrists, hands. (Patient not taking: Reported on 3/5/2024), Disp: 50 g, Rfl: 5    dicyclomine (BENTYL) 20 MG tablet, , Disp: , Rfl:     Problem List:  Patient Active Problem List   Diagnosis    Acid reflux    Allergic rhinitis    Anxiety    Atrophic kidney    Carotid bruit present    Carpal tunnel syndrome    Low back pain    Neck pain    Thoracic back pain    Depressive disorder    Medicare annual wellness visit, subsequent    Gout    Hemorrhoids    Mixed hyperlipidemia    IC (interstitial cystitis)    Irritable bowel syndrome with constipation    LVH (left ventricular hypertrophy)    Menopausal syndrome    Mitral insufficiency, acute    Onychomycosis    Disorder of bone and cartilage    Age-related osteoporosis without  current pathological fracture    Palpitations    Osteoarthritis    Primary pulmonary hypertension    Renal insufficiency    Plantar fasciitis    Senile osteoporosis    Solitary kidney    Encounter for screening mammogram for malignant neoplasm of breast    Special screening for malignant neoplasms, colon    Seborrheic keratosis    Chronic kidney disease, stage 3 (moderate)    Renal hypertrophy    Hypercalcemia    Acute cystitis with hematuria    Lower extremity edema    Overweight with body mass index (BMI) of 28 to 28.9 in adult    Dizziness    Left hip pain    Bilateral primary osteoarthritis of knee    Elevated fasting blood sugar    Solitary kidney, acquired    Benign essential HTN    Memory loss    Campylobacter diarrhea    Screening for thyroid disorder    Elevated hemoglobin A1c measurement    Vertigo     Past Medical History:  Past Medical History:   Diagnosis Date    Acid reflux     Impression: Discussed diet, exercise, lifestyle modification. start ppi Call / return if persistent symptoms.    Acute bronchitis     Impression: Discussed the plan of care and anticipated course of illness with antibiotic treatment. Educated on side effects of antibiotics and hydromet. Pt was educated on the effects of decongestants on bp. She was encouraged to stop nyquil and decongestants. Frequent fluids and rest were encouraged. Pt was told if he symptoms do no improve within one week to return for further evaluation.    Acute cystitis with hematuria     Impression: Findings discussed. All questions answered. Medication and medication adverse effects discussed. Drug education given and explained to patient. Patient verbalized understanding. Follow-up in approximately 3 days for reevaluation if not improved. Follow-up sooner for worsening symptoms or any concerns. Increase fluids    Acute sinusitis     Allergic rhinitis     Impression: Stable.    Anxiety     Arm weakness     Impression: episode weakness / stiffness l hand  and foot, resolved currently ddx includes tia / flare arhtritis / carpal tunnel consider recent working hard moving, rx in progress check carotid dopplers, echo, restart asa Follow up 2 months. Call / return if if recurrant symptoms.    Arthralgia     Impression: Will call with lab results. May need referral to Rheumatology.    Arthritis     Impression: history of treatment for lupus with medication in remote past - recheck bloodwork    Atrophic kidney     Impression: improve status post nephrectomy followed by urology    Back pain     Impression: traumatic ice 20 minutes bid Rehabilitation exercises discussed. xray without fracture Consider further imaging if symptoms persist    Blurry vision     Impression: transient episode, resolved has had benign optho eval per her report, will get records ddx ncludes tia check carotid dopplers, holter continue asa Follow up [id number] Call / return if if recurrant symptoms.    Carotid bruit     Carpal tunnel syndrome     Impression: followed by hand surgery continue qhs bracing consider Follow up for repeat injxn, surgery if persistent symptoms.    Cellulitis     Impression: Topical care discussed. Call / return if persistent symptoms.    Chest pain     Constipation     Impression: improved today - increase fluids, fiber - did not tolerate metamucil, change to fiber tablet / titratete - continue otc lactulose prm - consider amitiza - Follow up recheck    Contusion of knee, right     Impression: Ice. Elevate. Rest. Discussed anticipated course. Given copy of x-rays on disc to take to Dr. Warren tomorrow.    Depressive disorder     Impression: conflict with family currently, expects resolution when moves to [place] later this mos to live with new  Good social support start ativan prn, use sparingly consider ssri if persistent symptoms.    Disorder of bone and cartilage     Impression: stable / slightly worse per dexa (osteopenia, fn -1.5) discussed calcium, vit d start prolia  Discussed diet, exercise, lifestyle modification. recheck 1 year    Disorder of skin and subcutaneous tissue     Dog bite     Impression: her neighbors dog, is healthy Topical care discussed. Signs and symptoms of infecton discussed. Call / return if fever, worsening symptoms.    Dysuria     Impression: likely 2nd uti / IC flare ct with atrophic r kidney with stones in r kidney only, xray lspine without fracture, wbc normal continue antibiothiics + urinary retentinon / catheter in place urology Follow up scheduled monday go to ED if fever, unable to keep fluids down, worsening symptoms.    Encounter for long-term (current) use of medications     Enrolled in chronic care management     External otitis     Impression: Topical care discussed. Call / return if persistent symptoms.    Flank pain     Fracture, foot     Impression: Left. x-rayed at [facility] today - will call for records. Keep appointment with Dr. Warren for tomorrow. She was given prescription for Oxycodone in the ER.    Fracture, toe     Impression: base prox phalax great toe, nondisplaced, improving nita taping / hard soled shoe Call / return if persistent symptoms.    Generalized abdominal pain     Impression: llq, suprapubic, rlq cbc normal likely 2nd flare interstitial cystitis, uti, self cathing currently ddx includes diverticultis start antibiotics, cx sent, Follow up recheck has urology Follow up next week Call / return if fever, unable to keep fluids down, worsening symptoms. Consider imaging if persistent symptoms.    Gout     Impression: uric acid 7 on 4/12 doing well on allopurinol Follow up recheck levels Discussed diet, lifestyle modification.    H/O drug therapy     Hematuria, microscopic     Hemorrhoids     Impression: improved, increase fluids / fiber Topical care discussed. consider colorectal surgery referrall if persistent symptoms.    Hip pain     Impression: oa, worse / flare, refractory to pt ortho ref sched ice 20 minutes bid  Rehabilitation exercises discussed. consider add tramadol    Hyperlipidemia     Impression: good control ------------- Stable Compliant with meds Is getting adequate diet and exercise Goals developed at last visit were met Care management needs are self addressed. Discussed diet, exercise, lifestyle modification.    Hypertension     Impression: good control Discussed low sodium diet, lifestyle modification.    Hypertension, benign     Impression: good control holding lisinopril secondary to renal insufficiency    IC (interstitial cystitis)     Impression: improved on flomax, followed by [facility] h/o interstim placement / subsequent removal h/o improved on rx per morena, pt Discussed diet, lifestyle modification. followed by Zulema / Ernie, s/p recentt removal interstim 2nd inneffective    Inflammatory and toxic neuropathy     Impression: Trial of medications to see if she can get some relief.    Influenza     Impression: Push fluids, bland diet. Signs and symptoms of dehydration discussed. Prophylactic rx written for close contacts. Call / return if unable to keep fluids down, worsening symptoms.    Irritable bowel syndrome with constipation     Impression: Occasional flares. Change with stress and diet.    Knee pain     Impression: Right. X-ray without obvious acute process - awaiting Radiologist's official report.    Low back pain     Impression: improved lumbar MRI with moderate ddd 2017, x-ray left hip with mild arthritis. Doing well, improved with epidural injections currently. s/p course Physical therapy. followed by Dr. Beavers / improved with epidural injxn, consider repeat course.    LVH (left ventricular hypertrophy)     Impression: htn well controlled recheck echo    Malaise and fatigue     Impression: much improved today possibly 2nd recent nephrectomy bloodwork normal consider further eval if persistent symptoms. follow up 2 to 3 months    Medicare annual wellness visit, subsequent     Impression: doing  well, vaccines current Age specific anticipatory guidance and warning signs discussed. Diet, exercise, and lifestyle modification discussed. Safety, seatbelts, and routine screening examinations discussed. Discussed self-examinations.    Menopausal syndrome     Impression: current on mammogram / followed by ob/gyn (Dr. Claros) Recommend follow up visit for comprehensive physical exam, coordination of care, and screening tests.    Mitral insufficiency, acute     Myalgia     Neck pain     Impression: strain ice 20 minutes bid Rehabilitation exercises discussed. Consider imaging if persistent symptoms.    Onychomycosis     Osteoarthritis     Impression: She has an appointment for another opinion.    Osteoporosis     Impression: improved, tolerating prolia discussed calcium, vit d Follow up recheck dxa    Overweight (BMI 25.0-29.9)     Pain in soft tissues of limb     Palpitations     Plantar fasciitis     Impression: qhs bracing / heel cups ice 20 minutes bid nsaids Rehabilitation exercises discussed. Call / return if persistent symptoms.    Primary pulmonary hypertension     Pruritus     Psoriasis     Rectal bleeding     Impression: likely 2nd hemorrhoids, rx in orogress cbc normal, no tachycardia Follow up recheck Call / return if worsening symptoms.    Renal insufficiency     Impression: mild / stable / improved s/p left nephrectomy bell avoid nsaids, improved off lisinopril / hctz / pyridium no blood draws in right arm    Rotator cuff tendonitis, right     Impression: strain ice 20 minutes bid Rehabilitation exercises discussed. Consider imaging, joint injxn if persistent symptoms.    RUQ pain     Impression: much improved on ppi gerd vs gb pathology ruq us neg Follow up recheck Call / return if unable to keep fluids down, fever, worsening symptoms.    Screening for depression     Negative Depression Screening (4 or less) ()    Screening mammogram, encounter for     Seborrheic keratosis     Impression: left  forehead, benign appearance Topical care discussed. Follow up recheck apt with dermatology upcoming continue to monitor ccliniclly / consider resectino if worsening symptoms.    Solar lentiginosis     Impression: r face, benign appearance Topical care discussed. Call / return if lesion increases in size, changes in shape or color, or becomes tender or inflamed. Discussed skin care, use of sun block and protective clothing.    Solitary kidney     Impression: doing well, renal f negative normal avoid nsaids / continue to monitor    Special screening for malignant neoplasms, colon     Impression: Negative colonoscopy by Dr. Goff in 2016 (diverticulosis only)    Special screening for malignant neoplasms, colon 08/06/2019    Telogen effluvium     Thoracic back pain     TMJ arthritis     Impression: Rehabilitation exercises discussed. restart robaxin qhs prn    Tricuspid valve disorder     URI, acute     Impression: Increase fluid intake. Mucinex Call / return if fever, respiratory difficulty, worsening symptoms.    Urinary incontinence     Impression: She has been self catheterizing.    Urinary retention     Impression: catheter in place / urology Follow up scheduled     Past Surgical History:  Past Surgical History:   Procedure Laterality Date    CATARACT EXTRACTION Left 08/2005    with Insert of Lens Prostheti    CYSTOCELE REPAIR  06/1998    SIMENTAL Cystourethropexy & Cystocele Repair     KIDNEY SURGERY Right 02/24/2014    Removal    TONSILLECTOMY  1953    URETHROLYSIS  10/2000    Transvaginal Urethrolysis & Bone [place] Sling    VAGINAL HYSTERECTOMY  1970    VITRECTOMY Left 04/2005    & Membrane Peeling     Social History:  Social History     Socioeconomic History    Marital status: Single   Tobacco Use    Smoking status: Never     Passive exposure: Never    Smokeless tobacco: Never   Vaping Use    Vaping status: Never Used   Substance and Sexual Activity    Alcohol use: Not Currently    Drug use: No    Sexual  "activity: Defer     Allergies:  Allergies   Allergen Reactions    Contrast Dye (Echo Or Unknown Ct/Mr) Anaphylaxis    Gadolinium Hives     \"crawling\" feeling     Iodine Anaphylaxis    Shrimp Anaphylaxis     Immunizations:  Immunization History   Administered Date(s) Administered    COVID-19 (PFIZER) Purple Cap Monovalent 02/03/2021, 02/24/2021, 09/24/2021, 10/20/2021    Covid-19 (Pfizer) Gray Cap Monovalent 02/03/2021, 02/24/2021, 09/24/2021    Flu Vaccine Quad PF >36MO 08/31/2018    Fluzone High Dose =>65 Years ([employer] ONLY) 09/13/2019    Fluzone High-Dose 65+yrs 09/24/2021, 10/07/2022, 10/04/2023    H1N1 Inj Preservative Free 12/30/2009    Hep B, Unspecified 09/10/2008    Hepatitis A 12/12/2018, 08/06/2019    Influenza, Unspecified 10/07/2022    PEDS-Pneumococcal Conjugate (PCV7) 10/04/2019    Pneumococcal Conjugate 13-Valent (PCV13) 08/31/2018    Pneumococcal Polysaccharide (PPSV23) 10/04/2019    Pneumococcal, Unspecified 09/25/2013    Tdap 11/14/2007, 10/24/2012    Zostavax 12/12/2018    Zoster, Unspecified 10/01/2013, 08/31/2018, 12/13/2018            In-Office Procedure(s):  No orders to display        ASCVD RIsk Score::  The 10-year ASCVD risk score (Nicol DK, et al., 2019) is: 17.7%    Values used to calculate the score:      Age: 73 years      Sex: Female      Is Non- : No      Diabetic: No      Tobacco smoker: No      Systolic Blood Pressure: 117 mmHg      Is BP treated: Yes      HDL Cholesterol: 56 mg/dL      Total Cholesterol: 170 mg/dL    Imaging:    Results for orders placed in visit on 02/17/23    XR Shoulder 2+ View Left    Narrative  XR SHOULDER 2+ VW LEFT    Date of Exam: 2/17/2023 11:47 AM EST    Indication: left trapezius pain. (if needed)..    Comparison: None available.    Findings:  Clavicle intact. Minimal AC joint alignment. The proximal humerus is intact. No fracture or dislocation. Scapula intact. Calcified left upper lobe granuloma. No left apical " pneumothorax.    Impression  Impression:  Negative for acute osseous abnormality.    Electronically Signed: Jermaine Alexander  2/17/2023 12:07 PM EST  Workstation ID: [id number]       Results for orders placed during the hospital encounter of 09/29/23    CT Cardiac Calcium Score Without Dye    Narrative  CT CARDIAC CALCIUM SCORE WO DYE    Date of Exam: 9/29/2023 7:00 AM EDT    Indication: chest pain.    Comparison: Chest x-ray 7/19/2012    Technique: Multiple thin section CT axial images were obtained with prospective ECG-gating without intravenous contrast.    Findings:  Agatston scores for individual coronary arteries are as follows:  Left main (LM): 13.9  Left anterior descending (LAD): 134.6  Left circumflex (CX): 87.5  Right coronary artery (RCA): 218.9  Total: 454.9    Cardiomegaly. There are calcified hilar and mediastinal lymph nodes. Small hiatal hernia. Calcified granulomas in the right lower lobe centrally.    Impression  Impression:  Extensive plaque burden. High risk of cardiac events in the next 5 years.    Calcium Score Interpretation  0 -- Negative examination, no identifiable atherosclerotic plaque.  Very low risk of cardiac events in the next 5 years.  1-10 -- Minimal plaque burden.  Low risk of cardiac events in the next 5 years.  11- 100 -- Mild Plaque burden.  Mild risk of cardiac events in the next 5 years.  101 - 400 -- Moderate plaque burden.  Moderate risk of cardiac events in the next 5 years.  Over 400 -- Extensive plaque burden.  High risk of cardiac events in the next 5 years.      Electronically Signed: Yuli Gregory MD  9/29/2023 7:57 AM EDT  Workstation ID: [id number]      Results for orders placed during the hospital encounter of 09/29/23    CT Cardiac Calcium Score Without Dye    Narrative  CT CARDIAC CALCIUM SCORE WO DYE    Date of Exam: 9/29/2023 7:00 AM EDT    Indication: chest pain.    Comparison: Chest x-ray 7/19/2012    Technique: Multiple thin section CT axial images were obtained with  prospective ECG-gating without intravenous contrast.    Findings:  Agatston scores for individual coronary arteries are as follows:  Left main (LM): 13.9  Left anterior descending (LAD): 134.6  Left circumflex (CX): 87.5  Right coronary artery (RCA): 218.9  Total: 454.9    Cardiomegaly. There are calcified hilar and mediastinal lymph nodes. Small hiatal hernia. Calcified granulomas in the right lower lobe centrally.    Impression  Impression:  Extensive plaque burden. High risk of cardiac events in the next 5 years.    Calcium Score Interpretation  0 -- Negative examination, no identifiable atherosclerotic plaque.  Very low risk of cardiac events in the next 5 years.  1-10 -- Minimal plaque burden.  Low risk of cardiac events in the next 5 years.  11- 100 -- Mild Plaque burden.  Mild risk of cardiac events in the next 5 years.  101 - 400 -- Moderate plaque burden.  Moderate risk of cardiac events in the next 5 years.  Over 400 -- Extensive plaque burden.  High risk of cardiac events in the next 5 years.      Electronically Signed: Yuli Gregory MD  9/29/2023 7:57 AM EDT  Workstation ID: [id number]      Lab Review:   Office Visit on 01/03/2024   Component Date Value    Hemoglobin A1C 01/03/2024 6.8 (A)     Lot Number 01/03/2024 [id number]     Expiration Date 01/03/2024 10/31/2025     Hemoglobin A1C 01/05/2024 6.3 (H)     Glucose 01/05/2024 111 (H)     BUN 01/05/2024 21     Creatinine 01/05/2024 1.15 (H)     EGFR Result 01/05/2024 50 (L)     BUN/Creatinine Ratio 01/05/2024 18     Sodium 01/05/2024 143     Potassium 01/05/2024 4.5     Chloride 01/05/2024 105     Total CO2 01/05/2024 22     Calcium 01/05/2024 10.0     Total Protein 01/05/2024 7.2     Albumin 01/05/2024 4.6     Globulin 01/05/2024 2.6     A/G Ratio 01/05/2024 1.8     Total Bilirubin 01/05/2024 0.3     Alkaline Phosphatase 01/05/2024 49     AST (SGOT) 01/05/2024 18     ALT (SGPT) 01/05/2024 14     WBC 01/05/2024 8.3     RBC 01/05/2024 4.55     Hemoglobin  01/05/2024 13.3     Hematocrit 01/05/2024 40.5     MCV 01/05/2024 89     MCH 01/05/2024 29.2     MCHC 01/05/2024 32.8     RDW 01/05/2024 13.5     Platelets 01/05/2024 299     Neutrophil Rel % 01/05/2024 60     Lymphocyte Rel % 01/05/2024 30     Monocyte Rel % 01/05/2024 8     Eosinophil Rel % 01/05/2024 1     Basophil Rel % 01/05/2024 1     Neutrophils Absolute 01/05/2024 5.0     Lymphocytes Absolute 01/05/2024 2.5     Monocytes Absolute 01/05/2024 0.7     Eosinophils Absolute 01/05/2024 0.1     Basophils Absolute 01/05/2024 0.1     Immature Granulocyte Rel* 01/05/2024 0     Immature Grans Absolute 01/05/2024 0.0     TSH 01/05/2024 2.620    Hospital Outpatient Visit on 11/24/2023   Component Date Value     CV STRESS PROTOCOL 1 11/24/2023 Pharmacologic     Stage 1 11/24/2023 1.0     HR Stage 1 11/24/2023 68     BP Stage 1 11/24/2023 152/71     Duration Min Stage 1 11/24/2023 0     Duration Sec Stage 1 11/24/2023 10     Stress Dose Regadenoson * 11/24/2023 0.40     Stress Comments Stage 1 11/24/2023 10 sec bolus injection     Stage 2 11/24/2023 2.0     HR Stage 2 11/24/2023 74     BP Stage 2 11/24/2023 145/44     Duration Min Stage 2 11/24/2023 4     Duration Sec Stage 2 11/24/2023 0     Stress Comments Stage 2 11/24/2023 recovery     Baseline HR 11/24/2023 68     Baseline BP 11/24/2023 152/70     Peak HR 11/24/2023 87     Peak BP 11/24/2023 142/47     Recovery HR 11/24/2023 83     Recovery BP 11/24/2023 136/56     Target HR (85%) 11/24/2023 123     Max. Pred. HR (100%) 11/24/2023 145     Percent Max Pred HR 11/24/2023 60.00     Percent Target HR 11/24/2023 71     BH CV REST NUCLEAR ISOTO* 11/24/2023 11.0     BH CV STRESS NUCLEAR ISO* 11/24/2023 30.5     Nuc Stress EF 11/24/2023 79    Office Visit on 10/30/2023   Component Date Value    SARS Antigen 10/30/2023 Not Detected     Influenza A Antigen SYED 10/30/2023 Not Detected     Influenza B Antigen SYED 10/30/2023 Not Detected     Internal Control 10/30/2023 Passed      Lot Number 10/30/2023 [id number]     Expiration Date 10/30/2023 4/10/24      Recent labs reviewed and interpreted for clinical significance and application            Level of Care:           Marco A Winston MD  03/05/24  .

## 2024-03-08 DIAGNOSIS — E78.2 MIXED HYPERLIPIDEMIA: Chronic | ICD-10-CM

## 2024-03-08 DIAGNOSIS — R19.7 DIARRHEA, UNSPECIFIED TYPE: ICD-10-CM

## 2024-03-08 DIAGNOSIS — R10.9 ABDOMINAL PAIN, UNSPECIFIED ABDOMINAL LOCATION: ICD-10-CM

## 2024-03-08 RX ORDER — DICYCLOMINE HCL 20 MG
TABLET ORAL
Qty: 90 TABLET | Refills: 1 | Status: SHIPPED | OUTPATIENT
Start: 2024-03-08

## 2024-03-08 RX ORDER — MONTELUKAST SODIUM 4 MG/1
TABLET, CHEWABLE ORAL
Qty: 60 TABLET | Refills: 3 | Status: SHIPPED | OUTPATIENT
Start: 2024-03-08

## 2024-03-08 RX ORDER — ROSUVASTATIN CALCIUM 5 MG/1
5 TABLET, COATED ORAL DAILY
Qty: 90 TABLET | Refills: 1 | Status: SHIPPED | OUTPATIENT
Start: 2024-03-08

## 2024-04-01 ENCOUNTER — HOSPITAL ENCOUNTER (OUTPATIENT)
Dept: ULTRASOUND IMAGING | Facility: HOSPITAL | Age: 74
Discharge: HOME | End: 2024-04-01
Admitting: FAMILY MEDICINE
Payer: MEDICARE

## 2024-04-01 DIAGNOSIS — R09.89 CAROTID BRUIT, UNSPECIFIED LATERALITY: ICD-10-CM

## 2024-04-01 PROCEDURE — 93880 EXTRACRANIAL BILAT STUDY: CPT

## 2024-04-08 RX ORDER — HYDRALAZINE HYDROCHLORIDE 25 MG/1
TABLET, FILM COATED ORAL
Qty: 180 TABLET | Refills: 0 | Status: SHIPPED | OUTPATIENT
Start: 2024-04-08

## 2024-04-11 RX ORDER — CARVEDILOL 12.5 MG/1
TABLET ORAL
Qty: 180 TABLET | Refills: 1 | Status: SHIPPED | OUTPATIENT
Start: 2024-04-11 | End: 2024-04-12

## 2024-04-12 RX ORDER — CARVEDILOL 12.5 MG/1
TABLET ORAL
Qty: 60 TABLET | Refills: 1 | Status: SHIPPED | OUTPATIENT
Start: 2024-04-12

## 2024-04-30 NOTE — PROGRESS NOTES
Subjective   Dunia Fabian is a 73 y.o. female.     Chief Complaint   Patient presents with    Abdominal Pain    Earache       Abdominal Pain  This is a recurrent problem. The current episode started more than 1 month ago. The onset quality is gradual. The problem occurs intermittently. Duration: 5 minutes. The problem has been improved. The pain is located in the RUQ. The pain is at a severity of 7/10. The pain is moderate. The quality of the pain is sharp. The abdominal pain does not radiate. Pertinent negatives include no belching, constipation, diarrhea, dysuria, fever, flatus, headaches, hematuria, nausea or vomiting. Nothing aggravates the pain. Relieved by: rubbing it out. She has tried antibiotics (bentyl, steroid) for the symptoms.   Earache   There is pain in the left ear. This is a recurrent problem. The current episode started in the past 7 days. The problem has been comes and goes. There has been no fever. The pain is moderate. Associated symptoms include abdominal pain, drainage and neck pain. Pertinent negatives include no diarrhea, headaches, rhinorrhea, sore throat or vomiting. She has tried nothing for the symptoms.            I personally reviewed and updated the patient's allergies, medications, problem list, and past medical, surgical, social, and family history. I have reviewed and confirmed the accuracy of the History of Present Illness and Review of Symptoms as documented by the MA/MAXIMUSN/RN. Tyron Driver MD    Family History   Problem Relation Age of Onset    Parkinsonism Mother     Heart disease Mother         cardiovascular disease    Diabetes Mother         diabetes mellitus     Heart disease Father         cardiovascular disease    Heart disease Sister         cardiovascular disease    Diabetes Sister         diabetes mellitus     Heart disease Brother         cardiovascular disease    Diabetes Son         diabetes mellitus     Heart disease Paternal Uncle         ischemic    Stomach  cancer Maternal Grandmother     Breast cancer Niece 39    Ovarian cancer Paternal Grandmother        Social History     Tobacco Use    Smoking status: Never     Passive exposure: Never    Smokeless tobacco: Never   Vaping Use    Vaping status: Never Used   Substance Use Topics    Alcohol use: Not Currently    Drug use: No       Past Surgical History:   Procedure Laterality Date    CATARACT EXTRACTION Left 08/2005    with Insert of Lens Prostheti    CYSTOCELE REPAIR  06/1998    SIMENTAL Cystourethropexy & Cystocele Repair     ENDOSCOPY N/A 5/9/2024    Procedure: ESOPHAGOGASTRODUODENOSCOPY WITH BIOPSY X 1, DILATION (#52,#56 BOUGIE);  Surgeon: Jonah Abebe MD;  Location: [facility] ENDOSCOPY;  Service: Gastroenterology;  Laterality: N/A;  GASTRITIS, ESOPHAGITIS, DYSPHASIA, HIATEL HERNIA    KIDNEY SURGERY Right 02/24/2014    Removal    TONSILLECTOMY  1953    URETHROLYSIS  10/2000    Transvaginal Urethrolysis & Bone [place] Sling    VAGINAL HYSTERECTOMY  1970    VITRECTOMY Left 04/2005    & Membrane Peeling       Patient Active Problem List   Diagnosis    Acid reflux    Allergic rhinitis    Anxiety    Atrophic kidney    Carotid bruit present    Carpal tunnel syndrome    Low back pain    Neck pain    Thoracic back pain    Depressive disorder    Medicare annual wellness visit, subsequent    Gout    Hemorrhoids    Mixed hyperlipidemia    IC (interstitial cystitis)    Irritable bowel syndrome with constipation    LVH (left ventricular hypertrophy)    Menopausal syndrome    Mitral insufficiency, acute    Onychomycosis    Disorder of bone and cartilage    Age-related osteoporosis without current pathological fracture    Palpitations    Osteoarthritis    Primary pulmonary hypertension    Renal insufficiency    Plantar fasciitis    Senile osteoporosis    Solitary kidney    Encounter for screening mammogram for malignant neoplasm of breast    Special screening for malignant neoplasms, colon    Seborrheic keratosis    Chronic kidney  disease, stage 3 (moderate)    Renal hypertrophy    Hypercalcemia    Acute cystitis with hematuria    Lower extremity edema    Overweight with body mass index (BMI) of 28 to 28.9 in adult    Dizziness    Left hip pain    Bilateral primary osteoarthritis of knee    Elevated fasting blood sugar    Solitary kidney, acquired    Benign essential HTN    Memory loss    Campylobacter diarrhea    Screening for thyroid disorder    Elevated hemoglobin A1c measurement    Vertigo    Other dysphagia         Current Outpatient Medications:     amLODIPine (NORVASC) 5 MG tablet, Take 1 tablet by mouth Daily., Disp: 90 tablet, Rfl: 1    aspirin 81 MG EC tablet, Take 1 tablet by mouth Daily., Disp: , Rfl:     carvedilol (COREG) 12.5 MG tablet, TAKE 1 TABLET BY MOUTH TWICE DAILY, Disp: 60 tablet, Rfl: 1    ezetimibe (ZETIA) 10 MG tablet, Take 1 tablet by mouth Daily., Disp: 90 tablet, Rfl: 3    hydrALAZINE (APRESOLINE) 25 MG tablet, TAKE 1 TABLET BY MOUTH TWICE DAILY, Disp: 180 tablet, Rfl: 0    hydroCHLOROthiazide (MICROZIDE) 12.5 MG capsule, Take 1 capsule by mouth Daily., Disp: 90 capsule, Rfl: 3    Restasis 0.05 % ophthalmic emulsion, , Disp: , Rfl:     rosuvastatin (CRESTOR) 5 MG tablet, TAKE 1 TABLET BY MOUTH DAILY, Disp: 90 tablet, Rfl: 1    SUPER B COMPLEX/C capsule, SUPER B COMPLEX/C ORAL CAPSULE, Disp: , Rfl:     tamsulosin (FLOMAX) 0.4 MG capsule 24 hr capsule, Take 1 capsule by mouth Daily., Disp: , Rfl:     vitamin D3 125 MCG (5000 UT) capsule capsule, Take 1 capsule by mouth Daily., Disp: , Rfl:     famotidine (Pepcid) 40 MG tablet, Take 1 tablet by mouth Daily., Disp: 90 tablet, Rfl: 3         Review of Systems   Constitutional:  Negative for chills, diaphoresis and fever.   HENT:  Positive for ear pain and tinnitus. Negative for congestion, hoarse voice, rhinorrhea, sore throat, trouble swallowing and voice change.    Eyes:  Negative for visual disturbance.   Respiratory:  Negative for shortness of breath.   "  Cardiovascular:  Negative for chest pain and palpitations.   Gastrointestinal:  Positive for abdominal pain. Negative for constipation, diarrhea, flatus, nausea and vomiting.   Endocrine: Negative for polydipsia and polyphagia.   Genitourinary:  Negative for dysuria and hematuria.   Musculoskeletal:  Positive for neck pain. Negative for neck stiffness.   Skin:  Negative for color change and pallor.   Allergic/Immunologic: Negative for immunocompromised state.   Neurological:  Positive for dizziness. Negative for seizures and syncope.   Hematological:  Negative for adenopathy.   Psychiatric/Behavioral:  Negative for hallucinations, sleep disturbance and suicidal ideas.        I have reviewed and confirmed the accuracy of the ROS as documented by the MA/LPN/RN Tyron Driver MD      Objective   /68 (BP Location: Right arm, Patient Position: Sitting, Cuff Size: Adult)   Pulse 70   Temp 97.8 °F (36.6 °C) (Temporal)   Resp 18   Ht 157.5 cm (62.01\")   Wt 74.5 kg (164 lb 3.2 oz)   SpO2 95%   BMI 30.02 kg/m²   BP Readings from Last 3 Encounters:   05/09/24 129/63   05/01/24 124/68   03/05/24 117/75     Wt Readings from Last 3 Encounters:   05/09/24 73.8 kg (162 lb 11.2 oz)   05/01/24 74.5 kg (164 lb 3.2 oz)   03/05/24 74.4 kg (164 lb)     Physical Exam  Constitutional:       Appearance: She is well-developed. She is not diaphoretic.   HENT:      Head: Normocephalic.      Right Ear: Tympanic membrane, ear canal and external ear normal.      Left Ear: Tympanic membrane, ear canal and external ear normal.      Nose: Nose normal.   Eyes:      General: Lids are normal.      Conjunctiva/sclera: Conjunctivae normal.      Pupils: Pupils are equal, round, and reactive to light.   Neck:      Thyroid: No thyromegaly.      Vascular: No carotid bruit or JVD.      Trachea: No tracheal deviation.   Cardiovascular:      Rate and Rhythm: Normal rate and regular rhythm.      Heart sounds: Normal heart sounds. No murmur " heard.     No friction rub. No gallop.   Pulmonary:      Effort: Pulmonary effort is normal.      Breath sounds: Normal breath sounds. No stridor. No decreased breath sounds, wheezing or rales.   Abdominal:      General: Bowel sounds are normal. There is no distension.      Palpations: Abdomen is soft. There is no mass.      Tenderness: There is no abdominal tenderness. There is no guarding or rebound.      Hernia: No hernia is present.   Lymphadenopathy:      Head:      Right side of head: No submental, submandibular, tonsillar, preauricular, posterior auricular or occipital adenopathy.      Left side of head: No submental, submandibular, tonsillar, preauricular, posterior auricular or occipital adenopathy.      Cervical: No cervical adenopathy.   Skin:     General: Skin is warm and dry.      Coloration: Skin is not pale.   Neurological:      Mental Status: She is alert and oriented to person, place, and time.      Cranial Nerves: No cranial nerve deficit.      Sensory: No sensory deficit.      Coordination: Coordination normal.      Gait: Gait normal.      Deep Tendon Reflexes: Reflexes are normal and symmetric.         Data / Lab Results:    Hemoglobin A1C   Date Value Ref Range Status   01/05/2024 6.3 (H) 4.8 - 5.6 % Final     Comment:              Prediabetes: 5.7 - 6.4           Diabetes: >6.4           Glycemic control for adults with diabetes: <7.0     01/03/2024 6.8 (A) 4.5 - 5.7 % Final   05/10/2023 6.3 (H) 4.8 - 5.6 % Final     Comment:              Prediabetes: 5.7 - 6.4           Diabetes: >6.4           Glycemic control for adults with diabetes: <7.0     09/10/2021 6.1 % Final        Lab Results   Component Value Date    LDL 91 05/03/2023     (H) 03/11/2022     (H) 08/28/2021     Lab Results   Component Value Date    CHOL 181 07/24/2018    CHOL 215 (H) 07/21/2017    CHOL 198 07/20/2016     Lab Results   Component Value Date    TRIG 131 05/03/2023    TRIG 128 03/11/2022    TRIG 113  "08/28/2021     Lab Results   Component Value Date    HDL 56 05/03/2023    HDL 52 03/11/2022    HDL 54 08/28/2021     No results found for: \"PSA\"  Lab Results   Component Value Date    WBC 8.3 01/05/2024    HGB 13.3 01/05/2024    HCT 40.5 01/05/2024    MCV 89 01/05/2024     01/05/2024     Lab Results   Component Value Date    TSH 2.620 01/05/2024      Lab Results   Component Value Date    GLUCOSE 111 (H) 01/05/2024    BUN 21 01/05/2024    CREATININE 1.15 (H) 01/05/2024    EGFRIFNONA 48 (L) 08/28/2021    EGFRIFAFRI 55 (L) 08/28/2021    BCR 18 01/05/2024    K 4.5 01/05/2024    CO2 22 01/05/2024    CALCIUM 10.0 01/05/2024    PROTENTOTREF 7.2 01/05/2024    ALBUMIN 4.6 01/05/2024    LABIL2 1.8 01/05/2024    AST 18 01/05/2024    ALT 14 01/05/2024     No results found for: \"ESTHER\", \"RF\", \"SEDRATE\"   No results found for: \"CRP\"   No results found for: \"IRON\", \"TIBC\", \"FERRITIN\"   Lab Results   Component Value Date    [id number] 805 05/10/2023          Assessment & Plan      Medications        Problem List         LOS      Vertigo.  Much improved/resolved today.  Likely 2nd acute bacterial sinusitis with eustachain tube dysfxn, start antibotics, prednisone.  Increase fluid intake.  Home blood pressure results reviewd / good control. Has had caridology eval, tilt table, brain imagery planned.  Follow up recheck. Call or return if worsening or persistent symptoms.  Risk/benefits/side effects of prednisone Rx discussed, she has tolerated Rx in past without difficulty, okay to decrease or discontinue dose if side effects develop.  Tolerating  Health maintenance.  Doing well, vaccines current.  Discussed health maintenance, screening test, lifestyle modification.  Pap current, followed by Dr. Patiño, mammogram benign 11/22, further screening declined.  Allergic rhinitis.  OTC Claritin or Zyrtec.  Hypertension.  Overall improved today on carvedilol, amlodipine, tolerating decreased dose hydralazine ,Off metoprolol per " cardiology followed by Dr. Winston, .  Hold losartan/has had nephrology eval per Dr. Mcgee,  renal ultrasound/renal artery Doppler benign, has follow-up upcoming..  Clinically improved today on  hydralazine 100 mg twice daily, as needed clonidine.  Monitor blood pressure closely.  Follow-up recheck.  Consider restart amlodipine if persistent elevation.   . Discussed low-sodium diet.  Stress echo benign/elevated right-sided pressure only 2018.  Carotid Dopplers with mild blockage only 2018.  MRI brain benign 2020.  Has had cardiology eval/plan repeat stress testing when blood pressure better controlled per cardilogy recs/cardiology referral scheduled..  Life stress/decreased energy.  Multiple stressors.  Good social support.   Initially improved on Cymbalta/Wellbutrin, she discontinued Rx.  Start sertraline.  Lower extremity edema.  Improved currently with decreased dose amlodipine.  Discussed low-sodium diet.  Follow-up recheck.  Consider sleep study if persistent symptoms.  Osteoporosis.  Tolerating Prolia.  Holding calcium/vitamin D per nephrology.   recheck DEXA  Atrophic kidney.  Improved status post nephrectomy, followed by urology,nephrology.  Renal function gft 46..  Has had recent follow-up visit Dr. Mcgee, repeat blood work, we will get records.  We will get  Interstitial cystitis.  Improved on Flomax, followed by [facility], history of InterStim placement/subsequent removal.  I and O catheter dependent.  Hypertensive retinopathy.  Followed by ophthalmology ember galeano, status post treatment  Colon screening.  Colonoscopy benign 2016./Diverticulosis only  Carotid stenosis.  50 to 59% on the right, mild on left per Doppler 1/21, stable per repeat ultrasound 5/23.  She is allergic to contrast and gadolinium.  Repeat Doppler 1 year.  Consider vascular surgery referral if worsening symptoms.  Continue risk factor reduction.  Osteoarthritis.  Worse left hip/knee.    Followed by orthopedics Dr. Angel, x-rays with mild left  hip OA, moderate left OA knee, attempting knee bracing.  Consider knee injection.  Hyperlipidemia.    Improved today, LDL to 90 rosuvastatin, tolerating Rx.   Aggressive LDL target considering carotid stenosis. .  Discussed diet exercise lifestyle modification.  Follow-up recheck.    Memory loss.  Worse today, MMSE today, check B12, start Zoloft.  Good social support.  MRI brain benign 2020.  Follow-up recheck/plan check MMSE.  Neck pain.  Persistent symptoms today, significant bilateral trapezius spasm on exam today with left upper extremity radiculopathy, x-rays benign, positive and significant trauma DDx includes ruptured disc versus cervical disc disease, check MRI, add prednisone, continue muscle relaxant.  Consider PT referral, referral for epidural injections.  Follow-up recheck.  Call return if worsening symptoms.  Elevated fasting blood sugar.  FBS to 132, check A1c.  Epigastric pain / diarrhea.  Improved /Resolved today, stool cultures with Campylobacter, completing course Augmentin, add probiotics.  Overall benign exAm today, no s/s diverticultis.  DDX includes gb pathology, infeciton, gastritis. bloodwork, ruq us Benign.  Has completed course antibitotics, improvrd on ppi, cholestid / bentyl.  Increase fluid intake bland diet. Signs and symptoms of dehydration discussed.. Follow up recheck. Call or return if worsening or persistent symptoms  Acute bacterial bronchitis.  Clinically improved/resolved course antibiotics/steroids.  Dysphagia.  Likely secondary to GERD, restart PPI.  Has had gastroenterology eval, repeat EGD upcoming..  Discussed drink plenty of fluids, she feels throughout the, caution with movement, go to ED if worsening symptoms.  Lower extremity edema.  Improved today on HCTZ per cardiology, follow-up recheck kidney function.  New onset, overall mild symptoms today.  DDx includes secondary to peripheral vascular disease, recent steroid use, amlodipine Rx.  Discussed low-sodium diet,  compression.  Follow-up recheck.  expect resolution now that she has completed course of prednisone.  Consider decrease amlodipine Rx if persistent symptoms.  Has cardiology follow-up upcoming.  Plantar fasciitis.  Ice, rehabilitation exercises discussed, start nighttime bracing/heel cups.  Follow-up recheck.  Consider steroid Rx if persistent symptoms.      Diagnoses and all orders for this visit:    1. Abdominal pain, unspecified abdominal location (Primary)    2. Class 1 obesity due to excess calories with serious comorbidity and body mass index (BMI) of 30.0 to 30.9 in adult    3. Earache    4. Plantar fasciitis    5. Mixed hyperlipidemia  -     Lipid Panel With / Chol / HDL Ratio; Future    6. Benign essential HTN  -     CBC & Differential; Future  -     Comprehensive Metabolic Panel; Future  -     Lipid Panel With / Chol / HDL Ratio; Future  -     TSH; Future  -     T4, Free; Future    7. Screening for thyroid disorder  -     TSH; Future  -     T4, Free; Future    8. Vertigo    9. Solitary kidney, acquired    10. Stage 3a chronic kidney disease    11. Gastroesophageal reflux disease, unspecified whether esophagitis present    12. Other dysphagia          BMI is >= 30 and <35. (Class 1 Obesity). The following options were offered after discussion;: exercise counseling/recommendations and nutrition counseling/recommendations      Expected course, medications, and adverse effects discussed.  Call or return if worsening or persistent symptoms.  I wore protective equipment throughout this patient encounter including a mask, gloves, and eye protection.  Hand hygiene was performed before donning protective equipment and after removal when leaving the room. The complete contents of the Assessment and Plan and Data/Lab Results as documented above have been reviewed and addressed by myself with the patient today as part of an ongoing evaluation / treatment plan.  If some of the documentation has been copied from a  previous note and is unchanged it indicates that this problem / plan has been assessed today but is stable from a previous visit and no changes have been recommended.

## 2024-05-01 ENCOUNTER — OFFICE VISIT (OUTPATIENT)
Dept: FAMILY MEDICINE CLINIC | Facility: CLINIC | Age: 74
End: 2024-05-01
Payer: MEDICARE

## 2024-05-01 VITALS
SYSTOLIC BLOOD PRESSURE: 124 MMHG | WEIGHT: 164.2 LBS | TEMPERATURE: 97.8 F | DIASTOLIC BLOOD PRESSURE: 68 MMHG | BODY MASS INDEX: 30.22 KG/M2 | HEART RATE: 70 BPM | HEIGHT: 62 IN | RESPIRATION RATE: 18 BRPM | OXYGEN SATURATION: 95 %

## 2024-05-01 DIAGNOSIS — M72.2 PLANTAR FASCIITIS: ICD-10-CM

## 2024-05-01 DIAGNOSIS — R13.19 OTHER DYSPHAGIA: ICD-10-CM

## 2024-05-01 DIAGNOSIS — E66.09 CLASS 1 OBESITY DUE TO EXCESS CALORIES WITH SERIOUS COMORBIDITY AND BODY MASS INDEX (BMI) OF 30.0 TO 30.9 IN ADULT: ICD-10-CM

## 2024-05-01 DIAGNOSIS — R42 VERTIGO: ICD-10-CM

## 2024-05-01 DIAGNOSIS — Z13.29 SCREENING FOR THYROID DISORDER: ICD-10-CM

## 2024-05-01 DIAGNOSIS — K21.9 GASTROESOPHAGEAL REFLUX DISEASE, UNSPECIFIED WHETHER ESOPHAGITIS PRESENT: ICD-10-CM

## 2024-05-01 DIAGNOSIS — N18.31 STAGE 3A CHRONIC KIDNEY DISEASE: ICD-10-CM

## 2024-05-01 DIAGNOSIS — Z90.5 SOLITARY KIDNEY, ACQUIRED: ICD-10-CM

## 2024-05-01 DIAGNOSIS — R10.9 ABDOMINAL PAIN, UNSPECIFIED ABDOMINAL LOCATION: Primary | ICD-10-CM

## 2024-05-01 DIAGNOSIS — H92.09 EARACHE: ICD-10-CM

## 2024-05-01 DIAGNOSIS — I10 BENIGN ESSENTIAL HTN: ICD-10-CM

## 2024-05-01 DIAGNOSIS — E78.2 MIXED HYPERLIPIDEMIA: Chronic | ICD-10-CM

## 2024-05-01 PROCEDURE — 3074F SYST BP LT 130 MM HG: CPT | Performed by: FAMILY MEDICINE

## 2024-05-01 PROCEDURE — 99214 OFFICE O/P EST MOD 30 MIN: CPT | Performed by: FAMILY MEDICINE

## 2024-05-01 PROCEDURE — G2211 COMPLEX E/M VISIT ADD ON: HCPCS | Performed by: FAMILY MEDICINE

## 2024-05-01 PROCEDURE — 3078F DIAST BP <80 MM HG: CPT | Performed by: FAMILY MEDICINE

## 2024-05-01 PROCEDURE — 1126F AMNT PAIN NOTED NONE PRSNT: CPT | Performed by: FAMILY MEDICINE

## 2024-05-07 DIAGNOSIS — R10.9 ABDOMINAL PAIN, UNSPECIFIED ABDOMINAL LOCATION: ICD-10-CM

## 2024-05-07 DIAGNOSIS — R19.7 DIARRHEA, UNSPECIFIED TYPE: ICD-10-CM

## 2024-05-07 RX ORDER — DICYCLOMINE HCL 20 MG
TABLET ORAL
Qty: 90 TABLET | Refills: 1 | OUTPATIENT
Start: 2024-05-07

## 2024-05-09 ENCOUNTER — ANESTHESIA EVENT (OUTPATIENT)
Dept: GASTROENTEROLOGY | Facility: HOSPITAL | Age: 74
End: 2024-05-09
Payer: MEDICARE

## 2024-05-09 ENCOUNTER — ANESTHESIA (OUTPATIENT)
Dept: GASTROENTEROLOGY | Facility: HOSPITAL | Age: 74
End: 2024-05-09
Payer: MEDICARE

## 2024-05-09 ENCOUNTER — HOSPITAL ENCOUNTER (OUTPATIENT)
Facility: HOSPITAL | Age: 74
Setting detail: HOSPITAL OUTPATIENT SURGERY
Discharge: HOME | End: 2024-05-09
Attending: INTERNAL MEDICINE | Admitting: INTERNAL MEDICINE
Payer: MEDICARE

## 2024-05-09 ENCOUNTER — ON CAMPUS - OUTPATIENT (AMBULATORY)
Dept: URBAN - METROPOLITAN AREA HOSPITAL 85 | Facility: HOSPITAL | Age: 74
End: 2024-05-09

## 2024-05-09 VITALS
OXYGEN SATURATION: 93 % | SYSTOLIC BLOOD PRESSURE: 129 MMHG | BODY MASS INDEX: 30.72 KG/M2 | HEIGHT: 61 IN | RESPIRATION RATE: 22 BRPM | TEMPERATURE: 98.2 F | DIASTOLIC BLOOD PRESSURE: 63 MMHG | HEART RATE: 65 BPM | WEIGHT: 162.7 LBS

## 2024-05-09 DIAGNOSIS — K21.9 GERD (GASTROESOPHAGEAL REFLUX DISEASE): ICD-10-CM

## 2024-05-09 DIAGNOSIS — R13.10 DYSPHAGIA, UNSPECIFIED: ICD-10-CM

## 2024-05-09 DIAGNOSIS — K20.80 OTHER ESOPHAGITIS WITHOUT BLEEDING: ICD-10-CM

## 2024-05-09 DIAGNOSIS — K21.9 GASTRO-ESOPHAGEAL REFLUX DISEASE WITHOUT ESOPHAGITIS: ICD-10-CM

## 2024-05-09 DIAGNOSIS — R13.10 DYSPHAGIA: ICD-10-CM

## 2024-05-09 DIAGNOSIS — K29.70 GASTRITIS, UNSPECIFIED, WITHOUT BLEEDING: ICD-10-CM

## 2024-05-09 DIAGNOSIS — K44.9 DIAPHRAGMATIC HERNIA WITHOUT OBSTRUCTION OR GANGRENE: ICD-10-CM

## 2024-05-09 PROCEDURE — 43239 EGD BIOPSY SINGLE/MULTIPLE: CPT | Performed by: INTERNAL MEDICINE

## 2024-05-09 PROCEDURE — 43450 DILATE ESOPHAGUS 1/MULT PASS: CPT | Performed by: INTERNAL MEDICINE

## 2024-05-09 PROCEDURE — 25010000002 PROPOFOL 200 MG/20ML EMULSION: Performed by: NURSE ANESTHETIST, CERTIFIED REGISTERED

## 2024-05-09 PROCEDURE — 88305 TISSUE EXAM BY PATHOLOGIST: CPT | Performed by: INTERNAL MEDICINE

## 2024-05-09 PROCEDURE — 88342 IMHCHEM/IMCYTCHM 1ST ANTB: CPT | Performed by: INTERNAL MEDICINE

## 2024-05-09 RX ORDER — ONDANSETRON 2 MG/ML
4 INJECTION INTRAMUSCULAR; INTRAVENOUS ONCE AS NEEDED
Status: DISCONTINUED | OUTPATIENT
Start: 2024-05-09 | End: 2024-05-09 | Stop reason: HOSPADM

## 2024-05-09 RX ORDER — FAMOTIDINE 40 MG/1
40 TABLET, FILM COATED ORAL DAILY
Qty: 90 TABLET | Refills: 3 | Status: SHIPPED | OUTPATIENT
Start: 2024-05-09

## 2024-05-09 RX ORDER — SODIUM CHLORIDE 9 MG/ML
INJECTION, SOLUTION INTRAVENOUS CONTINUOUS PRN
Status: DISCONTINUED | OUTPATIENT
Start: 2024-05-09 | End: 2024-05-09 | Stop reason: SURG

## 2024-05-09 RX ORDER — LIDOCAINE HYDROCHLORIDE 20 MG/ML
INJECTION, SOLUTION INFILTRATION; PERINEURAL AS NEEDED
Status: DISCONTINUED | OUTPATIENT
Start: 2024-05-09 | End: 2024-05-09 | Stop reason: SURG

## 2024-05-09 RX ORDER — PROPOFOL 10 MG/ML
INJECTION, EMULSION INTRAVENOUS AS NEEDED
Status: DISCONTINUED | OUTPATIENT
Start: 2024-05-09 | End: 2024-05-09 | Stop reason: SURG

## 2024-05-09 RX ADMIN — LIDOCAINE HYDROCHLORIDE 100 MG: 20 INJECTION, SOLUTION INFILTRATION; PERINEURAL at 11:00

## 2024-05-09 RX ADMIN — PROPOFOL 120 MG: 10 INJECTION, EMULSION INTRAVENOUS at 11:00

## 2024-05-09 RX ADMIN — PROPOFOL 30 MG: 10 INJECTION, EMULSION INTRAVENOUS at 11:09

## 2024-05-09 RX ADMIN — PROPOFOL 30 MG: 10 INJECTION, EMULSION INTRAVENOUS at 11:06

## 2024-05-09 RX ADMIN — PROPOFOL 30 MG: 10 INJECTION, EMULSION INTRAVENOUS at 11:03

## 2024-05-09 RX ADMIN — SODIUM CHLORIDE: 9 INJECTION, SOLUTION INTRAVENOUS at 10:55

## 2024-05-09 NOTE — OP NOTE
ESOPHAGOGASTRODUODENOSCOPY Procedure Report    Patient Name:  Dunia Fabian  YOB: 1950    Date of Surgery:  5/9/2024     Pre-Op Diagnosis:  Dysphagia [R13.10]  GERD (gastroesophageal reflux disease) [K21.9]       Post-Op Diagnosis Codes:     * Dysphagia [R13.10]     * GERD (gastroesophageal reflux disease) [K21.9]  Grade a esophagitis dilated 52-56 Romanian Hester without trauma  Small 2 cm hiatal hernia  Moderate antritis biopsied    Procedure/CPT® Codes:      Procedure(s):  ESOPHAGOGASTRODUODENOSCOPY WITH BIOPSY X 1, DILATION (#52,#56 BOUGIE)    Staff:  Surgeon(s):  Jonah Abebe MD      Anesthesia: Monitored Anesthesia Care    Description of Procedure:  A description of the procedure as well as risks, benefits and alternative methods were explained to the patient who voiced understanding and signed the corresponding consent form. A physical exam was performed and vital signs were monitored throughout the procedure.    An upper GI endoscope was placed into the mouth and proceeded through the esophagus, stomach and second portion of the duodenum without difficulty. The scope was then retroflexed and the fundus was visualized. The procedure was not difficult and there were no immediate complications.    Specimen:        See Below    Estimated blood loss: none    Complications:  None    Findings:  Grade a esophagitis dilated 52-56 Romanian Hester without trauma  Small 2 cm hiatal hernia  Moderate antritis biopsied    Impression:  Dysphagia status post dilation which may be due to esophagitis  Gastritis biopsied    Recommendations:  Follow-up biopsy results  Monitor for symptom improvement following dilation  Start daily Pepcid      Jonah Abebe MD     Date: 5/9/2024    Time: 11:16 EDT

## 2024-05-09 NOTE — DISCHARGE INSTRUCTIONS
A responsible adult should stay with you and you should rest quietly for the rest of the day.    Do not drink alcohol, drive, operate any heavy machinery or power tools or make any legal/important decisions for the next 24 hours.     Progress your diet as tolerated.  If you begin to experience severe pain, increased shortness of breath, racing heartbeat or a fever above 101 F, seek immediate medical attention.     Follow up with MD as instructed. Call office for results in 3 to 5 days if needed.     Dr Abebe @ [phone]

## 2024-05-09 NOTE — H&P
GI Procedure H&P:    Referring Provider:    Tyron Driver MD Harrell, Steven, MD    Chief complaint: <principal problem not specified>    Subjective .  Dysphagia    History of present illness:      Dunia Fabian is a 73 y.o. female who presents today for Procedure(s):  ESOPHAGOGASTRODUODENOSCOPY WITH BIOPSY X 1, DILATION (#52,#56 BOUGIE) for the indications listed below.     The updated Patient Profile was reviewed prior to the procedure, in conjunction with the Physical Exam, including medical conditions, surgical procedures, medications, allergies, family history and social history.     Pre-operatively, I reviewed the indication(s) for the procedure, the risks of the procedure [including but not limited to: unexpected bleeding possibly requiring hospitalization and/or unplanned repeat procedures, perforation possibly requiring surgical treatment, missed lesions and complications of sedation/MAC (also explained by anesthesia staff)].     I have evaluated the patient for risks associated with the planned anesthesia and the procedure to be performed and find the patient an acceptable candidate for IV sedation.    Multiple opportunities were provided for any questions or concerns, and all questions were answered satisfactorily before any anesthesia was administered. We will proceed with the planned procedure.    Past Medical History:  Past Medical History:   Diagnosis Date    Acid reflux     Impression: Discussed diet, exercise, lifestyle modification. start ppi Call / return if persistent symptoms.    Acute bronchitis     Impression: Discussed the plan of care and anticipated course of illness with antibiotic treatment. Educated on side effects of antibiotics and hydromet. Pt was educated on the effects of decongestants on bp. She was encouraged to stop nyquil and decongestants. Frequent fluids and rest were encouraged. Pt was told if he symptoms do no improve within one week to return for further evaluation.     Acute cystitis with hematuria     Impression: Findings discussed. All questions answered. Medication and medication adverse effects discussed. Drug education given and explained to patient. Patient verbalized understanding. Follow-up in approximately 3 days for reevaluation if not improved. Follow-up sooner for worsening symptoms or any concerns. Increase fluids    Acute sinusitis     Allergic rhinitis     Impression: Stable.    Anxiety     Arm weakness     Impression: episode weakness / stiffness l hand and foot, resolved currently ddx includes tia / flare arhtritis / carpal tunnel consider recent working hard moving, rx in progress check carotid dopplers, echo, restart asa Follow up 2 months. Call / return if if recurrant symptoms.    Arthralgia     Impression: Will call with lab results. May need referral to Rheumatology.    Arthritis     Impression: history of treatment for lupus with medication in remote past - recheck bloodwork    Atrophic kidney     Impression: improve status post nephrectomy followed by urology    Back pain     Impression: traumatic ice 20 minutes bid Rehabilitation exercises discussed. xray without fracture Consider further imaging if symptoms persist    Blurry vision     Impression: transient episode, resolved has had benign optho eval per her report, will get records ddx ncludes tia check carotid dopplers, holter continue asa Follow up [id number] Call / return if if recurrant symptoms.    Carotid bruit     Carpal tunnel syndrome     Impression: followed by hand surgery continue qhs bracing consider Follow up for repeat injxn, surgery if persistent symptoms.    Cellulitis     Impression: Topical care discussed. Call / return if persistent symptoms.    Chest pain     Constipation     Impression: improved today - increase fluids, fiber - did not tolerate metamucil, change to fiber tablet / titratete - continue otc lactulose prm - consider amitiza - Follow up recheck    Contusion of knee,  right     Impression: Ice. Elevate. Rest. Discussed anticipated course. Given copy of x-rays on disc to take to Dr. Warren tomorrow.    Depressive disorder     Impression: conflict with family currently, expects resolution when moves to [place] later this mos to live with new  Good social support start ativan prn, use sparingly consider ssri if persistent symptoms.    Disorder of bone and cartilage     Impression: stable / slightly worse per dexa (osteopenia, fn -1.5) discussed calcium, vit d start prolia Discussed diet, exercise, lifestyle modification. recheck 1 year    Disorder of skin and subcutaneous tissue     Dog bite     Impression: her neighbors dog, is healthy Topical care discussed. Signs and symptoms of infecton discussed. Call / return if fever, worsening symptoms.    Dysphagia     Dysuria     Impression: likely 2nd uti / IC flare ct with atrophic r kidney with stones in r kidney only, xray lspine without fracture, wbc normal continue antibiothiics + urinary retentinon / catheter in place urology Follow up scheduled monday go to ED if fever, unable to keep fluids down, worsening symptoms.    Encounter for long-term (current) use of medications     Enrolled in chronic care management     External otitis     Impression: Topical care discussed. Call / return if persistent symptoms.    Flank pain     Fracture, foot     Impression: Left. x-rayed at [facility] today - will call for records. Keep appointment with Dr. Warren for tomorrow. She was given prescription for Oxycodone in the ER.    Fracture, toe     Impression: base prox phalax great toe, nondisplaced, improving nita taping / hard soled shoe Call / return if persistent symptoms.    Generalized abdominal pain     Impression: llq, suprapubic, rlq cbc normal likely 2nd flare interstitial cystitis, uti, self cathing currently ddx includes diverticultis start antibiotics, cx sent, Follow up recheck has urology Follow up next week Call / return if fever,  unable to keep fluids down, worsening symptoms. Consider imaging if persistent symptoms.    Gout     Impression: uric acid 7 on 4/12 doing well on allopurinol Follow up recheck levels Discussed diet, lifestyle modification.    H/O drug therapy     Hematuria, microscopic     Hemorrhoids     Impression: improved, increase fluids / fiber Topical care discussed. consider colorectal surgery referrall if persistent symptoms.    Hip pain     Impression: oa, worse / flare, refractory to pt ortho ref sched ice 20 minutes bid Rehabilitation exercises discussed. consider add tramadol    Hyperlipidemia     Impression: good control ------------- Stable Compliant with meds Is getting adequate diet and exercise Goals developed at last visit were met Care management needs are self addressed. Discussed diet, exercise, lifestyle modification.    Hypertension     Impression: good control Discussed low sodium diet, lifestyle modification.    Hypertension, benign     Impression: good control holding lisinopril secondary to renal insufficiency    IC (interstitial cystitis)     Impression: improved on flomax, followed by Harlan h/o interstim placement / subsequent removal h/o improved on rx per morena, pt Discussed diet, lifestyle modification. followed by Zulema / Ernie, s/p recentt removal interstim 2nd inneffective    Inflammatory and toxic neuropathy     Impression: Trial of medications to see if she can get some relief.    Influenza     Impression: Push fluids, bland diet. Signs and symptoms of dehydration discussed. Prophylactic rx written for close contacts. Call / return if unable to keep fluids down, worsening symptoms.    Irritable bowel syndrome with constipation     Impression: Occasional flares. Change with stress and diet.    Knee pain     Impression: Right. X-ray without obvious acute process - awaiting Radiologist's official report.    Low back pain     Impression: improved lumbar MRI with moderate ddd 2017, x-ray left hip  with mild arthritis. Doing well, improved with epidural injections currently. s/p course Physical therapy. followed by Dr. Beavers / improved with epidural injxn, consider repeat course.    LVH (left ventricular hypertrophy)     Impression: htn well controlled recheck echo    Malaise and fatigue     Impression: much improved today possibly 2nd recent nephrectomy bloodwork normal consider further eval if persistent symptoms. follow up 2 to 3 months    Medicare annual wellness visit, subsequent     Impression: doing well, vaccines current Age specific anticipatory guidance and warning signs discussed. Diet, exercise, and lifestyle modification discussed. Safety, seatbelts, and routine screening examinations discussed. Discussed self-examinations.    Menopausal syndrome     Impression: current on mammogram / followed by ob/gyn (Dr. Claros) Recommend follow up visit for comprehensive physical exam, coordination of care, and screening tests.    Mitral insufficiency, acute     Myalgia     Neck pain     Impression: strain ice 20 minutes bid Rehabilitation exercises discussed. Consider imaging if persistent symptoms.    Onychomycosis     Osteoarthritis     Impression: She has an appointment for another opinion.    Osteoporosis     Impression: improved, tolerating prolia discussed calcium, vit d Follow up recheck dxa    Overweight (BMI 25.0-29.9)     Pain in soft tissues of limb     Palpitations     Plantar fasciitis     Impression: qhs bracing / heel cups ice 20 minutes bid nsaids Rehabilitation exercises discussed. Call / return if persistent symptoms.    Primary pulmonary hypertension     Pruritus     Psoriasis     Rectal bleeding     Impression: likely 2nd hemorrhoids, rx in orogress cbc normal, no tachycardia Follow up recheck Call / return if worsening symptoms.    Renal insufficiency     Impression: mild / stable / improved s/p left nephrectomy bell avoid nsaids, improved off lisinopril / hctz / pyridium no blood  draws in right arm    Rotator cuff tendonitis, right     Impression: strain ice 20 minutes bid Rehabilitation exercises discussed. Consider imaging, joint injxn if persistent symptoms.    RUQ pain     Impression: much improved on ppi gerd vs gb pathology ruq us neg Follow up recheck Call / return if unable to keep fluids down, fever, worsening symptoms.    Screening for depression     Negative Depression Screening (4 or less) ()    Screening mammogram, encounter for     Seborrheic keratosis     Impression: left forehead, benign appearance Topical care discussed. Follow up recheck apt with dermatology upcoming continue to monitor ccliniclly / consider resectino if worsening symptoms.    Self-catheterizes urinary bladder     PRN    Solar lentiginosis     Impression: r face, benign appearance Topical care discussed. Call / return if lesion increases in size, changes in shape or color, or becomes tender or inflamed. Discussed skin care, use of sun block and protective clothing.    Solitary kidney     Impression: doing well, renal f negative normal avoid nsaids / continue to monitor    Special screening for malignant neoplasms, colon     Impression: Negative colonoscopy by Dr. Goff in 2016 (diverticulosis only)    Special screening for malignant neoplasms, colon 08/06/2019    Telogen effluvium     Thoracic back pain     TMJ arthritis     Impression: Rehabilitation exercises discussed. restart robaxin qhs prn    Tricuspid valve disorder     URI, acute     Impression: Increase fluid intake. Mucinex Call / return if fever, respiratory difficulty, worsening symptoms.    Urinary incontinence     Impression: She has been self catheterizing.    Urinary retention     Impression: catheter in place / urology Follow up scheduled       Past Surgical History:  Past Surgical History:   Procedure Laterality Date    CATARACT EXTRACTION Left 08/2005    with Insert of Lens Prostheti    CYSTOCELE REPAIR  06/1998    [facility]  Cystourethropexy & Cystocele Repair     KIDNEY SURGERY Right 02/24/2014    Removal    TONSILLECTOMY  1953    URETHROLYSIS  10/2000    Transvaginal Urethrolysis & Bone Blevins Sling    VAGINAL HYSTERECTOMY  1970    VITRECTOMY Left 04/2005    & Membrane Peeling       Social History:  Social History     Tobacco Use    Smoking status: Never     Passive exposure: Never    Smokeless tobacco: Never   Vaping Use    Vaping status: Never Used   Substance Use Topics    Alcohol use: Not Currently    Drug use: No       Family History:  Family History   Problem Relation Age of Onset    Parkinsonism Mother     Heart disease Mother         cardiovascular disease    Diabetes Mother         diabetes mellitus     Heart disease Father         cardiovascular disease    Heart disease Sister         cardiovascular disease    Diabetes Sister         diabetes mellitus     Heart disease Brother         cardiovascular disease    Diabetes Son         diabetes mellitus     Heart disease Paternal Uncle         ischemic    Stomach cancer Maternal Grandmother     Breast cancer Niece 39    Ovarian cancer Paternal Grandmother        Medications:  Medications Prior to Admission   Medication Sig Dispense Refill Last Dose    amLODIPine (NORVASC) 5 MG tablet Take 1 tablet by mouth Daily. 90 tablet 1 5/8/2024    aspirin 81 MG EC tablet Take 1 tablet by mouth Daily.   5/8/2024    carvedilol (COREG) 12.5 MG tablet TAKE 1 TABLET BY MOUTH TWICE DAILY 60 tablet 1 5/9/2024    ezetimibe (ZETIA) 10 MG tablet Take 1 tablet by mouth Daily. 90 tablet 3 5/8/2024    hydrALAZINE (APRESOLINE) 25 MG tablet TAKE 1 TABLET BY MOUTH TWICE DAILY 180 tablet 0 5/9/2024    hydroCHLOROthiazide (MICROZIDE) 12.5 MG capsule Take 1 capsule by mouth Daily. 90 capsule 3 5/8/2024    Restasis 0.05 % ophthalmic emulsion    5/8/2024    rosuvastatin (CRESTOR) 5 MG tablet TAKE 1 TABLET BY MOUTH DAILY 90 tablet 1 5/8/2024    SUPER B COMPLEX/C capsule SUPER B COMPLEX/C ORAL CAPSULE   5/8/2024  "   tamsulosin (FLOMAX) 0.4 MG capsule 24 hr capsule Take 1 capsule by mouth Daily.   5/8/2024    vitamin D3 125 MCG (5000 UT) capsule capsule Take 1 capsule by mouth Daily.   5/8/2024       Scheduled Meds:  Continuous Infusions:No current facility-administered medications for this encounter.    PRN Meds:.    ALLERGIES:  Contrast dye (echo or unknown ct/mr), Gadolinium, Iodine, and Shrimp    ROS:  The following systems were reviewed and negative;   Constitution:  No fevers, chills, no unintentional weight loss  Skin: no rash, no jaundice  Eyes:  No blurry vision, no eye pain  HENT:  No change in hearing or smell  Resp:  No dyspnea or cough  CV:  No chest pain or palpitations  :  No dysuria, hematuria  Musculoskeletal:  No leg cramps or arthralgias  Neuro:  No tremor, no numbness  Psych:  No depression or confsuion    Objective     Vital Signs:   Vitals:    05/03/24 1049 05/09/24 0926   BP:  162/68   BP Location:  Left arm   Patient Position:  Lying   Pulse:  89   Resp:  18   Temp:  98.2 °F (36.8 °C)   TempSrc:  Oral   SpO2:  95%   Weight: 74.4 kg (164 lb) 73.8 kg (162 lb 11.2 oz)   Height: 154.9 cm (61\") 154.9 cm (61\")       Physical Exam:       General Appearance:    Awake and alert, in no acute distress   Head:    Normocephalic, without obvious abnormality, atraumatic   Throat:   No oral lesions, no thrush, oral mucosa moist   Lungs:     respirations regular, even and unlabored   Skin:   No rash, no jaundice       Results Review:  Lab Results (last 24 hours)       ** No results found for the last 24 hours. **            Imaging Results (Last 24 Hours)       ** No results found for the last 24 hours. **             I reviewed the patient's labs and imaging.    ASSESSMENT AND PLAN:  Dysphagia    Active Problems:    * No active hospital problems. *       Procedure(s):  ESOPHAGOGASTRODUODENOSCOPY WITH BIOPSY X 1, DILATION (#52,#56 BOUGIE)      I discussed the patients findings and my recommendations with the " patient.    Jonah Abebe MD  05/09/24  11:15 EDT

## 2024-05-13 LAB
LAB AP CASE REPORT: NORMAL
PATH REPORT.FINAL DX SPEC: NORMAL
PATH REPORT.GROSS SPEC: NORMAL

## 2024-05-15 PROBLEM — R13.19 OTHER DYSPHAGIA: Status: ACTIVE | Noted: 2024-05-15

## 2024-06-29 DIAGNOSIS — R10.9 ABDOMINAL PAIN, UNSPECIFIED ABDOMINAL LOCATION: ICD-10-CM

## 2024-06-29 DIAGNOSIS — R19.7 DIARRHEA, UNSPECIFIED TYPE: ICD-10-CM

## 2024-07-01 RX ORDER — OMEPRAZOLE 40 MG/1
40 CAPSULE, DELAYED RELEASE ORAL DAILY
Qty: 90 CAPSULE | Refills: 3 | OUTPATIENT
Start: 2024-07-01

## 2024-07-01 RX ORDER — HYDRALAZINE HYDROCHLORIDE 25 MG/1
TABLET, FILM COATED ORAL
Qty: 180 TABLET | Refills: 0 | Status: SHIPPED | OUTPATIENT
Start: 2024-07-01

## 2024-07-01 RX ORDER — MONTELUKAST SODIUM 4 MG/1
TABLET, CHEWABLE ORAL
Qty: 60 TABLET | Refills: 3 | OUTPATIENT
Start: 2024-07-01

## 2024-07-02 NOTE — PROGRESS NOTES
Subjective   Dunia Fabian is a 73 y.o. female.     Chief Complaint   Patient presents with    Medicare Wellness-subsequent    Hypertension    Hyperlipidemia    Foot Swelling       The patient is here: for coordination of medical care to discuss health maintenance and disease prevention to follow up on multiple medical problems.  Last comprehensive physical was on 5/10/2023.  Previous physical was performed by  Tyron Driver MD  Overall has: moderate activity with work/home activities, good appetite, feels well with minor complaints, good energy level, is sleeping well, and returned to full activity. Nutrition: appropriate balanced diet and eating a variety of foods. Last tetanus shot was >10 years ago. Patient is doing routine self breast exams: monthly    Previous Exams:  Last PAP Smear was on 1/9/18 by Dr Patiño.    · Impression of Normal    Last Mammogram was on 11/08/2022 ordered by Dr Patiño.    · Impression of Normal   · Recommended repeat in 1 year    Last DEXA was on 5/22/2023 ordered by Dr Driver   · Impression of Osteopenia. Based upon the National Osteoporosis Foundation Guide,  patient's FRAX score does not meet criteria for pharmacologic treatment  to prevent osteoporosis. Individual treatment decisions should be made  based upon each patient's full clinical history and risk factors.     Last US Carotid Dopplers was on 5/22/2023 ordered by Dr Driver.    · Impression of Bulky calcific plaquing, right carotid bulb, with continued suggestion of a 50 to 69% diameter stenosis. When compared to the prior examination of October, 2021, the category stenosis is stable.   Mild to moderate plaquing, left carotid bulb, with the estimated degree of stenosis remaining at less than 50% diameter stenotic.   Patent bilateral vertebral arteries with antegrade flow.   Ostial stenosis, right external carotid artery.          Recommended repeat in 1year    Last Colonoscopy was on 6/21/2016 by Dr Goff.    ·  Impression of Diverticulosis of the sigmoid colon   · Recommended repeat in 10 years    Last Stress Test was on 11/27/2023 ordered by Dr Winston.    · Impression of Left ventricular ejection fraction is hyperdynamic (Calculated EF > 70%).   Findings consistent with an equivocal ECG stress test.      Last EKG was on 1/8/2024 ordered by Dr Winston.    · Impression of Rhythm: sinus rhythm  Rate: normal  QRS axis: normal  Other findings: non-specific ST-T wave changes      Last Echocardiogram was on 12/01/2022 ordered by Dr Winston.    · Impression of Left ventricular systolic function is normal. Left ventricular ejection fraction appears to be 61 - 65%.    Left ventricular diastolic function was normal.   Estimated right ventricular systolic pressure from tricuspid regurgitation is mildly elevated (35-45 mmHg). Calculated right ventricular systolic pressure from tricuspid regurgitation is 38 mmHg.    History of Present Illness     Recent Hospitalizations:  No hospitalization(s) within the last year..  [facility]    Patient Care Team:  Tyron Driver MD as PCP - General     I personally reviewed and updated the patient's allergies, medications, problem list, and past medical, surgical, social, and family history. I have reviewed and confirmed the accuracy of the HPI and ROS as documented by the MA/LPN/RN Bailey Henson MA      Family History   Problem Relation Age of Onset    Parkinsonism Mother     Heart disease Mother         cardiovascular disease    Diabetes Mother         diabetes mellitus     Heart disease Father         cardiovascular disease    Heart disease Sister         cardiovascular disease    Diabetes Sister         diabetes mellitus     Heart disease Brother         cardiovascular disease    Diabetes Son         diabetes mellitus     Heart disease Paternal Uncle         ischemic    Stomach cancer Maternal Grandmother     Breast cancer Niece 39    Ovarian cancer Paternal Grandmother        Social History     Tobacco Use     Smoking status: Never     Passive exposure: Never    Smokeless tobacco: Never   Vaping Use    Vaping status: Never Used   Substance Use Topics    Alcohol use: Not Currently    Drug use: No       Past Surgical History:   Procedure Laterality Date    CATARACT EXTRACTION Left 08/2005    with Insert of Lens Prostheti    CYSTOCELE REPAIR  06/1998    SIMENTAL Cystourethropexy & Cystocele Repair     ENDOSCOPY N/A 5/9/2024    Procedure: ESOPHAGOGASTRODUODENOSCOPY WITH BIOPSY X 1, DILATION (#52,#56 BOUGIE);  Surgeon: Jonah Abebe MD;  Location: [facility] ENDOSCOPY;  Service: Gastroenterology;  Laterality: N/A;  GASTRITIS, ESOPHAGITIS, DYSPHASIA, HIATEL HERNIA    KIDNEY SURGERY Right 02/24/2014    Removal    TONSILLECTOMY  1953    URETHROLYSIS  10/2000    Transvaginal Urethrolysis & Bone Conroy Sling    VAGINAL HYSTERECTOMY  1970    VITRECTOMY Left 04/2005    & Membrane Peeling       Patient Active Problem List   Diagnosis    Acid reflux    Allergic rhinitis    Anxiety    Atrophic kidney    Carotid bruit present    Carpal tunnel syndrome    Low back pain    Neck pain    Thoracic back pain    Depressive disorder    Medicare annual wellness visit, subsequent    Gout    Hemorrhoids    Mixed hyperlipidemia    IC (interstitial cystitis)    Irritable bowel syndrome with constipation    LVH (left ventricular hypertrophy)    Menopausal syndrome    Mitral insufficiency, acute    Onychomycosis    Disorder of bone and cartilage    Age-related osteoporosis without current pathological fracture    Palpitations    Osteoarthritis    Primary pulmonary hypertension    Renal insufficiency    Plantar fasciitis    Senile osteoporosis    Solitary kidney    Encounter for screening mammogram for malignant neoplasm of breast    Special screening for malignant neoplasms, colon    Seborrheic keratosis    Chronic kidney disease, stage 3 (moderate)    Renal hypertrophy    Hypercalcemia    Acute cystitis with hematuria    Lower extremity edema     "Overweight with body mass index (BMI) of 28 to 28.9 in adult    Dizziness    Left hip pain    Bilateral primary osteoarthritis of knee    Elevated fasting blood sugar    Solitary kidney, acquired    Benign essential HTN    Memory loss    Campylobacter diarrhea    Screening for thyroid disorder    Elevated hemoglobin A1c measurement    Vertigo    Other dysphagia         Current Outpatient Medications:     amLODIPine (NORVASC) 5 MG tablet, Take 1 tablet by mouth Daily., Disp: 90 tablet, Rfl: 1    aspirin 81 MG EC tablet, Take 1 tablet by mouth Daily., Disp: , Rfl:     carvedilol (COREG) 12.5 MG tablet, TAKE 1 TABLET BY MOUTH TWICE DAILY, Disp: 60 tablet, Rfl: 1    ezetimibe (ZETIA) 10 MG tablet, Take 1 tablet by mouth Daily., Disp: 90 tablet, Rfl: 3    famotidine (Pepcid) 40 MG tablet, Take 1 tablet by mouth Daily., Disp: 90 tablet, Rfl: 3    hydrALAZINE (APRESOLINE) 25 MG tablet, TAKE 1 TABLET BY MOUTH TWICE DAILY, Disp: 180 tablet, Rfl: 0    hydroCHLOROthiazide (MICROZIDE) 12.5 MG capsule, Take 1 capsule by mouth Daily., Disp: 90 capsule, Rfl: 3    Restasis 0.05 % ophthalmic emulsion, , Disp: , Rfl:     rosuvastatin (CRESTOR) 5 MG tablet, TAKE 1 TABLET BY MOUTH DAILY, Disp: 90 tablet, Rfl: 1    SUPER B COMPLEX/C capsule, SUPER B COMPLEX/C ORAL CAPSULE, Disp: , Rfl:     tamsulosin (FLOMAX) 0.4 MG capsule 24 hr capsule, Take 1 capsule by mouth Daily., Disp: , Rfl:     vitamin D3 125 MCG (5000 UT) capsule capsule, Take 1 capsule by mouth Daily., Disp: , Rfl:     No opioid medication identified on active medication list. I have reviewed chart for other potential  high risk medication/s and harmful drug interactions in the elderly.          Objective   /62 (BP Location: Right arm, Patient Position: Sitting, Cuff Size: Adult)   Pulse 73   Temp 97.3 °F (36.3 °C) (Temporal)   Resp 18   Ht 154.9 cm (60.98\")   Wt 72.8 kg (160 lb 9.6 oz)   SpO2 95%   BMI 30.36 kg/m²   BP Readings from Last 3 Encounters:   07/03/24 " 134/62   24 129/63   24 124/68     Wt Readings from Last 3 Encounters:   24 72.8 kg (160 lb 9.6 oz)   24 73.8 kg (162 lb 11.2 oz)   24 74.5 kg (164 lb 3.2 oz)       No results found.     Age-appropriate Screening Schedule:  Refer to the list below for future screening recommendations based on patient's age, sex and/or medical conditions. Orders for these Meredith Rhodes tests are listed in the plan section. The patient has been provided with a written plan.    Health Maintenance   Topic Date Due    RSV Vaccine - Adults (1 - 1-dose 60+ series) Never done    ZOSTER VACCINE (2 of 3) 2019    TDAP/TD VACCINES (3 - Td or Tdap) 10/24/2022    COVID-19 Vaccine (2023- season) 2023    HEPATITIS C SCREENING  2025 (Originally 7/15/2019)    INFLUENZA VACCINE  2024    DXA SCAN  2025    LIPID PANEL  2025    ANNUAL WELLNESS VISIT  2025    BMI FOLLOWUP  2025    COLORECTAL CANCER SCREENING  2026    Pneumococcal Vaccine 65+  Completed       Fall Risk Screen:    JW Fall Risk Assessment was completed, and patient is at LOW risk for falls.Assessment completed on:7/3/2024    Depression Screen:       7/3/2024    10:44 AM   PHQ-2/PHQ-9 Depression Screening   Little Interest or Pleasure in Doing Things 0-->not at all   Feeling Down, Depressed or Hopeless 0-->not at all   PHQ-9: Brief Depression Severity Measure Score 0     I spent more than 16 minutes asking patient questions, counseling and documenting in the chart.    Health Habits and Functional and Cognitive Screenin/3/2024    10:00 AM   Functional & Cognitive Status   Do you have difficulty preparing food and eating? No   Do you have difficulty bathing yourself, getting dressed or grooming yourself? No   Do you have difficulty using the toilet? No   Do you have difficulty moving around from place to place? No   Do you have trouble with steps or getting out of a bed or a chair? No    Current Diet Well Balanced Diet   Dental Exam Up to date   Eye Exam Up to date   Exercise (times per week) 0 times per week   Current Exercises Include No Regular Exercise   Do you need help using the phone?  No   Are you deaf or do you have serious difficulty hearing?  No   Do you need help to go to places out of walking distance? No   Do you need help shopping? No   Do you need help preparing meals?  No   Do you need help with housework?  No   Do you need help with laundry? No   Do you need help taking your medications? No   Do you need help managing money? No   Do you ever drive or ride in a car without wearing a seat belt? No   Have you felt unusual stress, anger or loneliness in the last month? No   Who do you live with? Alone   If you need help, do you have trouble finding someone available to you? No   Have you been bothered in the last four weeks by sexual problems? No   Do you have difficulty concentrating, remembering or making decisions? No       Does the patient have evidence of cognitive impairment? No    Advance Care Planning:  ACP discussion was held with the patient during this visit. Patient has an advance directive in EMR which is still valid.      A face-to-face visit was completed today with patient.  Counseling explanation, and discussion of advanced directives was performed.   The last advanced care visit was performed in 2023.  In a near life ending situation, from which she is not expected to recover functionally, and she is not able to speak for her, she does not want life sustaining measures. We discussed feeding tubes, ventilators and cardiac support as well as dialysis.    I spent more than 16 minutes discussing Advanced Care Planning information and documenting in the chart.    Identification of Risk Factors:  Risk factors include: Breast Cancer/Mammogram Screening  Cardiovascular risk  Colon Cancer Screening  Fall Risk  Immunizations Discussed/Encouraged (specific immunizations; Tdap,  Shingrix, COVID19, and RSV (Respiratory Syncytial Virus) )  Obesity/Overweight   Osteoporosis Risk  Dental Screening Recommended;  AMB DENTAL SCREENING RECOMMENDED:  Vision Screening Recommended;  AMB VISION SCREENING    Compared to one year ago, the patient feels her physical health is the same.  Compared to one year ago, the patient feels her mental health is the same.    Patient Self-Management and Personalized Health Advice  The patient has been provided with information about: diet, exercise, weight management, prevention of cardiac or vascular disease, fall prevention, and designing advance directives and preventive services including:   Alcohol Misuse Screening and Counseling  (15 minutes counseling time, Code )  Annual Wellness Visit (AWV)  Bone Density Measurements  Cardiovascular Disease Screening Tests (may do this order every 5 years in beneficiaries without signs or symptoms of cardiovascular disease)  Counseling to Prevent Tobacco Use (use of smartset and @cessation@ smartphrase for documentation)  Depression Screening (15 minutes face to face, Code )  Diabetes Screening-Lab Order for either glucose quantitative blood (except reagent strip), glucose;post glucose dose(includes glucose), or glucose tolerance test-3 specimens(includes glucose).      Assessment & Plan      Medications        Problem List         LOS      Diagnoses and all orders for this visit:    1. Medicare annual wellness visit, subsequent (Primary)    2. Benign essential HTN    3. Mixed hyperlipidemia    4. Class 1 obesity due to excess calories with serious comorbidity and body mass index (BMI) of 30.0 to 30.9 in adult    5. Neck pain    6. Osteoporosis, unspecified osteoporosis type, unspecified pathological fracture presence    7. Bilateral swelling of feet        Medicare wellness.  Discussed health maintenance, routine immunizations, screening tests, lifestyle modification.

## 2024-07-02 NOTE — PROGRESS NOTES
Subjective   Dunia Fabian is a 73 y.o. female.     Chief Complaint   Patient presents with    Medicare Wellness-subsequent    Hypertension    Hyperlipidemia    Foot Swelling       History of Present Illness  Dunia is about to go on vacation to [place], and she is concerned about her feet and ankle swelling. She is asking for any advice to keep the swelling down.   Hypertension  This is a chronic problem. The current episode started more than 1 year ago. The problem has been gradually improving since onset. The problem is controlled. Pertinent negatives include no chest pain, palpitations, peripheral edema or shortness of breath. (Dizziness, and neck pain.    Patient following up on the medication she was started on the last time she was here. She was started on Bystolic 20 MG tablet. She states that she is still logging her pressures and she states that there has been some higher readings as she takes her reading as soon as she gets up in the morning.   She states that the last time she was here she was instructed to stop the losartan that she was taking and she states that when she went to the pharmacy to get her medication she states that they had the Losartan ready for her and she states that it was for 25 mg which was cut in half from what she was on and she was concerned as she said that she was told to stop it completely. ) There are no associated agents to hypertension. Risk factors for coronary artery disease include post-menopausal state. Current antihypertension treatment includes beta blockers and calcium channel blockers. The current treatment provides moderate improvement. There are no compliance problems.  There is no history of kidney disease, CAD/MI, heart failure or left ventricular hypertrophy. There is no history of chronic renal disease, coarctation of the aorta, hyperaldosteronism, hypercortisolism, hyperparathyroidism, a hypertension causing med, pheochromocytoma, renovascular disease  or a thyroid problem.   Hyperlipidemia  This is a chronic problem. The current episode started more than 1 year ago. The problem is controlled. Recent lipid tests were reviewed and are high. She has no history of chronic renal disease. Factors aggravating her hyperlipidemia include fatty foods. Pertinent negatives include no chest pain or shortness of breath. Current antihyperlipidemic treatment includes statins. There are no compliance problems.  Risk factors for coronary artery disease include dyslipidemia and hypertension.            I personally reviewed and updated the patient's allergies, medications, problem list, and past medical, surgical, social, and family history. I have reviewed and confirmed the accuracy of the History of Present Illness and Review of Symptoms as documented by the MA/MAXIMUSN/RN. Tyron Driver MD    Family History   Problem Relation Age of Onset    Parkinsonism Mother     Heart disease Mother         cardiovascular disease    Diabetes Mother         diabetes mellitus     Heart disease Father         cardiovascular disease    Heart disease Sister         cardiovascular disease    Diabetes Sister         diabetes mellitus     Heart disease Brother         cardiovascular disease    Diabetes Son         diabetes mellitus     Heart disease Paternal Uncle         ischemic    Stomach cancer Maternal Grandmother     Breast cancer Niece 39    Ovarian cancer Paternal Grandmother        Social History     Tobacco Use    Smoking status: Never     Passive exposure: Never    Smokeless tobacco: Never   Vaping Use    Vaping status: Never Used   Substance Use Topics    Alcohol use: Not Currently    Drug use: No       Past Surgical History:   Procedure Laterality Date    CATARACT EXTRACTION Left 08/2005    with Insert of Lens Prostheti    CYSTOCELE REPAIR  06/1998    SIMENTAL Cystourethropexy & Cystocele Repair     ENDOSCOPY N/A 5/9/2024    Procedure: ESOPHAGOGASTRODUODENOSCOPY WITH BIOPSY X 1, DILATION  (#52,#56 BOUGIE);  Surgeon: Jonah Abebe MD;  Location: [facility] ENDOSCOPY;  Service: Gastroenterology;  Laterality: N/A;  GASTRITIS, ESOPHAGITIS, DYSPHASIA, HIATEL HERNIA    KIDNEY SURGERY Right 02/24/2014    Removal    TONSILLECTOMY  1953    URETHROLYSIS  10/2000    Transvaginal Urethrolysis & Bone Haywood Sling    VAGINAL HYSTERECTOMY  1970    VITRECTOMY Left 04/2005    & Membrane Peeling       Patient Active Problem List   Diagnosis    Acid reflux    Allergic rhinitis    Anxiety    Atrophic kidney    Carotid bruit present    Carpal tunnel syndrome    Low back pain    Neck pain    Thoracic back pain    Depressive disorder    Medicare annual wellness visit, subsequent    Gout    Hemorrhoids    Mixed hyperlipidemia    IC (interstitial cystitis)    Irritable bowel syndrome with constipation    LVH (left ventricular hypertrophy)    Menopausal syndrome    Mitral insufficiency, acute    Onychomycosis    Disorder of bone and cartilage    Age-related osteoporosis without current pathological fracture    Palpitations    Osteoarthritis    Primary pulmonary hypertension    Renal insufficiency    Plantar fasciitis    Senile osteoporosis    Solitary kidney    Encounter for screening mammogram for malignant neoplasm of breast    Special screening for malignant neoplasms, colon    Seborrheic keratosis    Chronic kidney disease, stage 3 (moderate)    Renal hypertrophy    Hypercalcemia    Acute cystitis with hematuria    Lower extremity edema    Overweight with body mass index (BMI) of 28 to 28.9 in adult    Dizziness    Left hip pain    Bilateral primary osteoarthritis of knee    Elevated fasting blood sugar    Solitary kidney, acquired    Benign essential HTN    Memory loss    Campylobacter diarrhea    Screening for thyroid disorder    Elevated hemoglobin A1c measurement    Vertigo    Other dysphagia         Current Outpatient Medications:     amLODIPine (NORVASC) 5 MG tablet, Take 1 tablet by mouth Daily., Disp: 90 tablet,  Rfl: 1    aspirin 81 MG EC tablet, Take 1 tablet by mouth Daily., Disp: , Rfl:     carvedilol (COREG) 12.5 MG tablet, TAKE 1 TABLET BY MOUTH TWICE DAILY, Disp: 60 tablet, Rfl: 1    Diclofenac Sodium (VOLTAREN) 1 % gel gel, Apply 4 g topically to the appropriate area as directed 3 (Three) Times a Day As Needed (neck pain). Dose is 4 grams for knees, ankles, feet.  Dose is 2 grams for elbows, wrists, hands., Disp: 50 g, Rfl: 5    ezetimibe (ZETIA) 10 MG tablet, Take 1 tablet by mouth Daily., Disp: 90 tablet, Rfl: 3    famotidine (Pepcid) 40 MG tablet, Take 1 tablet by mouth Daily., Disp: 90 tablet, Rfl: 3    hydrALAZINE (APRESOLINE) 25 MG tablet, TAKE 1 TABLET BY MOUTH TWICE DAILY, Disp: 180 tablet, Rfl: 0    hydroCHLOROthiazide (MICROZIDE) 12.5 MG capsule, Take 1 capsule by mouth Daily., Disp: 90 capsule, Rfl: 3    Restasis 0.05 % ophthalmic emulsion, , Disp: , Rfl:     rosuvastatin (CRESTOR) 5 MG tablet, TAKE 1 TABLET BY MOUTH DAILY, Disp: 90 tablet, Rfl: 1    SUPER B COMPLEX/C capsule, SUPER B COMPLEX/C ORAL CAPSULE, Disp: , Rfl:     tamsulosin (FLOMAX) 0.4 MG capsule 24 hr capsule, Take 1 capsule by mouth Daily., Disp: , Rfl:     vitamin D3 125 MCG (5000 UT) capsule capsule, Take 1 capsule by mouth Daily., Disp: , Rfl:     cephalexin (KEFLEX) 500 MG capsule, Take 1 capsule by mouth 3 (Three) Times a Day., Disp: 30 capsule, Rfl: 0         Review of Systems   Constitutional:  Negative for chills and diaphoresis.   HENT:  Negative for trouble swallowing and voice change.    Eyes:  Negative for visual disturbance.   Respiratory:  Negative for shortness of breath.    Cardiovascular:  Negative for chest pain and palpitations.   Gastrointestinal:  Negative for abdominal pain and nausea.   Endocrine: Negative for polydipsia and polyphagia.   Genitourinary:  Negative for hematuria.   Musculoskeletal:  Negative for neck stiffness.   Skin:  Negative for color change and pallor.   Allergic/Immunologic: Negative for  "immunocompromised state.   Neurological:  Negative for seizures and syncope.   Hematological:  Negative for adenopathy.   Psychiatric/Behavioral:  Negative for hallucinations, sleep disturbance and suicidal ideas.        I have reviewed and confirmed the accuracy of the ROS as documented by the MA/LPN/RN Tyron Driver MD      Objective   /62 (BP Location: Right arm, Patient Position: Sitting, Cuff Size: Adult)   Pulse 73   Temp 97.3 °F (36.3 °C) (Temporal)   Resp 18   Ht 154.9 cm (60.98\")   Wt 72.8 kg (160 lb 9.6 oz)   SpO2 95%   BMI 30.36 kg/m²   BP Readings from Last 3 Encounters:   07/03/24 134/62   05/09/24 129/63   05/01/24 124/68     Wt Readings from Last 3 Encounters:   07/03/24 72.8 kg (160 lb 9.6 oz)   05/09/24 73.8 kg (162 lb 11.2 oz)   05/01/24 74.5 kg (164 lb 3.2 oz)     Physical Exam  Constitutional:       Appearance: She is well-developed. She is not diaphoretic.   HENT:      Head: Normocephalic.      Right Ear: Hearing, tympanic membrane, ear canal and external ear normal.      Left Ear: Hearing, tympanic membrane, ear canal and external ear normal.      Nose: Nose normal. No mucosal edema or congestion.      Right Sinus: No maxillary sinus tenderness or frontal sinus tenderness.      Left Sinus: No maxillary sinus tenderness or frontal sinus tenderness.      Mouth/Throat:      Mouth: No oral lesions.      Pharynx: Uvula midline. No oropharyngeal exudate or posterior oropharyngeal erythema.      Tonsils: No tonsillar exudate.   Eyes:      General: Lids are normal.      Conjunctiva/sclera: Conjunctivae normal.      Pupils: Pupils are equal, round, and reactive to light.   Neck:      Thyroid: No thyromegaly.      Vascular: No carotid bruit or JVD.      Trachea: No tracheal deviation.   Cardiovascular:      Rate and Rhythm: Normal rate and regular rhythm.      Heart sounds: Normal heart sounds. No murmur heard.     No friction rub. No gallop.   Pulmonary:      Effort: Pulmonary effort is " normal.      Breath sounds: Normal breath sounds. No stridor. No decreased breath sounds, wheezing or rales.   Abdominal:      General: Bowel sounds are normal. There is no distension.      Palpations: Abdomen is soft. There is no mass.      Tenderness: There is no abdominal tenderness. There is no guarding or rebound.      Hernia: No hernia is present.   Lymphadenopathy:      Head:      Right side of head: No submental, submandibular, tonsillar, preauricular, posterior auricular or occipital adenopathy.      Left side of head: No submental, submandibular, tonsillar, preauricular, posterior auricular or occipital adenopathy.      Cervical: No cervical adenopathy.   Skin:     General: Skin is warm and dry.      Coloration: Skin is not pale.   Neurological:      Mental Status: She is alert and oriented to person, place, and time.      Cranial Nerves: No cranial nerve deficit.      Sensory: No sensory deficit.      Coordination: Coordination normal.      Gait: Gait normal.      Deep Tendon Reflexes: Reflexes are normal and symmetric.         Data / Lab Results:    Hemoglobin A1C   Date Value Ref Range Status   07/03/2024 6.5 (A) 4.5 - 5.7 % Final   01/05/2024 6.3 (H) 4.8 - 5.6 % Final     Comment:              Prediabetes: 5.7 - 6.4           Diabetes: >6.4           Glycemic control for adults with diabetes: <7.0     01/03/2024 6.8 (A) 4.5 - 5.7 % Final   05/10/2023 6.3 (H) 4.8 - 5.6 % Final     Comment:              Prediabetes: 5.7 - 6.4           Diabetes: >6.4           Glycemic control for adults with diabetes: <7.0     09/10/2021 6.1 % Final        Lab Results   Component Value Date    LDL 71 06/21/2024    LDL 91 05/03/2023     (H) 03/11/2022     Lab Results   Component Value Date    CHOL 181 07/24/2018    CHOL 215 (H) 07/21/2017    CHOL 198 07/20/2016     Lab Results   Component Value Date    TRIG 138 06/21/2024    TRIG 131 05/03/2023    TRIG 128 03/11/2022     Lab Results   Component Value Date    HDL  "54 06/21/2024    HDL 56 05/03/2023    HDL 52 03/11/2022     No results found for: \"PSA\"  Lab Results   Component Value Date    WBC 9.1 06/21/2024    HGB 12.8 06/21/2024    HCT 38.3 06/21/2024    MCV 88 06/21/2024     06/21/2024     Lab Results   Component Value Date    TSH 4.010 06/21/2024      Lab Results   Component Value Date    GLUCOSE 127 (H) 06/21/2024    BUN 25 06/21/2024    CREATININE 1.28 (H) 06/21/2024    EGFRIFNONA 48 (L) 08/28/2021    EGFRIFAFRI 55 (L) 08/28/2021    BCR 20 06/21/2024    K 4.2 06/21/2024    CO2 28 06/21/2024    CALCIUM 10.8 (H) 06/21/2024    PROTENTOTREF 7.3 06/21/2024    ALBUMIN 4.4 06/21/2024    LABIL2 1.8 01/05/2024    AST 18 06/21/2024    ALT 13 06/21/2024     No results found for: \"ESTHER\", \"RF\", \"SEDRATE\"   No results found for: \"CRP\"   No results found for: \"IRON\", \"TIBC\", \"FERRITIN\"   Lab Results   Component Value Date    [id number] 805 05/10/2023          Assessment & Plan      Medications        Problem List         LOS    Medicare wellness.  Doing well, vaccines current.  She agrees to update her tetanus and pneumonia shots through the pharmacy.  Discussed health maintenance, screening test, lifestyle modification.  Pap current followed by Dr. Patiño, mammogram benign 11/22, further screening declined.  Vertigo.  Much improved/resolved today.  Likely 2nd acute bacterial sinusitis with eustachain tube dysfxn, start antibotics, prednisone.  Increase fluid intake.  Home blood pressure results reviewd / good control. Has had caridology eval, tilt table, brain imagery planned.  Follow up recheck. Call or return if worsening or persistent symptoms.  Risk/benefits/side effects of prednisone Rx discussed, she has tolerated Rx in past without difficulty, okay to decrease or discontinue dose if side effects develop..  Allergic rhinitis.  OTC Claritin or Zyrtec.  Hypertension.  Overall improved today on carvedilol, amlodipine, tolerating decreased dose hydralazine ,Off metoprolol per " cardiology followed by Dr. Winston, .  Hold losartan/has had nephrology eval per Dr. Mcgee,  renal ultrasound/renal artery Doppler benign, has follow-up upcoming..  Clinically improved today on  hydralazine 100 mg twice daily, as needed clonidine.  Monitor blood pressure closely.  Follow-up recheck.  Consider restart amlodipine if persistent elevation.   . Discussed low-sodium diet.  Stress echo benign/elevated right-sided pressure only 2018.  Carotid Dopplers with mild blockage only 2018.  MRI brain benign 2020.  Has had cardiology eval/plan repeat stress testing when blood pressure better controlled per cardilogy recs/cardiology referral scheduled..  Life stress/decreased energy.  Multiple stressors.  Good social support.   Initially improved on Cymbalta/Wellbutrin, she discontinued Rx.  Did not tolerate sertraline.  Consider alternative SSRI if persistent symptoms.  Lower extremity edema.  Improved currently with decreased dose amlodipine.  Discussed low-sodium diet.  Follow-up recheck.  Consider sleep study if persistent symptoms.  Osteoporosis.  Tolerating Prolia.  Holding calcium/vitamin D per nephrology.   Repeat DEXA improved 2023 on Prolia.  Atrophic kidney.  Improved status post nephrectomy, followed by urology,nephrology.  Renal function gft 46..  Has had recent follow-up visit Dr. Mcgee, repeat blood work, we will get records.  We will get  Interstitial cystitis.  Improved on Flomax, followed by [facility], history of InterStim placement/subsequent removal.  I and O catheter dependent.  Hypertensive retinopathy.  Followed by ophthalmology ember galeano, status post treatment  Colon screening.  Colonoscopy benign 2016./Diverticulosis only  Carotid stenosis.  Improved per repeat Dopplers 4/24, moderate on the right, mild on the left, less than 50% bilaterally, history of 50 to 59% on the right, mild on left per Doppler 1/21, stable per repeat ultrasound 5/23.  She is allergic to contrast and gadolinium.  Repeat Doppler  1 year.  Consider vascular surgery referral if worsening symptoms.  Continue risk factor reduction.  Osteoarthritis.  Worse left hip/knee.    Followed by orthopedics Dr. Angel, x-rays with mild left hip OA, moderate left OA knee, attempting knee bracing.  Consider knee injection.  Hyperlipidemia.    Improved today, LDL to 90 rosuvastatin, tolerating Rx.   Aggressive LDL target considering carotid stenosis. .  Discussed diet exercise lifestyle modification.  Follow-up recheck.    Memory loss.  Stable today, MMSE 28, B12 normal, did not tolerate Zoloft.  Good social support.  MRI brain benign 2020.  Follow-up recheck/plan check MMSE.  Consider alternative SSRI, Namenda if worsening symptoms.  Neck pain.  Persistent symptoms today, significant bilateral trapezius spasm on exam today with left upper extremity radiculopathy, x-rays benign, positive and significant trauma DDx includes ruptured disc versus cervical disc disease, check MRI, add prednisone, continue muscle relaxant.  Consider PT referral, referral for epidural injections.  Follow-up recheck.  Call return if worsening symptoms.  Elevated fasting blood sugar.  Stable/borderline, A1c 6.5.  Discussed diet, exercise and lifestyle modification.  Follow-up recheck.  Epigastric pain / diarrhea.  Improved /Resolved today, stool cultures with Campylobacter, completing course Augmentin, add probiotics.  Overall benign exAm today, no s/s diverticultis.  DDX includes gb pathology, infeciton, gastritis. bloodwork, ruq us Benign.  Has completed course antibitotics, improvrd on ppi, cholestid / bentyl.  Increase fluid intake bland diet. Signs and symptoms of dehydration discussed.. Follow up recheck. Call or return if worsening or persistent symptoms  Acute bacterial bronchitis.  Clinically improved/resolved course antibiotics/steroids.  Dysphagia.  Improved today status post EGD with dilation.  EGD consistent with gastritis, biopsy benign, likely secondary to GERD,  continue PPI.  Has had gastroenterology eval, repeat EGD upcoming..  Discussed drink plenty of fluids, she feels throughout the, caution with movement, go to ED if worsening symptoms.  Lower extremity edema.  Improved today on HCTZ per cardiology, follow-up recheck kidney function.  New onset, overall mild symptoms today.  DDx includes secondary to peripheral vascular disease, recent steroid use, amlodipine Rx.  Discussed low-sodium diet, compression.  Follow-up recheck.  expect resolution now that she has completed course of prednisone.  Consider decrease amlodipine Rx if persistent symptoms.  Has cardiology follow-up upcoming.  Plantar fasciitis.  Much improved/resolving today.  Ice, rehabilitation exercises discussed, start nighttime bracing/heel cups.  Follow-up recheck.  Consider steroid Rx if persistent symptoms.    Previous Exams:  Last PAP Smear was on 1/9/18 by Dr Patiño.    · Impression of Normal    Last Mammogram was on 11/08/2022 ordered by Dr Patiño.    · Impression of Normal   · Recommended repeat in 1 year    Last DEXA was on 5/22/2023 ordered by Dr Driver   · Impression of Osteopenia. Based upon the National Osteoporosis Foundation Guide,  patient's FRAX score does not meet criteria for pharmacologic treatment  to prevent osteoporosis. Individual treatment decisions should be made  based upon each patient's full clinical history and risk factors.     Last US Carotid Dopplers was on 5/22/2023 ordered by Dr Driver.    · Impression of Bulky calcific plaquing, right carotid bulb, with continued suggestion of a 50 to 69% diameter stenosis. When compared to the prior examination of October, 2021, the category stenosis is stable.   Mild to moderate plaquing, left carotid bulb, with the estimated degree of stenosis remaining at less than 50% diameter stenotic.   Patent bilateral vertebral arteries with antegrade flow.   Ostial stenosis, right external carotid artery.          Recommended repeat in  1year    Last Colonoscopy was on 6/21/2016 by Dr Goff.    · Impression of Diverticulosis of the sigmoid colon   · Recommended repeat in 10 years    Last Stress Test was on 11/27/2023 ordered by Dr Winston.    · Impression of Left ventricular ejection fraction is hyperdynamic (Calculated EF > 70%).   Findings consistent with an equivocal ECG stress test.      Last EKG was on 1/8/2024 ordered by Dr Winston.    · Impression of Rhythm: sinus rhythm  Rate: normal  QRS axis: normal  Other findings: non-specific ST-T wave changes      Last Echocardiogram was on 12/01/2022 ordered by Dr Winston.    · Impression of Left ventricular systolic function is normal. Left ventricular ejection fraction appears to be 61 - 65%.    Left ventricular diastolic function was normal.   Estimated right ventricular systolic pressure from tricuspid regurgitation is mildly elevated (35-45 mmHg). Calculated right ventricular systolic pressure from tricuspid regurgitation is 38 mmHg.          Diagnoses and all orders for this visit:    1. Medicare annual wellness visit, subsequent (Primary)    2. Benign essential HTN    3. Mixed hyperlipidemia    4. Class 1 obesity due to excess calories with serious comorbidity and body mass index (BMI) of 30.0 to 30.9 in adult    5. Neck pain  -     Diclofenac Sodium (VOLTAREN) 1 % gel gel; Apply 4 g topically to the appropriate area as directed 3 (Three) Times a Day As Needed (neck pain). Dose is 4 grams for knees, ankles, feet.  Dose is 2 grams for elbows, wrists, hands.  Dispense: 50 g; Refill: 5    6. Osteoporosis, unspecified osteoporosis type, unspecified pathological fracture presence  -     Diclofenac Sodium (VOLTAREN) 1 % gel gel; Apply 4 g topically to the appropriate area as directed 3 (Three) Times a Day As Needed (neck pain). Dose is 4 grams for knees, ankles, feet.  Dose is 2 grams for elbows, wrists, hands.  Dispense: 50 g; Refill: 5  -     denosumab (PROLIA) syringe 60 mg    7. Bilateral swelling  of feet    8. Elevated fasting blood sugar  -     POC Glycosylated Hemoglobin (Hb A1C)    9. Upper respiratory tract infection, unspecified type  -     cephalexin (KEFLEX) 500 MG capsule; Take 1 capsule by mouth 3 (Three) Times a Day.  Dispense: 30 capsule; Refill: 0    10. Solitary kidney, acquired    11. Renal insufficiency    12. Senile osteoporosis    13. Other dysphagia    14. Memory loss        BMI is >= 30 and <35. (Class 1 Obesity). The following options were offered after discussion;: exercise counseling/recommendations and nutrition counseling/recommendations        Expected course, medications, and adverse effects discussed.  Call or return if worsening or persistent symptoms.  I wore protective equipment throughout this patient encounter including a mask, gloves, and eye protection.  Hand hygiene was performed before donning protective equipment and after removal when leaving the room. The complete contents of the Assessment and Plan and Data/Lab Results as documented above have been reviewed and addressed by myself with the patient today as part of an ongoing evaluation / treatment plan.  If some of the documentation has been copied from a previous note and is unchanged it indicates that this problem / plan has been assessed today but is stable from a previous visit and no changes have been recommended.

## 2024-07-03 ENCOUNTER — OFFICE VISIT (OUTPATIENT)
Dept: FAMILY MEDICINE CLINIC | Facility: CLINIC | Age: 74
End: 2024-07-03
Payer: MEDICARE

## 2024-07-03 VITALS
TEMPERATURE: 97.3 F | SYSTOLIC BLOOD PRESSURE: 134 MMHG | HEART RATE: 73 BPM | DIASTOLIC BLOOD PRESSURE: 62 MMHG | OXYGEN SATURATION: 95 % | BODY MASS INDEX: 30.32 KG/M2 | WEIGHT: 160.6 LBS | RESPIRATION RATE: 18 BRPM | HEIGHT: 61 IN

## 2024-07-03 DIAGNOSIS — E78.2 MIXED HYPERLIPIDEMIA: ICD-10-CM

## 2024-07-03 DIAGNOSIS — I10 BENIGN ESSENTIAL HTN: ICD-10-CM

## 2024-07-03 DIAGNOSIS — R73.01 ELEVATED FASTING BLOOD SUGAR: ICD-10-CM

## 2024-07-03 DIAGNOSIS — E66.09 CLASS 1 OBESITY DUE TO EXCESS CALORIES WITH SERIOUS COMORBIDITY AND BODY MASS INDEX (BMI) OF 30.0 TO 30.9 IN ADULT: ICD-10-CM

## 2024-07-03 DIAGNOSIS — N28.9 RENAL INSUFFICIENCY: ICD-10-CM

## 2024-07-03 DIAGNOSIS — M54.2 NECK PAIN: ICD-10-CM

## 2024-07-03 DIAGNOSIS — R41.3 MEMORY LOSS: ICD-10-CM

## 2024-07-03 DIAGNOSIS — M79.89 BILATERAL SWELLING OF FEET: ICD-10-CM

## 2024-07-03 DIAGNOSIS — R13.19 OTHER DYSPHAGIA: ICD-10-CM

## 2024-07-03 DIAGNOSIS — M81.0 OSTEOPOROSIS, UNSPECIFIED OSTEOPOROSIS TYPE, UNSPECIFIED PATHOLOGICAL FRACTURE PRESENCE: ICD-10-CM

## 2024-07-03 DIAGNOSIS — J06.9 UPPER RESPIRATORY TRACT INFECTION, UNSPECIFIED TYPE: ICD-10-CM

## 2024-07-03 DIAGNOSIS — Z00.00 MEDICARE ANNUAL WELLNESS VISIT, SUBSEQUENT: Primary | ICD-10-CM

## 2024-07-03 DIAGNOSIS — Z90.5 SOLITARY KIDNEY, ACQUIRED: ICD-10-CM

## 2024-07-03 DIAGNOSIS — M81.0 SENILE OSTEOPOROSIS: ICD-10-CM

## 2024-07-03 LAB
EXPIRATION DATE: ABNORMAL
HBA1C MFR BLD: 6.5 % (ref 4.5–5.7)
Lab: ABNORMAL

## 2024-07-03 RX ORDER — CEPHALEXIN 500 MG/1
500 CAPSULE ORAL 3 TIMES DAILY
Qty: 30 CAPSULE | Refills: 0 | Status: SHIPPED | OUTPATIENT
Start: 2024-07-03

## 2024-08-28 DIAGNOSIS — E78.2 MIXED HYPERLIPIDEMIA: Chronic | ICD-10-CM

## 2024-08-28 RX ORDER — AMLODIPINE BESYLATE 5 MG/1
5 TABLET ORAL DAILY
Qty: 90 TABLET | Refills: 1 | Status: SHIPPED | OUTPATIENT
Start: 2024-08-28

## 2024-08-28 RX ORDER — ROSUVASTATIN CALCIUM 5 MG/1
5 TABLET, COATED ORAL DAILY
Qty: 90 TABLET | Refills: 1 | Status: SHIPPED | OUTPATIENT
Start: 2024-08-28

## 2024-09-04 ENCOUNTER — TRANSCRIBE ORDERS (OUTPATIENT)
Dept: ADMINISTRATIVE | Facility: HOSPITAL | Age: 74
End: 2024-09-04
Payer: MEDICARE

## 2024-09-04 DIAGNOSIS — Z12.31 ENCOUNTER FOR SCREENING MAMMOGRAM FOR MALIGNANT NEOPLASM OF BREAST: Primary | ICD-10-CM

## 2024-09-05 ENCOUNTER — OFFICE VISIT (OUTPATIENT)
Dept: CARDIOLOGY | Facility: CLINIC | Age: 74
End: 2024-09-05
Payer: MEDICARE

## 2024-09-05 VITALS
BODY MASS INDEX: 29.63 KG/M2 | OXYGEN SATURATION: 95 % | RESPIRATION RATE: 16 BRPM | SYSTOLIC BLOOD PRESSURE: 117 MMHG | HEART RATE: 59 BPM | DIASTOLIC BLOOD PRESSURE: 64 MMHG | HEIGHT: 62 IN | WEIGHT: 161 LBS

## 2024-09-05 DIAGNOSIS — R00.2 PALPITATIONS: ICD-10-CM

## 2024-09-05 DIAGNOSIS — E78.2 MIXED HYPERLIPIDEMIA: ICD-10-CM

## 2024-09-05 DIAGNOSIS — R55 SYNCOPE AND COLLAPSE: Primary | ICD-10-CM

## 2024-09-05 DIAGNOSIS — R09.89 CAROTID BRUIT, UNSPECIFIED LATERALITY: ICD-10-CM

## 2024-09-05 DIAGNOSIS — I10 BENIGN ESSENTIAL HTN: ICD-10-CM

## 2024-09-05 DIAGNOSIS — I51.7 LVH (LEFT VENTRICULAR HYPERTROPHY): ICD-10-CM

## 2024-09-05 RX ORDER — ROSUVASTATIN CALCIUM 5 MG/1
5 TABLET, COATED ORAL DAILY
Qty: 90 TABLET | Refills: 3 | Status: SHIPPED | OUTPATIENT
Start: 2024-09-05

## 2024-09-05 RX ORDER — EZETIMIBE 10 MG/1
10 TABLET ORAL DAILY
Qty: 90 TABLET | Refills: 3 | Status: SHIPPED | OUTPATIENT
Start: 2024-09-05

## 2024-09-05 RX ORDER — ASPIRIN 81 MG/1
81 TABLET ORAL DAILY
Qty: 90 TABLET | Refills: 3 | Status: SHIPPED | OUTPATIENT
Start: 2024-09-05

## 2024-09-05 RX ORDER — CLONIDINE HYDROCHLORIDE 0.1 MG/1
0.1 TABLET ORAL 2 TIMES DAILY
COMMUNITY
End: 2024-09-05 | Stop reason: SDUPTHER

## 2024-09-05 RX ORDER — CLONIDINE HYDROCHLORIDE 0.1 MG/1
0.1 TABLET ORAL 2 TIMES DAILY
Qty: 180 TABLET | Refills: 3 | Status: SHIPPED | OUTPATIENT
Start: 2024-09-05

## 2024-09-05 RX ORDER — AMLODIPINE BESYLATE 5 MG/1
5 TABLET ORAL DAILY
Qty: 90 TABLET | Refills: 3 | Status: SHIPPED | OUTPATIENT
Start: 2024-09-05

## 2024-09-05 RX ORDER — HYDROCHLOROTHIAZIDE 12.5 MG/1
12.5 CAPSULE ORAL DAILY
Qty: 90 CAPSULE | Refills: 3 | Status: SHIPPED | OUTPATIENT
Start: 2024-09-05

## 2024-09-05 RX ORDER — CARVEDILOL 12.5 MG/1
12.5 TABLET ORAL 2 TIMES DAILY
Qty: 180 TABLET | Refills: 3 | Status: SHIPPED | OUTPATIENT
Start: 2024-09-05

## 2024-09-05 RX ORDER — MECLIZINE HYDROCHLORIDE 25 MG/1
25 TABLET ORAL 3 TIMES DAILY PRN
COMMUNITY

## 2024-09-05 NOTE — PROGRESS NOTES
Cardiology Clinic Note  Marco A Winston MD, PhD    Subjective:     Encounter Date:09/05/2024      Patient ID: Dunia Fabian is a 73 y.o. female.    Chief Complaint:  Chief Complaint   Patient presents with    Follow-up     6 MONTHS       HPI:             I had the pleasure to see this very pleasant 73-year-old female in follow-up today initially referred for chest pain.  She has a history of CKD stage III with solitary kidney with nephrectomy in the past.  History of some mitral valve insufficiency. she has history of palpitations hypertension hyperlipidemia, murmur systolic in nature, mother and father and brother all have heart disease but unspecified, she is not diabetic and she is a non-smoker.  She has some chest pain substernal without significant radiation but blood pressures been very uncontrolled recently which is her primary reason for referral.  Doppler arterial ultrasound of the left renal which is her remaining kidney demonstrated normal Doppler findings with no evidence of stenosis.    Surgically absent right kidney.  Her losartan was  discontinued with elevated creatinine of 1.6 and August, September value was much better at 1.1.  She does drink a lot of water liberally to hydrate her remaining kidney she says.  Blood pressures are well controlled after previous changes to her regimen, 90-1 35 systolic at home.  She denies any unstable angina.  2D echo revealed normal EF at 65% with normal diastolic function with mild TR and normal pulmonary pressures.  She is reassured by these findings.  Wrist optimizing her regimens on Coreg amlodipine and hydralazine trying to decrease hydralazine to off as able or only as needed with long-acting medications once or twice a day only.     Today on repeat encounter she is overall doing well symptomatically but blood pressure much better controlled, stress testing and echo results below are benign with past workup although calcium score was significantly elevated  at 454 with 218 in the right, circumflex 87, , left main 13.  She is chest pain-free.  She has complete resolution of pitting edema.  Good functional status exercising and exerting herself regularly.  We discussed primary secondary prevention goals, reviewed her medicines individually, all questions answered proposed by patient today       Stress testing November 2023, EF greater than 70%, low risk study, possible diaphragmatic GI attenuation versus small fixed inferolateral defect, no reversibility identified, low risk for reversible ischemia     2D echo December 2022, EF 60 to 65% with normal diastolic function,  Mild TR, borderline elevated pulmonary pressures by instantaneous gradient likely not significant, normal RV size and function, normal atrial sizes grossly     Carotid Dopplers demonstrated bulky calcific plaque in the right carotid bulb 50 to 69% stenosis, mild to moderate left carotid bulb less than 50%, antegrade flow bilateral vertebrals, ostial stenosis right external  carotid     Review of systems otherwise negative x14 point review of systems except as mentioned above     Historical data copied forward from previous encounters in EMR including the history, exam, and assessment/plan has been reviewed and is unchanged unless noted otherwise.     Cardiac medicines reviewed with risk, benefits, and necessity of each discussed.     Risk and benefit of cardiac testing reviewed including death heart attack stroke pain bleeding infection need for vascular /cardiovascular surgery were discussed and the patient      Objective:         Vitals reviewed below     Physical Exam  Regular rate and rhythm no rubs gallops heave or lift  1 out of 6 systolic ejection murmur left sternal border  No clubbing cyanosis with trace to 2+ edema  Positive HJR and JVD  Clear to auscultation  Obese soft nontender nondistended  Normal pulses normal cap refill  Tach grossly  Normocephalic/atraumatic pupils are  "ground  Unchanged  Assessment:            Atypical chest pain  Essential hypertension  Volume overload previously  CKD stage III with remaining solitary left kidney, right kidney removed remotely  Systolic ejection murmur on exam  Severely elevated coronary calcium score  Coronary artery disease  Normal stress evaluation November 2023  Episodic dizziness  Peripheral edema     Continue Coreg 12.5 twice daily  Amlodipine 5 decreased to daily, peripheral edema resolved   no utilization of clonidine  HCTZ 12.5 daily  Continue hydralazine to 25 twice daily, extra dose as needed blood pressure greater than 140 systolic at home, twice daily blood pressure logs  Off Cardura  Avoid nitrates presently     Coronary calcium scoring severely elevated at 454  Continue Crestor and Zetia  Functional stress evaluation revealed no reversible ischemia, EF greater than 70% with no reversibility suggestive of ischemia and was a low risk study, November 2023  Chest pain-free     For any recurrent chest pain/chest discomfort would recommend ischemic evaluation with left heart catheterization with high risk calcium score     2D echo for structural functional evaluation revealed an EF of 60 to 65% with normal diastolic function and normal pulmonary pressures with mild TR.        9-month follow-up  Objective:         /64 (BP Location: Right arm, Patient Position: Sitting, Cuff Size: Adult)   Pulse 59   Resp 16   Ht 157.5 cm (62\")   Wt 73 kg (161 lb)   SpO2 95%   BMI 29.45 kg/m²               The pleasure to be involved in this patient's cardiovascular care.  Please call with any questions or concerns  Marco A Winston MD, PhD    Most recent EKG as reviewed and interpreted by me:  Procedures     Most recent echo as reviewed and interpreted by me:  Results for orders placed during the hospital encounter of 12/01/22    Adult Transthoracic Echo Complete W/ Cont if Necessary Per Protocol    Interpretation Summary    Left ventricular " systolic function is normal. Left ventricular ejection fraction appears to be 61 - 65%.    Left ventricular diastolic function was normal.    Estimated right ventricular systolic pressure from tricuspid regurgitation is mildly elevated (35-45 mmHg). Calculated right ventricular systolic pressure from tricuspid regurgitation is 38 mmHg.      Most recent stress test as reviewed and interpreted by me:  Results for orders placed during the hospital encounter of 11/24/23    Stress Test With Myocardial Perfusion (1 Day)    Interpretation Summary    Left ventricular ejection fraction is hyperdynamic (Calculated EF > 70%).    Findings consistent with an equivocal ECG stress test.    Low risk study  Fixed small mild inferolateral defect, possible diaphragmatic or GI attenuation  No reversibility identified  Hyperdynamic EF 79%  Stress ECG did not reach target heart rate with no reversible ST-T wave abnormality at peak stress or recovery, no arrhythmias seen  Normal size ventricle    Low risk study      Most recent cardiac catheterization as reviewed interpreted by me:  No results found for this or any previous visit.    The following portions of the patient's history were reviewed and updated as appropriate: allergies, current medications, past family history, past medical history, past social history, past surgical history, and problem list.      ROS:  14 point review of systems negative except as mentioned above    Current Outpatient Medications:     amLODIPine (NORVASC) 5 MG tablet, TAKE 1 TABLET BY MOUTH DAILY, Disp: 90 tablet, Rfl: 1    aspirin 81 MG EC tablet, Take 1 tablet by mouth Daily., Disp: , Rfl:     Biotin 2500 MCG chewable tablet, Chew 2 tablet/day Daily., Disp: , Rfl:     carvedilol (COREG) 12.5 MG tablet, TAKE 1 TABLET BY MOUTH TWICE DAILY, Disp: 60 tablet, Rfl: 1    cloNIDine (CATAPRES) 0.1 MG tablet, Take 1 tablet by mouth 2 (Two) Times a Day. ONLY IF OVER 160, Disp: , Rfl:     denosumab (PROLIA) 60 MG/ML  solution prefilled syringe syringe, Inject 1 mL under the skin into the appropriate area as directed 1 (One) Time., Disp: , Rfl:     ezetimibe (ZETIA) 10 MG tablet, Take 1 tablet by mouth Daily., Disp: 90 tablet, Rfl: 3    famotidine (Pepcid) 40 MG tablet, Take 1 tablet by mouth Daily., Disp: 90 tablet, Rfl: 3    hydroCHLOROthiazide (MICROZIDE) 12.5 MG capsule, Take 1 capsule by mouth Daily., Disp: 90 capsule, Rfl: 3    meclizine (ANTIVERT) 25 MG tablet, Take 1 tablet by mouth 3 (Three) Times a Day As Needed for Dizziness., Disp: , Rfl:     rosuvastatin (CRESTOR) 5 MG tablet, TAKE 1 TABLET BY MOUTH DAILY, Disp: 90 tablet, Rfl: 1    SUPER B COMPLEX/C capsule, SUPER B COMPLEX/C ORAL CAPSULE, Disp: , Rfl:     tamsulosin (FLOMAX) 0.4 MG capsule 24 hr capsule, Take 1 capsule by mouth Daily., Disp: , Rfl:     vitamin D3 125 MCG (5000 UT) capsule capsule, Take 1 capsule by mouth Daily., Disp: , Rfl:     Problem List:  Patient Active Problem List   Diagnosis    Acid reflux    Allergic rhinitis    Anxiety    Atrophic kidney    Carotid bruit present    Carpal tunnel syndrome    Low back pain    Neck pain    Thoracic back pain    Depressive disorder    Medicare annual wellness visit, subsequent    Gout    Hemorrhoids    Mixed hyperlipidemia    IC (interstitial cystitis)    Irritable bowel syndrome with constipation    LVH (left ventricular hypertrophy)    Menopausal syndrome    Mitral insufficiency, acute    Onychomycosis    Disorder of bone and cartilage    Age-related osteoporosis without current pathological fracture    Palpitations    Osteoarthritis    Primary pulmonary hypertension    Renal insufficiency    Plantar fasciitis    Senile osteoporosis    Solitary kidney    Encounter for screening mammogram for malignant neoplasm of breast    Special screening for malignant neoplasms, colon    Seborrheic keratosis    Chronic kidney disease, stage 3 (moderate)    Renal hypertrophy    Hypercalcemia    Acute cystitis with  hematuria    Lower extremity edema    Overweight with body mass index (BMI) of 28 to 28.9 in adult    Dizziness    Left hip pain    Bilateral primary osteoarthritis of knee    Elevated fasting blood sugar    Solitary kidney, acquired    Benign essential HTN    Memory loss    Campylobacter diarrhea    Screening for thyroid disorder    Elevated hemoglobin A1c measurement    Vertigo    Other dysphagia     Past Medical History:  Past Medical History:   Diagnosis Date    Acid reflux     Impression: Discussed diet, exercise, lifestyle modification. start ppi Call / return if persistent symptoms.    Acute bronchitis     Impression: Discussed the plan of care and anticipated course of illness with antibiotic treatment. Educated on side effects of antibiotics and hydromet. Pt was educated on the effects of decongestants on bp. She was encouraged to stop nyquil and decongestants. Frequent fluids and rest were encouraged. Pt was told if he symptoms do no improve within one week to return for further evaluation.    Acute cystitis with hematuria     Impression: Findings discussed. All questions answered. Medication and medication adverse effects discussed. Drug education given and explained to patient. Patient verbalized understanding. Follow-up in approximately 3 days for reevaluation if not improved. Follow-up sooner for worsening symptoms or any concerns. Increase fluids    Acute sinusitis     Allergic rhinitis     Impression: Stable.    Anxiety     Arm weakness     Impression: episode weakness / stiffness l hand and foot, resolved currently ddx includes tia / flare arhtritis / carpal tunnel consider recent working hard moving, rx in progress check carotid dopplers, echo, restart asa Follow up 2 months. Call / return if if recurrant symptoms.    Arthralgia     Impression: Will call with lab results. May need referral to Rheumatology.    Arthritis     Impression: history of treatment for lupus with medication in remote past -  recheck bloodwork    Atrophic kidney     Impression: improve status post nephrectomy followed by urology    Back pain     Impression: traumatic ice 20 minutes bid Rehabilitation exercises discussed. xray without fracture Consider further imaging if symptoms persist    Blurry vision     Impression: transient episode, resolved has had benign optho eval per her report, will get records ddx ncludes tia check carotid dopplers, holter continue asa Follow up [email or web address] Call / return if if recurrant symptoms.    Carotid bruit     Carpal tunnel syndrome     Impression: followed by hand surgery continue qhs bracing consider Follow up for repeat injxn, surgery if persistent symptoms.    Cellulitis     Impression: Topical care discussed. Call / return if persistent symptoms.    Chest pain     Constipation     Impression: improved today - increase fluids, fiber - did not tolerate metamucil, change to fiber tablet / titratete - continue otc lactulose prm - consider amitiza - Follow up recheck    Contusion of knee, right     Impression: Ice. Elevate. Rest. Discussed anticipated course. Given copy of x-rays on disc to take to Dr. Warren tomorrow.    Depressive disorder     Impression: conflict with family currently, expects resolution when moves to [place] later this mos to live with new  Good social support start ativan prn, use sparingly consider ssri if persistent symptoms.    Disorder of bone and cartilage     Impression: stable / slightly worse per dexa (osteopenia, fn -1.5) discussed calcium, vit d start prolia Discussed diet, exercise, lifestyle modification. recheck 1 year    Disorder of skin and subcutaneous tissue     Dog bite     Impression: her neighbors dog, is healthy Topical care discussed. Signs and symptoms of infecton discussed. Call / return if fever, worsening symptoms.    Dysphagia     Dysuria     Impression: likely 2nd uti / IC flare ct with atrophic r kidney with stones in r kidney only, xray lspine without  fracture, wbc normal continue antibiothiics + urinary retentinon / catheter in place urology Follow up scheduled monday go to ED if fever, unable to keep fluids down, worsening symptoms.    Encounter for long-term (current) use of medications     Enrolled in chronic care management     External otitis     Impression: Topical care discussed. Call / return if persistent symptoms.    Flank pain     Fracture, foot     Impression: Left. x-rayed at [facility] today - will call for records. Keep appointment with Dr. Warren for tomorrow. She was given prescription for Oxycodone in the ER.    Fracture, toe     Impression: base prox phalax great toe, nondisplaced, improving nita taping / hard soled shoe Call / return if persistent symptoms.    Generalized abdominal pain     Impression: llq, suprapubic, rlq cbc normal likely 2nd flare interstitial cystitis, uti, self cathing currently ddx includes diverticultis start antibiotics, cx sent, Follow up recheck has urology Follow up next week Call / return if fever, unable to keep fluids down, worsening symptoms. Consider imaging if persistent symptoms.    Gout     Impression: uric acid 7 on 4/12 doing well on allopurinol Follow up recheck levels Discussed diet, lifestyle modification.    H/O drug therapy     Hematuria, microscopic     Hemorrhoids     Impression: improved, increase fluids / fiber Topical care discussed. consider colorectal surgery referrall if persistent symptoms.    Hip pain     Impression: oa, worse / flare, refractory to pt ortho ref sched ice 20 minutes bid Rehabilitation exercises discussed. consider add tramadol    Hyperlipidemia     Impression: good control ------------- Stable Compliant with meds Is getting adequate diet and exercise Goals developed at last visit were met Care management needs are self addressed. Discussed diet, exercise, lifestyle modification.    Hypertension     Impression: good control Discussed low sodium diet, lifestyle modification.     Hypertension, benign     Impression: good control holding lisinopril secondary to renal insufficiency    IC (interstitial cystitis)     Impression: improved on flomax, followed by [facility] h/o interstim placement / subsequent removal h/o improved on rx per morena, pt Discussed diet, lifestyle modification. followed by Zulema / Ernie, s/p recentt removal interstim 2nd inneffective    Inflammatory and toxic neuropathy     Impression: Trial of medications to see if she can get some relief.    Influenza     Impression: Push fluids, bland diet. Signs and symptoms of dehydration discussed. Prophylactic rx written for close contacts. Call / return if unable to keep fluids down, worsening symptoms.    Irritable bowel syndrome with constipation     Impression: Occasional flares. Change with stress and diet.    Knee pain     Impression: Right. X-ray without obvious acute process - awaiting Radiologist's official report.    Low back pain     Impression: improved lumbar MRI with moderate ddd 2017, x-ray left hip with mild arthritis. Doing well, improved with epidural injections currently. s/p course Physical therapy. followed by Dr. Beavers / improved with epidural injxn, consider repeat course.    LVH (left ventricular hypertrophy)     Impression: htn well controlled recheck echo    Malaise and fatigue     Impression: much improved today possibly 2nd recent nephrectomy bloodwork normal consider further eval if persistent symptoms. follow up 2 to 3 months    Medicare annual wellness visit, subsequent     Impression: doing well, vaccines current Age specific anticipatory guidance and warning signs discussed. Diet, exercise, and lifestyle modification discussed. Safety, seatbelts, and routine screening examinations discussed. Discussed self-examinations.    Menopausal syndrome     Impression: current on mammogram / followed by ob/gyn (Dr. Claros) Recommend follow up visit for comprehensive physical exam, coordination of care, and  screening tests.    Mitral insufficiency, acute     Myalgia     Neck pain     Impression: strain ice 20 minutes bid Rehabilitation exercises discussed. Consider imaging if persistent symptoms.    Onychomycosis     Osteoarthritis     Impression: She has an appointment for another opinion.    Osteoporosis     Impression: improved, tolerating prolia discussed calcium, vit d Follow up recheck dxa    Overweight (BMI 25.0-29.9)     Pain in soft tissues of limb     Palpitations     Plantar fasciitis     Impression: qhs bracing / heel cups ice 20 minutes bid nsaids Rehabilitation exercises discussed. Call / return if persistent symptoms.    Primary pulmonary hypertension     Pruritus     Psoriasis     Rectal bleeding     Impression: likely 2nd hemorrhoids, rx in orogress cbc normal, no tachycardia Follow up recheck Call / return if worsening symptoms.    Renal insufficiency     Impression: mild / stable / improved s/p left nephrectomy bell avoid nsaids, improved off lisinopril / hctz / pyridium no blood draws in right arm    Rotator cuff tendonitis, right     Impression: strain ice 20 minutes bid Rehabilitation exercises discussed. Consider imaging, joint injxn if persistent symptoms.    RUQ pain     Impression: much improved on ppi gerd vs gb pathology ruq us neg Follow up recheck Call / return if unable to keep fluids down, fever, worsening symptoms.    Screening for depression     Negative Depression Screening (4 or less) ()    Screening mammogram, encounter for     Seborrheic keratosis     Impression: left forehead, benign appearance Topical care discussed. Follow up recheck apt with dermatology upcoming continue to monitor ccliniclly / consider resectino if worsening symptoms.    Self-catheterizes urinary bladder     PRN    Solar lentiginosis     Impression: r face, benign appearance Topical care discussed. Call / return if lesion increases in size, changes in shape or color, or becomes tender or inflamed.  Discussed skin care, use of sun block and protective clothing.    Solitary kidney     Impression: doing well, renal f negative normal avoid nsaids / continue to monitor    Special screening for malignant neoplasms, colon     Impression: Negative colonoscopy by Dr. Goff in 2016 (diverticulosis only)    Special screening for malignant neoplasms, colon 08/06/2019    Telogen effluvium     Thoracic back pain     TMJ arthritis     Impression: Rehabilitation exercises discussed. restart robaxin qhs prn    Tricuspid valve disorder     URI, acute     Impression: Increase fluid intake. Mucinex Call / return if fever, respiratory difficulty, worsening symptoms.    Urinary incontinence     Impression: She has been self catheterizing.    Urinary retention     Impression: catheter in place / urology Follow up scheduled     Past Surgical History:  Past Surgical History:   Procedure Laterality Date    CATARACT EXTRACTION Left 08/2005    with Insert of Lens Prostheti    CYSTOCELE REPAIR  06/1998    SIMENTAL Cystourethropexy & Cystocele Repair     ENDOSCOPY N/A 5/9/2024    Procedure: ESOPHAGOGASTRODUODENOSCOPY WITH BIOPSY X 1, DILATION (#52,#56 BOUGIE);  Surgeon: Jonah Abebe MD;  Location: [facility] ENDOSCOPY;  Service: Gastroenterology;  Laterality: N/A;  GASTRITIS, ESOPHAGITIS, DYSPHASIA, HIATEL HERNIA    KIDNEY SURGERY Right 02/24/2014    Removal    TONSILLECTOMY  1953    URETHROLYSIS  10/2000    Transvaginal Urethrolysis & Bone Cardwell Sling    VAGINAL HYSTERECTOMY  1970    VITRECTOMY Left 04/2005    & Membrane Peeling     Social History:  Social History     Socioeconomic History    Marital status: Single   Tobacco Use    Smoking status: Never     Passive exposure: Never    Smokeless tobacco: Never   Vaping Use    Vaping status: Never Used   Substance and Sexual Activity    Alcohol use: Not Currently    Drug use: No    Sexual activity: Defer     Allergies:  Allergies   Allergen Reactions    Contrast Dye (Echo Or Unknown Ct/Mr)  "Anaphylaxis    Gadolinium Hives     \"crawling\" feeling     Iodine Anaphylaxis    Shrimp Anaphylaxis     Immunizations:  Immunization History   Administered Date(s) Administered    COVID-19 (PFIZER) Purple Cap Monovalent 02/03/2021, 02/24/2021, 09/24/2021, 10/20/2021    COVID-19 (UNSPECIFIED) 02/03/2021, 02/24/2021, 09/24/2021    Covid-19 (Pfizer) Gray Cap Monovalent 02/03/2021, 02/24/2021, 09/24/2021    Flu Vaccine Quad PF >36MO 08/31/2018    Fluzone High-Dose 65+YRS 09/13/2019    Fluzone High-Dose 65+yrs 09/24/2021, 10/07/2022, 10/04/2023    H1N1 Inj Preservative Free 12/30/2009    Hep B, Unspecified 09/10/2008    Hepatitis A 12/12/2018, 08/06/2019    Influenza, Unspecified 10/07/2022    PEDS-Pneumococcal Conjugate (PCV7) 10/04/2019    Pneumococcal Conjugate 13-Valent (PCV13) 08/31/2018    Pneumococcal Polysaccharide (PPSV23) 10/04/2019    Pneumococcal, Unspecified 09/25/2013    Tdap 11/14/2007, 10/24/2012    Zostavax 12/12/2018    Zoster, Unspecified 10/01/2013, 08/31/2018, 12/13/2018            In-Office Procedure(s):  No orders to display        ASCVD RIsk Score::  The 10-year ASCVD risk score (Nicol DK, et al., 2019) is: 17.4%    Values used to calculate the score:      Age: 73 years      Sex: Female      Is Non- : No      Diabetic: No      Tobacco smoker: No      Systolic Blood Pressure: 117 mmHg      Is BP treated: Yes      HDL Cholesterol: 54 mg/dL      Total Cholesterol: 149 mg/dL    Imaging:    Results for orders placed in visit on 02/17/23    XR Shoulder 2+ View Left    Narrative  XR SHOULDER 2+ VW LEFT    Date of Exam: 2/17/2023 11:47 AM EST    Indication: left trapezius pain. (if needed)..    Comparison: None available.    Findings:  Clavicle intact. Minimal AC joint alignment. The proximal humerus is intact. No fracture or dislocation. Scapula intact. Calcified left upper lobe granuloma. No left apical pneumothorax.    Impression  Impression:  Negative for acute osseous " abnormality.    Electronically Signed: Jermaine Alexander  2/17/2023 12:07 PM EST  Workstation ID: [id number]       Results for orders placed during the hospital encounter of 04/01/24    US Carotid Bilateral    Narrative  US CAROTID BILATERAL    Date of Exam: 4/1/2024 1:18 PM EDT    Indication: carotid stenosis.    Comparison: 5/22/2023    Technique: Grayscale, color-flow, and spectral imaging was obtained of the bilateral carotid and vertebral arteries.      Findings:  Right carotid:  Moderate carotid atherosclerotic plaque) in the bulb and proximal ICA.  Peak systolic velocities:  CCA : 64 cm/s  ICA : 106 cm/s  ICA/CCA ratio: 1.7.  There is anterograde flow within the right vertebral artery.    Left carotid:  Mild to moderate carotid atherosclerotic plaque in the bulb and proximal ICA  Peak systolic velocities:  CCA : 82 cm/s  ICA : 84 cm/s  ICA/CCA ratio: 1.0.  There is anterograde flow within the left vertebral artery.    Impression  Impression:  Moderate right and more mild left carotid atherosclerotic disease. No evidence of greater than 50% stenosis based on velocity criteria currently.        Electronically Signed: Bandar Alva MD  4/1/2024 4:40 PM EDT  Workstation ID: [id number]      Results for orders placed during the hospital encounter of 09/29/23    CT Cardiac Calcium Score Without Dye    Narrative  CT CARDIAC CALCIUM SCORE WO DYE    Date of Exam: 9/29/2023 7:00 AM EDT    Indication: chest pain.    Comparison: Chest x-ray 7/19/2012    Technique: Multiple thin section CT axial images were obtained with prospective ECG-gating without intravenous contrast.    Findings:  Agatston scores for individual coronary arteries are as follows:  Left main (LM): 13.9  Left anterior descending (LAD): 134.6  Left circumflex (CX): 87.5  Right coronary artery (RCA): 218.9  Total: 454.9    Cardiomegaly. There are calcified hilar and mediastinal lymph nodes. Small hiatal hernia. Calcified granulomas in the right lower lobe  centrally.    Impression  Impression:  Extensive plaque burden. High risk of cardiac events in the next 5 years.    Calcium Score Interpretation  0 -- Negative examination, no identifiable atherosclerotic plaque.  Very low risk of cardiac events in the next 5 years.  1-10 -- Minimal plaque burden.  Low risk of cardiac events in the next 5 years.  11- 100 -- Mild Plaque burden.  Mild risk of cardiac events in the next 5 years.  101 - 400 -- Moderate plaque burden.  Moderate risk of cardiac events in the next 5 years.  Over 400 -- Extensive plaque burden.  High risk of cardiac events in the next 5 years.      Electronically Signed: Yuli Gregory MD  9/29/2023 7:57 AM EDT  Workstation ID: [id number]      Lab Review:   Office Visit on 07/03/2024   Component Date Value    Hemoglobin A1C 07/03/2024 6.5 (A)     Lot Number 07/03/2024 #[id number]     Expiration Date 07/03/2024 04/30/2026    Results Encounter on 06/17/2024   Component Date Value    WBC 06/21/2024 9.1     RBC 06/21/2024 4.34     Hemoglobin 06/21/2024 12.8     Hematocrit 06/21/2024 38.3     MCV 06/21/2024 88     MCH 06/21/2024 29.5     MCHC 06/21/2024 33.4     RDW 06/21/2024 13.1     Platelets 06/21/2024 270     Neutrophil Rel % 06/21/2024 53     Lymphocyte Rel % 06/21/2024 33     Monocyte Rel % 06/21/2024 11     Eosinophil Rel % 06/21/2024 2     Basophil Rel % 06/21/2024 1     Neutrophils Absolute 06/21/2024 4.9     Lymphocytes Absolute 06/21/2024 2.9     Monocytes Absolute 06/21/2024 1.0 (H)     Eosinophils Absolute 06/21/2024 0.1     Basophils Absolute 06/21/2024 0.1     Immature Granulocyte Rel* 06/21/2024 0     Immature Grans Absolute 06/21/2024 0.0     Glucose 06/21/2024 127 (H)     BUN 06/21/2024 25     Creatinine 06/21/2024 1.28 (H)     EGFR Result 06/21/2024 44 (L)     BUN/Creatinine Ratio 06/21/2024 20     Sodium 06/21/2024 142     Potassium 06/21/2024 4.2     Chloride 06/21/2024 99     Total CO2 06/21/2024 28     Calcium 06/21/2024 10.8 (H)     Total  Protein 06/21/2024 7.3     Albumin 06/21/2024 4.4     Globulin 06/21/2024 2.9     Total Bilirubin 06/21/2024 0.4     Alkaline Phosphatase 06/21/2024 53     AST (SGOT) 06/21/2024 18     ALT (SGPT) 06/21/2024 13     Total Cholesterol 06/21/2024 149     Triglycerides 06/21/2024 138     HDL Cholesterol 06/21/2024 54     VLDL Cholesterol [place] 06/21/2024 24     LDL Chol Calc (NIH) 06/21/2024 71     Chol/HDL Ratio 06/21/2024 2.8     TSH 06/21/2024 4.010     Free T4 06/21/2024 1.15    Admission on 05/09/2024, Discharged on 05/09/2024   Component Date Value    Case Report 05/09/2024                      Value:Surgical Pathology Report                         Case: [id number]                                  Authorizing Provider:  Jonah Abebe MD        Collected:           05/09/2024 11:07 AM          Ordering Location[facility]     [facility]  Received:            05/09/2024 02:05 PM                                 SUIT[facility]                                                                       Pathologist:           Nirmal Soares MD                                                            Specimen:    Stomach, gastritis bx                                                                      Final Diagnosis 05/09/2024                      Value:This result contains rich text formatting which cannot be displayed here.    Gross Description 05/09/2024                      Value:This result contains rich text formatting which cannot be displayed here.     Recent labs reviewed and interpreted for clinical significance and application            Level of Care:           Marco A Winston MD  09/05/24  .

## 2024-09-11 ENCOUNTER — HOSPITAL ENCOUNTER (OUTPATIENT)
Dept: MAMMOGRAPHY | Facility: HOSPITAL | Age: 74
Discharge: HOME | End: 2024-09-11
Admitting: OBSTETRICS & GYNECOLOGY
Payer: MEDICARE

## 2024-09-11 DIAGNOSIS — Z12.31 ENCOUNTER FOR SCREENING MAMMOGRAM FOR MALIGNANT NEOPLASM OF BREAST: ICD-10-CM

## 2024-09-11 PROCEDURE — 77067 SCR MAMMO BI INCL CAD: CPT

## 2024-09-11 PROCEDURE — 77063 BREAST TOMOSYNTHESIS BI: CPT

## 2024-10-16 ENCOUNTER — TELEPHONE (OUTPATIENT)
Dept: CARDIOLOGY | Facility: CLINIC | Age: 74
End: 2024-10-16
Payer: MEDICARE

## 2024-10-16 RX ORDER — HYDRALAZINE HYDROCHLORIDE 25 MG/1
25 TABLET, FILM COATED ORAL 2 TIMES DAILY
Qty: 180 TABLET | Refills: 1 | Status: SHIPPED | OUTPATIENT
Start: 2024-10-16

## 2024-10-16 NOTE — TELEPHONE ENCOUNTER
Caller: Dunia Fabian    Relationship: Self    Best call back number: [phone]    Requested Prescriptions:   HYDRALAZINE 25 MG       Pharmacy where request should be sent:      Last office visit with prescribing clinician: 9/5/2024   Last telemedicine visit with prescribing clinician: Visit date not found   Next office visit with prescribing clinician: 9/3/2025     Additional details provided by patient: PATIENT STATED THAT DR MARTÍNEZ PRESCRIBED HYDRALAZINE, BUT IT'S NOT IN THE MEDICATION LIST TO BE REFILLED. PATIENT IS ALMOST OUT. PLEASE ADVISE.    Does the patient have less than a 3 day supply:  [] Yes  [x] No    Would you like a call back once the refill request has been completed: [x] Yes [] No    If the office needs to give you a call back, can they leave a voicemail: [x] Yes [] No    Armando Bernal Rep   10/16/24 14:45 EDT

## 2024-10-18 NOTE — PROGRESS NOTES
Subjective   Dunia Fabian is a 74 y.o. female.     Chief Complaint   Patient presents with    Prediabetes    Hypertension    Hyperlipidemia    Dizziness       Hypertension  This is a chronic problem. The current episode started more than 1 year ago. The problem has been gradually improving since onset. The problem is controlled. Pertinent negatives include no blurred vision, chest pain, headaches, malaise/fatigue, neck pain, palpitations, peripheral edema or shortness of breath. (Dizziness, and neck pain.    Patient following up on the medication she was started on the last time she was here. She was started on Bystolic 20 MG tablet. She states that she is still logging her pressures and she states that there has been some higher readings as she takes her reading as soon as she gets up in the morning.   She states that the last time she was here she was instructed to stop the losartan that she was taking and she states that when she went to the pharmacy to get her medication she states that they had the Losartan ready for her and she states that it was for 25 mg which was cut in half from what she was on and she was concerned as she said that she was told to stop it completely. ) There are no associated agents to hypertension. Risk factors for coronary artery disease include post-menopausal state. Current antihypertension treatment includes beta blockers and calcium channel blockers. The current treatment provides moderate improvement. There are no compliance problems.  There is no history of kidney disease, CAD/MI, heart failure or left ventricular hypertrophy. There is no history of chronic renal disease, coarctation of the aorta, hyperaldosteronism, hypercortisolism, hyperparathyroidism, a hypertension causing med, pheochromocytoma, renovascular disease or a thyroid problem.   Hyperlipidemia  This is a chronic problem. The current episode started more than 1 year ago. The problem is controlled. Recent lipid  tests were reviewed and are high. Exacerbating diseases include diabetes. She has no history of chronic renal disease. Factors aggravating her hyperlipidemia include fatty foods. Pertinent negatives include no chest pain, leg pain, myalgias or shortness of breath. Current antihyperlipidemic treatment includes statins. There are no compliance problems.  Risk factors for coronary artery disease include dyslipidemia and hypertension.   Blood Sugar Problem  This is a chronic problem. The current episode started more than 1 year ago. The problem has been unchanged. Associated symptoms include vertigo. Pertinent negatives include no abdominal pain, change in bowel habit, chest pain, chills, diaphoresis, fatigue, headaches, joint swelling, myalgias, nausea, neck pain, numbness, sore throat, urinary symptoms, visual change, vomiting or weakness. The symptoms are aggravated by eating. She has tried nothing for the symptoms.   Dizziness  This is a recurrent problem. The current episode started more than 1 month ago. The problem occurs daily. The problem has been unchanged. Associated symptoms include vertigo. Pertinent negatives include no abdominal pain, change in bowel habit, chest pain, chills, diaphoresis, fatigue, headaches, joint swelling, myalgias, nausea, neck pain, numbness, sore throat, urinary symptoms, visual change, vomiting or weakness. Associated symptoms comments: Left ear discomfort. Exacerbated by: Laying down. She has tried nothing for the symptoms.            I personally reviewed and updated the patient's allergies, medications, problem list, and past medical, surgical, social, and family history. I have reviewed and confirmed the accuracy of the History of Present Illness and Review of Symptoms as documented by the MA/LPN/RN. Tyron Driver MD    Family History   Problem Relation Age of Onset    Parkinsonism Mother     Heart disease Mother         cardiovascular disease    Diabetes Mother          diabetes mellitus     Heart disease Father         cardiovascular disease    Heart disease Sister         cardiovascular disease    Diabetes Sister         diabetes mellitus     Heart disease Brother         cardiovascular disease    Diabetes Son         diabetes mellitus     Heart disease Paternal Uncle         ischemic    Stomach cancer Maternal Grandmother     Breast cancer Niece 39    Ovarian cancer Paternal Grandmother        Social History     Tobacco Use    Smoking status: Never     Passive exposure: Never    Smokeless tobacco: Never   Vaping Use    Vaping status: Never Used   Substance Use Topics    Alcohol use: Not Currently    Drug use: No       Past Surgical History:   Procedure Laterality Date    CATARACT EXTRACTION Left 08/2005    with Insert of Lens Prostheti    CYSTOCELE REPAIR  06/1998    SIMENTAL Cystourethropexy & Cystocele Repair     ENDOSCOPY N/A 5/9/2024    Procedure: ESOPHAGOGASTRODUODENOSCOPY WITH BIOPSY X 1, DILATION (#52,#56 BOUGIE);  Surgeon: Jonah Abebe MD;  Location: [facility] ENDOSCOPY;  Service: Gastroenterology;  Laterality: N/A;  GASTRITIS, ESOPHAGITIS, DYSPHASIA, HIATEL HERNIA    KIDNEY SURGERY Right 02/24/2014    Removal    TONSILLECTOMY  1953    URETHROLYSIS  10/2000    Transvaginal Urethrolysis & Bone [place] Sling    VAGINAL HYSTERECTOMY  1970    VITRECTOMY Left 04/2005    & Membrane Peeling       Patient Active Problem List   Diagnosis    Acid reflux    Allergic rhinitis    Anxiety    Atrophic kidney    Carotid bruit present    Carpal tunnel syndrome    Low back pain    Neck pain    Thoracic back pain    Depressive disorder    Medicare annual wellness visit, subsequent    Gout    Hemorrhoids    Mixed hyperlipidemia    IC (interstitial cystitis)    Irritable bowel syndrome with constipation    LVH (left ventricular hypertrophy)    Menopausal syndrome    Mitral insufficiency, acute    Onychomycosis    Disorder of bone and cartilage    Age-related osteoporosis without current  pathological fracture    Palpitations    Osteoarthritis    Primary pulmonary hypertension    Renal insufficiency    Plantar fasciitis    Senile osteoporosis    Solitary kidney    Encounter for screening mammogram for malignant neoplasm of breast    Special screening for malignant neoplasms, colon    Seborrheic keratosis    Chronic kidney disease, stage 3 (moderate)    Renal hypertrophy    Hypercalcemia    Acute cystitis with hematuria    Lower extremity edema    Overweight with body mass index (BMI) of 28 to 28.9 in adult    Dizziness    Left hip pain    Bilateral primary osteoarthritis of knee    Elevated fasting blood sugar    Solitary kidney, acquired    Benign essential HTN    Memory loss    Campylobacter diarrhea    Screening for thyroid disorder    Elevated hemoglobin A1c measurement    Vertigo    Other dysphagia         Current Outpatient Medications:     amLODIPine (NORVASC) 5 MG tablet, Take 1 tablet by mouth Daily., Disp: 90 tablet, Rfl: 3    aspirin 81 MG EC tablet, Take 1 tablet by mouth Daily., Disp: 90 tablet, Rfl: 3    Biotin 2500 MCG chewable tablet, Chew 2 tablet/day Daily., Disp: , Rfl:     carvedilol (COREG) 12.5 MG tablet, Take 1 tablet by mouth 2 (Two) Times a Day., Disp: 180 tablet, Rfl: 3    cloNIDine (CATAPRES) 0.1 MG tablet, Take 1 tablet by mouth 2 (Two) Times a Day. ONLY IF OVER 160, Disp: 180 tablet, Rfl: 3    denosumab (PROLIA) 60 MG/ML solution prefilled syringe syringe, Inject 1 mL under the skin into the appropriate area as directed 1 (One) Time., Disp: , Rfl:     ezetimibe (ZETIA) 10 MG tablet, Take 1 tablet by mouth Daily., Disp: 90 tablet, Rfl: 3    hydrALAZINE (APRESOLINE) 25 MG tablet, Take 1 tablet by mouth 2 (Two) Times a Day., Disp: 180 tablet, Rfl: 1    hydroCHLOROthiazide (MICROZIDE) 12.5 MG capsule, Take 1 capsule by mouth Daily., Disp: 90 capsule, Rfl: 3    SUPER B COMPLEX/C capsule, SUPER B COMPLEX/C ORAL CAPSULE, Disp: , Rfl:     tamsulosin (FLOMAX) 0.4 MG capsule 24 hr  "capsule, Take 1 capsule by mouth Daily., Disp: , Rfl:     vitamin D3 125 MCG (5000 UT) capsule capsule, Take 1 capsule by mouth Daily., Disp: , Rfl:     famotidine (Pepcid) 40 MG tablet, Take 1 tablet by mouth Daily. (Patient not taking: Reported on 10/23/2024), Disp: 90 tablet, Rfl: 3    meclizine (ANTIVERT) 25 MG tablet, Take 1 tablet by mouth 3 (Three) Times a Day As Needed for Dizziness. (Patient not taking: Reported on 10/23/2024), Disp: , Rfl:     rosuvastatin (CRESTOR) 5 MG tablet, Take 1 tablet by mouth Daily. (Patient not taking: Reported on 10/23/2024), Disp: 90 tablet, Rfl: 3         Review of Systems   Constitutional:  Negative for chills, diaphoresis, fatigue and malaise/fatigue.   HENT:  Negative for sore throat.    Eyes:  Negative for blurred vision and visual disturbance.   Respiratory:  Negative for shortness of breath.    Cardiovascular:  Negative for chest pain and palpitations.   Gastrointestinal:  Negative for abdominal pain, change in bowel habit, nausea and vomiting.   Endocrine: Negative for polydipsia and polyphagia.   Musculoskeletal:  Negative for joint swelling, myalgias, neck pain and neck stiffness.   Skin:  Negative for color change and pallor.   Neurological:  Positive for dizziness and vertigo. Negative for seizures, syncope, weakness and numbness.   Hematological:  Negative for adenopathy.   Psychiatric/Behavioral:  Negative for hallucinations and suicidal ideas.        I have reviewed and confirmed the accuracy of the ROS as documented by the MA/LPN/RN Tyron Driver MD      Objective   /66 (BP Location: Right arm, Patient Position: Sitting, Cuff Size: Large Adult)   Pulse 68   Temp 98.2 °F (36.8 °C) (Temporal)   Resp 18   Ht 157.5 cm (62\")   Wt 72.8 kg (160 lb 9.6 oz)   SpO2 95% Comment: room air  BMI 29.37 kg/m²   BP Readings from Last 3 Encounters:   10/23/24 118/66   09/05/24 117/64   07/03/24 134/62     Wt Readings from Last 3 Encounters:   10/23/24 72.8 kg (160 " lb 9.6 oz)   09/05/24 73 kg (161 lb)   07/03/24 72.8 kg (160 lb 9.6 oz)     Physical Exam  Constitutional:       Appearance: Normal appearance. She is well-developed. She is not diaphoretic.   HENT:      Head: Normocephalic and atraumatic.      Right Ear: Tympanic membrane, ear canal and external ear normal.      Left Ear: Tympanic membrane, ear canal and external ear normal.      Nose: Nose normal.      Mouth/Throat:      Mouth: Mucous membranes are moist.   Eyes:      General: Lids are normal.      Extraocular Movements: Extraocular movements intact.      Right eye: No nystagmus.      Left eye: No nystagmus.      Conjunctiva/sclera: Conjunctivae normal.      Right eye: Right conjunctiva is not injected. No hemorrhage.     Left eye: Left conjunctiva is not injected. No hemorrhage.     Pupils: Pupils are equal, round, and reactive to light.      Funduscopic exam:     Right eye: No hemorrhage, exudate, AV nicking, arteriolar narrowing or papilledema.         Left eye: No hemorrhage, exudate, AV nicking, arteriolar narrowing or papilledema.   Neck:      Thyroid: No thyromegaly.      Vascular: No carotid bruit or JVD.      Trachea: No tracheal deviation.   Cardiovascular:      Rate and Rhythm: Normal rate and regular rhythm.      Heart sounds: Normal heart sounds. No murmur heard.     No friction rub. No gallop.   Pulmonary:      Effort: Pulmonary effort is normal.      Breath sounds: Normal breath sounds. No stridor. No decreased breath sounds, wheezing or rales.   Abdominal:      General: Bowel sounds are normal. There is no distension.      Palpations: Abdomen is soft. There is no mass.      Tenderness: There is no abdominal tenderness. There is no guarding or rebound.      Hernia: No hernia is present.   Lymphadenopathy:      Head:      Right side of head: No submental, submandibular, tonsillar, preauricular, posterior auricular or occipital adenopathy.      Left side of head: No submental, submandibular,  "tonsillar, preauricular, posterior auricular or occipital adenopathy.      Cervical: No cervical adenopathy.   Skin:     General: Skin is warm and dry.      Coloration: Skin is not pale.      Comments: Actinic keratosis right neck, right forearm   Neurological:      Mental Status: She is alert and oriented to person, place, and time.      Cranial Nerves: No cranial nerve deficit.      Sensory: No sensory deficit.      Motor: No tremor, abnormal muscle tone or seizure activity.      Coordination: Coordination normal.      Gait: Gait normal.      Deep Tendon Reflexes: Reflexes are normal and symmetric.         Data / Lab Results:    Hemoglobin A1C   Date Value Ref Range Status   07/03/2024 6.5 (A) 4.5 - 5.7 % Final   01/05/2024 6.3 (H) 4.8 - 5.6 % Final     Comment:              Prediabetes: 5.7 - 6.4           Diabetes: >6.4           Glycemic control for adults with diabetes: <7.0     01/03/2024 6.8 (A) 4.5 - 5.7 % Final   05/10/2023 6.3 (H) 4.8 - 5.6 % Final     Comment:              Prediabetes: 5.7 - 6.4           Diabetes: >6.4           Glycemic control for adults with diabetes: <7.0     09/10/2021 6.1 % Final        Lab Results   Component Value Date    LDL 71 06/21/2024    LDL 91 05/03/2023     (H) 03/11/2022     Lab Results   Component Value Date    CHOL 181 07/24/2018    CHOL 215 (H) 07/21/2017    CHOL 198 07/20/2016     Lab Results   Component Value Date    TRIG 138 06/21/2024    TRIG 131 05/03/2023    TRIG 128 03/11/2022     Lab Results   Component Value Date    HDL 54 06/21/2024    HDL 56 05/03/2023    HDL 52 03/11/2022     No results found for: \"PSA\"  Lab Results   Component Value Date    WBC 9.1 06/21/2024    HGB 12.8 06/21/2024    HCT 38.3 06/21/2024    MCV 88 06/21/2024     06/21/2024     Lab Results   Component Value Date    TSH 4.010 06/21/2024      Lab Results   Component Value Date    GLUCOSE 127 (H) 06/21/2024    BUN 25 06/21/2024    CREATININE 1.28 (H) 06/21/2024    EGFRIFNONA 48 " "(L) 08/28/2021    EGFRIFAFRI 55 (L) 08/28/2021    BCR 20 06/21/2024    K 4.2 06/21/2024    CO2 28 06/21/2024    CALCIUM 10.8 (H) 06/21/2024    PROTENTOTREF 7.3 06/21/2024    ALBUMIN 4.4 06/21/2024    LABIL2 1.8 01/05/2024    AST 18 06/21/2024    ALT 13 06/21/2024     No results found for: \"ESTHER\", \"RF\", \"SEDRATE\"   No results found for: \"CRP\"   No results found for: \"IRON\", \"TIBC\", \"FERRITIN\"   Lab Results   Component Value Date    [id number] 805 05/10/2023      Procedure note: Actinic keratoses neck, arm frozen repeated with liquid nitrogen today, she tolerated procedure well, no complications.    Assessment & Plan      Medications        Problem List         LOS      Health maintenance.  Doing well, vaccines current.  She agrees to update her tetanus and pneumonia shots through the pharmacy.  Discussed health maintenance, screening test, lifestyle modification.  Pap current followed by Dr. Patiño, mammogram benign 11/22, further screening declined.  Vertigo.  Much improved/resolved today.  Likely 2nd acute bacterial sinusitis with eustachain tube dysfxn, start antibotics, prednisone.  Increase fluid intake.  Home blood pressure results reviewd / good control. Has had caridology eval, tilt table, brain imagery planned.  Follow up recheck. Call or return if worsening or persistent symptoms.  Risk/benefits/side effects of prednisone Rx discussed, she has tolerated Rx in past without difficulty, okay to decrease or discontinue dose if side effects develop..  Allergic rhinitis.  OTC Claritin or Zyrtec.  Hypertension.  Overall improved today on carvedilol, amlodipine, tolerating decreased dose hydralazine ,Off metoprolol per cardiology followed by Dr. Winston, .  Clinically improved today has been walking regularly.  Hold losartan/has had nephrology eval per Dr. Mcgee,  renal ultrasound/renal artery Doppler benign, has follow-up upcoming..  Clinically improved today on  hydralazine 100 mg twice daily, as needed clonidine.  " Monitor blood pressure closely.  Follow-up recheck.  Consider restart amlodipine if persistent elevation.   . Discussed low-sodium diet.  Stress echo benign/elevated right-sided pressure only 2018.  Carotid Dopplers with mild blockage only 2018.  MRI brain benign 2020.  Has had cardiology eval/plan repeat stress testing when blood pressure better controlled per cardilogy recs/cardiology referral scheduled..  Life stress/decreased energy.  Multiple stressors.  Good social support.   Initially improved on Cymbalta/Wellbutrin, she discontinued Rx.  Did not tolerate sertraline.  Consider alternative SSRI if persistent symptoms.  Lower extremity edema.  Improved currently with decreased dose amlodipine.  Discussed low-sodium diet.  Follow-up recheck.  Consider sleep study if persistent symptoms.  Osteoporosis.  Tolerating Prolia.  Holding calcium/vitamin D per nephrology.   Repeat DEXA improved 2023 on Prolia.  Atrophic kidney/renal insufficiency.  GFR a little worse in the 40s HCTZ recently started, has nephrology follow-up upcoming plan recheck renal function..  Improved status post nephrectomy, followed by urology,nephrology.  Renal function gft 46..  Interstitial cystitis.  Improved on Flomax, followed by [facility], history of InterStim placement/subsequent removal.  I and O catheter dependent.  Hypertensive retinopathy.  Followed by ophthalmology ember galeano, status post treatment  Colon screening.  Colonoscopy benign 2016./Diverticulosis only  Carotid stenosis.  Improved per repeat Dopplers 4/24, moderate on the right, mild on the left, less than 50% bilaterally, history of 50 to 59% on the right, mild on left per Doppler 1/21, stable per repeat ultrasound 5/23.  She is allergic to contrast and gadolinium.  Repeat Doppler 1 year.  Consider vascular surgery referral if worsening symptoms.  Continue risk factor reduction.  Osteoarthritis.  Worse left hip/knee.    Followed by orthopedics Dr. Angel, x-rays with mild left  hip OA, moderate left OA knee, attempting knee bracing.  Consider knee injection.  Hyperlipidemia.    Improved today, LDL to 90 rosuvastatin, tolerating Rx.   Aggressive LDL target considering carotid stenosis. .  Discussed diet exercise lifestyle modification.  Follow-up recheck.    Memory loss.  Stable today, MMSE 28, B12 normal, did not tolerate Zoloft.  Good social support.  MRI brain benign 2020.  Follow-up recheck/plan check MMSE.  Consider alternative SSRI, Namenda if worsening symptoms.  Neck pain.  Persistent symptoms today, significant bilateral trapezius spasm on exam today with left upper extremity radiculopathy, x-rays benign, positive and significant trauma DDx includes ruptured disc versus cervical disc disease, check MRI, add prednisone, continue muscle relaxant.  Consider PT referral, referral for epidural injections.  Follow-up recheck.  Call return if worsening symptoms.  Elevated fasting blood sugar.  Stable/borderline, A1c 6.3.  Discussed diet, exercise and lifestyle modification.  Follow-up recheck.  Epigastric pain / diarrhea.  Improved /Resolved today, stool cultures with Campylobacter, completing course Augmentin, add probiotics.  Overall benign exAm today, no s/s diverticultis.  DDX includes gb pathology, infeciton, gastritis. bloodwork, ruq us Benign.  Has completed course antibitotics, improvrd on ppi, cholestid / bentyl.  Increase fluid intake bland diet. Signs and symptoms of dehydration discussed.. Follow up recheck. Call or return if worsening or persistent symptoms  Acute bacterial bronchitis.  Clinically improved/resolved course antibiotics/steroids.  Dysphagia.  Improved today status post EGD with dilation.  EGD consistent with gastritis, biopsy benign, likely secondary to GERD, she stopped PPI, restart/recommend daily./Ongoing..  Has had gastroenterology eval, repeat EGD upcoming..  Discussed drink plenty of fluids, she feels throughout the, caution with movement, go to ED if  worsening symptoms.  Lower extremity edema.  Improved today on HCTZ per cardiology, follow-up recheck kidney function.  New onset, overall mild symptoms today.  DDx includes secondary to peripheral vascular disease, recent steroid use, amlodipine Rx.  Discussed low-sodium diet, compression.  Follow-up recheck.  expect resolution now that she has completed course of prednisone.  Consider decrease amlodipine Rx if persistent symptoms.  Has cardiology follow-up upcoming.  Plantar fasciitis.  Much improved/resolving today.  Ice, rehabilitation exercises discussed, start nighttime bracing/heel cups.  Follow-up recheck.  Consider steroid Rx if persistent symptoms.  Actinic keratosis.  Lesion neck improved status post freezing, frozen again today.  Second lesion right forearm frozen today.  Topical care discussed.  Expected course discussed.  Follow-up recheck.  Consider repeat freezing if persistent symptoms.    Previous Exams:  Last PAP Smear was on 1/9/18 by Dr Patiño.    · Impression of Normal    Last Mammogram was on 11/08/2022 ordered by Dr Patiño.    · Impression of Normal   · Recommended repeat in 1 year    Last DEXA was on 5/22/2023 ordered by Dr Driver   · Impression of Osteopenia. Based upon the National Osteoporosis [employer] Guide,  patient's FRAX score does not meet criteria for pharmacologic treatment  to prevent osteoporosis. Individual treatment decisions should be made  based upon each patient's full clinical history and risk factors.     Last US Carotid Dopplers was on 5/22/2023 ordered by Dr Driver.    · Impression of Bulky calcific plaquing, right carotid bulb, with continued suggestion of a 50 to 69% diameter stenosis. When compared to the prior examination of October, 2021, the category stenosis is stable.   Mild to moderate plaquing, left carotid bulb, with the estimated degree of stenosis remaining at less than 50% diameter stenotic.   Patent bilateral vertebral arteries with antegrade flow.    Ostial stenosis, right external carotid artery.          Recommended repeat in 1year    Last Colonoscopy was on 6/21/2016 by Dr Goff.    · Impression of Diverticulosis of the sigmoid colon   · Recommended repeat in 10 years    Last Stress Test was on 11/27/2023 ordered by Dr Winston.    · Impression of Left ventricular ejection fraction is hyperdynamic (Calculated EF > 70%).   Findings consistent with an equivocal ECG stress test.      Last EKG was on 1/8/2024 ordered by Dr Winston.    · Impression of Rhythm: sinus rhythm  Rate: normal  QRS axis: normal  Other findings: non-specific ST-T wave changes      Last Echocardiogram was on 12/01/2022 ordered by Dr Winston.    · Impression of Left ventricular systolic function is normal. Left ventricular ejection fraction appears to be 61 - 65%.    Left ventricular diastolic function was normal.   Estimated right ventricular systolic pressure from tricuspid regurgitation is mildly elevated (35-45 mmHg). Calculated right ventricular systolic pressure from tricuspid regurgitation is 38 mmHg.      Diagnoses and all orders for this visit:    1. Elevated fasting blood sugar (Primary)  -     POC Glycosylated Hemoglobin (Hb A1C)    2. Benign essential HTN    3. Mixed hyperlipidemia    4. Class 1 obesity due to excess calories with serious comorbidity and body mass index (BMI) of 30.0 to 30.9 in adult                Expected course, medications, and adverse effects discussed.  Call or return if worsening or persistent symptoms.  I wore protective equipment throughout this patient encounter including a mask, gloves, and eye protection.  Hand hygiene was performed before donning protective equipment and after removal when leaving the room. The complete contents of the Assessment and Plan and Data/Lab Results as documented above have been reviewed and addressed by myself with the patient today as part of an ongoing evaluation / treatment plan.  If some of the documentation has been copied  from a previous note and is unchanged it indicates that this problem / plan has been assessed today but is stable from a previous visit and no changes have been recommended.

## 2024-10-23 ENCOUNTER — OFFICE VISIT (OUTPATIENT)
Dept: FAMILY MEDICINE CLINIC | Facility: CLINIC | Age: 74
End: 2024-10-23
Payer: MEDICARE

## 2024-10-23 VITALS
DIASTOLIC BLOOD PRESSURE: 66 MMHG | WEIGHT: 160.6 LBS | TEMPERATURE: 98.2 F | SYSTOLIC BLOOD PRESSURE: 118 MMHG | HEART RATE: 68 BPM | RESPIRATION RATE: 18 BRPM | BODY MASS INDEX: 29.55 KG/M2 | OXYGEN SATURATION: 95 % | HEIGHT: 62 IN

## 2024-10-23 DIAGNOSIS — Z90.5 SOLITARY KIDNEY, ACQUIRED: ICD-10-CM

## 2024-10-23 DIAGNOSIS — E78.2 MIXED HYPERLIPIDEMIA: ICD-10-CM

## 2024-10-23 DIAGNOSIS — R73.01 ELEVATED FASTING BLOOD SUGAR: Primary | ICD-10-CM

## 2024-10-23 DIAGNOSIS — E66.811 CLASS 1 OBESITY DUE TO EXCESS CALORIES WITH SERIOUS COMORBIDITY AND BODY MASS INDEX (BMI) OF 30.0 TO 30.9 IN ADULT: ICD-10-CM

## 2024-10-23 DIAGNOSIS — E66.09 CLASS 1 OBESITY DUE TO EXCESS CALORIES WITH SERIOUS COMORBIDITY AND BODY MASS INDEX (BMI) OF 30.0 TO 30.9 IN ADULT: ICD-10-CM

## 2024-10-23 DIAGNOSIS — K21.9 GASTROESOPHAGEAL REFLUX DISEASE, UNSPECIFIED WHETHER ESOPHAGITIS PRESENT: ICD-10-CM

## 2024-10-23 DIAGNOSIS — L57.0 ACTINIC KERATOSIS: ICD-10-CM

## 2024-10-23 DIAGNOSIS — F41.9 ANXIETY: ICD-10-CM

## 2024-10-23 DIAGNOSIS — I10 BENIGN ESSENTIAL HTN: ICD-10-CM

## 2024-10-23 DIAGNOSIS — M81.0 SENILE OSTEOPOROSIS: ICD-10-CM

## 2024-10-23 LAB
EXPIRATION DATE: ABNORMAL
HBA1C MFR BLD: 6.3 % (ref 4.5–5.7)
Lab: ABNORMAL

## 2024-10-23 PROCEDURE — 1126F AMNT PAIN NOTED NONE PRSNT: CPT | Performed by: FAMILY MEDICINE

## 2024-10-23 PROCEDURE — 3078F DIAST BP <80 MM HG: CPT | Performed by: FAMILY MEDICINE

## 2024-10-23 PROCEDURE — 83036 HEMOGLOBIN GLYCOSYLATED A1C: CPT | Performed by: FAMILY MEDICINE

## 2024-10-23 PROCEDURE — 3074F SYST BP LT 130 MM HG: CPT | Performed by: FAMILY MEDICINE

## 2024-10-23 PROCEDURE — 17000 DESTRUCT PREMALG LESION: CPT | Performed by: FAMILY MEDICINE

## 2024-10-23 PROCEDURE — 99214 OFFICE O/P EST MOD 30 MIN: CPT | Performed by: FAMILY MEDICINE

## 2024-10-23 PROCEDURE — 3044F HG A1C LEVEL LT 7.0%: CPT | Performed by: FAMILY MEDICINE

## 2024-10-23 PROCEDURE — 17003 DESTRUCT PREMALG LES 2-14: CPT | Performed by: FAMILY MEDICINE

## 2024-10-23 RX ORDER — OMEPRAZOLE 40 MG/1
40 CAPSULE, DELAYED RELEASE ORAL DAILY
Qty: 90 CAPSULE | Refills: 3 | Status: SHIPPED | OUTPATIENT
Start: 2024-10-23

## 2024-10-23 RX ORDER — ROSUVASTATIN CALCIUM 5 MG/1
5 TABLET, COATED ORAL DAILY
Qty: 90 TABLET | Refills: 3 | Status: SHIPPED | OUTPATIENT
Start: 2024-10-23

## 2024-12-31 ENCOUNTER — TELEPHONE (OUTPATIENT)
Dept: FAMILY MEDICINE CLINIC | Facility: CLINIC | Age: 74
End: 2024-12-31
Payer: MEDICARE

## 2024-12-31 NOTE — TELEPHONE ENCOUNTER
Patient's prolia was on 7/3/2024. She is due on 1/3/2025. Approval for Prolia is in the chart for 1/1/25 through 12/31/25. Dayna notified to order a Prolia. Patient comes in on 1/29/25 for an appointment with Dr Driver.

## 2025-01-03 RX ORDER — EZETIMIBE 10 MG/1
10 TABLET ORAL DAILY
Qty: 90 TABLET | Refills: 3 | Status: SHIPPED | OUTPATIENT
Start: 2025-01-03

## 2025-01-27 ENCOUNTER — OFFICE VISIT (OUTPATIENT)
Dept: FAMILY MEDICINE CLINIC | Facility: CLINIC | Age: 75
End: 2025-01-27
Payer: MEDICARE

## 2025-01-27 VITALS
RESPIRATION RATE: 18 BRPM | SYSTOLIC BLOOD PRESSURE: 132 MMHG | OXYGEN SATURATION: 92 % | BODY MASS INDEX: 29.88 KG/M2 | TEMPERATURE: 99.3 F | HEIGHT: 62 IN | WEIGHT: 162.4 LBS | DIASTOLIC BLOOD PRESSURE: 68 MMHG | HEART RATE: 83 BPM

## 2025-01-27 DIAGNOSIS — J02.9 SORE THROAT: ICD-10-CM

## 2025-01-27 DIAGNOSIS — R05.1 ACUTE COUGH: Primary | ICD-10-CM

## 2025-01-27 DIAGNOSIS — J06.9 ACUTE URI: ICD-10-CM

## 2025-01-27 DIAGNOSIS — I10 BENIGN ESSENTIAL HTN: ICD-10-CM

## 2025-01-27 DIAGNOSIS — E66.3 OVERWEIGHT WITH BODY MASS INDEX (BMI) OF 29 TO 29.9 IN ADULT: ICD-10-CM

## 2025-01-27 DIAGNOSIS — H92.09 EARACHE: ICD-10-CM

## 2025-01-27 DIAGNOSIS — N18.31 STAGE 3A CHRONIC KIDNEY DISEASE: ICD-10-CM

## 2025-01-27 PROCEDURE — 87428 SARSCOV & INF VIR A&B AG IA: CPT | Performed by: FAMILY MEDICINE

## 2025-01-27 PROCEDURE — 1126F AMNT PAIN NOTED NONE PRSNT: CPT | Performed by: FAMILY MEDICINE

## 2025-01-27 PROCEDURE — 3075F SYST BP GE 130 - 139MM HG: CPT | Performed by: FAMILY MEDICINE

## 2025-01-27 PROCEDURE — 3078F DIAST BP <80 MM HG: CPT | Performed by: FAMILY MEDICINE

## 2025-01-27 PROCEDURE — 99214 OFFICE O/P EST MOD 30 MIN: CPT | Performed by: FAMILY MEDICINE

## 2025-01-27 RX ORDER — HYDROCODONE BITARTRATE AND HOMATROPINE METHYLBROMIDE ORAL SOLUTION 5; 1.5 MG/5ML; MG/5ML
5 LIQUID ORAL NIGHTLY PRN
Qty: 180 ML | Refills: 0 | Status: SHIPPED | OUTPATIENT
Start: 2025-01-27

## 2025-01-27 RX ORDER — LEVOFLOXACIN 500 MG/1
500 TABLET, FILM COATED ORAL DAILY
Qty: 10 TABLET | Refills: 0 | Status: SHIPPED | OUTPATIENT
Start: 2025-01-27

## 2025-01-27 NOTE — PROGRESS NOTES
Subjective   Dunia Fabian is a 74 y.o. female.     Chief Complaint   Patient presents with   • Cough       Cough  This is a new problem. The current episode started in the past 7 days. The problem has been worse. The problem occurs constantly. The cough is Productive of green sputum. Associated symptoms include ear pain, headaches, myalgias, nasal congestion, rhinorrhea, a sore throat, shortness of breath and wheezing. Pertinent negatives include no chest pain or chills. Nothing aggravates the symptoms. She has tried OTC cough suppressant (Mucinex) for the symptoms. The treatment provided mild relief.            I personally reviewed and updated the patient's allergies, medications, problem list, and past medical, surgical, social, and family history. I have reviewed and confirmed the accuracy of the History of Present Illness and Review of Symptoms as documented by the MA/LPN/RN. Tyron Driver MD    Family History   Problem Relation Age of Onset   • Parkinsonism Mother    • Heart disease Mother         cardiovascular disease   • Diabetes Mother         diabetes mellitus    • Heart disease Father         cardiovascular disease   • Heart disease Sister         cardiovascular disease   • Diabetes Sister         diabetes mellitus    • Heart disease Brother         cardiovascular disease   • Diabetes Son         diabetes mellitus    • Heart disease Paternal Uncle         ischemic   • Stomach cancer Maternal Grandmother    • Breast cancer Niece 39   • Ovarian cancer Paternal Grandmother        Social History     Tobacco Use   • Smoking status: Never     Passive exposure: Never   • Smokeless tobacco: Never   Vaping Use   • Vaping status: Never Used   Substance Use Topics   • Alcohol use: Not Currently   • Drug use: No       Past Surgical History:   Procedure Laterality Date   • CATARACT EXTRACTION Left 08/2005    with Insert of Lens Prostheti   • CYSTOCELE REPAIR  06/1998    SIMENTAL Cystourethropexy & Cystocele Repair     • ENDOSCOPY N/A 5/9/2024    Procedure: ESOPHAGOGASTRODUODENOSCOPY WITH BIOPSY X 1, DILATION (#52,#56 BOUGIE);  Surgeon: Jonah Abebe MD;  Location: [facility] ENDOSCOPY;  Service: Gastroenterology;  Laterality: N/A;  GASTRITIS, ESOPHAGITIS, DYSPHASIA, HIATEL HERNIA   • KIDNEY SURGERY Right 02/24/2014    Removal   • TONSILLECTOMY  1953   • URETHROLYSIS  10/2000    Transvaginal Urethrolysis & Bone [place] Sling   • VAGINAL HYSTERECTOMY  1970   • VITRECTOMY Left 04/2005    & Membrane Peeling       Patient Active Problem List   Diagnosis   • Acid reflux   • Allergic rhinitis   • Anxiety   • Atrophic kidney   • Carotid bruit present   • Carpal tunnel syndrome   • Low back pain   • Neck pain   • Thoracic back pain   • Depressive disorder   • Medicare annual wellness visit, subsequent   • Gout   • Hemorrhoids   • Mixed hyperlipidemia   • IC (interstitial cystitis)   • Irritable bowel syndrome with constipation   • LVH (left ventricular hypertrophy)   • Menopausal syndrome   • Mitral insufficiency, acute   • Onychomycosis   • Disorder of bone and cartilage   • Age-related osteoporosis without current pathological fracture   • Palpitations   • Osteoarthritis   • Primary pulmonary hypertension   • Renal insufficiency   • Plantar fasciitis   • Senile osteoporosis   • Solitary kidney   • Encounter for screening mammogram for malignant neoplasm of breast   • Special screening for malignant neoplasms, colon   • Seborrheic keratosis   • Chronic kidney disease, stage 3 (moderate)   • Renal hypertrophy   • Hypercalcemia   • Acute cystitis with hematuria   • Lower extremity edema   • Overweight with body mass index (BMI) of 28 to 28.9 in adult   • Dizziness   • Left hip pain   • Bilateral primary osteoarthritis of knee   • Elevated fasting blood sugar   • Solitary kidney, acquired   • Benign essential HTN   • Memory loss   • Campylobacter diarrhea   • Screening for thyroid disorder   • Elevated hemoglobin A1c measurement   •  Vertigo   • Other dysphagia   • Actinic keratosis         Current Outpatient Medications:   •  amLODIPine (NORVASC) 5 MG tablet, Take 1 tablet by mouth Daily., Disp: 90 tablet, Rfl: 3  •  aspirin 81 MG EC tablet, Take 1 tablet by mouth Daily., Disp: 90 tablet, Rfl: 3  •  Biotin 2500 MCG chewable tablet, Chew 2 tablet/day Daily., Disp: , Rfl:   •  carvedilol (COREG) 12.5 MG tablet, Take 1 tablet by mouth 2 (Two) Times a Day., Disp: 180 tablet, Rfl: 3  •  cloNIDine (CATAPRES) 0.1 MG tablet, Take 1 tablet by mouth 2 (Two) Times a Day. ONLY IF OVER 160, Disp: 180 tablet, Rfl: 3  •  denosumab (PROLIA) 60 MG/ML solution prefilled syringe syringe, Inject 1 mL under the skin into the appropriate area as directed 1 (One) Time., Disp: , Rfl:   •  ezetimibe (ZETIA) 10 MG tablet, TAKE 1 TABLET BY MOUTH DAILY, Disp: 90 tablet, Rfl: 3  •  hydrALAZINE (APRESOLINE) 25 MG tablet, Take 1 tablet by mouth 2 (Two) Times a Day., Disp: 180 tablet, Rfl: 1  •  hydroCHLOROthiazide (MICROZIDE) 12.5 MG capsule, Take 1 capsule by mouth Daily., Disp: 90 capsule, Rfl: 3  •  HYDROcodone Bit-Homatrop MBr (HYCODAN) 5-1.5 MG/5ML solution, Take 5 mL by mouth At Night As Needed for Cough., Disp: 180 mL, Rfl: 0  •  omeprazole (priLOSEC) 40 MG capsule, Take 1 capsule by mouth Daily., Disp: 90 capsule, Rfl: 3  •  rosuvastatin (CRESTOR) 5 MG tablet, Take 1 tablet by mouth Daily., Disp: 90 tablet, Rfl: 3  •  SUPER B COMPLEX/C capsule, SUPER B COMPLEX/C ORAL CAPSULE, Disp: , Rfl:   •  tamsulosin (FLOMAX) 0.4 MG capsule 24 hr capsule, Take 1 capsule by mouth Daily., Disp: , Rfl:   •  vitamin D3 125 MCG (5000 UT) capsule capsule, Take 1 capsule by mouth Daily., Disp: , Rfl:   •  famotidine (Pepcid) 40 MG tablet, Take 1 tablet by mouth Daily. (Patient not taking: Reported on 1/27/2025), Disp: 90 tablet, Rfl: 3  •  levoFLOXacin (LEVAQUIN) 500 MG tablet, Take 1 tablet by mouth Daily., Disp: 10 tablet, Rfl: 0  •  meclizine (ANTIVERT) 25 MG tablet, Take 1 tablet by  "mouth 3 (Three) Times a Day As Needed for Dizziness. (Patient not taking: Reported on 1/27/2025), Disp: , Rfl:          Review of Systems   Constitutional:  Negative for chills and diaphoresis.   HENT:  Positive for ear pain, rhinorrhea and sore throat. Negative for trouble swallowing and voice change.    Eyes:  Negative for visual disturbance.   Respiratory:  Positive for cough, shortness of breath and wheezing.    Cardiovascular:  Negative for chest pain and palpitations.   Gastrointestinal:  Negative for abdominal pain and nausea.   Endocrine: Negative for polydipsia and polyphagia.   Genitourinary:  Negative for hematuria.   Musculoskeletal:  Positive for myalgias. Negative for neck stiffness.   Skin:  Negative for color change and pallor.   Allergic/Immunologic: Negative for immunocompromised state.   Neurological:  Negative for seizures and syncope.   Hematological:  Negative for adenopathy.   Psychiatric/Behavioral:  Negative for sleep disturbance and suicidal ideas.        I have reviewed and confirmed the accuracy of the ROS as documented by the MA/LPN/RN Tyron Driver MD      Objective   /68 (BP Location: Right arm, Patient Position: Sitting, Cuff Size: Adult)   Pulse 83   Temp 99.3 °F (37.4 °C) (Temporal)   Resp 18   Ht 157.5 cm (62\")   Wt 73.7 kg (162 lb 6.4 oz)   SpO2 92% Comment: room air  BMI 29.70 kg/m²   BP Readings from Last 3 Encounters:   01/27/25 132/68   10/23/24 118/66   09/05/24 117/64     Wt Readings from Last 3 Encounters:   01/27/25 73.7 kg (162 lb 6.4 oz)   10/23/24 72.8 kg (160 lb 9.6 oz)   09/05/24 73 kg (161 lb)     Physical Exam  Constitutional:       Appearance: Normal appearance. She is well-developed. She is not diaphoretic.   HENT:      Head: Normocephalic.      Right Ear: Hearing, ear canal and external ear normal. A middle ear effusion is present. Tympanic membrane is erythematous.      Left Ear: Hearing, ear canal and external ear normal. A middle ear effusion is " present. Tympanic membrane is erythematous.      Nose: Congestion present.      Right Sinus: Maxillary sinus tenderness and frontal sinus tenderness present.      Left Sinus: Maxillary sinus tenderness and frontal sinus tenderness present.      Mouth/Throat:      Pharynx: Posterior oropharyngeal erythema present.      Tonsils: No tonsillar abscesses. 1+ on the right. 1+ on the left.   Eyes:      General: Lids are normal.      Conjunctiva/sclera: Conjunctivae normal.      Pupils: Pupils are equal, round, and reactive to light.   Neck:      Meningeal: Brudzinski's sign and Kernig's sign absent.   Cardiovascular:      Rate and Rhythm: Normal rate and regular rhythm.      Pulses: Normal pulses.      Heart sounds: Normal heart sounds, S1 normal and S2 normal. No murmur heard.     No friction rub. No gallop.   Pulmonary:      Effort: Pulmonary effort is normal. No accessory muscle usage or respiratory distress.      Breath sounds: No stridor. Examination of the right-upper field reveals wheezing and rhonchi. Examination of the left-upper field reveals wheezing and rhonchi. Examination of the right-middle field reveals wheezing and rhonchi. Examination of the left-middle field reveals wheezing and rhonchi. Examination of the right-lower field reveals wheezing and rhonchi. Examination of the left-lower field reveals wheezing and rhonchi. Wheezing and rhonchi present. No decreased breath sounds or rales.   Abdominal:      General: Bowel sounds are normal. There is no distension.      Palpations: Abdomen is soft. There is no mass.      Tenderness: There is no abdominal tenderness.      Hernia: No hernia is present.   Skin:     General: Skin is warm and dry.      Coloration: Skin is not pale.   Neurological:      Mental Status: She is alert and oriented to person, place, and time.      Cranial Nerves: No cranial nerve deficit.      Coordination: Coordination normal.      Gait: Gait normal.       Data / Lab  "Results:    Hemoglobin A1C   Date Value Ref Range Status   10/23/2024 6.3 (A) 4.5 - 5.7 % Final   07/03/2024 6.5 (A) 4.5 - 5.7 % Final   01/05/2024 6.3 (H) 4.8 - 5.6 % Final     Comment:              Prediabetes: 5.7 - 6.4           Diabetes: >6.4           Glycemic control for adults with diabetes: <7.0     01/03/2024 6.8 (A) 4.5 - 5.7 % Final     Lab Results   Component Value Date    Glucose 127 (H) 06/21/2024    Glucose 120 09/10/2021    Glucose, UA Negative 04/14/2021     Lab Results   Component Value Date    LDL 71 06/21/2024    LDL 91 05/03/2023     (H) 03/11/2022     Lab Results   Component Value Date    CHOL 181 07/24/2018    CHOL 215 (H) 07/21/2017    CHOL 198 07/20/2016     Lab Results   Component Value Date    TRIG 138 06/21/2024    TRIG 131 05/03/2023    TRIG 128 03/11/2022     Lab Results   Component Value Date    HDL 54 06/21/2024    HDL 56 05/03/2023    HDL 52 03/11/2022     No results found for: \"PSA\"  Lab Results   Component Value Date    WBC 9.1 06/21/2024    HGB 12.8 06/21/2024    HCT 38.3 06/21/2024    MCV 88 06/21/2024     06/21/2024     Lab Results   Component Value Date    TSH 4.010 06/21/2024      Lab Results   Component Value Date    GLUCOSE 127 (H) 06/21/2024    BUN 25 06/21/2024    CREATININE 1.28 (H) 06/21/2024    EGFRIFNONA 48 (L) 08/28/2021    EGFRIFAFRI 55 (L) 08/28/2021    BCR 20 06/21/2024    K 4.2 06/21/2024    CO2 28 06/21/2024    CALCIUM 10.8 (H) 06/21/2024    PROTENTOTREF 7.3 06/21/2024    ALBUMIN 4.4 06/21/2024    LABIL2 1.8 01/05/2024    AST 18 06/21/2024    ALT 13 06/21/2024     No results found for: \"ESTHER\", \"RF\", \"SEDRATE\"   No results found for: \"CRP\"   No results found for: \"IRON\", \"TIBC\", \"FERRITIN\"   Lab Results   Component Value Date    NDGHCCEM35 805 05/10/2023          Assessment & Plan      Medications        Problem List         LOS    Acute bacterial bronchitis.  Flu/COVID swabs negative.  Start antibiotic, increase fluid intake.  Call return if fever " or worsening symptoms.  Health maintenance.  Doing well, vaccines current.  She agrees to update her tetanus and pneumonia shots through the pharmacy.  Discussed health maintenance, screening test, lifestyle modification.  Pap current followed by Dr. Patiño, mammogram benign 11/22, further screening declined.  Vertigo.  Much improved/resolved today.  Likely 2nd acute bacterial sinusitis with eustachain tube dysfxn, start antibotics, prednisone.  Increase fluid intake.  Home blood pressure results reviewd / good control. Has had caridology eval, tilt table, brain imagery planned.  Follow up recheck. Call or return if worsening or persistent symptoms.  Risk/benefits/side effects of prednisone Rx discussed, she has tolerated Rx in past without difficulty, okay to decrease or discontinue dose if side effects develop..  Allergic rhinitis.  OTC Claritin or Zyrtec.  Hypertension.  Overall improved today on carvedilol, amlodipine, tolerating decreased dose hydralazine ,Off metoprolol per cardiology followed by Dr. Winston, .  Clinically improved today has been walking regularly.  Hold losartan/has had nephrology eval per Dr. Mcgee,  renal ultrasound/renal artery Doppler benign, has follow-up upcoming..  Clinically improved today on  hydralazine 100 mg twice daily, as needed clonidine.  Monitor blood pressure closely.  Follow-up recheck.  Consider restart amlodipine if persistent elevation.   . Discussed low-sodium diet.  Stress echo benign/elevated right-sided pressure only 2018.  Carotid Dopplers with mild blockage only 2018.  MRI brain benign 2020.  Has had cardiology eval/plan repeat stress testing when blood pressure better controlled per cardilogy recs/cardiology referral scheduled..  Life stress/decreased energy.  Multiple stressors.  Good social support.   Initially improved on Cymbalta/Wellbutrin, she discontinued Rx.  Did not tolerate sertraline.  Consider alternative SSRI if persistent symptoms.  Lower extremity  edema.  Improved currently with decreased dose amlodipine.  Discussed low-sodium diet.  Follow-up recheck.  Consider sleep study if persistent symptoms.  Osteoporosis.  Tolerating Prolia.  Holding calcium/vitamin D per nephrology.   Repeat DEXA improved 2023 on Prolia.  Atrophic kidney/renal insufficiency.  GFR a little worse in the 40s HCTZ recently started, has nephrology follow-up upcoming plan recheck renal function..  Improved status post nephrectomy, followed by urology,nephrology.  Renal function gft 46..  Interstitial cystitis.  Improved on Flomax, followed by [facility], history of InterStim placement/subsequent removal.  I and O catheter dependent.  Hypertensive retinopathy.  Followed by ophthalmology ember galeano, status post treatment  Colon screening.  Colonoscopy benign 2016./Diverticulosis only  Carotid stenosis.  Improved per repeat Dopplers 4/24, moderate on the right, mild on the left, less than 50% bilaterally, history of 50 to 59% on the right, mild on left per Doppler 1/21, stable per repeat ultrasound 5/23.  She is allergic to contrast and gadolinium.  Repeat Doppler 1 year.  Consider vascular surgery referral if worsening symptoms.  Continue risk factor reduction.  Osteoarthritis.  Worse left hip/knee.    Followed by orthopedics Dr. Angel, x-rays with mild left hip OA, moderate left OA knee, attempting knee bracing.  Consider knee injection.  Hyperlipidemia.    Improved today, LDL to 90 rosuvastatin, tolerating Rx.   Aggressive LDL target considering carotid stenosis. .  Discussed diet exercise lifestyle modification.  Follow-up recheck.    Memory loss.  Stable today, MMSE 28, B12 normal, did not tolerate Zoloft.  Good social support.  MRI brain benign 2020.  Follow-up recheck/plan check MMSE.  Consider alternative SSRI, Namenda if worsening symptoms.  Neck pain.  Persistent symptoms today, significant bilateral trapezius spasm on exam today with left upper extremity radiculopathy, x-rays benign,  positive and significant trauma DDx includes ruptured disc versus cervical disc disease, check MRI, add prednisone, continue muscle relaxant.  Consider PT referral, referral for epidural injections.  Follow-up recheck.  Call return if worsening symptoms.  Elevated fasting blood sugar.  Stable/borderline, A1c 6.3.  Discussed diet, exercise and lifestyle modification.  Follow-up recheck.  Epigastric pain / diarrhea.  Improved /Resolved today, stool cultures with Campylobacter, completing course Augmentin, add probiotics.  Overall benign exAm today, no s/s diverticultis.  DDX includes gb pathology, infeciton, gastritis. bloodwork, ruq us Benign.  Has completed course antibitotics, improvrd on ppi, cholestid / bentyl.  Increase fluid intake bland diet. Signs and symptoms of dehydration discussed.. Follow up recheck. Call or return if worsening or persistent symptoms  Acute bacterial bronchitis.  Clinically improved/resolved course antibiotics/steroids.  Dysphagia.  Improved today status post EGD with dilation.  EGD consistent with gastritis, biopsy benign, likely secondary to GERD, she stopped PPI, restart/recommend daily./Ongoing..  Has had gastroenterology eval, repeat EGD upcoming..  Discussed drink plenty of fluids, she feels throughout the, caution with movement, go to ED if worsening symptoms.  Lower extremity edema.  Improved today on HCTZ per cardiology, follow-up recheck kidney function.  New onset, overall mild symptoms today.  DDx includes secondary to peripheral vascular disease, recent steroid use, amlodipine Rx.  Discussed low-sodium diet, compression.  Follow-up recheck.  expect resolution now that she has completed course of prednisone.  Consider decrease amlodipine Rx if persistent symptoms.  Has cardiology follow-up upcoming.  Plantar fasciitis.  Much improved/resolving today.  Ice, rehabilitation exercises discussed, start nighttime bracing/heel cups.  Follow-up recheck.  Consider steroid Rx if  persistent symptoms.  Actinic keratosis.  Lesion neck improved status post freezing, frozen again today.  Second lesion right forearm frozen today.  Topical care discussed.  Expected course discussed.  Follow-up recheck.  Consider repeat freezing if persistent symptoms.        Diagnoses and all orders for this visit:    1. Acute cough (Primary)  -     POCT SARS-CoV-2 + Flu Antigen SYED  -     levoFLOXacin (LEVAQUIN) 500 MG tablet; Take 1 tablet by mouth Daily.  Dispense: 10 tablet; Refill: 0  -     HYDROcodone Bit-Homatrop MBr (HYCODAN) 5-1.5 MG/5ML solution; Take 5 mL by mouth At Night As Needed for Cough.  Dispense: 180 mL; Refill: 0    2. Overweight with body mass index (BMI) of 29 to 29.9 in adult    3. Sore throat  -     POCT SARS-CoV-2 + Flu Antigen SYED  -     levoFLOXacin (LEVAQUIN) 500 MG tablet; Take 1 tablet by mouth Daily.  Dispense: 10 tablet; Refill: 0    4. Earache  -     POCT SARS-CoV-2 + Flu Antigen SYED  -     levoFLOXacin (LEVAQUIN) 500 MG tablet; Take 1 tablet by mouth Daily.  Dispense: 10 tablet; Refill: 0    5. Acute URI  -     levoFLOXacin (LEVAQUIN) 500 MG tablet; Take 1 tablet by mouth Daily.  Dispense: 10 tablet; Refill: 0  -     HYDROcodone Bit-Homatrop MBr (HYCODAN) 5-1.5 MG/5ML solution; Take 5 mL by mouth At Night As Needed for Cough.  Dispense: 180 mL; Refill: 0    6. Stage 3a chronic kidney disease    7. Benign essential HTN        BMI is >= 25 and <30. (Overweight) The following options were offered after discussion;: exercise counseling/recommendations and nutrition counseling/recommendations        Expected course, medications, and adverse effects discussed.  Call or return if worsening or persistent symptoms.  I wore protective equipment throughout this patient encounter including a mask, gloves, and eye protection.  Hand hygiene was performed before donning protective equipment and after removal when leaving the room. The complete contents of the Assessment and Plan and Data/Lab  Results as documented above have been reviewed and addressed by myself with the patient today as part of an ongoing evaluation / treatment plan.  If some of the documentation has been copied from a previous note and is unchanged it indicates that this problem / plan has been assessed today but is stable from a previous visit and no changes have been recommended.

## 2025-02-11 NOTE — PROGRESS NOTES
Subjective   Dunia Fabian is a 74 y.o. female.     Chief Complaint   Patient presents with    Hypertension    Hyperlipidemia    Hyperglycemia    Cough       Hypertension  This is a chronic problem. The current episode started more than 1 year ago. The problem has been gradually improving since onset. The problem is controlled. Pertinent negatives include no blurred vision, chest pain, headaches, malaise/fatigue, neck pain, palpitations, peripheral edema or shortness of breath. (Dizziness, and neck pain.    Patient following up on the medication she was started on the last time she was here. She was started on Bystolic 20 MG tablet. She states that she is still logging her pressures and she states that there has been some higher readings as she takes her reading as soon as she gets up in the morning.   She states that the last time she was here she was instructed to stop the losartan that she was taking and she states that when she went to the pharmacy to get her medication she states that they had the Losartan ready for her and she states that it was for 25 mg which was cut in half from what she was on and she was concerned as she said that she was told to stop it completely. ) There are no associated agents to hypertension. Risk factors for coronary artery disease include post-menopausal state. Current antihypertension treatment includes beta blockers and calcium channel blockers. The current treatment provides moderate improvement. There are no compliance problems.  There is no history of kidney disease, CAD/MI, heart failure or left ventricular hypertrophy. There is no history of chronic renal disease, coarctation of the aorta, hyperaldosteronism, hypercortisolism, hyperparathyroidism, a hypertension causing med, pheochromocytoma, renovascular disease or a thyroid problem.   Hyperlipidemia  This is a chronic problem. The current episode started more than 1 year ago. The problem is controlled. Recent lipid  tests were reviewed and are high. Exacerbating diseases include diabetes. She has no history of chronic renal disease. Factors aggravating her hyperlipidemia include fatty foods. Pertinent negatives include no chest pain, leg pain, myalgias or shortness of breath. Current antihyperlipidemic treatment includes statins. There are no compliance problems.  Risk factors for coronary artery disease include dyslipidemia and hypertension.   Blood Sugar Problem  This is a chronic problem. The current episode started more than 1 year ago. The problem has been unchanged. Associated symptoms include coughing and vertigo. Pertinent negatives include no abdominal pain, chest pain, chills, diaphoresis, fatigue, headaches, myalgias, nausea, neck pain, numbness, sore throat, visual change, vomiting or weakness. The symptoms are aggravated by eating. She has tried nothing for the symptoms.   Cough  This is a recurrent problem. The current episode started 1 to 4 weeks ago. The problem has been comes and goes. The problem occurs every few hours. The cough is Dry. Associated symptoms include ear pain. Pertinent negatives include no chest pain, chills, headaches, myalgias, sore throat or shortness of breath. Associated symptoms comments: Patient reports cough is ongoing for the past few weeks, and also has ongoing right ear discomfort/itching. Nothing aggravates the symptoms.            I personally reviewed and updated the patient's allergies, medications, problem list, and past medical, surgical, social, and family history. I have reviewed and confirmed the accuracy of the History of Present Illness and Review of Symptoms as documented by the MA/TARA/RN. Tyron Driver MD    Family History   Problem Relation Age of Onset    Parkinsonism Mother     Heart disease Mother         cardiovascular disease    Diabetes Mother         diabetes mellitus     Heart disease Father         cardiovascular disease    Heart disease Sister          cardiovascular disease    Diabetes Sister         diabetes mellitus     Heart disease Brother         cardiovascular disease    Diabetes Son         diabetes mellitus     Heart disease Paternal Uncle         ischemic    Stomach cancer Maternal Grandmother     Breast cancer Niece 39    Ovarian cancer Paternal Grandmother        Social History     Tobacco Use    Smoking status: Never     Passive exposure: Never    Smokeless tobacco: Never   Vaping Use    Vaping status: Never Used   Substance Use Topics    Alcohol use: Not Currently    Drug use: No       Past Surgical History:   Procedure Laterality Date    CATARACT EXTRACTION Left 08/2005    with Insert of Lens Prostheti    CYSTOCELE REPAIR  06/1998    SIMENTAL Cystourethropexy & Cystocele Repair     ENDOSCOPY N/A 5/9/2024    Procedure: ESOPHAGOGASTRODUODENOSCOPY WITH BIOPSY X 1, DILATION (#52,#56 BOUGIE);  Surgeon: Jonah Abebe MD;  Location: [facility] ENDOSCOPY;  Service: Gastroenterology;  Laterality: N/A;  GASTRITIS, ESOPHAGITIS, DYSPHASIA, HIATEL HERNIA    KIDNEY SURGERY Right 02/24/2014    Removal    TONSILLECTOMY  1953    URETHROLYSIS  10/2000    Transvaginal Urethrolysis & Bone [place] Sling    VAGINAL HYSTERECTOMY  1970    VITRECTOMY Left 04/2005    & Membrane Peeling       Patient Active Problem List   Diagnosis    Acid reflux    Allergic rhinitis    Anxiety    Atrophic kidney    Carotid bruit present    Carpal tunnel syndrome    Low back pain    Neck pain    Thoracic back pain    Depressive disorder    Medicare annual wellness visit, subsequent    Gout    Hemorrhoids    Mixed hyperlipidemia    IC (interstitial cystitis)    Irritable bowel syndrome with constipation    LVH (left ventricular hypertrophy)    Menopausal syndrome    Mitral insufficiency, acute    Onychomycosis    Disorder of bone and cartilage    Age-related osteoporosis without current pathological fracture    Palpitations    Osteoarthritis    Primary pulmonary hypertension    Renal insufficiency     Plantar fasciitis    Senile osteoporosis    Solitary kidney    Encounter for screening mammogram for malignant neoplasm of breast    Special screening for malignant neoplasms, colon    Seborrheic keratosis    Chronic kidney disease, stage 3 (moderate)    Renal hypertrophy    Hypercalcemia    Acute cystitis with hematuria    Lower extremity edema    Overweight with body mass index (BMI) of 28 to 28.9 in adult    Dizziness    Left hip pain    Bilateral primary osteoarthritis of knee    Elevated fasting blood sugar    Solitary kidney, acquired    Benign essential HTN    Memory loss    Campylobacter diarrhea    Screening for thyroid disorder    Elevated hemoglobin A1c measurement    Vertigo    Other dysphagia    Actinic keratosis         Current Outpatient Medications:     amLODIPine (NORVASC) 5 MG tablet, Take 1 tablet by mouth Daily., Disp: 90 tablet, Rfl: 3    aspirin 81 MG EC tablet, Take 1 tablet by mouth Daily., Disp: 90 tablet, Rfl: 3    Biotin 2500 MCG chewable tablet, Chew 2 tablet/day Daily., Disp: , Rfl:     carvedilol (COREG) 12.5 MG tablet, Take 1 tablet by mouth 2 (Two) Times a Day., Disp: 180 tablet, Rfl: 3    denosumab (PROLIA) 60 MG/ML solution prefilled syringe syringe, Inject 1 mL under the skin into the appropriate area as directed 1 (One) Time., Disp: , Rfl:     ezetimibe (ZETIA) 10 MG tablet, TAKE 1 TABLET BY MOUTH DAILY, Disp: 90 tablet, Rfl: 3    hydrALAZINE (APRESOLINE) 25 MG tablet, Take 1 tablet by mouth 2 (Two) Times a Day., Disp: 180 tablet, Rfl: 1    hydroCHLOROthiazide (MICROZIDE) 12.5 MG capsule, Take 1 capsule by mouth Daily., Disp: 90 capsule, Rfl: 3    omeprazole (priLOSEC) 40 MG capsule, Take 1 capsule by mouth Daily., Disp: 90 capsule, Rfl: 3    rosuvastatin (CRESTOR) 5 MG tablet, Take 1 tablet by mouth Daily., Disp: 90 tablet, Rfl: 3    SUPER B COMPLEX/C capsule, SUPER B COMPLEX/C ORAL CAPSULE, Disp: , Rfl:     tamsulosin (FLOMAX) 0.4 MG capsule 24 hr capsule, Take 1 capsule by  mouth Daily., Disp: , Rfl:     vitamin D3 125 MCG (5000 UT) capsule capsule, Take 1 capsule by mouth Daily., Disp: , Rfl:     azithromycin (ZITHROMAX) 250 MG tablet, Take 2 tablets the first day, then 1 tablet daily for 4 days., Disp: 6 tablet, Rfl: 0    cloNIDine (CATAPRES) 0.1 MG tablet, Take 1 tablet by mouth 2 (Two) Times a Day. ONLY IF OVER 160 (Patient not taking: Reported on 2/12/2025), Disp: 180 tablet, Rfl: 3    famotidine (Pepcid) 40 MG tablet, Take 1 tablet by mouth Daily. (Patient not taking: Reported on 10/23/2024), Disp: 90 tablet, Rfl: 3    HYDROcodone Bit-Homatrop MBr (HYCODAN) 5-1.5 MG/5ML solution, Take 5 mL by mouth At Night As Needed for Cough. (Patient not taking: Reported on 2/12/2025), Disp: 180 mL, Rfl: 0    meclizine (ANTIVERT) 25 MG tablet, Take 1 tablet by mouth 3 (Three) Times a Day As Needed for Dizziness. (Patient not taking: Reported on 10/23/2024), Disp: , Rfl:          Review of Systems   Constitutional:  Negative for chills, diaphoresis, fatigue and malaise/fatigue.   HENT:  Positive for ear pain. Negative for sore throat, trouble swallowing and voice change.    Eyes:  Negative for blurred vision and visual disturbance.   Respiratory:  Positive for cough. Negative for shortness of breath.    Cardiovascular:  Negative for chest pain and palpitations.   Gastrointestinal:  Negative for abdominal pain, nausea and vomiting.   Endocrine: Negative for polydipsia and polyphagia.   Genitourinary:  Negative for hematuria.   Musculoskeletal:  Negative for myalgias, neck pain and neck stiffness.   Skin:  Negative for color change and pallor.   Allergic/Immunologic: Negative for immunocompromised state.   Neurological:  Positive for vertigo. Negative for seizures, syncope, weakness and numbness.   Hematological:  Negative for adenopathy.   Psychiatric/Behavioral:  Negative for hallucinations, sleep disturbance and suicidal ideas.        I have reviewed and confirmed the accuracy of the ROS as  "documented by the MA/LPN/RN Tyron Driver MD      Objective   /68 (BP Location: Right arm, Patient Position: Sitting, Cuff Size: Adult)   Pulse 78   Temp 98.2 °F (36.8 °C) (Temporal)   Resp 18   Ht 157.5 cm (62\")   Wt 71.7 kg (158 lb)   SpO2 97%   BMI 28.90 kg/m²   BP Readings from Last 3 Encounters:   02/12/25 120/68   01/27/25 132/68   10/23/24 118/66     Wt Readings from Last 3 Encounters:   02/12/25 71.7 kg (158 lb)   01/27/25 73.7 kg (162 lb 6.4 oz)   10/23/24 72.8 kg (160 lb 9.6 oz)     Physical Exam  Constitutional:       Appearance: Normal appearance. She is well-developed. She is not diaphoretic.   HENT:      Head: Normocephalic and atraumatic.      Right Ear: Tympanic membrane, ear canal and external ear normal.      Left Ear: Tympanic membrane, ear canal and external ear normal.      Nose: Nose normal.      Mouth/Throat:      Mouth: Mucous membranes are moist.   Eyes:      General: Lids are normal.      Extraocular Movements: Extraocular movements intact.      Conjunctiva/sclera: Conjunctivae normal.      Pupils: Pupils are equal, round, and reactive to light.   Neck:      Thyroid: No thyromegaly.      Vascular: No carotid bruit or JVD.      Trachea: No tracheal deviation.   Cardiovascular:      Rate and Rhythm: Normal rate and regular rhythm.      Heart sounds: Normal heart sounds. No murmur heard.     No friction rub. No gallop.   Pulmonary:      Effort: Pulmonary effort is normal.      Breath sounds: Normal breath sounds. No stridor. No decreased breath sounds, wheezing or rales.   Abdominal:      General: Bowel sounds are normal. There is no distension.      Palpations: Abdomen is soft. There is no mass.      Tenderness: There is no abdominal tenderness. There is no guarding or rebound.      Hernia: No hernia is present.   Lymphadenopathy:      Head:      Right side of head: No submental, submandibular, tonsillar, preauricular, posterior auricular or occipital adenopathy.      Left side " "of head: No submental, submandibular, tonsillar, preauricular, posterior auricular or occipital adenopathy.      Cervical: No cervical adenopathy.   Skin:     General: Skin is warm and dry.      Coloration: Skin is not pale.   Neurological:      Mental Status: She is alert and oriented to person, place, and time.      Cranial Nerves: No cranial nerve deficit.      Sensory: No sensory deficit.      Coordination: Coordination normal.      Gait: Gait normal.      Deep Tendon Reflexes: Reflexes are normal and symmetric.         Data / Lab Results:    Hemoglobin A1C   Date Value Ref Range Status   02/12/2025 6.2 (A) 4.5 - 5.7 % Final   10/23/2024 6.3 (A) 4.5 - 5.7 % Final   07/03/2024 6.5 (A) 4.5 - 5.7 % Final     Lab Results   Component Value Date    Glucose 127 (H) 06/21/2024    Glucose 120 09/10/2021    Glucose, UA Negative 04/14/2021     Lab Results   Component Value Date    LDL 71 06/21/2024    LDL 91 05/03/2023     (H) 03/11/2022     Lab Results   Component Value Date    CHOL 181 07/24/2018    CHOL 215 (H) 07/21/2017    CHOL 198 07/20/2016     Lab Results   Component Value Date    TRIG 138 06/21/2024    TRIG 131 05/03/2023    TRIG 128 03/11/2022     Lab Results   Component Value Date    HDL 54 06/21/2024    HDL 56 05/03/2023    HDL 52 03/11/2022     No results found for: \"PSA\"  Lab Results   Component Value Date    WBC 9.1 06/21/2024    HGB 12.8 06/21/2024    HCT 38.3 06/21/2024    MCV 88 06/21/2024     06/21/2024     Lab Results   Component Value Date    TSH 4.010 06/21/2024      Lab Results   Component Value Date    GLUCOSE 127 (H) 06/21/2024    BUN 25 06/21/2024    CREATININE 1.28 (H) 06/21/2024    EGFRIFNONA 48 (L) 08/28/2021    EGFRIFAFRI 55 (L) 08/28/2021    BCR 20 06/21/2024    K 4.2 06/21/2024    CO2 28 06/21/2024    CALCIUM 10.8 (H) 06/21/2024    ALBUMIN 4.4 06/21/2024    AST 18 06/21/2024    ALT 13 06/21/2024     No results found for: \"ESTHER\", \"RF\", \"SEDRATE\"   No results found for: \"CRP\"   No " "results found for: \"IRON\", \"TIBC\", \"FERRITIN\"   Lab Results   Component Value Date    IJXDIGQB21 805 05/10/2023          Assessment & Plan      Medications        Problem List         LOS    Acute bacterial bronchitis.  Improving today, with persistent sinus effusion continue antibiotics.  Flu/COVID swabs negative.  Start antibiotic, increase fluid intake.  Call return if fever or worsening symptoms.  Health maintenance.  Doing well, vaccines current.  She agrees to update her tetanus and pneumonia shots through the pharmacy.  Discussed health maintenance, screening test, lifestyle modification.  Pap current followed by Dr. Patiño, mammogram benign 11/22, further screening declined.  Vertigo.  Much improved/resolved today.  Likely 2nd acute bacterial sinusitis with eustachain tube dysfxn, start antibotics, prednisone.  Increase fluid intake.  Home blood pressure results reviewd / good control. Has had caridology eval, tilt table, brain imagery planned.  Follow up recheck. Call or return if worsening or persistent symptoms.  Risk/benefits/side effects of prednisone Rx discussed, she has tolerated Rx in past without difficulty, okay to decrease or discontinue dose if side effects develop..  Allergic rhinitis.  OTC Claritin or Zyrtec.  Hypertension.  Overall improved today on carvedilol, amlodipine, tolerating decreased dose hydralazine ,Off metoprolol per cardiology followed by Dr. Winston, .  Clinically improved today has been walking regularly.  Hold losartan/has had nephrology eval per Dr. Mcgee,  renal ultrasound/renal artery Doppler benign, has follow-up upcoming..  Clinically improved today on  hydralazine 100 mg twice daily, as needed clonidine.  Monitor blood pressure closely.  Follow-up recheck.  Consider restart amlodipine if persistent elevation.   . Discussed low-sodium diet.  Stress echo benign/elevated right-sided pressure only 2018.  Carotid Dopplers with mild blockage only 2018.  MRI brain benign 2020.  " Has had cardiology eval/plan repeat stress testing when blood pressure better controlled per cardilogy recs/cardiology referral scheduled..  Life stress/decreased energy.  Multiple stressors.  Good social support.   Initially improved on Cymbalta/Wellbutrin, she discontinued Rx.  Did not tolerate sertraline.  Consider alternative SSRI if persistent symptoms.  Lower extremity edema.  Improved currently with decreased dose amlodipine.  Discussed low-sodium diet.  Follow-up recheck.  Consider sleep study if persistent symptoms.  Osteoporosis.  Tolerating Prolia.  Holding calcium/vitamin D per nephrology.   Repeat DEXA improved 2023 on Prolia.  Atrophic kidney/renal insufficiency.  GFR a little worse in the 40s HCTZ recently started, has nephrology follow-up upcoming plan recheck renal function..  Improved status post nephrectomy, followed by urology,nephrology.  Renal function gft 46..  Interstitial cystitis.  Improved on Flomax, followed by [facility], history of InterStim placement/subsequent removal.  I and O catheter dependent.  Hypertensive retinopathy.  Followed by ophthalmology ember galeano, status post treatment  Colon screening.  Colonoscopy benign 2016./Diverticulosis only  Carotid stenosis.  Improved per repeat Dopplers 4/24, moderate on the right, mild on the left, less than 50% bilaterally, history of 50 to 59% on the right, mild on left per Doppler 1/21, stable per repeat ultrasound 5/23.  She is allergic to contrast and gadolinium.  Repeat Doppler 1 year.  Consider vascular surgery referral if worsening symptoms.  Continue risk factor reduction.  Osteoarthritis.  Worse left hip/knee.    Followed by orthopedics Dr. Angel, x-rays with mild left hip OA, moderate left OA knee, attempting knee bracing.  Consider knee injection.  Hyperlipidemia.    Improved today, LDL to 90 rosuvastatin, tolerating Rx.   Aggressive LDL target considering carotid stenosis. .  Discussed diet exercise lifestyle modification.  Follow-up  recheck.    Memory loss.  Stable today, MMSE 28, B12 normal, did not tolerate Zoloft.  Good social support.  MRI brain benign 2020.  Follow-up recheck/plan check MMSE.  Consider alternative SSRI, Namenda if worsening symptoms.  Neck pain.  Persistent symptoms today, significant bilateral trapezius spasm on exam today with left upper extremity radiculopathy, x-rays benign, positive and significant trauma DDx includes ruptured disc versus cervical disc disease, check MRI, add prednisone, continue muscle relaxant.  Consider PT referral, referral for epidural injections.  Follow-up recheck.  Call return if worsening symptoms.  Elevated fasting blood sugar.  Improved/borderline, A1c 6.3.  Discussed diet, exercise and lifestyle modification.  Follow-up recheck.  Epigastric pain / diarrhea.  Improved /Resolved today, stool cultures with Campylobacter, completing course Augmentin, add probiotics.  Overall benign exAm today, no s/s diverticultis.  DDX includes gb pathology, infeciton, gastritis. bloodwork, ruq us Benign.  Has completed course antibitotics, improvrd on ppi, cholestid / bentyl.  Increase fluid intake bland diet. Signs and symptoms of dehydration discussed.. Follow up recheck. Call or return if worsening or persistent symptoms  Acute bacterial bronchitis.  Clinically improved/resolved course antibiotics/steroids.  Dysphagia.  Improved today status post EGD with dilation.  EGD consistent with gastritis, biopsy benign, likely secondary to GERD, she stopped PPI, restart/recommend daily./Ongoing..  Has had gastroenterology eval, repeat EGD upcoming..  Discussed drink plenty of fluids, she feels throughout the, caution with movement, go to ED if worsening symptoms.  Lower extremity edema.  Improved today on HCTZ per cardiology, follow-up recheck kidney function.  New onset, overall mild symptoms today.  DDx includes secondary to peripheral vascular disease, recent steroid use, amlodipine Rx.  Discussed low-sodium  diet, compression.  Follow-up recheck.  expect resolution now that she has completed course of prednisone.  Consider decrease amlodipine Rx if persistent symptoms.  Has cardiology follow-up upcoming.  Plantar fasciitis.  Much improved/resolving today.  Ice, rehabilitation exercises discussed, start nighttime bracing/heel cups.  Follow-up recheck.  Consider steroid Rx if persistent symptoms.  Actinic keratosis.  Lesion neck improved status post freezing, frozen again today.  Second lesion right forearm frozen today.  Topical care discussed.  Expected course discussed.  Follow-up recheck.  Consider repeat freezing if persistent symptoms.      Diagnoses and all orders for this visit:    1. Benign essential HTN (Primary)    2. Mixed hyperlipidemia    3. Elevated fasting blood sugar  -     POC Glycosylated Hemoglobin (Hb A1C)    4. Acute cough  -     azithromycin (ZITHROMAX) 250 MG tablet; Take 2 tablets the first day, then 1 tablet daily for 4 days.  Dispense: 6 tablet; Refill: 0    5. Overweight with body mass index (BMI) of 28 to 28.9 in adult    6. Age-related osteoporosis without current pathological fracture  -     denosumab (PROLIA) syringe 60 mg        BMI is >= 25 and <30. (Overweight) The following options were offered after discussion;: exercise counseling/recommendations and nutrition counseling/recommendations        Expected course, medications, and adverse effects discussed.  Call or return if worsening or persistent symptoms.  I wore protective equipment throughout this patient encounter including a mask, gloves, and eye protection.  Hand hygiene was performed before donning protective equipment and after removal when leaving the room. The complete contents of the Assessment and Plan and Data/Lab Results as documented above have been reviewed and addressed by myself with the patient today as part of an ongoing evaluation / treatment plan.  If some of the documentation has been copied from a previous note  and is unchanged it indicates that this problem / plan has been assessed today but is stable from a previous visit and no changes have been recommended.

## 2025-02-12 ENCOUNTER — OFFICE VISIT (OUTPATIENT)
Dept: FAMILY MEDICINE CLINIC | Facility: CLINIC | Age: 75
End: 2025-02-12
Payer: MEDICARE

## 2025-02-12 VITALS
RESPIRATION RATE: 18 BRPM | DIASTOLIC BLOOD PRESSURE: 68 MMHG | BODY MASS INDEX: 29.08 KG/M2 | TEMPERATURE: 98.2 F | WEIGHT: 158 LBS | HEIGHT: 62 IN | HEART RATE: 78 BPM | SYSTOLIC BLOOD PRESSURE: 120 MMHG | OXYGEN SATURATION: 97 %

## 2025-02-12 DIAGNOSIS — R05.1 ACUTE COUGH: ICD-10-CM

## 2025-02-12 DIAGNOSIS — M81.0 AGE-RELATED OSTEOPOROSIS WITHOUT CURRENT PATHOLOGICAL FRACTURE: ICD-10-CM

## 2025-02-12 DIAGNOSIS — R73.01 ELEVATED FASTING BLOOD SUGAR: ICD-10-CM

## 2025-02-12 DIAGNOSIS — E78.2 MIXED HYPERLIPIDEMIA: ICD-10-CM

## 2025-02-12 DIAGNOSIS — Z00.00 MEDICARE ANNUAL WELLNESS VISIT, SUBSEQUENT: ICD-10-CM

## 2025-02-12 DIAGNOSIS — I10 BENIGN ESSENTIAL HTN: Primary | ICD-10-CM

## 2025-02-12 DIAGNOSIS — E66.3 OVERWEIGHT WITH BODY MASS INDEX (BMI) OF 28 TO 28.9 IN ADULT: ICD-10-CM

## 2025-02-12 LAB
EXPIRATION DATE: ABNORMAL
HBA1C MFR BLD: 6.2 % (ref 4.5–5.7)
Lab: ABNORMAL

## 2025-02-12 PROCEDURE — 3078F DIAST BP <80 MM HG: CPT | Performed by: FAMILY MEDICINE

## 2025-02-12 PROCEDURE — 83036 HEMOGLOBIN GLYCOSYLATED A1C: CPT | Performed by: FAMILY MEDICINE

## 2025-02-12 PROCEDURE — 3044F HG A1C LEVEL LT 7.0%: CPT | Performed by: FAMILY MEDICINE

## 2025-02-12 PROCEDURE — 99214 OFFICE O/P EST MOD 30 MIN: CPT | Performed by: FAMILY MEDICINE

## 2025-02-12 PROCEDURE — 3074F SYST BP LT 130 MM HG: CPT | Performed by: FAMILY MEDICINE

## 2025-02-12 PROCEDURE — G2211 COMPLEX E/M VISIT ADD ON: HCPCS | Performed by: FAMILY MEDICINE

## 2025-02-12 PROCEDURE — 96372 THER/PROPH/DIAG INJ SC/IM: CPT | Performed by: FAMILY MEDICINE

## 2025-02-12 PROCEDURE — 1126F AMNT PAIN NOTED NONE PRSNT: CPT | Performed by: FAMILY MEDICINE

## 2025-02-12 RX ORDER — AZITHROMYCIN 250 MG/1
TABLET, FILM COATED ORAL
Qty: 6 TABLET | Refills: 0 | Status: SHIPPED | OUTPATIENT
Start: 2025-02-12

## 2025-03-03 ENCOUNTER — PATIENT MESSAGE (OUTPATIENT)
Dept: FAMILY MEDICINE CLINIC | Facility: CLINIC | Age: 75
End: 2025-03-03
Payer: MEDICARE

## 2025-03-03 DIAGNOSIS — R05.8 RECURRENT COUGH: ICD-10-CM

## 2025-03-03 DIAGNOSIS — R05.1 ACUTE COUGH: ICD-10-CM

## 2025-03-03 DIAGNOSIS — J06.9 ACUTE URI: ICD-10-CM

## 2025-03-03 RX ORDER — HYDROCODONE BITARTRATE AND HOMATROPINE METHYLBROMIDE ORAL SOLUTION 5; 1.5 MG/5ML; MG/5ML
5 LIQUID ORAL NIGHTLY PRN
Qty: 180 ML | Refills: 0 | Status: CANCELLED | OUTPATIENT
Start: 2025-03-03

## 2025-03-03 NOTE — TELEPHONE ENCOUNTER
Okay to send in a refill on her Hycodan 5 mL nightly as needed, 180 mL, no refills, she should let me know if she does not improve, thanks

## 2025-03-03 NOTE — TELEPHONE ENCOUNTER
Caller: Dunia Fabian    Relationship: Self    Best call back number: [phone]    What medication are you requesting: COUGH     What are your current symptoms: COUGH, EARS AND THROAT PAIN     If a prescription is needed, what is your preferred pharmacy and phone number: [id number] - [place], [place]  [address] [facility] [address] AT [address] OF  [address] [facility] - [phone]  - [phone] FX     Additional notes:SEEN IN OFFICE 2-12-25

## 2025-03-04 RX ORDER — CEPHALEXIN 500 MG/1
500 CAPSULE ORAL 3 TIMES DAILY
Qty: 30 CAPSULE | Refills: 0 | Status: SHIPPED | OUTPATIENT
Start: 2025-03-04

## 2025-03-04 RX ORDER — HYDROCODONE BITARTRATE AND HOMATROPINE METHYLBROMIDE ORAL SOLUTION 5; 1.5 MG/5ML; MG/5ML
5 LIQUID ORAL NIGHTLY PRN
Qty: 180 ML | Refills: 0 | Status: SHIPPED | OUTPATIENT
Start: 2025-03-04

## 2025-03-04 NOTE — TELEPHONE ENCOUNTER
"\" I need an anabiotic to get rid of this. It’s been hanging on too long.\"    Patient is asking if Antibiotic can be sent in as well.  "

## 2025-03-17 RX ORDER — HYDROCHLOROTHIAZIDE 12.5 MG/1
12.5 CAPSULE ORAL DAILY
Qty: 90 CAPSULE | Refills: 3 | Status: SHIPPED | OUTPATIENT
Start: 2025-03-17

## 2025-03-29 DIAGNOSIS — E78.2 MIXED HYPERLIPIDEMIA: ICD-10-CM

## 2025-03-31 RX ORDER — HYDRALAZINE HYDROCHLORIDE 25 MG/1
25 TABLET, FILM COATED ORAL 2 TIMES DAILY
Qty: 180 TABLET | Refills: 1 | Status: SHIPPED | OUTPATIENT
Start: 2025-03-31

## 2025-03-31 RX ORDER — ROSUVASTATIN CALCIUM 5 MG/1
5 TABLET, COATED ORAL DAILY
Qty: 90 TABLET | Refills: 3 | Status: SHIPPED | OUTPATIENT
Start: 2025-03-31

## 2025-05-20 ENCOUNTER — OFFICE VISIT (OUTPATIENT)
Dept: FAMILY MEDICINE CLINIC | Facility: CLINIC | Age: 75
End: 2025-05-20
Payer: MEDICARE

## 2025-05-20 VITALS
SYSTOLIC BLOOD PRESSURE: 130 MMHG | HEART RATE: 69 BPM | RESPIRATION RATE: 18 BRPM | DIASTOLIC BLOOD PRESSURE: 64 MMHG | OXYGEN SATURATION: 98 % | TEMPERATURE: 97.8 F | WEIGHT: 161.6 LBS | BODY MASS INDEX: 29.74 KG/M2 | HEIGHT: 62 IN

## 2025-05-20 DIAGNOSIS — N18.31 STAGE 3A CHRONIC KIDNEY DISEASE: ICD-10-CM

## 2025-05-20 DIAGNOSIS — R73.03 PREDIABETES: ICD-10-CM

## 2025-05-20 DIAGNOSIS — Z90.5 SOLITARY KIDNEY, ACQUIRED: ICD-10-CM

## 2025-05-20 DIAGNOSIS — I10 BENIGN ESSENTIAL HTN: ICD-10-CM

## 2025-05-20 DIAGNOSIS — N30.90 CYSTITIS: Primary | ICD-10-CM

## 2025-05-20 DIAGNOSIS — R35.0 FREQUENT URINATION: ICD-10-CM

## 2025-05-20 PROBLEM — M25.552 LEFT HIP PAIN: Status: RESOLVED | Noted: 2021-04-27 | Resolved: 2025-05-20

## 2025-05-20 PROBLEM — M54.2 NECK PAIN: Status: RESOLVED | Noted: 2019-08-06 | Resolved: 2025-05-20

## 2025-05-20 LAB
BILIRUB BLD-MCNC: NEGATIVE MG/DL
CLARITY, POC: ABNORMAL
COLOR UR: YELLOW
GLUCOSE UR STRIP-MCNC: NEGATIVE MG/DL
KETONES UR QL: NEGATIVE
LEUKOCYTE EST, POC: ABNORMAL
NITRITE UR-MCNC: POSITIVE MG/ML
PH UR: 5 [PH] (ref 5–8)
PROT UR STRIP-MCNC: ABNORMAL MG/DL
RBC # UR STRIP: NEGATIVE /UL
SP GR UR: 1.01 (ref 1–1.03)
UROBILINOGEN UR QL: ABNORMAL

## 2025-05-20 RX ORDER — SULFAMETHOXAZOLE AND TRIMETHOPRIM 800; 160 MG/1; MG/1
1 TABLET ORAL 2 TIMES DAILY
Qty: 20 TABLET | Refills: 0 | Status: SHIPPED | OUTPATIENT
Start: 2025-05-20

## 2025-05-20 NOTE — ASSESSMENT & PLAN NOTE
A1c 6.2 02/2025, she has apt 07/09/2025. The importance of diet and lots of exercise discussed and understood so as to potentially avoid DM.

## 2025-05-20 NOTE — PROGRESS NOTES
Chief Complaint  Urinary Tract Infection, Hypertension, and Chronic Kidney Disease    Subjective     CC  Problem List  Visit Diagnosis   Encounters  Notes  Medications  Labs  Result Review Imaging  Media    Dunia Fabian presents to [facility] for   Urinary Tract Infection  Chronicity:  New  Onset:  In the past 7 days  Frequency:  Constantly  Progression since onset:  Unchanged  Pain quality:  Burning  Pain - numeric:  6/10  Pain severity:  Moderate  Fever:  No fever  Associated symptoms: flank pain and urgency    Associated symptoms: no chills, no discharge, no hematuria, no hesitancy, no nausea, no possible pregnancy and no vomiting    Associated symptoms comment:  Burning with urination  Treatments tried:  Acetaminophen  Improvement on treatment:  Mild  Hypertension  Onset:  More than 1 year ago  Progression since onset:  Stable  Condition status:  Controlled  Associated symptoms: no blurred vision, no chest pain, no headaches, no malaise/fatigue, no palpitations, no peripheral edema, no shortness of breath, no dyspnea with exertion and no dizziness    CAD risks:  Dyslipidemia, post-menopausal state and family history  Current therapy:  Beta blockers, calcium channel blockers and diuretics  Improvement on treatment:  Significant  Compliance problems:  No compliance problems  Chronic Kidney Disease  Symptoms are chronic.   Onset was more than 5 years.   Symptoms have been unchanged since onset.   Pertinent negative symptoms include no abdominal pain, no joint pain, no chest pain, no chills, no fever, no headaches, no joint swelling, no myalgias, no nausea, no rash, no swollen glands, no vertigo, no visual change, no vomiting and no weakness.   Treatments tried: Hydrochlorothiazide.   Blood Sugar Problem  Symptoms are chronic.   Onset was 6 to12 months.   Symptoms have been improved (A1c 6.2 02/2025) since onset.   Pertinent negative symptoms include no abdominal pain,  "no joint pain, no chest pain, no chills, no fever, no headaches, no joint swelling, no myalgias, no nausea, no rash, no swollen glands, no vertigo, no visual change, no vomiting and no weakness.       Review of Systems   Constitutional:  Negative for appetite change, chills, fever and malaise/fatigue.   HENT:  Negative for swollen glands.    Eyes:  Negative for blurred vision.   Respiratory:  Negative for shortness of breath.    Cardiovascular:  Negative for chest pain and palpitations.   Gastrointestinal:  Negative for abdominal pain, diarrhea, nausea and vomiting.   Endocrine: Negative for cold intolerance, heat intolerance, polydipsia and polyuria.   Genitourinary:  Positive for flank pain and urgency. Negative for hematuria and hesitancy.   Musculoskeletal:  Negative for joint pain and myalgias.   Skin:  Negative for rash.   Neurological:  Negative for dizziness, vertigo and weakness.   Hematological:  Negative for adenopathy. Does not bruise/bleed easily.        Objective   Vital Signs:   /64 (BP Location: Right arm, Patient Position: Sitting, Cuff Size: Adult)   Pulse 69   Temp 97.8 °F (36.6 °C) (Temporal)   Resp 18   Ht 157.5 cm (62\")   Wt 73.3 kg (161 lb 9.6 oz)   SpO2 98%   BMI 29.56 kg/m²     Physical Exam  Constitutional:       General: She is not in acute distress.  Cardiovascular:      Rate and Rhythm: Normal rate and regular rhythm.      Pulses: Normal pulses.      Heart sounds: No murmur heard.  Pulmonary:      Effort: Pulmonary effort is normal.      Breath sounds: Normal breath sounds. No wheezing.   Abdominal:      General: Abdomen is flat. Bowel sounds are normal.      Palpations: Abdomen is soft.      Tenderness: There is no right CVA tenderness or left CVA tenderness.   Musculoskeletal:      Cervical back: Neck supple.      Right lower leg: No edema.      Left lower leg: No edema.   Lymphadenopathy:      Cervical: No cervical adenopathy.   Skin:     Findings: No rash. "   Neurological:      Mental Status: She is alert.        Result Review :Labs  Result Review  Imaging  Med Tab  Media                 Assessment and Plan CC Problem List  Visit Diagnosis  ROS  Review (Popup)  Health Maintenance  Quality  BestPractice  Medications  SmartSets  SnapShot Encounters  Media  Problem List Items Addressed This Visit          Unprioritized    Chronic kidney disease, stage 3 (moderate)    Overview   Creat 1.1 GFR 51 11/2024         Current Assessment & Plan     She is encouraged to increase fluids and follow.          Solitary kidney, acquired    Benign essential HTN    Current Assessment & Plan   Hypertension is stable and controlled  Continue current treatment regimen.  Blood pressure will be reassessed in 3 months.         Prediabetes    Current Assessment & Plan   A1c 6.2 02/2025, she has apt 07/09/2025. The importance of diet and lots of exercise discussed and understood so as to potentially avoid DM.           Other Visit Diagnoses         Cystitis    -  Primary    abx fluids, cx notify of results, f.u if no improvement    Relevant Medications    sulfamethoxazole-trimethoprim (BACTRIM DS,SEPTRA DS) 800-160 MG per tablet    Other Relevant Orders    POCT urinalysis dipstick, manual (Completed)    Urine Culture - Urine, Urine, Random Void      Frequent urination        Relevant Orders    POCT urinalysis dipstick, manual (Completed)            Follow Up Instructions Charge Capture  Follow-up Communications  Return in about 2 months (around 7/20/2025), or if symptoms worsen or fail to improve.  Patient was given instructions and counseling regarding her condition or for health maintenance advice. Please see specific information pulled into the AVS if appropriate.

## 2025-05-24 DIAGNOSIS — N30.90 CYSTITIS: Primary | ICD-10-CM

## 2025-05-24 LAB
BACTERIA UR CULT: ABNORMAL
BACTERIA UR CULT: ABNORMAL
OTHER ANTIBIOTIC SUSC ISLT: ABNORMAL

## 2025-05-24 RX ORDER — NITROFURANTOIN 25; 75 MG/1; MG/1
100 CAPSULE ORAL 2 TIMES DAILY
Qty: 14 CAPSULE | Refills: 0 | Status: SHIPPED | OUTPATIENT
Start: 2025-05-24

## 2025-08-04 ENCOUNTER — OFFICE VISIT (OUTPATIENT)
Dept: FAMILY MEDICINE CLINIC | Facility: CLINIC | Age: 75
End: 2025-08-04
Payer: MEDICARE

## 2025-08-04 VITALS
OXYGEN SATURATION: 96 % | TEMPERATURE: 98.2 F | SYSTOLIC BLOOD PRESSURE: 138 MMHG | HEART RATE: 69 BPM | RESPIRATION RATE: 22 BRPM | DIASTOLIC BLOOD PRESSURE: 72 MMHG | WEIGHT: 162.4 LBS | HEIGHT: 61 IN | BODY MASS INDEX: 30.66 KG/M2

## 2025-08-04 DIAGNOSIS — Z12.4 SCREENING FOR MALIGNANT NEOPLASM OF CERVIX: ICD-10-CM

## 2025-08-04 DIAGNOSIS — E78.2 MIXED HYPERLIPIDEMIA: ICD-10-CM

## 2025-08-04 DIAGNOSIS — N28.9 RENAL INSUFFICIENCY: ICD-10-CM

## 2025-08-04 DIAGNOSIS — N30.90 CYSTITIS: ICD-10-CM

## 2025-08-04 DIAGNOSIS — L82.1 SEBORRHEIC KERATOSIS: ICD-10-CM

## 2025-08-04 DIAGNOSIS — Z90.5 SOLITARY KIDNEY, ACQUIRED: ICD-10-CM

## 2025-08-04 DIAGNOSIS — R35.0 FREQUENT URINATION: ICD-10-CM

## 2025-08-04 DIAGNOSIS — R73.01 ELEVATED FASTING BLOOD SUGAR: ICD-10-CM

## 2025-08-04 DIAGNOSIS — I10 BENIGN ESSENTIAL HTN: ICD-10-CM

## 2025-08-04 DIAGNOSIS — Z00.00 MEDICARE ANNUAL WELLNESS VISIT, SUBSEQUENT: Primary | ICD-10-CM

## 2025-08-04 DIAGNOSIS — N30.10 IC (INTERSTITIAL CYSTITIS): ICD-10-CM

## 2025-08-04 DIAGNOSIS — E66.3 OVERWEIGHT WITH BODY MASS INDEX (BMI) OF 29 TO 29.9 IN ADULT: ICD-10-CM

## 2025-08-04 DIAGNOSIS — Z23 NEED FOR PNEUMOCOCCAL VACCINATION: ICD-10-CM

## 2025-08-04 DIAGNOSIS — Z12.11 SCREENING FOR MALIGNANT NEOPLASM OF COLON: ICD-10-CM

## 2025-08-04 LAB
BILIRUB BLD-MCNC: NEGATIVE MG/DL
CLARITY, POC: ABNORMAL
COLOR UR: YELLOW
EXPIRATION DATE: ABNORMAL
GLUCOSE UR STRIP-MCNC: NEGATIVE MG/DL
HBA1C MFR BLD: 6 % (ref 4.5–5.7)
KETONES UR QL: NEGATIVE
LEUKOCYTE EST, POC: ABNORMAL
Lab: ABNORMAL
NITRITE UR-MCNC: POSITIVE MG/ML
PH UR: 7.5 [PH] (ref 5–8)
PROT UR STRIP-MCNC: ABNORMAL MG/DL
RBC # UR STRIP: ABNORMAL /UL
SP GR UR: 1.01 (ref 1–1.03)
UROBILINOGEN UR QL: NORMAL

## 2025-08-04 PROCEDURE — G0444 DEPRESSION SCREEN ANNUAL: HCPCS | Performed by: FAMILY MEDICINE

## 2025-08-04 PROCEDURE — 1170F FXNL STATUS ASSESSED: CPT | Performed by: FAMILY MEDICINE

## 2025-08-04 PROCEDURE — 3075F SYST BP GE 130 - 139MM HG: CPT | Performed by: FAMILY MEDICINE

## 2025-08-04 PROCEDURE — G0439 PPPS, SUBSEQ VISIT: HCPCS | Performed by: FAMILY MEDICINE

## 2025-08-04 PROCEDURE — G0009 ADMIN PNEUMOCOCCAL VACCINE: HCPCS | Performed by: FAMILY MEDICINE

## 2025-08-04 PROCEDURE — 90684 PCV21 VACCINE IM: CPT | Performed by: FAMILY MEDICINE

## 2025-08-04 PROCEDURE — 99214 OFFICE O/P EST MOD 30 MIN: CPT | Performed by: FAMILY MEDICINE

## 2025-08-04 PROCEDURE — 3078F DIAST BP <80 MM HG: CPT | Performed by: FAMILY MEDICINE

## 2025-08-04 PROCEDURE — 99497 ADVNCD CARE PLAN 30 MIN: CPT | Performed by: FAMILY MEDICINE

## 2025-08-04 PROCEDURE — 96160 PT-FOCUSED HLTH RISK ASSMT: CPT | Performed by: FAMILY MEDICINE

## 2025-08-04 PROCEDURE — 3044F HG A1C LEVEL LT 7.0%: CPT | Performed by: FAMILY MEDICINE

## 2025-08-04 PROCEDURE — 1125F AMNT PAIN NOTED PAIN PRSNT: CPT | Performed by: FAMILY MEDICINE

## 2025-08-04 PROCEDURE — 81002 URINALYSIS NONAUTO W/O SCOPE: CPT | Performed by: FAMILY MEDICINE

## 2025-08-04 PROCEDURE — 83036 HEMOGLOBIN GLYCOSYLATED A1C: CPT | Performed by: FAMILY MEDICINE

## 2025-08-04 RX ORDER — NITROFURANTOIN 25; 75 MG/1; MG/1
100 CAPSULE ORAL 2 TIMES DAILY
Qty: 20 CAPSULE | Refills: 0 | Status: SHIPPED | OUTPATIENT
Start: 2025-08-04

## 2025-08-05 ENCOUNTER — TELEPHONE (OUTPATIENT)
Dept: FAMILY MEDICINE CLINIC | Facility: CLINIC | Age: 75
End: 2025-08-05
Payer: MEDICARE

## (undated) DEVICE — BITEBLOCK ENDO W/STRAP 60F A/ LF DISP

## (undated) DEVICE — SINGLE-USE BIOPSY FORCEPS: Brand: RADIAL JAW 4

## (undated) DEVICE — PK ENDO GI 50